# Patient Record
Sex: FEMALE | Race: BLACK OR AFRICAN AMERICAN | Employment: PART TIME
[De-identification: names, ages, dates, MRNs, and addresses within clinical notes are randomized per-mention and may not be internally consistent; named-entity substitution may affect disease eponyms.]

---

## 2017-01-05 ENCOUNTER — OFFICE VISIT (OUTPATIENT)
Dept: INTERNAL MEDICINE | Facility: CLINIC | Age: 47
End: 2017-01-05

## 2017-01-05 VITALS
BODY MASS INDEX: 37.11 KG/M2 | TEMPERATURE: 98.2 F | OXYGEN SATURATION: 99 % | RESPIRATION RATE: 14 BRPM | HEART RATE: 50 BPM | SYSTOLIC BLOOD PRESSURE: 124 MMHG | DIASTOLIC BLOOD PRESSURE: 79 MMHG | WEIGHT: 190 LBS

## 2017-01-05 DIAGNOSIS — I10 ESSENTIAL HYPERTENSION: ICD-10-CM

## 2017-01-05 DIAGNOSIS — J20.8 ACUTE BRONCHITIS DUE TO OTHER SPECIFIED ORGANISMS: Primary | ICD-10-CM

## 2017-01-05 DIAGNOSIS — H10.13 ALLERGIC CONJUNCTIVITIS, BILATERAL: ICD-10-CM

## 2017-01-05 PROCEDURE — 99213 OFFICE O/P EST LOW 20 MIN: CPT | Performed by: INTERNAL MEDICINE

## 2017-01-05 RX ORDER — OLOPATADINE HYDROCHLORIDE 1 MG/ML
1 SOLUTION/ DROPS OPHTHALMIC 2 TIMES DAILY
Qty: 1 BOTTLE | Refills: 0 | Status: SHIPPED | OUTPATIENT
Start: 2017-01-05 | End: 2017-01-06

## 2017-01-05 RX ORDER — AZITHROMYCIN 250 MG/1
TABLET, FILM COATED ORAL
Qty: 1 PACKET | Refills: 0 | Status: SHIPPED | OUTPATIENT
Start: 2017-01-05 | End: 2017-01-15

## 2017-01-05 RX ORDER — BENZONATATE 100 MG/1
100 CAPSULE ORAL 3 TIMES DAILY PRN
Qty: 30 CAPSULE | Refills: 0 | Status: SHIPPED | OUTPATIENT
Start: 2017-01-05 | End: 2017-01-12

## 2017-01-05 ASSESSMENT — PATIENT HEALTH QUESTIONNAIRE - PHQ9
6. FEELING BAD ABOUT YOURSELF - OR THAT YOU ARE A FAILURE OR HAVE LET YOURSELF OR YOUR FAMILY DOWN: 1
SUM OF ALL RESPONSES TO PHQ QUESTIONS 1-9: 18
SUM OF ALL RESPONSES TO PHQ9 QUESTIONS 1 & 2: 3
2. FEELING DOWN, DEPRESSED OR HOPELESS: 1
9. THOUGHTS THAT YOU WOULD BE BETTER OFF DEAD, OR OF HURTING YOURSELF: 0
3. TROUBLE FALLING OR STAYING ASLEEP: 3
4. FEELING TIRED OR HAVING LITTLE ENERGY: 3
8. MOVING OR SPEAKING SO SLOWLY THAT OTHER PEOPLE COULD HAVE NOTICED. OR THE OPPOSITE, BEING SO FIGETY OR RESTLESS THAT YOU HAVE BEEN MOVING AROUND A LOT MORE THAN USUAL: 2
7. TROUBLE CONCENTRATING ON THINGS, SUCH AS READING THE NEWSPAPER OR WATCHING TELEVISION: 3
5. POOR APPETITE OR OVEREATING: 3
10. IF YOU CHECKED OFF ANY PROBLEMS, HOW DIFFICULT HAVE THESE PROBLEMS MADE IT FOR YOU TO DO YOUR WORK, TAKE CARE OF THINGS AT HOME, OR GET ALONG WITH OTHER PEOPLE: 1
1. LITTLE INTEREST OR PLEASURE IN DOING THINGS: 2

## 2017-01-05 ASSESSMENT — ENCOUNTER SYMPTOMS
EYE ITCHING: 1
COUGH: 1
GASTROINTESTINAL NEGATIVE: 1
ALLERGIC/IMMUNOLOGIC NEGATIVE: 1

## 2017-01-06 ENCOUNTER — TELEPHONE (OUTPATIENT)
Dept: INTERNAL MEDICINE | Facility: CLINIC | Age: 47
End: 2017-01-06

## 2017-01-06 DIAGNOSIS — F32.89 OTHER DEPRESSION: Primary | ICD-10-CM

## 2017-01-06 RX ORDER — AZELASTINE HYDROCHLORIDE 0.5 MG/ML
1 SOLUTION/ DROPS OPHTHALMIC 2 TIMES DAILY
Qty: 1 BOTTLE | Refills: 0 | Status: SHIPPED | OUTPATIENT
Start: 2017-01-06 | End: 2017-01-30 | Stop reason: SDUPTHER

## 2017-01-30 RX ORDER — AZELASTINE HYDROCHLORIDE 0.5 MG/ML
1 SOLUTION/ DROPS OPHTHALMIC 2 TIMES DAILY
Qty: 1 BOTTLE | Refills: 0 | Status: SHIPPED | OUTPATIENT
Start: 2017-01-30 | End: 2017-05-01 | Stop reason: ALTCHOICE

## 2017-02-22 ENCOUNTER — TELEPHONE (OUTPATIENT)
Dept: INTERNAL MEDICINE | Facility: CLINIC | Age: 47
End: 2017-02-22

## 2017-02-22 ENCOUNTER — APPOINTMENT (OUTPATIENT)
Dept: GENERAL RADIOLOGY | Age: 47
End: 2017-02-22
Payer: MEDICARE

## 2017-02-22 ENCOUNTER — HOSPITAL ENCOUNTER (EMERGENCY)
Age: 47
Discharge: HOME | End: 2017-02-22
Attending: EMERGENCY MEDICINE
Payer: MEDICARE

## 2017-02-22 VITALS
BODY MASS INDEX: 37.89 KG/M2 | HEIGHT: 60 IN | TEMPERATURE: 97.9 F | DIASTOLIC BLOOD PRESSURE: 84 MMHG | SYSTOLIC BLOOD PRESSURE: 136 MMHG | WEIGHT: 193 LBS | RESPIRATION RATE: 15 BRPM | HEART RATE: 60 BPM | OXYGEN SATURATION: 100 %

## 2017-02-22 DIAGNOSIS — R07.9 CHEST PAIN, UNSPECIFIED TYPE: Primary | ICD-10-CM

## 2017-02-22 LAB
ABSOLUTE EOS #: 0.1 K/UL (ref 0–0.4)
ABSOLUTE LYMPH #: 2.2 K/UL (ref 1–4.8)
ABSOLUTE MONO #: 0.5 K/UL (ref 0.1–1.2)
ANION GAP SERPL CALCULATED.3IONS-SCNC: 13 MMOL/L (ref 9–17)
BASOPHILS # BLD: 1 % (ref 0–2)
BASOPHILS ABSOLUTE: 0 K/UL (ref 0–0.2)
BUN BLDV-MCNC: 11 MG/DL (ref 6–20)
BUN/CREAT BLD: ABNORMAL (ref 9–20)
CALCIUM SERPL-MCNC: 9.8 MG/DL (ref 8.6–10.4)
CHLORIDE BLD-SCNC: 102 MMOL/L (ref 98–107)
CO2: 26 MMOL/L (ref 20–31)
CREAT SERPL-MCNC: 0.81 MG/DL (ref 0.5–0.9)
D-DIMER QUANTITATIVE: 0.18 MG/L FEU
DIFFERENTIAL TYPE: ABNORMAL
EOSINOPHILS RELATIVE PERCENT: 2 % (ref 1–4)
GFR AFRICAN AMERICAN: >60 ML/MIN
GFR NON-AFRICAN AMERICAN: >60 ML/MIN
GFR SERPL CREATININE-BSD FRML MDRD: ABNORMAL ML/MIN/{1.73_M2}
GFR SERPL CREATININE-BSD FRML MDRD: ABNORMAL ML/MIN/{1.73_M2}
GLUCOSE BLD-MCNC: 105 MG/DL (ref 70–99)
HCT VFR BLD CALC: 35.4 % (ref 36–46)
HEMOGLOBIN: 11.5 G/DL (ref 12–16)
LYMPHOCYTES # BLD: 37 % (ref 24–44)
MCH RBC QN AUTO: 26.8 PG (ref 26–34)
MCHC RBC AUTO-ENTMCNC: 32.6 G/DL (ref 31–37)
MCV RBC AUTO: 82.3 FL (ref 80–100)
MONOCYTES # BLD: 8 % (ref 2–11)
PDW BLD-RTO: 15.1 % (ref 12.5–15.4)
PLATELET # BLD: 312 K/UL (ref 140–450)
PLATELET ESTIMATE: ABNORMAL
PMV BLD AUTO: 9.1 FL (ref 6–12)
POC TROPONIN I: 0 NG/ML (ref 0–0.1)
POC TROPONIN I: 0 NG/ML (ref 0–0.1)
POC TROPONIN INTERP: NORMAL
POC TROPONIN INTERP: NORMAL
POTASSIUM SERPL-SCNC: 4.5 MMOL/L (ref 3.7–5.3)
RBC # BLD: 4.3 M/UL (ref 4–5.2)
RBC # BLD: ABNORMAL 10*6/UL
SEG NEUTROPHILS: 52 % (ref 36–66)
SEGMENTED NEUTROPHILS ABSOLUTE COUNT: 3.2 K/UL (ref 1.8–7.7)
SODIUM BLD-SCNC: 141 MMOL/L (ref 135–144)
WBC # BLD: 6 K/UL (ref 3.5–11)
WBC # BLD: ABNORMAL 10*3/UL

## 2017-02-22 PROCEDURE — 6360000002 HC RX W HCPCS: Performed by: EMERGENCY MEDICINE

## 2017-02-22 PROCEDURE — 96376 TX/PRO/DX INJ SAME DRUG ADON: CPT

## 2017-02-22 PROCEDURE — 84484 ASSAY OF TROPONIN QUANT: CPT

## 2017-02-22 PROCEDURE — 6370000000 HC RX 637 (ALT 250 FOR IP): Performed by: EMERGENCY MEDICINE

## 2017-02-22 PROCEDURE — 80048 BASIC METABOLIC PNL TOTAL CA: CPT

## 2017-02-22 PROCEDURE — 85025 COMPLETE CBC W/AUTO DIFF WBC: CPT

## 2017-02-22 PROCEDURE — 85379 FIBRIN DEGRADATION QUANT: CPT

## 2017-02-22 PROCEDURE — 71020 XR CHEST STANDARD TWO VW: CPT

## 2017-02-22 PROCEDURE — 96374 THER/PROPH/DIAG INJ IV PUSH: CPT

## 2017-02-22 PROCEDURE — 99285 EMERGENCY DEPT VISIT HI MDM: CPT

## 2017-02-22 PROCEDURE — 93005 ELECTROCARDIOGRAM TRACING: CPT

## 2017-02-22 PROCEDURE — 71020 XR CHEST STANDARD TWO VW: CPT | Performed by: RADIOLOGY

## 2017-02-22 RX ORDER — MORPHINE SULFATE 4 MG/ML
4 INJECTION, SOLUTION INTRAMUSCULAR; INTRAVENOUS ONCE
Status: COMPLETED | OUTPATIENT
Start: 2017-02-22 | End: 2017-02-22

## 2017-02-22 RX ORDER — MORPHINE SULFATE 2 MG/ML
2 INJECTION, SOLUTION INTRAMUSCULAR; INTRAVENOUS ONCE
Status: COMPLETED | OUTPATIENT
Start: 2017-02-22 | End: 2017-02-22

## 2017-02-22 RX ORDER — ALPRAZOLAM 0.25 MG/1
0.5 TABLET ORAL ONCE
Status: COMPLETED | OUTPATIENT
Start: 2017-02-22 | End: 2017-02-22

## 2017-02-22 RX ORDER — ASPIRIN 81 MG/1
243 TABLET, CHEWABLE ORAL ONCE
Status: COMPLETED | OUTPATIENT
Start: 2017-02-22 | End: 2017-02-22

## 2017-02-22 RX ORDER — IBUPROFEN 600 MG/1
600 TABLET ORAL EVERY 6 HOURS PRN
Qty: 15 TABLET | Refills: 0 | Status: SHIPPED | OUTPATIENT
Start: 2017-02-22 | End: 2017-03-02 | Stop reason: ALTCHOICE

## 2017-02-22 RX ADMIN — ASPIRIN 81 MG 243 MG: 81 TABLET ORAL at 17:22

## 2017-02-22 RX ADMIN — MORPHINE SULFATE 4 MG: 4 INJECTION, SOLUTION INTRAMUSCULAR; INTRAVENOUS at 17:22

## 2017-02-22 RX ADMIN — ALPRAZOLAM 0.5 MG: 0.25 TABLET ORAL at 17:22

## 2017-02-22 RX ADMIN — MORPHINE SULFATE 2 MG: 2 INJECTION, SOLUTION INTRAMUSCULAR; INTRAVENOUS at 19:40

## 2017-02-22 ASSESSMENT — PAIN SCALES - GENERAL
PAINLEVEL_OUTOF10: 8
PAINLEVEL_OUTOF10: 10
PAINLEVEL_OUTOF10: 8

## 2017-02-22 ASSESSMENT — PAIN DESCRIPTION - DESCRIPTORS: DESCRIPTORS: PRESSURE

## 2017-02-22 ASSESSMENT — PAIN DESCRIPTION - FREQUENCY: FREQUENCY: CONTINUOUS

## 2017-02-22 ASSESSMENT — PAIN DESCRIPTION - ORIENTATION: ORIENTATION: MID

## 2017-02-22 ASSESSMENT — PAIN DESCRIPTION - LOCATION: LOCATION: CHEST

## 2017-02-22 ASSESSMENT — PAIN DESCRIPTION - PAIN TYPE: TYPE: ACUTE PAIN

## 2017-02-24 LAB
EKG ATRIAL RATE: 61 BPM
EKG P AXIS: 64 DEGREES
EKG P-R INTERVAL: 132 MS
EKG Q-T INTERVAL: 382 MS
EKG QRS DURATION: 80 MS
EKG QTC CALCULATION (BAZETT): 384 MS
EKG R AXIS: 11 DEGREES
EKG T AXIS: 18 DEGREES
EKG VENTRICULAR RATE: 61 BPM

## 2017-03-02 ENCOUNTER — OFFICE VISIT (OUTPATIENT)
Dept: INTERNAL MEDICINE | Facility: CLINIC | Age: 47
End: 2017-03-02

## 2017-03-02 VITALS
TEMPERATURE: 97.8 F | SYSTOLIC BLOOD PRESSURE: 147 MMHG | BODY MASS INDEX: 38.48 KG/M2 | OXYGEN SATURATION: 100 % | DIASTOLIC BLOOD PRESSURE: 86 MMHG | HEIGHT: 60 IN | WEIGHT: 196 LBS | HEART RATE: 56 BPM

## 2017-03-02 DIAGNOSIS — R20.2 NUMBNESS AND TINGLING: ICD-10-CM

## 2017-03-02 DIAGNOSIS — R13.12 DYSPHAGIA, OROPHARYNGEAL: ICD-10-CM

## 2017-03-02 DIAGNOSIS — Z98.890 S/P CARDIAC CATH: ICD-10-CM

## 2017-03-02 DIAGNOSIS — I10 ESSENTIAL HYPERTENSION: ICD-10-CM

## 2017-03-02 DIAGNOSIS — K21.00 CHRONIC REFLUX ESOPHAGITIS: ICD-10-CM

## 2017-03-02 DIAGNOSIS — E78.2 MIXED HYPERLIPIDEMIA: ICD-10-CM

## 2017-03-02 DIAGNOSIS — K21.9 GASTROESOPHAGEAL REFLUX DISEASE WITHOUT ESOPHAGITIS: ICD-10-CM

## 2017-03-02 DIAGNOSIS — R20.0 NUMBNESS AND TINGLING: ICD-10-CM

## 2017-03-02 PROCEDURE — 99213 OFFICE O/P EST LOW 20 MIN: CPT | Performed by: INTERNAL MEDICINE

## 2017-03-02 RX ORDER — CARVEDILOL 6.25 MG/1
6.25 TABLET ORAL 2 TIMES DAILY
Qty: 60 TABLET | Refills: 3 | Status: SHIPPED | OUTPATIENT
Start: 2017-03-02 | End: 2017-08-23 | Stop reason: SDUPTHER

## 2017-03-02 RX ORDER — ATORVASTATIN CALCIUM 20 MG/1
20 TABLET, FILM COATED ORAL DAILY
Qty: 30 TABLET | Refills: 3 | Status: SHIPPED | OUTPATIENT
Start: 2017-03-02 | End: 2017-08-23 | Stop reason: SDUPTHER

## 2017-03-02 RX ORDER — GABAPENTIN 100 MG/1
100 CAPSULE ORAL 3 TIMES DAILY
Qty: 90 CAPSULE | Refills: 3 | Status: SHIPPED | OUTPATIENT
Start: 2017-03-02 | End: 2019-04-12

## 2017-03-02 RX ORDER — VALSARTAN AND HYDROCHLOROTHIAZIDE 80; 12.5 MG/1; MG/1
1 TABLET, FILM COATED ORAL DAILY
Qty: 30 TABLET | Refills: 3 | Status: SHIPPED | OUTPATIENT
Start: 2017-03-02 | End: 2017-08-23 | Stop reason: SDUPTHER

## 2017-03-02 RX ORDER — OMEPRAZOLE 40 MG/1
40 CAPSULE, DELAYED RELEASE ORAL 2 TIMES DAILY
Qty: 60 CAPSULE | Refills: 3 | Status: SHIPPED | OUTPATIENT
Start: 2017-03-02 | End: 2017-10-05 | Stop reason: SDUPTHER

## 2017-03-02 ASSESSMENT — ENCOUNTER SYMPTOMS
EYES NEGATIVE: 1
GASTROINTESTINAL NEGATIVE: 1
ALLERGIC/IMMUNOLOGIC NEGATIVE: 1
RESPIRATORY NEGATIVE: 1

## 2017-03-28 ENCOUNTER — TELEPHONE (OUTPATIENT)
Dept: GASTROENTEROLOGY | Age: 47
End: 2017-03-28

## 2017-04-03 ENCOUNTER — TELEPHONE (OUTPATIENT)
Dept: GASTROENTEROLOGY | Age: 47
End: 2017-04-03

## 2017-04-10 ENCOUNTER — HOSPITAL ENCOUNTER (OUTPATIENT)
Age: 47
Discharge: HOME | End: 2017-04-10
Payer: MEDICARE

## 2017-04-10 ENCOUNTER — OFFICE VISIT (OUTPATIENT)
Dept: INTERNAL MEDICINE | Age: 47
End: 2017-04-10
Payer: MEDICARE

## 2017-04-10 ENCOUNTER — HOSPITAL ENCOUNTER (OUTPATIENT)
Dept: GENERAL RADIOLOGY | Age: 47
Discharge: HOME | End: 2017-04-10
Payer: MEDICARE

## 2017-04-10 VITALS
HEIGHT: 60 IN | HEART RATE: 58 BPM | SYSTOLIC BLOOD PRESSURE: 142 MMHG | WEIGHT: 198 LBS | DIASTOLIC BLOOD PRESSURE: 87 MMHG | BODY MASS INDEX: 38.87 KG/M2

## 2017-04-10 DIAGNOSIS — H04.123 DRY EYES: ICD-10-CM

## 2017-04-10 DIAGNOSIS — S83.207A ACUTE MENISCAL TEAR OF KNEE, LEFT, INITIAL ENCOUNTER: ICD-10-CM

## 2017-04-10 DIAGNOSIS — S83.207A ACUTE MENISCAL TEAR OF KNEE, LEFT, INITIAL ENCOUNTER: Primary | ICD-10-CM

## 2017-04-10 PROCEDURE — 99213 OFFICE O/P EST LOW 20 MIN: CPT | Performed by: INTERNAL MEDICINE

## 2017-04-10 PROCEDURE — 73562 X-RAY EXAM OF KNEE 3: CPT

## 2017-04-18 ENCOUNTER — TELEPHONE (OUTPATIENT)
Dept: INTERNAL MEDICINE | Age: 47
End: 2017-04-18

## 2017-05-01 ENCOUNTER — HOSPITAL ENCOUNTER (OUTPATIENT)
Age: 47
Setting detail: SPECIMEN
Discharge: HOME | End: 2017-05-01
Payer: MEDICARE

## 2017-05-01 ENCOUNTER — OFFICE VISIT (OUTPATIENT)
Dept: PULMONOLOGY | Age: 47
End: 2017-05-01
Payer: MEDICARE

## 2017-05-01 VITALS
WEIGHT: 196 LBS | BODY MASS INDEX: 38.48 KG/M2 | HEIGHT: 60 IN | RESPIRATION RATE: 12 BRPM | SYSTOLIC BLOOD PRESSURE: 169 MMHG | TEMPERATURE: 97.1 F | HEART RATE: 64 BPM | DIASTOLIC BLOOD PRESSURE: 85 MMHG | OXYGEN SATURATION: 97 %

## 2017-05-01 DIAGNOSIS — R06.83 SNORING: ICD-10-CM

## 2017-05-01 DIAGNOSIS — J30.89 PERENNIAL ALLERGIC RHINITIS, UNSPECIFIED ALLERGIC RHINITIS TRIGGER: ICD-10-CM

## 2017-05-01 DIAGNOSIS — J01.90 ACUTE RHINOSINUSITIS: ICD-10-CM

## 2017-05-01 DIAGNOSIS — E66.09 NON MORBID OBESITY DUE TO EXCESS CALORIES: ICD-10-CM

## 2017-05-01 DIAGNOSIS — J34.3 NASAL TURBINATE HYPERTROPHY: ICD-10-CM

## 2017-05-01 DIAGNOSIS — K21.9 GASTROESOPHAGEAL REFLUX DISEASE WITHOUT ESOPHAGITIS: ICD-10-CM

## 2017-05-01 DIAGNOSIS — J45.50 UNCOMPLICATED SEVERE PERSISTENT ASTHMA: Primary | ICD-10-CM

## 2017-05-01 LAB
ALBUMIN SERPL-MCNC: 4.4 G/DL (ref 3.5–5.2)
ALBUMIN/GLOBULIN RATIO: 1.4 (ref 1–2.5)
ALP BLD-CCNC: 50 U/L (ref 35–104)
ALT SERPL-CCNC: 18 U/L (ref 5–33)
AST SERPL-CCNC: 25 U/L
BILIRUB SERPL-MCNC: 0.31 MG/DL (ref 0.3–1.2)
BILIRUBIN DIRECT: <0.08 MG/DL
BILIRUBIN, INDIRECT: NORMAL MG/DL (ref 0–1)
GLOBULIN: NORMAL G/DL (ref 1.5–3.8)
TOTAL PROTEIN: 7.6 G/DL (ref 6.4–8.3)

## 2017-05-01 PROCEDURE — 36415 COLL VENOUS BLD VENIPUNCTURE: CPT

## 2017-05-01 PROCEDURE — 80076 HEPATIC FUNCTION PANEL: CPT

## 2017-05-01 PROCEDURE — 99214 OFFICE O/P EST MOD 30 MIN: CPT | Performed by: INTERNAL MEDICINE

## 2017-05-01 RX ORDER — ALBUTEROL SULFATE 90 UG/1
2 AEROSOL, METERED RESPIRATORY (INHALATION) EVERY 6 HOURS PRN
Qty: 1 INHALER | Refills: 11 | Status: SHIPPED | OUTPATIENT
Start: 2017-05-01 | End: 2018-06-26 | Stop reason: SDUPTHER

## 2017-05-01 RX ORDER — FLUTICASONE PROPIONATE 50 MCG
1 SPRAY, SUSPENSION (ML) NASAL DAILY
Qty: 1 BOTTLE | Refills: 6 | Status: SHIPPED | OUTPATIENT
Start: 2017-05-01 | End: 2018-02-19 | Stop reason: SDUPTHER

## 2017-05-02 ENCOUNTER — HOSPITAL ENCOUNTER (EMERGENCY)
Age: 47
Discharge: HOME | End: 2017-05-02
Attending: EMERGENCY MEDICINE
Payer: MEDICARE

## 2017-05-02 ENCOUNTER — APPOINTMENT (OUTPATIENT)
Dept: GENERAL RADIOLOGY | Age: 47
End: 2017-05-02
Payer: MEDICARE

## 2017-05-02 VITALS
SYSTOLIC BLOOD PRESSURE: 133 MMHG | WEIGHT: 196 LBS | BODY MASS INDEX: 38.28 KG/M2 | OXYGEN SATURATION: 100 % | DIASTOLIC BLOOD PRESSURE: 65 MMHG | HEART RATE: 57 BPM | TEMPERATURE: 97.3 F | RESPIRATION RATE: 13 BRPM

## 2017-05-02 DIAGNOSIS — R10.13 ABDOMINAL PAIN, EPIGASTRIC: Primary | ICD-10-CM

## 2017-05-02 LAB
ABSOLUTE EOS #: 0.1 K/UL (ref 0–0.4)
ABSOLUTE LYMPH #: 3.3 K/UL (ref 1–4.8)
ABSOLUTE MONO #: 0.7 K/UL (ref 0.1–1.2)
ALBUMIN SERPL-MCNC: 4.3 G/DL (ref 3.5–5.2)
ALBUMIN/GLOBULIN RATIO: 1.4 (ref 1–2.5)
ALP BLD-CCNC: 49 U/L (ref 35–104)
ALT SERPL-CCNC: 16 U/L (ref 5–33)
ANION GAP SERPL CALCULATED.3IONS-SCNC: 13 MMOL/L (ref 9–17)
AST SERPL-CCNC: 17 U/L
BASOPHILS # BLD: 0 %
BASOPHILS ABSOLUTE: 0 K/UL (ref 0–0.2)
BILIRUB SERPL-MCNC: 0.55 MG/DL (ref 0.3–1.2)
BILIRUBIN DIRECT: 0.12 MG/DL
BILIRUBIN, INDIRECT: 0.43 MG/DL (ref 0–1)
BUN BLDV-MCNC: 8 MG/DL (ref 6–20)
BUN/CREAT BLD: ABNORMAL (ref 9–20)
CALCIUM SERPL-MCNC: 10 MG/DL (ref 8.6–10.4)
CHLORIDE BLD-SCNC: 102 MMOL/L (ref 98–107)
CO2: 25 MMOL/L (ref 20–31)
CREAT SERPL-MCNC: 0.75 MG/DL (ref 0.5–0.9)
DIFFERENTIAL TYPE: ABNORMAL
EOSINOPHILS RELATIVE PERCENT: 1 %
GFR AFRICAN AMERICAN: >60 ML/MIN
GFR NON-AFRICAN AMERICAN: >60 ML/MIN
GFR SERPL CREATININE-BSD FRML MDRD: ABNORMAL ML/MIN/{1.73_M2}
GFR SERPL CREATININE-BSD FRML MDRD: ABNORMAL ML/MIN/{1.73_M2}
GLOBULIN: NORMAL G/DL (ref 1.5–3.8)
GLUCOSE BLD-MCNC: 110 MG/DL (ref 70–99)
HCG QUALITATIVE: NEGATIVE
HCT VFR BLD CALC: 35.4 % (ref 36–46)
HEMOGLOBIN: 11.5 G/DL (ref 12–16)
LIPASE: 21 U/L (ref 13–60)
LYMPHOCYTES # BLD: 38 %
MCH RBC QN AUTO: 26.4 PG (ref 26–34)
MCHC RBC AUTO-ENTMCNC: 32.5 G/DL (ref 31–37)
MCV RBC AUTO: 81.1 FL (ref 80–100)
MONOCYTES # BLD: 8 %
PDW BLD-RTO: 17.3 % (ref 12.5–15.4)
PLATELET # BLD: 284 K/UL (ref 140–450)
PLATELET ESTIMATE: ABNORMAL
PMV BLD AUTO: 9.2 FL (ref 6–12)
POC TROPONIN I: 0 NG/ML (ref 0–0.1)
POC TROPONIN I: 0 NG/ML (ref 0–0.1)
POC TROPONIN INTERP: NORMAL
POC TROPONIN INTERP: NORMAL
POTASSIUM SERPL-SCNC: 4.7 MMOL/L (ref 3.7–5.3)
RBC # BLD: 4.36 M/UL (ref 4–5.2)
RBC # BLD: ABNORMAL 10*6/UL
SEG NEUTROPHILS: 53 %
SEGMENTED NEUTROPHILS ABSOLUTE COUNT: 4.4 K/UL (ref 1.8–7.7)
SODIUM BLD-SCNC: 140 MMOL/L (ref 135–144)
TOTAL PROTEIN: 7.4 G/DL (ref 6.4–8.3)
WBC # BLD: 8.5 K/UL (ref 3.5–11)
WBC # BLD: ABNORMAL 10*3/UL

## 2017-05-02 PROCEDURE — 96375 TX/PRO/DX INJ NEW DRUG ADDON: CPT

## 2017-05-02 PROCEDURE — 80048 BASIC METABOLIC PNL TOTAL CA: CPT

## 2017-05-02 PROCEDURE — 6360000002 HC RX W HCPCS: Performed by: EMERGENCY MEDICINE

## 2017-05-02 PROCEDURE — 99284 EMERGENCY DEPT VISIT MOD MDM: CPT

## 2017-05-02 PROCEDURE — 6370000000 HC RX 637 (ALT 250 FOR IP): Performed by: EMERGENCY MEDICINE

## 2017-05-02 PROCEDURE — 85025 COMPLETE CBC W/AUTO DIFF WBC: CPT

## 2017-05-02 PROCEDURE — 80076 HEPATIC FUNCTION PANEL: CPT

## 2017-05-02 PROCEDURE — 96374 THER/PROPH/DIAG INJ IV PUSH: CPT

## 2017-05-02 PROCEDURE — 84484 ASSAY OF TROPONIN QUANT: CPT

## 2017-05-02 PROCEDURE — 71020 XR CHEST STANDARD TWO VW: CPT

## 2017-05-02 PROCEDURE — 83690 ASSAY OF LIPASE: CPT

## 2017-05-02 PROCEDURE — 93005 ELECTROCARDIOGRAM TRACING: CPT

## 2017-05-02 PROCEDURE — 84703 CHORIONIC GONADOTROPIN ASSAY: CPT

## 2017-05-02 RX ORDER — ONDANSETRON 2 MG/ML
4 INJECTION INTRAMUSCULAR; INTRAVENOUS ONCE
Status: COMPLETED | OUTPATIENT
Start: 2017-05-02 | End: 2017-05-02

## 2017-05-02 RX ORDER — MAGNESIUM HYDROXIDE/ALUMINUM HYDROXICE/SIMETHICONE 120; 1200; 1200 MG/30ML; MG/30ML; MG/30ML
30 SUSPENSION ORAL ONCE
Status: COMPLETED | OUTPATIENT
Start: 2017-05-02 | End: 2017-05-02

## 2017-05-02 RX ORDER — ONDANSETRON 2 MG/ML
4 INJECTION INTRAMUSCULAR; INTRAVENOUS EVERY 12 HOURS SCHEDULED
Qty: 8 ML | Refills: 0 | Status: SHIPPED | OUTPATIENT
Start: 2017-05-02 | End: 2017-05-04

## 2017-05-02 RX ORDER — FENTANYL CITRATE 50 UG/ML
50 INJECTION, SOLUTION INTRAMUSCULAR; INTRAVENOUS ONCE
Status: COMPLETED | OUTPATIENT
Start: 2017-05-02 | End: 2017-05-02

## 2017-05-02 RX ORDER — KETOROLAC TROMETHAMINE 30 MG/ML
30 INJECTION, SOLUTION INTRAMUSCULAR; INTRAVENOUS ONCE
Status: COMPLETED | OUTPATIENT
Start: 2017-05-02 | End: 2017-05-02

## 2017-05-02 RX ADMIN — KETOROLAC TROMETHAMINE 30 MG: 30 INJECTION, SOLUTION INTRAMUSCULAR at 18:59

## 2017-05-02 RX ADMIN — ONDANSETRON 4 MG: 2 INJECTION, SOLUTION INTRAMUSCULAR; INTRAVENOUS at 16:31

## 2017-05-02 RX ADMIN — ALUMINUM HYDROXIDE, MAGNESIUM HYDROXIDE, AND SIMETHICONE 30 ML: 200; 200; 20 SUSPENSION ORAL at 16:31

## 2017-05-02 RX ADMIN — FENTANYL CITRATE 50 MCG: 50 INJECTION INTRAMUSCULAR; INTRAVENOUS at 16:42

## 2017-05-02 ASSESSMENT — PAIN SCALES - GENERAL
PAINLEVEL_OUTOF10: 7
PAINLEVEL_OUTOF10: 8
PAINLEVEL_OUTOF10: 8

## 2017-05-02 ASSESSMENT — PAIN DESCRIPTION - DESCRIPTORS: DESCRIPTORS: SHARP

## 2017-05-02 ASSESSMENT — ENCOUNTER SYMPTOMS
NAUSEA: 1
DIARRHEA: 0
EYE DISCHARGE: 0
COUGH: 0
SORE THROAT: 0
ABDOMINAL PAIN: 1
SHORTNESS OF BREATH: 1
VOMITING: 1
EYE PAIN: 0

## 2017-05-02 ASSESSMENT — PAIN DESCRIPTION - ORIENTATION: ORIENTATION: LEFT

## 2017-05-02 ASSESSMENT — PAIN DESCRIPTION - LOCATION: LOCATION: CHEST

## 2017-05-02 ASSESSMENT — HEART SCORE: ECG: 0

## 2017-05-02 ASSESSMENT — PAIN DESCRIPTION - PAIN TYPE: TYPE: ACUTE PAIN

## 2017-05-04 LAB
EKG ATRIAL RATE: 61 BPM
EKG P AXIS: 87 DEGREES
EKG P-R INTERVAL: 176 MS
EKG Q-T INTERVAL: 392 MS
EKG QRS DURATION: 74 MS
EKG QTC CALCULATION (BAZETT): 394 MS
EKG R AXIS: 51 DEGREES
EKG T AXIS: 51 DEGREES
EKG VENTRICULAR RATE: 61 BPM

## 2017-05-26 ENCOUNTER — TELEPHONE (OUTPATIENT)
Dept: INTERNAL MEDICINE | Age: 47
End: 2017-05-26

## 2017-05-30 DIAGNOSIS — Z98.890 S/P CARDIAC CATH: ICD-10-CM

## 2017-05-30 RX ORDER — ASPIRIN 81 MG/1
TABLET ORAL
Qty: 30 TABLET | Refills: 3 | Status: SHIPPED | OUTPATIENT
Start: 2017-05-30 | End: 2017-10-05 | Stop reason: SDUPTHER

## 2017-06-02 DIAGNOSIS — M25.561 RIGHT KNEE PAIN, UNSPECIFIED CHRONICITY: Primary | ICD-10-CM

## 2017-06-12 ENCOUNTER — TELEPHONE (OUTPATIENT)
Dept: INTERNAL MEDICINE | Age: 47
End: 2017-06-12

## 2017-06-13 ENCOUNTER — TELEPHONE (OUTPATIENT)
Dept: INTERNAL MEDICINE | Age: 47
End: 2017-06-13

## 2017-06-30 DIAGNOSIS — M25.552 LEFT HIP PAIN: ICD-10-CM

## 2017-06-30 RX ORDER — IBUPROFEN 800 MG/1
TABLET ORAL
Qty: 30 TABLET | Refills: 3 | Status: SHIPPED | OUTPATIENT
Start: 2017-06-30 | End: 2017-08-20 | Stop reason: SDUPTHER

## 2017-07-17 ENCOUNTER — HOSPITAL ENCOUNTER (OUTPATIENT)
Age: 47
Setting detail: OBSERVATION
Discharge: HOME | End: 2017-07-18
Attending: EMERGENCY MEDICINE | Admitting: EMERGENCY MEDICINE
Payer: MEDICARE

## 2017-07-17 ENCOUNTER — APPOINTMENT (OUTPATIENT)
Dept: GENERAL RADIOLOGY | Age: 47
End: 2017-07-17
Payer: MEDICARE

## 2017-07-17 DIAGNOSIS — R13.14 PHARYNGOESOPHAGEAL DYSPHAGIA: Primary | ICD-10-CM

## 2017-07-17 DIAGNOSIS — R51.9 HEADACHE, UNSPECIFIED HEADACHE TYPE: ICD-10-CM

## 2017-07-17 LAB
ANION GAP SERPL CALCULATED.3IONS-SCNC: 12 MMOL/L (ref 9–17)
BUN BLDV-MCNC: 9 MG/DL (ref 6–20)
BUN/CREAT BLD: ABNORMAL (ref 9–20)
CALCIUM SERPL-MCNC: 9 MG/DL (ref 8.6–10.4)
CHLORIDE BLD-SCNC: 101 MMOL/L (ref 98–107)
CO2: 24 MMOL/L (ref 20–31)
CREAT SERPL-MCNC: 0.76 MG/DL (ref 0.5–0.9)
GFR AFRICAN AMERICAN: >60 ML/MIN
GFR NON-AFRICAN AMERICAN: >60 ML/MIN
GFR SERPL CREATININE-BSD FRML MDRD: ABNORMAL ML/MIN/{1.73_M2}
GFR SERPL CREATININE-BSD FRML MDRD: ABNORMAL ML/MIN/{1.73_M2}
GLUCOSE BLD-MCNC: 109 MG/DL (ref 70–99)
HCT VFR BLD CALC: 33.6 % (ref 36–46)
HEMOGLOBIN: 10.9 G/DL (ref 12–16)
INR BLD: 1
MCH RBC QN AUTO: 26.3 PG (ref 26–34)
MCHC RBC AUTO-ENTMCNC: 32.5 G/DL (ref 31–37)
MCV RBC AUTO: 80.9 FL (ref 80–100)
PDW BLD-RTO: 17.1 % (ref 12.5–15.4)
PLATELET # BLD: 301 K/UL (ref 140–450)
PMV BLD AUTO: 8.9 FL (ref 6–12)
POTASSIUM SERPL-SCNC: 3.8 MMOL/L (ref 3.7–5.3)
PROTHROMBIN TIME: 11.3 SEC (ref 9.4–12.6)
RBC # BLD: 4.15 M/UL (ref 4–5.2)
SODIUM BLD-SCNC: 137 MMOL/L (ref 135–144)
WBC # BLD: 7 K/UL (ref 3.5–11)

## 2017-07-17 PROCEDURE — 71020 XR CHEST STANDARD TWO VW: CPT

## 2017-07-17 PROCEDURE — 85027 COMPLETE CBC AUTOMATED: CPT

## 2017-07-17 PROCEDURE — G0378 HOSPITAL OBSERVATION PER HR: HCPCS

## 2017-07-17 PROCEDURE — 6370000000 HC RX 637 (ALT 250 FOR IP): Performed by: EMERGENCY MEDICINE

## 2017-07-17 PROCEDURE — 80048 BASIC METABOLIC PNL TOTAL CA: CPT

## 2017-07-17 PROCEDURE — 85610 PROTHROMBIN TIME: CPT

## 2017-07-17 PROCEDURE — 6360000002 HC RX W HCPCS: Performed by: STUDENT IN AN ORGANIZED HEALTH CARE EDUCATION/TRAINING PROGRAM

## 2017-07-17 PROCEDURE — 96376 TX/PRO/DX INJ SAME DRUG ADON: CPT

## 2017-07-17 PROCEDURE — 99285 EMERGENCY DEPT VISIT HI MDM: CPT

## 2017-07-17 PROCEDURE — 2580000003 HC RX 258: Performed by: STUDENT IN AN ORGANIZED HEALTH CARE EDUCATION/TRAINING PROGRAM

## 2017-07-17 PROCEDURE — 96374 THER/PROPH/DIAG INJ IV PUSH: CPT

## 2017-07-17 RX ORDER — 0.9 % SODIUM CHLORIDE 0.9 %
1000 INTRAVENOUS SOLUTION INTRAVENOUS ONCE
Status: COMPLETED | OUTPATIENT
Start: 2017-07-17 | End: 2017-07-17

## 2017-07-17 RX ORDER — SODIUM CHLORIDE 0.9 % (FLUSH) 0.9 %
10 SYRINGE (ML) INJECTION PRN
Status: DISCONTINUED | OUTPATIENT
Start: 2017-07-17 | End: 2017-07-18 | Stop reason: HOSPADM

## 2017-07-17 RX ORDER — ACETAMINOPHEN 325 MG/1
650 TABLET ORAL EVERY 4 HOURS PRN
Status: DISCONTINUED | OUTPATIENT
Start: 2017-07-17 | End: 2017-07-18 | Stop reason: HOSPADM

## 2017-07-17 RX ORDER — VALSARTAN 80 MG/1
80 TABLET ORAL DAILY
Status: DISCONTINUED | OUTPATIENT
Start: 2017-07-17 | End: 2017-07-18 | Stop reason: HOSPADM

## 2017-07-17 RX ORDER — FLUTICASONE PROPIONATE 50 MCG
1 SPRAY, SUSPENSION (ML) NASAL DAILY
Status: DISCONTINUED | OUTPATIENT
Start: 2017-07-17 | End: 2017-07-18 | Stop reason: HOSPADM

## 2017-07-17 RX ORDER — PANTOPRAZOLE SODIUM 40 MG/1
40 TABLET, DELAYED RELEASE ORAL
Status: DISCONTINUED | OUTPATIENT
Start: 2017-07-18 | End: 2017-07-18 | Stop reason: HOSPADM

## 2017-07-17 RX ORDER — ATORVASTATIN CALCIUM 20 MG/1
20 TABLET, FILM COATED ORAL NIGHTLY
Status: DISCONTINUED | OUTPATIENT
Start: 2017-07-17 | End: 2017-07-18 | Stop reason: HOSPADM

## 2017-07-17 RX ORDER — DOCUSATE SODIUM 100 MG/1
100 CAPSULE, LIQUID FILLED ORAL 2 TIMES DAILY
Status: DISCONTINUED | OUTPATIENT
Start: 2017-07-17 | End: 2017-07-18 | Stop reason: HOSPADM

## 2017-07-17 RX ORDER — GABAPENTIN 100 MG/1
100 CAPSULE ORAL 3 TIMES DAILY
Status: DISCONTINUED | OUTPATIENT
Start: 2017-07-17 | End: 2017-07-18 | Stop reason: HOSPADM

## 2017-07-17 RX ORDER — SODIUM CHLORIDE 0.9 % (FLUSH) 0.9 %
10 SYRINGE (ML) INJECTION EVERY 12 HOURS SCHEDULED
Status: DISCONTINUED | OUTPATIENT
Start: 2017-07-17 | End: 2017-07-18 | Stop reason: HOSPADM

## 2017-07-17 RX ORDER — ASPIRIN 81 MG/1
81 TABLET ORAL DAILY
Status: DISCONTINUED | OUTPATIENT
Start: 2017-07-18 | End: 2017-07-18 | Stop reason: HOSPADM

## 2017-07-17 RX ORDER — KETOROLAC TROMETHAMINE 30 MG/ML
30 INJECTION, SOLUTION INTRAMUSCULAR; INTRAVENOUS EVERY 6 HOURS PRN
Status: DISCONTINUED | OUTPATIENT
Start: 2017-07-17 | End: 2017-07-18 | Stop reason: HOSPADM

## 2017-07-17 RX ORDER — KETOROLAC TROMETHAMINE 30 MG/ML
30 INJECTION, SOLUTION INTRAMUSCULAR; INTRAVENOUS ONCE
Status: COMPLETED | OUTPATIENT
Start: 2017-07-17 | End: 2017-07-17

## 2017-07-17 RX ORDER — ALBUTEROL SULFATE 90 UG/1
2 AEROSOL, METERED RESPIRATORY (INHALATION) EVERY 6 HOURS PRN
Status: DISCONTINUED | OUTPATIENT
Start: 2017-07-17 | End: 2017-07-18 | Stop reason: HOSPADM

## 2017-07-17 RX ORDER — CARVEDILOL 6.25 MG/1
6.25 TABLET ORAL 2 TIMES DAILY WITH MEALS
Status: DISCONTINUED | OUTPATIENT
Start: 2017-07-17 | End: 2017-07-18 | Stop reason: HOSPADM

## 2017-07-17 RX ADMIN — Medication 10 ML: at 21:37

## 2017-07-17 RX ADMIN — DOCUSATE SODIUM 100 MG: 100 CAPSULE ORAL at 21:36

## 2017-07-17 RX ADMIN — GABAPENTIN 100 MG: 100 CAPSULE ORAL at 21:36

## 2017-07-17 RX ADMIN — SODIUM CHLORIDE 1000 ML: 9 INJECTION, SOLUTION INTRAVENOUS at 10:20

## 2017-07-17 RX ADMIN — KETOROLAC TROMETHAMINE 30 MG: 30 INJECTION, SOLUTION INTRAMUSCULAR; INTRAVENOUS at 23:43

## 2017-07-17 RX ADMIN — CARVEDILOL 6.25 MG: 6.25 TABLET, FILM COATED ORAL at 21:36

## 2017-07-17 RX ADMIN — KETOROLAC TROMETHAMINE 30 MG: 30 INJECTION, SOLUTION INTRAMUSCULAR at 10:20

## 2017-07-17 RX ADMIN — KETOROLAC TROMETHAMINE 30 MG: 30 INJECTION, SOLUTION INTRAMUSCULAR; INTRAVENOUS at 12:59

## 2017-07-17 RX ADMIN — VALSARTAN 80 MG: 80 TABLET ORAL at 21:36

## 2017-07-17 RX ADMIN — FLUTICASONE PROPIONATE 1 SPRAY: 50 SPRAY, METERED NASAL at 21:36

## 2017-07-17 RX ADMIN — ATORVASTATIN CALCIUM 20 MG: 20 TABLET, FILM COATED ORAL at 21:36

## 2017-07-17 ASSESSMENT — PAIN DESCRIPTION - FREQUENCY
FREQUENCY: CONTINUOUS
FREQUENCY: INTERMITTENT

## 2017-07-17 ASSESSMENT — ENCOUNTER SYMPTOMS
GASTROINTESTINAL NEGATIVE: 1
ALLERGIC/IMMUNOLOGIC NEGATIVE: 1
SORE THROAT: 1
RESPIRATORY NEGATIVE: 1
TROUBLE SWALLOWING: 1
EYES NEGATIVE: 1

## 2017-07-17 ASSESSMENT — PAIN DESCRIPTION - DESCRIPTORS
DESCRIPTORS: THROBBING
DESCRIPTORS: SORE;SHARP

## 2017-07-17 ASSESSMENT — PAIN DESCRIPTION - PAIN TYPE
TYPE: ACUTE PAIN
TYPE: ACUTE PAIN

## 2017-07-17 ASSESSMENT — PAIN DESCRIPTION - LOCATION
LOCATION: THROAT
LOCATION: HEAD;THROAT

## 2017-07-17 ASSESSMENT — PAIN SCALES - GENERAL
PAINLEVEL_OUTOF10: 6
PAINLEVEL_OUTOF10: 6
PAINLEVEL_OUTOF10: 5
PAINLEVEL_OUTOF10: 6
PAINLEVEL_OUTOF10: 5
PAINLEVEL_OUTOF10: 5
PAINLEVEL_OUTOF10: 7
PAINLEVEL_OUTOF10: 8

## 2017-07-17 ASSESSMENT — PAIN DESCRIPTION - PROGRESSION
CLINICAL_PROGRESSION: NOT CHANGED
CLINICAL_PROGRESSION: NOT CHANGED
CLINICAL_PROGRESSION: GRADUALLY WORSENING
CLINICAL_PROGRESSION: NOT CHANGED

## 2017-07-18 ENCOUNTER — ANESTHESIA EVENT (OUTPATIENT)
Dept: ENDOSCOPY | Age: 47
End: 2017-07-18
Payer: MEDICARE

## 2017-07-18 ENCOUNTER — ANESTHESIA (OUTPATIENT)
Dept: ENDOSCOPY | Age: 47
End: 2017-07-18
Payer: MEDICARE

## 2017-07-18 VITALS
HEART RATE: 71 BPM | BODY MASS INDEX: 42.33 KG/M2 | OXYGEN SATURATION: 98 % | TEMPERATURE: 98.7 F | DIASTOLIC BLOOD PRESSURE: 61 MMHG | RESPIRATION RATE: 15 BRPM | WEIGHT: 215.6 LBS | HEIGHT: 60 IN | SYSTOLIC BLOOD PRESSURE: 113 MMHG

## 2017-07-18 VITALS
DIASTOLIC BLOOD PRESSURE: 60 MMHG | SYSTOLIC BLOOD PRESSURE: 110 MMHG | RESPIRATION RATE: 11 BRPM | OXYGEN SATURATION: 99 %

## 2017-07-18 PROCEDURE — G0378 HOSPITAL OBSERVATION PER HR: HCPCS

## 2017-07-18 PROCEDURE — 3609012700 HC EGD DILATION SAVORY: Performed by: INTERNAL MEDICINE

## 2017-07-18 PROCEDURE — 3700000000 HC ANESTHESIA ATTENDED CARE: Performed by: INTERNAL MEDICINE

## 2017-07-18 PROCEDURE — 6360000002 HC RX W HCPCS: Performed by: STUDENT IN AN ORGANIZED HEALTH CARE EDUCATION/TRAINING PROGRAM

## 2017-07-18 PROCEDURE — 96376 TX/PRO/DX INJ SAME DRUG ADON: CPT

## 2017-07-18 PROCEDURE — 6370000000 HC RX 637 (ALT 250 FOR IP): Performed by: EMERGENCY MEDICINE

## 2017-07-18 PROCEDURE — 7100000011 HC PHASE II RECOVERY - ADDTL 15 MIN: Performed by: INTERNAL MEDICINE

## 2017-07-18 PROCEDURE — 2500000003 HC RX 250 WO HCPCS: Performed by: NURSE ANESTHETIST, CERTIFIED REGISTERED

## 2017-07-18 PROCEDURE — 7100000010 HC PHASE II RECOVERY - FIRST 15 MIN: Performed by: INTERNAL MEDICINE

## 2017-07-18 PROCEDURE — 2580000003 HC RX 258: Performed by: STUDENT IN AN ORGANIZED HEALTH CARE EDUCATION/TRAINING PROGRAM

## 2017-07-18 PROCEDURE — 96375 TX/PRO/DX INJ NEW DRUG ADDON: CPT

## 2017-07-18 PROCEDURE — 6370000000 HC RX 637 (ALT 250 FOR IP): Performed by: STUDENT IN AN ORGANIZED HEALTH CARE EDUCATION/TRAINING PROGRAM

## 2017-07-18 PROCEDURE — 3700000001 HC ADD 15 MINUTES (ANESTHESIA): Performed by: INTERNAL MEDICINE

## 2017-07-18 PROCEDURE — 6360000002 HC RX W HCPCS: Performed by: NURSE ANESTHETIST, CERTIFIED REGISTERED

## 2017-07-18 PROCEDURE — C1769 GUIDE WIRE: HCPCS | Performed by: INTERNAL MEDICINE

## 2017-07-18 RX ORDER — PROPOFOL 10 MG/ML
INJECTION, EMULSION INTRAVENOUS PRN
Status: DISCONTINUED | OUTPATIENT
Start: 2017-07-18 | End: 2017-07-18 | Stop reason: SDUPTHER

## 2017-07-18 RX ORDER — FENTANYL CITRATE 50 UG/ML
25 INJECTION, SOLUTION INTRAMUSCULAR; INTRAVENOUS ONCE
Status: COMPLETED | OUTPATIENT
Start: 2017-07-18 | End: 2017-07-18

## 2017-07-18 RX ORDER — LIDOCAINE HYDROCHLORIDE 40 MG/ML
4 SOLUTION TOPICAL ONCE
Status: DISCONTINUED | OUTPATIENT
Start: 2017-07-18 | End: 2017-07-18

## 2017-07-18 RX ORDER — HYDROCODONE BITARTRATE AND ACETAMINOPHEN 5; 325 MG/1; MG/1
1 TABLET ORAL EVERY 6 HOURS PRN
Qty: 10 TABLET | Refills: 0 | Status: SHIPPED | OUTPATIENT
Start: 2017-07-18 | End: 2017-07-25

## 2017-07-18 RX ORDER — LIDOCAINE HYDROCHLORIDE 10 MG/ML
INJECTION, SOLUTION INFILTRATION; PERINEURAL PRN
Status: DISCONTINUED | OUTPATIENT
Start: 2017-07-18 | End: 2017-07-18 | Stop reason: SDUPTHER

## 2017-07-18 RX ADMIN — DOCUSATE SODIUM 100 MG: 100 CAPSULE ORAL at 12:40

## 2017-07-18 RX ADMIN — KETOROLAC TROMETHAMINE 30 MG: 30 INJECTION, SOLUTION INTRAMUSCULAR; INTRAVENOUS at 12:40

## 2017-07-18 RX ADMIN — GABAPENTIN 100 MG: 100 CAPSULE ORAL at 12:40

## 2017-07-18 RX ADMIN — Medication 10 ML: at 08:15

## 2017-07-18 RX ADMIN — LIDOCAINE HYDROCHLORIDE 35 MG: 10 INJECTION, SOLUTION INFILTRATION; PERINEURAL at 10:03

## 2017-07-18 RX ADMIN — MAGNESIUM HYDROXIDE 30 ML: 400 SUSPENSION ORAL at 12:39

## 2017-07-18 RX ADMIN — FENTANYL CITRATE 25 MCG: 50 INJECTION, SOLUTION INTRAMUSCULAR; INTRAVENOUS at 12:40

## 2017-07-18 RX ADMIN — LIDOCAINE HYDROCHLORIDE 5 ML: 20 SOLUTION ORAL; TOPICAL at 16:05

## 2017-07-18 RX ADMIN — PROPOFOL 180 MG: 10 INJECTION, EMULSION INTRAVENOUS at 10:03

## 2017-07-18 RX ADMIN — KETOROLAC TROMETHAMINE 30 MG: 30 INJECTION, SOLUTION INTRAMUSCULAR; INTRAVENOUS at 06:19

## 2017-07-18 RX ADMIN — CARVEDILOL 6.25 MG: 6.25 TABLET, FILM COATED ORAL at 12:41

## 2017-07-18 RX ADMIN — FLUTICASONE PROPIONATE 1 SPRAY: 50 SPRAY, METERED NASAL at 12:40

## 2017-07-18 RX ADMIN — ASPIRIN 81 MG: 81 TABLET, COATED ORAL at 12:41

## 2017-07-18 RX ADMIN — PANTOPRAZOLE SODIUM 40 MG: 40 TABLET, DELAYED RELEASE ORAL at 12:41

## 2017-07-18 RX ADMIN — VALSARTAN 80 MG: 80 TABLET ORAL at 12:40

## 2017-07-18 ASSESSMENT — PAIN SCALES - GENERAL
PAINLEVEL_OUTOF10: 0
PAINLEVEL_OUTOF10: 5
PAINLEVEL_OUTOF10: 0
PAINLEVEL_OUTOF10: 6
PAINLEVEL_OUTOF10: 6
PAINLEVEL_OUTOF10: 4
PAINLEVEL_OUTOF10: 0
PAINLEVEL_OUTOF10: 0
PAINLEVEL_OUTOF10: 6
PAINLEVEL_OUTOF10: 0
PAINLEVEL_OUTOF10: 4
PAINLEVEL_OUTOF10: 0
PAINLEVEL_OUTOF10: 4
PAINLEVEL_OUTOF10: 0

## 2017-07-18 ASSESSMENT — PAIN - FUNCTIONAL ASSESSMENT: PAIN_FUNCTIONAL_ASSESSMENT: 0-10

## 2017-07-18 ASSESSMENT — PAIN DESCRIPTION - PROGRESSION
CLINICAL_PROGRESSION: GRADUALLY IMPROVING
CLINICAL_PROGRESSION: NOT CHANGED

## 2017-07-18 ASSESSMENT — PAIN DESCRIPTION - ORIENTATION: ORIENTATION: MID

## 2017-07-18 ASSESSMENT — PAIN DESCRIPTION - DESCRIPTORS
DESCRIPTORS: ACHING
DESCRIPTORS: THROBBING

## 2017-07-18 ASSESSMENT — PAIN DESCRIPTION - PAIN TYPE: TYPE: ACUTE PAIN

## 2017-07-18 ASSESSMENT — PAIN DESCRIPTION - LOCATION: LOCATION: HEAD

## 2017-07-19 ENCOUNTER — TELEPHONE (OUTPATIENT)
Dept: INTERNAL MEDICINE | Age: 47
End: 2017-07-19

## 2017-08-07 ENCOUNTER — APPOINTMENT (OUTPATIENT)
Dept: GENERAL RADIOLOGY | Age: 47
End: 2017-08-07
Payer: MEDICARE

## 2017-08-07 ENCOUNTER — HOSPITAL ENCOUNTER (EMERGENCY)
Age: 47
Discharge: HOME | End: 2017-08-07
Attending: EMERGENCY MEDICINE
Payer: MEDICARE

## 2017-08-07 VITALS
HEART RATE: 63 BPM | WEIGHT: 199 LBS | SYSTOLIC BLOOD PRESSURE: 128 MMHG | TEMPERATURE: 97.9 F | RESPIRATION RATE: 14 BRPM | OXYGEN SATURATION: 98 % | HEIGHT: 60 IN | BODY MASS INDEX: 39.07 KG/M2 | DIASTOLIC BLOOD PRESSURE: 67 MMHG

## 2017-08-07 DIAGNOSIS — R20.2 PARESTHESIAS: ICD-10-CM

## 2017-08-07 DIAGNOSIS — R07.89 NON-CARDIAC CHEST PAIN: Primary | ICD-10-CM

## 2017-08-07 LAB
ABSOLUTE EOS #: 0.1 K/UL (ref 0–0.4)
ABSOLUTE LYMPH #: 3.1 K/UL (ref 1–4.8)
ABSOLUTE MONO #: 0.7 K/UL (ref 0.1–1.2)
ANION GAP SERPL CALCULATED.3IONS-SCNC: 12 MMOL/L (ref 9–17)
BASOPHILS # BLD: 2 %
BASOPHILS ABSOLUTE: 0.2 K/UL (ref 0–0.2)
BUN BLDV-MCNC: 3 MG/DL (ref 6–20)
BUN/CREAT BLD: ABNORMAL (ref 9–20)
CALCIUM SERPL-MCNC: 9.1 MG/DL (ref 8.6–10.4)
CHLORIDE BLD-SCNC: 103 MMOL/L (ref 98–107)
CO2: 24 MMOL/L (ref 20–31)
CREAT SERPL-MCNC: 0.75 MG/DL (ref 0.5–0.9)
DIFFERENTIAL TYPE: ABNORMAL
EOSINOPHILS RELATIVE PERCENT: 2 %
GFR AFRICAN AMERICAN: >60 ML/MIN
GFR NON-AFRICAN AMERICAN: >60 ML/MIN
GFR SERPL CREATININE-BSD FRML MDRD: ABNORMAL ML/MIN/{1.73_M2}
GFR SERPL CREATININE-BSD FRML MDRD: ABNORMAL ML/MIN/{1.73_M2}
GLUCOSE BLD-MCNC: 103 MG/DL (ref 70–99)
HCG QUALITATIVE: NEGATIVE
HCT VFR BLD CALC: 35.3 % (ref 36–46)
HEMOGLOBIN: 11.5 G/DL (ref 12–16)
LYMPHOCYTES # BLD: 45 %
MCH RBC QN AUTO: 26.5 PG (ref 26–34)
MCHC RBC AUTO-ENTMCNC: 32.7 G/DL (ref 31–37)
MCV RBC AUTO: 81.2 FL (ref 80–100)
MONOCYTES # BLD: 10 %
PDW BLD-RTO: 17.3 % (ref 12.5–15.4)
PLATELET # BLD: 286 K/UL (ref 140–450)
PLATELET ESTIMATE: ABNORMAL
PMV BLD AUTO: 9.1 FL (ref 6–12)
POC TROPONIN I: 0 NG/ML (ref 0–0.1)
POC TROPONIN I: 0 NG/ML (ref 0–0.1)
POC TROPONIN INTERP: NORMAL
POC TROPONIN INTERP: NORMAL
POTASSIUM SERPL-SCNC: 3.9 MMOL/L (ref 3.7–5.3)
RBC # BLD: 4.34 M/UL (ref 4–5.2)
RBC # BLD: ABNORMAL 10*6/UL
SEG NEUTROPHILS: 41 %
SEGMENTED NEUTROPHILS ABSOLUTE COUNT: 2.8 K/UL (ref 1.8–7.7)
SODIUM BLD-SCNC: 139 MMOL/L (ref 135–144)
WBC # BLD: 6.9 K/UL (ref 3.5–11)
WBC # BLD: ABNORMAL 10*3/UL

## 2017-08-07 PROCEDURE — 84484 ASSAY OF TROPONIN QUANT: CPT

## 2017-08-07 PROCEDURE — 93005 ELECTROCARDIOGRAM TRACING: CPT

## 2017-08-07 PROCEDURE — 85025 COMPLETE CBC W/AUTO DIFF WBC: CPT

## 2017-08-07 PROCEDURE — 71020 XR CHEST STANDARD TWO VW: CPT

## 2017-08-07 PROCEDURE — 99285 EMERGENCY DEPT VISIT HI MDM: CPT

## 2017-08-07 PROCEDURE — 84703 CHORIONIC GONADOTROPIN ASSAY: CPT

## 2017-08-07 PROCEDURE — 6370000000 HC RX 637 (ALT 250 FOR IP): Performed by: EMERGENCY MEDICINE

## 2017-08-07 PROCEDURE — 80048 BASIC METABOLIC PNL TOTAL CA: CPT

## 2017-08-07 RX ORDER — NITROGLYCERIN 0.4 MG/1
0.4 TABLET SUBLINGUAL EVERY 5 MIN PRN
Status: DISCONTINUED | OUTPATIENT
Start: 2017-08-07 | End: 2017-08-07 | Stop reason: HOSPADM

## 2017-08-07 RX ORDER — ASPIRIN 81 MG/1
324 TABLET, CHEWABLE ORAL ONCE
Status: COMPLETED | OUTPATIENT
Start: 2017-08-07 | End: 2017-08-07

## 2017-08-07 RX ADMIN — ASPIRIN 81 MG 324 MG: 81 TABLET ORAL at 07:09

## 2017-08-07 ASSESSMENT — ENCOUNTER SYMPTOMS
NAUSEA: 1
COUGH: 0
ABDOMINAL PAIN: 0
VOMITING: 0
EYE PAIN: 0
SHORTNESS OF BREATH: 1
COLOR CHANGE: 0

## 2017-08-09 ENCOUNTER — OFFICE VISIT (OUTPATIENT)
Dept: GASTROENTEROLOGY | Age: 47
End: 2017-08-09
Payer: MEDICARE

## 2017-08-09 VITALS
OXYGEN SATURATION: 98 % | DIASTOLIC BLOOD PRESSURE: 82 MMHG | WEIGHT: 198 LBS | BODY MASS INDEX: 38.87 KG/M2 | TEMPERATURE: 98.1 F | SYSTOLIC BLOOD PRESSURE: 143 MMHG | HEART RATE: 57 BPM | HEIGHT: 60 IN

## 2017-08-09 DIAGNOSIS — Z80.0 FAMILY HISTORY OF COLON CANCER: ICD-10-CM

## 2017-08-09 DIAGNOSIS — R13.10 DYSPHAGIA, UNSPECIFIED TYPE: ICD-10-CM

## 2017-08-09 PROCEDURE — 99213 OFFICE O/P EST LOW 20 MIN: CPT | Performed by: INTERNAL MEDICINE

## 2017-08-09 ASSESSMENT — ENCOUNTER SYMPTOMS
CHOKING: 0
TROUBLE SWALLOWING: 1
SHORTNESS OF BREATH: 1
SORE THROAT: 1
COUGH: 1
ABDOMINAL DISTENTION: 0
VOMITING: 0
RECTAL PAIN: 0
DIARRHEA: 0
BACK PAIN: 0
BLOOD IN STOOL: 0
ANAL BLEEDING: 0
ABDOMINAL PAIN: 0
CONSTIPATION: 0
NAUSEA: 0

## 2017-08-10 RX ORDER — POLYETHYLENE GLYCOL 3350 17 G/17G
POWDER, FOR SOLUTION ORAL
Qty: 255 G | Refills: 0 | Status: SHIPPED | OUTPATIENT
Start: 2017-08-10 | End: 2017-09-08

## 2017-08-14 LAB
EKG ATRIAL RATE: 63 BPM
EKG P AXIS: 66 DEGREES
EKG P-R INTERVAL: 150 MS
EKG Q-T INTERVAL: 404 MS
EKG QRS DURATION: 76 MS
EKG QTC CALCULATION (BAZETT): 413 MS
EKG R AXIS: 14 DEGREES
EKG T AXIS: 13 DEGREES
EKG VENTRICULAR RATE: 63 BPM

## 2017-08-15 ENCOUNTER — HOSPITAL ENCOUNTER (OUTPATIENT)
Dept: GENERAL RADIOLOGY | Age: 47
Discharge: HOME | End: 2017-08-15
Payer: MEDICARE

## 2017-08-15 DIAGNOSIS — Z80.0 FAMILY HISTORY OF COLON CANCER: ICD-10-CM

## 2017-08-15 DIAGNOSIS — R13.10 DYSPHAGIA, UNSPECIFIED TYPE: ICD-10-CM

## 2017-08-15 PROCEDURE — 2500000003 HC RX 250 WO HCPCS: Performed by: INTERNAL MEDICINE

## 2017-08-15 PROCEDURE — 74220 X-RAY XM ESOPHAGUS 1CNTRST: CPT

## 2017-08-15 RX ADMIN — BARIUM SULFATE 101 ML: 980 POWDER, FOR SUSPENSION ORAL at 08:30

## 2017-08-15 RX ADMIN — BARIUM SULFATE 1 TABLET: 700 TABLET ORAL at 08:30

## 2017-08-15 RX ADMIN — BARIUM SULFATE 132 ML: 960 POWDER, FOR SUSPENSION ORAL at 08:30

## 2017-08-20 DIAGNOSIS — M25.552 LEFT HIP PAIN: ICD-10-CM

## 2017-08-21 RX ORDER — IBUPROFEN 800 MG/1
TABLET ORAL
Qty: 30 TABLET | Refills: 3 | Status: SHIPPED | OUTPATIENT
Start: 2017-08-21 | End: 2017-11-04 | Stop reason: SDUPTHER

## 2017-08-23 ENCOUNTER — TELEPHONE (OUTPATIENT)
Dept: GASTROENTEROLOGY | Age: 47
End: 2017-08-23

## 2017-08-23 DIAGNOSIS — Z98.890 S/P CARDIAC CATH: ICD-10-CM

## 2017-08-23 DIAGNOSIS — E78.2 MIXED HYPERLIPIDEMIA: ICD-10-CM

## 2017-08-23 DIAGNOSIS — I10 ESSENTIAL HYPERTENSION: ICD-10-CM

## 2017-08-23 RX ORDER — VALSARTAN AND HYDROCHLOROTHIAZIDE 80; 12.5 MG/1; MG/1
TABLET, FILM COATED ORAL
Qty: 30 TABLET | Refills: 3 | Status: SHIPPED | OUTPATIENT
Start: 2017-08-23 | End: 2017-10-09 | Stop reason: SDUPTHER

## 2017-08-23 RX ORDER — CARVEDILOL 6.25 MG/1
TABLET ORAL
Qty: 60 TABLET | Refills: 3 | Status: SHIPPED | OUTPATIENT
Start: 2017-08-23 | End: 2017-10-09 | Stop reason: SDUPTHER

## 2017-08-23 RX ORDER — ATORVASTATIN CALCIUM 20 MG/1
TABLET, FILM COATED ORAL
Qty: 30 TABLET | Refills: 3 | Status: SHIPPED | OUTPATIENT
Start: 2017-08-23 | End: 2017-10-09 | Stop reason: SDUPTHER

## 2017-08-29 ENCOUNTER — OFFICE VISIT (OUTPATIENT)
Dept: INTERNAL MEDICINE | Age: 47
End: 2017-08-29
Payer: MEDICARE

## 2017-08-29 ENCOUNTER — HOSPITAL ENCOUNTER (OUTPATIENT)
Age: 47
Setting detail: SPECIMEN
Discharge: HOME | End: 2017-08-29
Payer: MEDICARE

## 2017-08-29 VITALS
HEART RATE: 60 BPM | WEIGHT: 197 LBS | SYSTOLIC BLOOD PRESSURE: 139 MMHG | DIASTOLIC BLOOD PRESSURE: 87 MMHG | BODY MASS INDEX: 38.68 KG/M2 | HEIGHT: 60 IN

## 2017-08-29 DIAGNOSIS — J45.50 UNCOMPLICATED SEVERE PERSISTENT ASTHMA: ICD-10-CM

## 2017-08-29 DIAGNOSIS — I10 ESSENTIAL HYPERTENSION: Primary | ICD-10-CM

## 2017-08-29 DIAGNOSIS — I10 ESSENTIAL HYPERTENSION: ICD-10-CM

## 2017-08-29 DIAGNOSIS — R13.19 ESOPHAGEAL DYSPHAGIA: ICD-10-CM

## 2017-08-29 LAB
ABSOLUTE EOS #: 0.1 K/UL (ref 0–0.4)
ABSOLUTE LYMPH #: 3.2 K/UL (ref 1–4.8)
ABSOLUTE MONO #: 0.6 K/UL (ref 0.1–1.2)
BASOPHILS # BLD: 0 %
BASOPHILS ABSOLUTE: 0 K/UL (ref 0–0.2)
DIFFERENTIAL TYPE: ABNORMAL
EOSINOPHILS RELATIVE PERCENT: 2 %
FOLATE: >20 NG/ML
HCT VFR BLD CALC: 37.1 % (ref 36–46)
HEMOGLOBIN: 11.7 G/DL (ref 12–16)
LYMPHOCYTES # BLD: 46 %
MCH RBC QN AUTO: 26.3 PG (ref 26–34)
MCHC RBC AUTO-ENTMCNC: 31.6 G/DL (ref 31–37)
MCV RBC AUTO: 82.9 FL (ref 80–100)
MONOCYTES # BLD: 9 %
PDW BLD-RTO: 17.9 % (ref 12.5–15.4)
PLATELET # BLD: 311 K/UL (ref 140–450)
PLATELET ESTIMATE: ABNORMAL
PMV BLD AUTO: 8.9 FL (ref 6–12)
RBC # BLD: 4.47 M/UL (ref 4–5.2)
RBC # BLD: ABNORMAL 10*6/UL
SEDIMENTATION RATE, ERYTHROCYTE: 6 MM (ref 0–20)
SEG NEUTROPHILS: 43 %
SEGMENTED NEUTROPHILS ABSOLUTE COUNT: 3 K/UL (ref 1.8–7.7)
TSH SERPL DL<=0.05 MIU/L-ACNC: 2.31 MIU/L (ref 0.3–5)
VITAMIN B-12: 429 PG/ML (ref 211–946)
WBC # BLD: 7 K/UL (ref 3.5–11)
WBC # BLD: ABNORMAL 10*3/UL

## 2017-08-29 PROCEDURE — 99214 OFFICE O/P EST MOD 30 MIN: CPT | Performed by: INTERNAL MEDICINE

## 2017-08-29 PROCEDURE — 82607 VITAMIN B-12: CPT

## 2017-08-29 PROCEDURE — 85651 RBC SED RATE NONAUTOMATED: CPT

## 2017-08-29 PROCEDURE — 82746 ASSAY OF FOLIC ACID SERUM: CPT

## 2017-08-29 PROCEDURE — 85025 COMPLETE CBC W/AUTO DIFF WBC: CPT

## 2017-08-29 PROCEDURE — 36415 COLL VENOUS BLD VENIPUNCTURE: CPT

## 2017-08-29 PROCEDURE — 84443 ASSAY THYROID STIM HORMONE: CPT

## 2017-08-29 ASSESSMENT — ENCOUNTER SYMPTOMS
SHORTNESS OF BREATH: 1
EYES NEGATIVE: 1
ALLERGIC/IMMUNOLOGIC NEGATIVE: 1

## 2017-09-06 ENCOUNTER — TELEPHONE (OUTPATIENT)
Dept: INTERNAL MEDICINE | Age: 47
End: 2017-09-06

## 2017-09-08 ENCOUNTER — TELEPHONE (OUTPATIENT)
Dept: INTERNAL MEDICINE | Age: 47
End: 2017-09-08

## 2017-09-19 ENCOUNTER — TELEPHONE (OUTPATIENT)
Dept: GASTROENTEROLOGY | Age: 47
End: 2017-09-19

## 2017-09-19 NOTE — TELEPHONE ENCOUNTER
PT called and CX her procedure for tomorrow, pt stated that she was just released from [facility] for her Asthma and it still is not under control

## 2017-09-25 NOTE — TELEPHONE ENCOUNTER
Called patient to see how she was doing. Had to leave VM. Asked patient to call back if she was ready to r/s colonoscopy. Stressed the importance of colonoscopy d/t family hx of colon cancer.

## 2017-10-05 DIAGNOSIS — Z98.890 S/P CARDIAC CATH: ICD-10-CM

## 2017-10-05 DIAGNOSIS — K21.9 GASTROESOPHAGEAL REFLUX DISEASE WITHOUT ESOPHAGITIS: ICD-10-CM

## 2017-10-05 DIAGNOSIS — R13.12 DYSPHAGIA, OROPHARYNGEAL: ICD-10-CM

## 2017-10-05 DIAGNOSIS — K21.00 CHRONIC REFLUX ESOPHAGITIS: ICD-10-CM

## 2017-10-05 RX ORDER — OMEPRAZOLE 40 MG/1
CAPSULE, DELAYED RELEASE ORAL
Qty: 60 CAPSULE | Refills: 3 | Status: SHIPPED | OUTPATIENT
Start: 2017-10-05 | End: 2018-01-24 | Stop reason: SDUPTHER

## 2017-10-05 RX ORDER — ASPIRIN 81 MG/1
TABLET ORAL
Qty: 30 TABLET | Refills: 3 | Status: SHIPPED | OUTPATIENT
Start: 2017-10-05 | End: 2018-01-24 | Stop reason: SDUPTHER

## 2017-10-05 NOTE — TELEPHONE ENCOUNTER
Escribe request for aspirin and omeprazole .   Please escribe if appropriate      Next Visit Date:  10/9/17     Health Maintenance   Topic Date Due    Flu vaccine (1) 09/01/2017    HIV screen  10/11/2017 (Originally 12/4/1984)    Cervical cancer screen  12/08/2017    Diabetes screen  10/11/2019    Lipid screen  03/24/2021    DTaP/Tdap/Td vaccine (2 - Td) 02/05/2026    Pneumococcal med risk  Completed       Hemoglobin A1C (%)   Date Value   10/11/2016 5.5   03/26/2015 5.4             ( goal A1C is < 7)   No results found for: LABMICR  LDL Cholesterol (mg/dL)   Date Value   03/24/2016 153 (H)       (goal LDL is <100)   AST (U/L)   Date Value   05/02/2017 17     ALT (U/L)   Date Value   05/02/2017 16     BUN (mg/dL)   Date Value   08/07/2017 3 (L)     BP Readings from Last 3 Encounters:   08/29/17 139/87   08/09/17 (!) 143/82   08/07/17 128/67          (goal 120/80)          Patient Active Problem List:     Hypertension     Asthma     GERD (gastroesophageal reflux disease)     Hypokalemia     S/P cardiac cath-Minimal disease 11/23/15 Dr. Therese Fairchild     Irritable bowel syndrome with constipation     Obesity     Allergic rhinitis     Hiatal hernia     Paresthesias     Chest pain     Left sided numbness     Dysphagia     Family history of colon cancer

## 2017-10-09 ENCOUNTER — OFFICE VISIT (OUTPATIENT)
Dept: INTERNAL MEDICINE | Age: 47
End: 2017-10-09
Payer: MEDICARE

## 2017-10-09 VITALS
TEMPERATURE: 98.1 F | HEART RATE: 66 BPM | HEIGHT: 60 IN | BODY MASS INDEX: 39.27 KG/M2 | SYSTOLIC BLOOD PRESSURE: 119 MMHG | DIASTOLIC BLOOD PRESSURE: 67 MMHG | WEIGHT: 200 LBS | OXYGEN SATURATION: 100 %

## 2017-10-09 DIAGNOSIS — E78.2 MIXED HYPERLIPIDEMIA: ICD-10-CM

## 2017-10-09 DIAGNOSIS — D64.9 ANEMIA, UNSPECIFIED TYPE: ICD-10-CM

## 2017-10-09 DIAGNOSIS — Z12.39 BREAST CANCER SCREENING: ICD-10-CM

## 2017-10-09 DIAGNOSIS — Z23 NEED FOR IMMUNIZATION AGAINST INFLUENZA: ICD-10-CM

## 2017-10-09 DIAGNOSIS — Z98.890 S/P CARDIAC CATH: ICD-10-CM

## 2017-10-09 DIAGNOSIS — E66.9 OBESITY (BMI 30-39.9): ICD-10-CM

## 2017-10-09 DIAGNOSIS — I10 ESSENTIAL HYPERTENSION: Primary | ICD-10-CM

## 2017-10-09 DIAGNOSIS — R73.9 HYPERGLYCEMIA: ICD-10-CM

## 2017-10-09 DIAGNOSIS — J45.40 MODERATE PERSISTENT ASTHMA WITHOUT COMPLICATION: ICD-10-CM

## 2017-10-09 PROCEDURE — 99214 OFFICE O/P EST MOD 30 MIN: CPT | Performed by: INTERNAL MEDICINE

## 2017-10-09 PROCEDURE — 90471 IMMUNIZATION ADMIN: CPT | Performed by: INTERNAL MEDICINE

## 2017-10-09 PROCEDURE — 90688 IIV4 VACCINE SPLT 0.5 ML IM: CPT | Performed by: INTERNAL MEDICINE

## 2017-10-09 RX ORDER — ATORVASTATIN CALCIUM 20 MG/1
TABLET, FILM COATED ORAL
Qty: 30 TABLET | Refills: 5 | Status: SHIPPED | OUTPATIENT
Start: 2017-10-09 | End: 2018-05-03 | Stop reason: SDUPTHER

## 2017-10-09 RX ORDER — CARVEDILOL 6.25 MG/1
TABLET ORAL
Qty: 60 TABLET | Refills: 5 | Status: SHIPPED | OUTPATIENT
Start: 2017-10-09 | End: 2018-05-03 | Stop reason: SDUPTHER

## 2017-10-09 RX ORDER — POLYETHYLENE GLYCOL 3350 17 G/17G
17 POWDER, FOR SOLUTION ORAL DAILY
Qty: 510 G | Refills: 0 | Status: SHIPPED | OUTPATIENT
Start: 2017-10-09 | End: 2017-11-08

## 2017-10-09 RX ORDER — VALSARTAN AND HYDROCHLOROTHIAZIDE 80; 12.5 MG/1; MG/1
TABLET, FILM COATED ORAL
Qty: 30 TABLET | Refills: 5 | Status: SHIPPED | OUTPATIENT
Start: 2017-10-09 | End: 2018-05-03 | Stop reason: SDUPTHER

## 2017-10-09 RX ORDER — SENNA PLUS 8.6 MG/1
1 TABLET ORAL DAILY
Qty: 30 TABLET | Refills: 2 | Status: SHIPPED | OUTPATIENT
Start: 2017-10-09 | End: 2017-12-19 | Stop reason: SDUPTHER

## 2017-10-09 ASSESSMENT — ENCOUNTER SYMPTOMS
SHORTNESS OF BREATH: 0
HEARTBURN: 0
BLURRED VISION: 0
EYE REDNESS: 0
CONSTIPATION: 1
DIARRHEA: 0
NAUSEA: 0
ABDOMINAL PAIN: 0
BLOOD IN STOOL: 0
COUGH: 0

## 2017-10-09 NOTE — PROGRESS NOTES
[facility]    Progress Note    Date of patient's visit: 10/9/2017    Patient's Name:  Katty Waller    YOB: 1970            Patient Care Team:  Melanie Ruelas MD as PCP - General (Internal Medicine)  Zenaida Wells MD as Consulting Physician (Cardiology)  Thelma Olvera MD as Referring Physician (Internal Medicine)  Tye Menjivar MD as Consulting Physician (Gastroenterology)    REASON FOR VISIT: Routine outpatient follow     Chief Complaint   Patient presents with    Dysphagia     patient states that she is doing better but still having issues now seeing GI   [facility]     patient would like a chadd and pap scheduled     Follow-Up from Hospital     patient states that she was seen at [facility] er about 1 month ago for cold she states that she feels better    Discuss Medications     patient would like something other than colace         HISTORY OF PRESENT ILLNESS:    History was obtained from the patient. Katty Waller is a 51 y.o. is here for Follow-up and med refills. She is transferring care from Dr. Dolphus Boas as he is retiring. She has chronic history of dysphagia. She has had EGD and barium swallow recently which have all been normal.  She has a history of hypertension, moderate persistent asthma, hyperlipidemia and obesity. She had a cardiac cath in 2015 which showed minimal disease. She denies chest pain or shortness of breath. No leg edema. She needs labs done. She is on statin. She agrees for a flu vaccination. She has not had a Pap in several years. She was scheduled for a colonoscopy but canceled it. She has a family history of colon cancer in her father. She plans to reschedule the appointment. EGD 2017  ATTENDING PHYSICIAN: Tye Menjivar MD      HISTORY: Ms. Katty Waller is a 51 y.o. female who presents to the [place] [address] Endoscopy unit for upper endoscopy.  The patient's clinical history is remarkable for dysphagia. She is currently medically stable and appropriate for the planned procedure.         PREOPERATIVE DIAGNOSIS: dysphagia.      PROCEDURES:   1) Transoral Upper Endoscopy, Savary dilation.      POSTOPERATIVE DIAGNOSIS:   Tortuous esophagus     Echo 2016  CONCLUSIONS    Summary  Technically somewhat difficult study. Global left ventricular systolic function is normal.  Estimated ejection fraction is >55 %. Normal valvular structure and function. FL esophagram 2017  COMPARISON:   Chest x-ray, August 7, 2017       HISTORY:   ORDERING SYSTEM PROVIDED HISTORY: Dysphagia, unspecified type       FINDINGS:   The esophagus is of normal caliber and demonstrates normal motility.  There   are no mucosal abnormalities seen.       The gastroesophageal junction is normal.  There is no evidence for reflux   seen.           Impression   Unremarkable esophagram.  No stricture is identified.         NM stress test 2016     FINDINGS:   There is normal distribution of radiotracer throughout the myocardium without evidence for a significant reversible or fixed perfusion defect.  The gated images show no wall motion abnormalities and myocardial thickening is normal.   SSS0. SRS0 SDS 0. Left ventricular ejection fraction is 57 percent. Transient ischemic dilatation (TID) index is 1.07  .           Impression   Normal study.            Past Medical History:   Diagnosis Date    Allergic rhinitis     Asthma     Clinical trial participant 11/23/2015 11/23/20015    COPD (chronic obstructive pulmonary disease) (HCC)     Dysphagia     Dysphagia     has needed EGD with dilatation in the past    GERD (gastroesophageal reflux disease)     Hiatal hernia     Hyperlipidemia     Hypertension     Irritable bowel syndrome with constipation 1/21/2016    Obesity     Snores     states has tested negative for sleep apnea    Wears glasses        Past Surgical History:   Procedure Laterality

## 2017-10-10 ENCOUNTER — HOSPITAL ENCOUNTER (OUTPATIENT)
Age: 47
Discharge: HOME | End: 2017-10-10
Payer: MEDICARE

## 2017-10-10 DIAGNOSIS — Z98.890 S/P CARDIAC CATH: ICD-10-CM

## 2017-10-10 DIAGNOSIS — E78.2 MIXED HYPERLIPIDEMIA: ICD-10-CM

## 2017-10-10 DIAGNOSIS — D64.9 ANEMIA, UNSPECIFIED TYPE: ICD-10-CM

## 2017-10-10 DIAGNOSIS — I10 ESSENTIAL HYPERTENSION: ICD-10-CM

## 2017-10-10 DIAGNOSIS — R73.9 HYPERGLYCEMIA: ICD-10-CM

## 2017-10-10 LAB
ANION GAP SERPL CALCULATED.3IONS-SCNC: 9 MMOL/L (ref 9–17)
BUN BLDV-MCNC: 7 MG/DL (ref 6–20)
BUN/CREAT BLD: ABNORMAL (ref 9–20)
CALCIUM SERPL-MCNC: 9.3 MG/DL (ref 8.6–10.4)
CHLORIDE BLD-SCNC: 103 MMOL/L (ref 98–107)
CHOLESTEROL/HDL RATIO: 3
CHOLESTEROL: 140 MG/DL
CO2: 28 MMOL/L (ref 20–31)
CREAT SERPL-MCNC: 0.67 MG/DL (ref 0.5–0.9)
ESTIMATED AVERAGE GLUCOSE: 123 MG/DL
FERRITIN: 9 UG/L (ref 13–150)
GFR AFRICAN AMERICAN: >60 ML/MIN
GFR NON-AFRICAN AMERICAN: >60 ML/MIN
GFR SERPL CREATININE-BSD FRML MDRD: ABNORMAL ML/MIN/{1.73_M2}
GFR SERPL CREATININE-BSD FRML MDRD: ABNORMAL ML/MIN/{1.73_M2}
GLUCOSE BLD-MCNC: 112 MG/DL (ref 70–99)
HBA1C MFR BLD: 5.9 % (ref 4–6)
HDLC SERPL-MCNC: 47 MG/DL
IRON SATURATION: 9 % (ref 20–55)
IRON: 33 UG/DL (ref 37–145)
LDL CHOLESTEROL: 67 MG/DL (ref 0–130)
POTASSIUM SERPL-SCNC: 4.3 MMOL/L (ref 3.7–5.3)
SODIUM BLD-SCNC: 140 MMOL/L (ref 135–144)
TOTAL IRON BINDING CAPACITY: 377 UG/DL (ref 250–450)
TRIGL SERPL-MCNC: 129 MG/DL
UNSATURATED IRON BINDING CAPACITY: 344 UG/DL (ref 112–347)
VLDLC SERPL CALC-MCNC: NORMAL MG/DL (ref 1–30)

## 2017-10-10 PROCEDURE — 36415 COLL VENOUS BLD VENIPUNCTURE: CPT

## 2017-10-10 PROCEDURE — 80048 BASIC METABOLIC PNL TOTAL CA: CPT

## 2017-10-10 PROCEDURE — 83550 IRON BINDING TEST: CPT

## 2017-10-10 PROCEDURE — 80061 LIPID PANEL: CPT

## 2017-10-10 PROCEDURE — 83540 ASSAY OF IRON: CPT

## 2017-10-10 PROCEDURE — 82728 ASSAY OF FERRITIN: CPT

## 2017-10-10 PROCEDURE — 83036 HEMOGLOBIN GLYCOSYLATED A1C: CPT

## 2017-11-04 DIAGNOSIS — M25.552 LEFT HIP PAIN: ICD-10-CM

## 2017-11-06 RX ORDER — IBUPROFEN 800 MG/1
TABLET ORAL
Qty: 30 TABLET | Refills: 3 | Status: SHIPPED | OUTPATIENT
Start: 2017-11-06 | End: 2018-01-19 | Stop reason: SDUPTHER

## 2017-11-06 NOTE — TELEPHONE ENCOUNTER
joseph request for ibuprofen, future appt scheduled      Health Maintenance   Topic Date Due    HIV screen  12/04/1984    Cervical cancer screen  12/08/2017    Diabetes screen  10/10/2020    Lipid screen  10/10/2022    DTaP/Tdap/Td vaccine (2 - Td) 02/05/2026    Flu vaccine  Completed    Pneumococcal med risk  Completed             (applicable per patient's age: Cancer Screenings, Depression Screening, Fall Risk Screening, Immunizations)    Hemoglobin A1C (%)   Date Value   10/10/2017 5.9   10/11/2016 5.5   03/26/2015 5.4     LDL Cholesterol (mg/dL)   Date Value   10/10/2017 67     AST (U/L)   Date Value   05/02/2017 17     ALT (U/L)   Date Value   05/02/2017 16     BUN (mg/dL)   Date Value   10/10/2017 7      (goal A1C is < 7)   (goal LDL is <100) need 30-50% reduction from baseline     BP Readings from Last 3 Encounters:   10/09/17 119/67   08/29/17 139/87   08/09/17 (!) 143/82    (goal /80)      All Future Testing planned in [facility]:  Lab Frequency Next Occurrence   TRAVIS Screen Routine Once 01/24/2018       Next Visit Date:  Future Appointments  Date Time Provider Fortino Husseinisti   12/18/2017 11:00 AM Armando Padilla MD Resp Spec Mayra Levo   3/1/2018 9:30 AM Scott Lamb MD [id number]            Patient Active Problem List:     Hypertension     Asthma     GERD (gastroesophageal reflux disease)     Hypokalemia     S/P cardiac cath-Minimal disease 11/23/15 Dr. Glover Flaming     Irritable bowel syndrome with constipation     Obesity     Allergic rhinitis     Hiatal hernia     Paresthesias     Left sided numbness     Dysphagia     Family history of colon cancer

## 2017-12-19 ENCOUNTER — OFFICE VISIT (OUTPATIENT)
Dept: INTERNAL MEDICINE | Age: 47
End: 2017-12-19
Payer: MEDICARE

## 2017-12-19 ENCOUNTER — TELEPHONE (OUTPATIENT)
Dept: INTERNAL MEDICINE | Age: 47
End: 2017-12-19

## 2017-12-19 VITALS
HEART RATE: 60 BPM | DIASTOLIC BLOOD PRESSURE: 78 MMHG | BODY MASS INDEX: 39.45 KG/M2 | OXYGEN SATURATION: 98 % | WEIGHT: 202 LBS | SYSTOLIC BLOOD PRESSURE: 128 MMHG

## 2017-12-19 DIAGNOSIS — K59.00 CONSTIPATION, UNSPECIFIED CONSTIPATION TYPE: Primary | ICD-10-CM

## 2017-12-19 DIAGNOSIS — J45.50 SEVERE PERSISTENT ASTHMA WITHOUT COMPLICATION: ICD-10-CM

## 2017-12-19 PROCEDURE — 99213 OFFICE O/P EST LOW 20 MIN: CPT | Performed by: STUDENT IN AN ORGANIZED HEALTH CARE EDUCATION/TRAINING PROGRAM

## 2017-12-19 PROCEDURE — G8417 CALC BMI ABV UP PARAM F/U: HCPCS | Performed by: STUDENT IN AN ORGANIZED HEALTH CARE EDUCATION/TRAINING PROGRAM

## 2017-12-19 PROCEDURE — G8427 DOCREV CUR MEDS BY ELIG CLIN: HCPCS | Performed by: STUDENT IN AN ORGANIZED HEALTH CARE EDUCATION/TRAINING PROGRAM

## 2017-12-19 PROCEDURE — 1036F TOBACCO NON-USER: CPT | Performed by: STUDENT IN AN ORGANIZED HEALTH CARE EDUCATION/TRAINING PROGRAM

## 2017-12-19 PROCEDURE — G8484 FLU IMMUNIZE NO ADMIN: HCPCS | Performed by: STUDENT IN AN ORGANIZED HEALTH CARE EDUCATION/TRAINING PROGRAM

## 2017-12-19 RX ORDER — SENNA PLUS 8.6 MG/1
1 TABLET ORAL DAILY
Qty: 30 TABLET | Refills: 2 | Status: SHIPPED | OUTPATIENT
Start: 2017-12-19 | End: 2018-05-03 | Stop reason: SDUPTHER

## 2017-12-19 RX ORDER — MONTELUKAST SODIUM 10 MG/1
10 TABLET ORAL DAILY
Qty: 30 TABLET | Refills: 3 | Status: SHIPPED | OUTPATIENT
Start: 2017-12-19 | End: 2019-02-07 | Stop reason: ALTCHOICE

## 2017-12-19 ASSESSMENT — ENCOUNTER SYMPTOMS
COUGH: 0
SHORTNESS OF BREATH: 1
EYES NEGATIVE: 1
CONSTIPATION: 1

## 2017-12-19 NOTE — PROGRESS NOTES
[facility]  [facility] [address]  [address] [facility] [address] [address]  Dept: [phone]  Dept Fax: [phone]    Office Progress/Follow Up Note  Date of patient's visit: 12/19/2017  Patient's Name:  Aneudy Vazquez YOB: 1970            Patient Care Team:  Bebo Colvin MD as PCP - General (Internal Medicine)  Ghazala Young MD as Consulting Physician (Cardiology)  Denia Boothe MD as Referring Physician (Internal Medicine)  Shalom Vazquez MD as Consulting Physician (Gastroenterology)  ================================================================    REASON FOR VISIT/CHIEF COMPLAINT:  Forms (fmla); Asthma; and Pharyngitis    HISTORY OF PRESENTING ILLNESS:  History was obtained from: patient. Aneudy Vazquez is a 49 y.o. with past medical history of HTN,COPD ,Asthma is here for a follow up visit and for FMLA paper work. She complain of dyspnea which started 3 days ago,intermittent, last for few minutes and subside on rest,associated with chest pain. She described it as chest tightness,in the center of chest,allveiated and aggravated by nothing, can occur at rest or activity,not associated with N/V,diaphoresis,palpitations. She also complain of constipation and told that she takes senna for it which helped it . Her blood pressure is well controlled. She needs to have colonoscopy,mammogram and PAP smear done. She had chronic dysphagia and EGD and FL esophagogram done which showed no stricture but tortuous esophagus. She had cath done in 2015 which showed minimal disease and trop in august which was negative      Patient Active Problem List   Diagnosis    Hypertension    Asthma    GERD (gastroesophageal reflux disease)    Hypokalemia    S/P cardiac cath-Minimal disease 11/23/15 Dr. Dmitry Fox Irritable bowel syndrome with constipation    Obesity    Allergic rhinitis    Hiatal hernia    Paresthesias    Left sided numbness    Dysphagia    Family history of colon cancer       Health Maintenance Due   Topic Date Due    HIV screen  12/04/1984    Cervical cancer screen  12/08/2017       No Known Allergies      Current Outpatient Prescriptions   Medication Sig Dispense Refill    ibuprofen (ADVIL;MOTRIN) 800 MG tablet take 1 tablet by mouth every 8 hours if needed for pain 30 tablet 3    carvedilol (COREG) 6.25 MG tablet take 1 tablet by mouth twice a day 60 tablet 5    atorvastatin (LIPITOR) 20 MG tablet take 1 tablet by mouth once daily 30 tablet 5    valsartan-hydrochlorothiazide (DIOVAN-HCT) 80-12.5 MG per tablet take 1 tablet by mouth once daily 30 tablet 5    senna (SENOKOT) 8.6 MG tablet Take 1 tablet by mouth daily 30 tablet 2    aspirin 81 MG EC tablet take 1 tablet by mouth once daily 30 tablet 3    omeprazole (PRILOSEC) 40 MG delayed release capsule take 1 capsule by mouth twice a day 60 capsule 3    albuterol sulfate HFA (PROAIR HFA) 108 (90 BASE) MCG/ACT inhaler Inhale 2 puffs into the lungs every 6 hours as needed for Wheezing 1 Inhaler 11    fluticasone (FLONASE) 50 MCG/ACT nasal spray 1 spray by Nasal route daily 1 Bottle 6    fluticasone-salmeterol (ADVAIR) 500-50 MCG/DOSE diskus inhaler Inhale 1 puff into the lungs every 12 hours 1 each 11    tears naturale II (CELLUGEL) SOLN ophthalmic solution Place 1 drop into both eyes as needed for Dry Eyes  0    gabapentin (NEURONTIN) 100 MG capsule Take 1 capsule by mouth 3 times daily 90 capsule 3    albuterol (PROVENTIL) (2.5 MG/3ML) 0.083% nebulizer solution Take 3 mLs by nebulization every 6 hours as needed for Wheezing 120 each 3    Multiple Vitamins-Minerals (WOMENS MULTI VITAMIN & MINERAL PO) Take 1 tablet by mouth daily      docusate sodium (COLACE) 100 MG capsule Take 1 capsule by mouth 2 times daily as needed for Constipation (Patient taking differently: Take 100 mg by mouth 2 times daily as needed for Constipation Indications: only using once daily ) 30 sounds and intact distal pulses. Exam reveals no gallop and no friction rub. No murmur heard. Pulmonary/Chest: Breath sounds normal. No respiratory distress. She has no wheezes. She has no rales. She exhibits no tenderness. Abdominal: Soft. Bowel sounds are normal. She exhibits no distension and no mass. There is no tenderness. Musculoskeletal: Normal range of motion. She exhibits no edema, tenderness or deformity. Neurological: She is alert and oriented to person, place, and time. No cranial nerve deficit. Coordination normal.         DIAGNOSTIC FINDINGS:  CBC:  Lab Results   Component Value Date    WBC 7.0 08/29/2017    HGB 11.7 08/29/2017     08/29/2017       BMP:    Lab Results   Component Value Date     10/10/2017    K 4.3 10/10/2017     10/10/2017    CO2 28 10/10/2017    BUN 7 10/10/2017    CREATININE 0.67 10/10/2017    GLUCOSE 112 10/10/2017       HEMOGLOBIN A1C:   Lab Results   Component Value Date    LABA1C 5.9 10/10/2017       FASTING LIPID PANEL:  Lab Results   Component Value Date    CHOL 140 10/10/2017    HDL 47 10/10/2017    TRIG 129 10/10/2017       ASSESSMENT AND PLAN:  Gabriel Bansal was seen today for forms, asthma and pharyngitis. Diagnoses and all orders for this visit:    Constipation, unspecified constipation type    Other orders  -     senna (SENOKOT) 8.6 MG tablet; Take 1 tablet by mouth daily    . Essential hypertension  carvedilol (COREG) 6.25 MG tablet, valsartan-hydrochlorothiazide (DIOVAN-HCT) 80-12.5 MG     Moderate persistent asthma without complication  Albuterol  Advair  Starting on Singulair    Mixed hyperlipidemia  atorvastatin (LIPITOR) 20 MG tablet    Breast and cervical cancer screening  [facility],PAP smear. Obesity (BMI 30-39. 9)       FOLLOW UP AND INSTRUCTIONS:    · Gabriel Bansal received counseling on the following healthy behaviors: nutrition, exercise and tobacco cessation    · Discussed use, benefit, and side effects of prescribed medications.   Barriers to

## 2017-12-19 NOTE — PATIENT INSTRUCTIONS
Medications e-scribe to pharmacy of pt's choice. Follow-up appointment scheduled for     03/01/18, AVS given to patient.         TV

## 2017-12-19 NOTE — TELEPHONE ENCOUNTER
Pt calling about needing a letter for work. She plans on taking off the rest of the week. Letter can be faxed to  [facility] Yesy Montanez. Pt will return to work on the 12/25.

## 2017-12-19 NOTE — PROGRESS NOTES
Attending Physician Statement GC  I have discussed the care of Kevan Night, including pertinent history and exam findings with the resident. I have reviewed the key elements of all parts of the encounter with the resident. I have seen and examined the patient with the resident and the key elements of all parts of the encounter have been performed by me. 1. Constipation, unspecified constipation type -refill senakot   2. H/o asthma- on advair, albuterol- restart Singulair-AIDS FMLA paperwork done-form filled  Please keep appointment with pulmonary as scheduled  C/o intermittent chest pain- cath in 2015-minimal CAD  3.  UTD on      Follow-up as scheduled in March with Dr. Bonifacio Royal MD

## 2017-12-20 NOTE — TELEPHONE ENCOUNTER
pc asking again for  to reconsider her letter , she is not feeling well and would like a letter please advise

## 2018-01-02 ENCOUNTER — TELEPHONE (OUTPATIENT)
Dept: GASTROENTEROLOGY | Age: 48
End: 2018-01-02

## 2018-01-02 NOTE — TELEPHONE ENCOUNTER
Pt called to schedule her colon with Dr Viktoriya Ruvalcaba. Writer noticed pt was a NS on 8/23/17 and CX on 9/20/17. Please advise if pt should be re scheduled again.

## 2018-01-08 RX ORDER — POLYETHYLENE GLYCOL 3350 17 G/17G
POWDER, FOR SOLUTION ORAL
Qty: 255 G | Refills: 0 | Status: SHIPPED | OUTPATIENT
Start: 2018-01-08 | End: 2018-02-01

## 2018-01-13 ENCOUNTER — HOSPITAL ENCOUNTER (INPATIENT)
Age: 48
LOS: 1 days | Discharge: HOME | DRG: 192 | End: 2018-01-16
Attending: EMERGENCY MEDICINE | Admitting: EMERGENCY MEDICINE
Payer: MEDICARE

## 2018-01-13 ENCOUNTER — APPOINTMENT (OUTPATIENT)
Dept: GENERAL RADIOLOGY | Age: 48
DRG: 192 | End: 2018-01-13
Payer: MEDICARE

## 2018-01-13 DIAGNOSIS — R07.9 CHEST PAIN, UNSPECIFIED TYPE: Primary | ICD-10-CM

## 2018-01-13 LAB
ABSOLUTE EOS #: 0.09 K/UL (ref 0–0.44)
ABSOLUTE IMMATURE GRANULOCYTE: 0.03 K/UL (ref 0–0.3)
ABSOLUTE LYMPH #: 4.59 K/UL (ref 1.1–3.7)
ABSOLUTE MONO #: 0.57 K/UL (ref 0.1–1.2)
ANION GAP SERPL CALCULATED.3IONS-SCNC: 16 MMOL/L (ref 9–17)
BASOPHILS # BLD: 0 % (ref 0–2)
BASOPHILS ABSOLUTE: <0.03 K/UL (ref 0–0.2)
BUN BLDV-MCNC: 11 MG/DL (ref 6–20)
BUN/CREAT BLD: NORMAL (ref 9–20)
CALCIUM SERPL-MCNC: 9.1 MG/DL (ref 8.6–10.4)
CHLORIDE BLD-SCNC: 100 MMOL/L (ref 98–107)
CO2: 22 MMOL/L (ref 20–31)
CREAT SERPL-MCNC: 0.76 MG/DL (ref 0.5–0.9)
DIFFERENTIAL TYPE: ABNORMAL
EOSINOPHILS RELATIVE PERCENT: 1 % (ref 1–4)
GFR AFRICAN AMERICAN: >60 ML/MIN
GFR NON-AFRICAN AMERICAN: >60 ML/MIN
GFR SERPL CREATININE-BSD FRML MDRD: NORMAL ML/MIN/{1.73_M2}
GFR SERPL CREATININE-BSD FRML MDRD: NORMAL ML/MIN/{1.73_M2}
GLUCOSE BLD-MCNC: 95 MG/DL (ref 70–99)
HCT VFR BLD CALC: 32.6 % (ref 36.3–47.1)
HEMOGLOBIN: 10.2 G/DL (ref 11.9–15.1)
IMMATURE GRANULOCYTES: 0 %
LV EF: 55 %
LVEF MODALITY: NORMAL
LYMPHOCYTES # BLD: 56 % (ref 24–43)
MCH RBC QN AUTO: 25.4 PG (ref 25.2–33.5)
MCHC RBC AUTO-ENTMCNC: 31.3 G/DL (ref 28.4–34.8)
MCV RBC AUTO: 81.1 FL (ref 82.6–102.9)
MONOCYTES # BLD: 7 % (ref 3–12)
PDW BLD-RTO: 17.1 % (ref 11.8–14.4)
PLATELET # BLD: ABNORMAL K/UL (ref 138–453)
PLATELET ESTIMATE: ABNORMAL
PLATELET, FLUORESCENCE: NORMAL K/UL (ref 138–453)
PLATELET, IMMATURE FRACTION: NORMAL % (ref 1.1–10.3)
PMV BLD AUTO: ABNORMAL FL (ref 8.1–13.5)
POTASSIUM SERPL-SCNC: 4.6 MMOL/L (ref 3.7–5.3)
RBC # BLD: 4.02 M/UL (ref 3.95–5.11)
RBC # BLD: ABNORMAL 10*6/UL
SEG NEUTROPHILS: 36 % (ref 36–65)
SEGMENTED NEUTROPHILS ABSOLUTE COUNT: 2.93 K/UL (ref 1.5–8.1)
SODIUM BLD-SCNC: 138 MMOL/L (ref 135–144)
TROPONIN INTERP: NORMAL
TROPONIN T: <0.03 NG/ML
WBC # BLD: 8.2 K/UL (ref 3.5–11.3)
WBC # BLD: ABNORMAL 10*3/UL

## 2018-01-13 PROCEDURE — 85025 COMPLETE CBC W/AUTO DIFF WBC: CPT

## 2018-01-13 PROCEDURE — 36415 COLL VENOUS BLD VENIPUNCTURE: CPT

## 2018-01-13 PROCEDURE — 2580000003 HC RX 258: Performed by: EMERGENCY MEDICINE

## 2018-01-13 PROCEDURE — 94664 DEMO&/EVAL PT USE INHALER: CPT

## 2018-01-13 PROCEDURE — G0378 HOSPITAL OBSERVATION PER HR: HCPCS

## 2018-01-13 PROCEDURE — 6370000000 HC RX 637 (ALT 250 FOR IP): Performed by: EMERGENCY MEDICINE

## 2018-01-13 PROCEDURE — 84484 ASSAY OF TROPONIN QUANT: CPT

## 2018-01-13 PROCEDURE — 71046 X-RAY EXAM CHEST 2 VIEWS: CPT

## 2018-01-13 PROCEDURE — 96374 THER/PROPH/DIAG INJ IV PUSH: CPT

## 2018-01-13 PROCEDURE — 6360000002 HC RX W HCPCS: Performed by: EMERGENCY MEDICINE

## 2018-01-13 PROCEDURE — 80048 BASIC METABOLIC PNL TOTAL CA: CPT

## 2018-01-13 PROCEDURE — 94640 AIRWAY INHALATION TREATMENT: CPT

## 2018-01-13 PROCEDURE — 93306 TTE W/DOPPLER COMPLETE: CPT

## 2018-01-13 PROCEDURE — 99285 EMERGENCY DEPT VISIT HI MDM: CPT

## 2018-01-13 PROCEDURE — 93005 ELECTROCARDIOGRAM TRACING: CPT

## 2018-01-13 RX ORDER — ALBUTEROL SULFATE 90 UG/1
2 AEROSOL, METERED RESPIRATORY (INHALATION) EVERY 6 HOURS PRN
Status: DISCONTINUED | OUTPATIENT
Start: 2018-01-13 | End: 2018-01-16 | Stop reason: HOSPADM

## 2018-01-13 RX ORDER — MONTELUKAST SODIUM 10 MG/1
10 TABLET ORAL DAILY
Status: DISCONTINUED | OUTPATIENT
Start: 2018-01-13 | End: 2018-01-13

## 2018-01-13 RX ORDER — CYCLOBENZAPRINE HCL 5 MG
5 TABLET ORAL 3 TIMES DAILY PRN
Qty: 15 TABLET | Refills: 0 | Status: CANCELLED | OUTPATIENT
Start: 2018-01-13 | End: 2018-01-23

## 2018-01-13 RX ORDER — DOCUSATE SODIUM 100 MG/1
100 CAPSULE, LIQUID FILLED ORAL 2 TIMES DAILY PRN
Status: DISCONTINUED | OUTPATIENT
Start: 2018-01-13 | End: 2018-01-16 | Stop reason: HOSPADM

## 2018-01-13 RX ORDER — PANTOPRAZOLE SODIUM 40 MG/1
40 TABLET, DELAYED RELEASE ORAL EVERY MORNING
Status: DISCONTINUED | OUTPATIENT
Start: 2018-01-13 | End: 2018-01-16 | Stop reason: HOSPADM

## 2018-01-13 RX ORDER — SENNA PLUS 8.6 MG/1
1 TABLET ORAL DAILY
Status: DISCONTINUED | OUTPATIENT
Start: 2018-01-13 | End: 2018-01-16 | Stop reason: HOSPADM

## 2018-01-13 RX ORDER — CARVEDILOL 3.12 MG/1
3.12 TABLET ORAL 2 TIMES DAILY WITH MEALS
Status: DISCONTINUED | OUTPATIENT
Start: 2018-01-13 | End: 2018-01-16 | Stop reason: HOSPADM

## 2018-01-13 RX ORDER — ALBUTEROL SULFATE 2.5 MG/3ML
2.5 SOLUTION RESPIRATORY (INHALATION) EVERY 6 HOURS PRN
Status: DISCONTINUED | OUTPATIENT
Start: 2018-01-13 | End: 2018-01-16 | Stop reason: HOSPADM

## 2018-01-13 RX ORDER — MORPHINE SULFATE 4 MG/ML
4 INJECTION, SOLUTION INTRAMUSCULAR; INTRAVENOUS
Status: DISPENSED | OUTPATIENT
Start: 2018-01-13 | End: 2018-01-15

## 2018-01-13 RX ORDER — NITROGLYCERIN 0.4 MG/1
0.4 TABLET SUBLINGUAL EVERY 5 MIN PRN
Status: DISCONTINUED | OUTPATIENT
Start: 2018-01-13 | End: 2018-01-16 | Stop reason: HOSPADM

## 2018-01-13 RX ORDER — MORPHINE SULFATE 2 MG/ML
2 INJECTION, SOLUTION INTRAMUSCULAR; INTRAVENOUS
Status: ACTIVE | OUTPATIENT
Start: 2018-01-13 | End: 2018-01-15

## 2018-01-13 RX ORDER — SODIUM CHLORIDE 0.9 % (FLUSH) 0.9 %
10 SYRINGE (ML) INJECTION EVERY 12 HOURS SCHEDULED
Status: DISCONTINUED | OUTPATIENT
Start: 2018-01-13 | End: 2018-01-16 | Stop reason: HOSPADM

## 2018-01-13 RX ORDER — KETOROLAC TROMETHAMINE 15 MG/ML
15 INJECTION, SOLUTION INTRAMUSCULAR; INTRAVENOUS
Status: DISPENSED | OUTPATIENT
Start: 2018-01-13 | End: 2018-01-13

## 2018-01-13 RX ORDER — OMEPRAZOLE 20 MG/1
20 CAPSULE, DELAYED RELEASE ORAL EVERY MORNING
Status: DISCONTINUED | OUTPATIENT
Start: 2018-01-13 | End: 2018-01-13

## 2018-01-13 RX ORDER — SODIUM CHLORIDE 0.9 % (FLUSH) 0.9 %
10 SYRINGE (ML) INJECTION PRN
Status: DISCONTINUED | OUTPATIENT
Start: 2018-01-13 | End: 2018-01-16 | Stop reason: HOSPADM

## 2018-01-13 RX ORDER — SODIUM CHLORIDE 9 MG/ML
INJECTION, SOLUTION INTRAVENOUS CONTINUOUS
Status: DISCONTINUED | OUTPATIENT
Start: 2018-01-13 | End: 2018-01-14

## 2018-01-13 RX ORDER — ACETAMINOPHEN 325 MG/1
650 TABLET ORAL EVERY 6 HOURS PRN
Qty: 60 TABLET | Refills: 0 | Status: CANCELLED | OUTPATIENT
Start: 2018-01-13

## 2018-01-13 RX ORDER — IBUPROFEN 600 MG/1
600 TABLET ORAL EVERY 6 HOURS PRN
Qty: 30 TABLET | Refills: 0 | Status: CANCELLED | OUTPATIENT
Start: 2018-01-13

## 2018-01-13 RX ORDER — KETOROLAC TROMETHAMINE 30 MG/ML
30 INJECTION, SOLUTION INTRAMUSCULAR; INTRAVENOUS ONCE
Status: COMPLETED | OUTPATIENT
Start: 2018-01-13 | End: 2018-01-13

## 2018-01-13 RX ORDER — ATORVASTATIN CALCIUM 10 MG/1
10 TABLET, FILM COATED ORAL DAILY
Status: DISCONTINUED | OUTPATIENT
Start: 2018-01-13 | End: 2018-01-16 | Stop reason: HOSPADM

## 2018-01-13 RX ORDER — MORPHINE SULFATE 4 MG/ML
4 INJECTION, SOLUTION INTRAMUSCULAR; INTRAVENOUS ONCE
Status: COMPLETED | OUTPATIENT
Start: 2018-01-13 | End: 2018-01-13

## 2018-01-13 RX ORDER — GABAPENTIN 100 MG/1
100 CAPSULE ORAL 3 TIMES DAILY
Status: DISCONTINUED | OUTPATIENT
Start: 2018-01-13 | End: 2018-01-16 | Stop reason: HOSPADM

## 2018-01-13 RX ORDER — ASPIRIN 81 MG/1
324 TABLET, CHEWABLE ORAL ONCE
Status: COMPLETED | OUTPATIENT
Start: 2018-01-13 | End: 2018-01-13

## 2018-01-13 RX ORDER — KETOROLAC TROMETHAMINE 15 MG/ML
15 INJECTION, SOLUTION INTRAMUSCULAR; INTRAVENOUS EVERY 6 HOURS PRN
Status: DISCONTINUED | OUTPATIENT
Start: 2018-01-13 | End: 2018-01-13

## 2018-01-13 RX ORDER — FLUTICASONE PROPIONATE 50 MCG
1 SPRAY, SUSPENSION (ML) NASAL DAILY
Status: DISCONTINUED | OUTPATIENT
Start: 2018-01-13 | End: 2018-01-16 | Stop reason: HOSPADM

## 2018-01-13 RX ORDER — ACETAMINOPHEN 325 MG/1
650 TABLET ORAL EVERY 4 HOURS PRN
Status: DISCONTINUED | OUTPATIENT
Start: 2018-01-13 | End: 2018-01-16 | Stop reason: HOSPADM

## 2018-01-13 RX ORDER — MONTELUKAST SODIUM 10 MG/1
10 TABLET ORAL DAILY
Status: DISCONTINUED | OUTPATIENT
Start: 2018-01-13 | End: 2018-01-16 | Stop reason: HOSPADM

## 2018-01-13 RX ADMIN — ATORVASTATIN CALCIUM 10 MG: 10 TABLET, FILM COATED ORAL at 12:54

## 2018-01-13 RX ADMIN — GABAPENTIN 100 MG: 100 CAPSULE ORAL at 20:16

## 2018-01-13 RX ADMIN — MORPHINE SULFATE 4 MG: 4 INJECTION, SOLUTION INTRAMUSCULAR; INTRAVENOUS at 09:33

## 2018-01-13 RX ADMIN — KETOROLAC TROMETHAMINE 30 MG: 30 INJECTION, SOLUTION INTRAMUSCULAR at 12:46

## 2018-01-13 RX ADMIN — SENNA 8.6 MG: 8.6 TABLET, COATED ORAL at 12:54

## 2018-01-13 RX ADMIN — MORPHINE SULFATE 4 MG: 4 INJECTION INTRAVENOUS at 20:22

## 2018-01-13 RX ADMIN — FLUTICASONE PROPIONATE 1 SPRAY: 50 SPRAY, METERED NASAL at 12:54

## 2018-01-13 RX ADMIN — CARVEDILOL 3.12 MG: 3.12 TABLET, FILM COATED ORAL at 12:54

## 2018-01-13 RX ADMIN — NITROGLYCERIN 0.4 MG: 0.4 TABLET SUBLINGUAL at 09:33

## 2018-01-13 RX ADMIN — Medication 2 PUFF: at 19:46

## 2018-01-13 RX ADMIN — MONTELUKAST SODIUM 10 MG: 10 TABLET, FILM COATED ORAL at 12:54

## 2018-01-13 RX ADMIN — CARVEDILOL 3.12 MG: 3.12 TABLET, FILM COATED ORAL at 20:16

## 2018-01-13 RX ADMIN — SODIUM CHLORIDE: 9 INJECTION, SOLUTION INTRAVENOUS at 12:55

## 2018-01-13 RX ADMIN — PANTOPRAZOLE SODIUM 40 MG: 40 TABLET, DELAYED RELEASE ORAL at 12:54

## 2018-01-13 RX ADMIN — ASPIRIN 81 MG 324 MG: 81 TABLET ORAL at 12:54

## 2018-01-13 ASSESSMENT — ENCOUNTER SYMPTOMS
EYES NEGATIVE: 1
ALLERGIC/IMMUNOLOGIC NEGATIVE: 1
NAUSEA: 1
RESPIRATORY NEGATIVE: 1

## 2018-01-13 ASSESSMENT — PAIN DESCRIPTION - PROGRESSION: CLINICAL_PROGRESSION: GRADUALLY WORSENING

## 2018-01-13 ASSESSMENT — PAIN DESCRIPTION - PAIN TYPE: TYPE: ACUTE PAIN

## 2018-01-13 ASSESSMENT — PAIN DESCRIPTION - LOCATION: LOCATION: CHEST

## 2018-01-13 ASSESSMENT — PAIN SCALES - GENERAL
PAINLEVEL_OUTOF10: 7
PAINLEVEL_OUTOF10: 8
PAINLEVEL_OUTOF10: 8

## 2018-01-13 ASSESSMENT — PAIN DESCRIPTION - ORIENTATION: ORIENTATION: MID

## 2018-01-13 ASSESSMENT — PAIN DESCRIPTION - ONSET: ONSET: ON-GOING

## 2018-01-13 ASSESSMENT — PAIN DESCRIPTION - DESCRIPTORS: DESCRIPTORS: PRESSURE

## 2018-01-13 ASSESSMENT — PAIN DESCRIPTION - FREQUENCY: FREQUENCY: CONTINUOUS

## 2018-01-13 ASSESSMENT — HEART SCORE: ECG: 0

## 2018-01-13 NOTE — ED PROVIDER NOTES
Negative. Psychiatric/Behavioral: Negative. PHYSICAL EXAM   (up to 7 for level 4, 8 or more for level 5)      INITIAL VITALS:   BP (!) 152/86   Pulse 66   Temp 96.6 °F (35.9 °C) (Oral)   Resp 16   Ht 5' (1.524 m)   Wt 217 lb 6 oz (98.6 kg)   SpO2 98%   BMI 42.45 kg/m²     Physical Exam   Constitutional: She is oriented to person, place, and time. She appears well-developed and well-nourished. HENT:   Head: Normocephalic and atraumatic. Eyes: Conjunctivae are normal. Pupils are equal, round, and reactive to light. Neck: Normal range of motion. No thyromegaly present. Cardiovascular: Normal rate, regular rhythm and normal heart sounds. Pulmonary/Chest: Effort normal and breath sounds normal. No respiratory distress. She has no wheezes. She has no rales. Abdominal: Soft. She exhibits no distension. There is no tenderness. Musculoskeletal: Normal range of motion. Neurological: She is alert and oriented to person, place, and time. Skin: Skin is warm. Nursing note and vitals reviewed.       DIFFERENTIAL  DIAGNOSIS     PLAN (LABS / IMAGING / EKG):  Orders Placed This Encounter   Procedures    XR CHEST STANDARD (2 VW)    CBC WITH AUTO DIFFERENTIAL    BASIC METABOLIC PANEL    Troponin    Immature Platelet Fraction    Troponin    Troponin    DIET CARB CONTROL;    Vital signs per unit routine    Notify physician    Notify physician    Up as tolerated    Telemetry Monitoring    Telemetry monitoring    Full Code    Inpatient consult to Cardiology    EKG 12 Lead    EKG 12 Lead    EKG 12 Lead    ECHO Complete 2D W Doppler W Color    PATIENT STATUS (FROM ED OR OR/PROCEDURAL) Observation       MEDICATIONS ORDERED:  Orders Placed This Encounter   Medications    nitroGLYCERIN (NITROSTAT) SL tablet 0.4 mg    morphine injection 4 mg    docusate sodium (COLACE) capsule 100 mg    albuterol (PROVENTIL) nebulizer solution 2.5 mg    DISCONTD: montelukast (SINGULAIR) tablet 10 mg 36 36 - 65 %    Lymphocytes 56 (H) 24 - 43 %    Monocytes 7 3 - 12 %    Eosinophils % 1 1 - 4 %    Basophils 0 0 - 2 %    Immature Granulocytes 0 0 %    Segs Absolute 2.93 1.50 - 8.10 k/uL    Absolute Lymph # 4.59 (H) 1.10 - 3.70 k/uL    Absolute Mono # 0.57 0.10 - 1.20 k/uL    Absolute Eos # 0.09 0.00 - 0.44 k/uL    Basophils # <0.03 0.00 - 0.20 k/uL    Absolute Immature Granulocyte 0.03 0.00 - 0.30 k/uL   BASIC METABOLIC PANEL   Result Value Ref Range    Glucose 95 70 - 99 mg/dL    BUN 11 6 - 20 mg/dL    CREATININE 0.76 0.50 - 0.90 mg/dL    Bun/Cre Ratio NOT REPORTED 9 - 20    Calcium 9.1 8.6 - 10.4 mg/dL    Sodium 138 135 - 144 mmol/L    Potassium 4.6 3.7 - 5.3 mmol/L    Chloride 100 98 - 107 mmol/L    CO2 22 20 - 31 mmol/L    Anion Gap 16 9 - 17 mmol/L    GFR Non-African American >60 >60 mL/min    GFR African American >60 >60 mL/min    GFR Comment          GFR Staging NOT REPORTED    Troponin   Result Value Ref Range    Troponin T <0.03 <0.03 ng/mL    Troponin Interp         Immature Platelet Fraction   Result Value Ref Range    Platelet, Immature Fraction NOT REPORTED 1.1 - 10.3 %    Platelet, Fluorescence Platelet clumps present, count appears decreased.  138 - 453 k/uL   Troponin   Result Value Ref Range    Troponin T <0.03 <0.03 ng/mL    Troponin Interp         Troponin   Result Value Ref Range    Troponin T <0.03 <0.03 ng/mL    Troponin Interp         EKG 12 Lead   Result Value Ref Range    Ventricular Rate 54 BPM    Atrial Rate 54 BPM    P-R Interval 160 ms    QRS Duration 72 ms    Q-T Interval 386 ms    QTc Calculation (Bazett) 366 ms    P Axis 67 degrees    R Axis 25 degrees    T Axis 27 degrees   EKG 12 Lead   Result Value Ref Range    Ventricular Rate 49 BPM    Atrial Rate 49 BPM    P-R Interval 166 ms    QRS Duration 80 ms    Q-T Interval 436 ms    QTc Calculation (Bazett) 393 ms    P Axis 112 degrees    R Axis 174 degrees    T Axis 168 degrees   EKG 12 Lead   Result Value Ref Range    Ventricular Rate 67 BPM    Atrial Rate 67 BPM    P-R Interval 170 ms    QRS Duration 74 ms    Q-T Interval 424 ms    QTc Calculation (Bazett) 448 ms    P Axis 72 degrees    R Axis 19 degrees    T Axis 5 degrees       IMPRESSION: Patients present with complaints of pressure like chest pain with associated left arm numbness and tingling, nausea. On presentation, blood pressure is 133/77, heart rate is 57, respiratory rate is 14, patient looks slightly uncomfortable. Physical exam is unremarkable, Normal heart sounds, normal lung sounds, no JVD, no lower extremity edema. EKG is nonspecific and initial troponin is negative. Patient's presentation is slightly wandering given that she has a history of hypertension, hyperlipidemia and hasn't had a stress test or been evaluated by cardiologist in a while . We have a obtained an EKG, we will proceed with a chest xray, CBC, BMP, a second troponin, treat with sublingual nitroglycerin, morphine and then re- assess. RADIOLOGY:  Xr Chest Standard (2 Vw)    Result Date: 1/13/2018  EXAMINATION: TWO VIEWS OF THE CHEST 1/13/2018 8:57 am COMPARISON: 08/07/2017 HISTORY: ORDERING SYSTEM PROVIDED HISTORY: chest pain TECHNOLOGIST PROVIDED HISTORY: Reason for exam:->chest pain Acuity: Acute Type of Exam: Initial 54-year-old female who complains of acute chest pain FINDINGS: Cardiac monitor leads overlie the chest. Cardiac and mediastinal contours unchanged and within normal limits. No pneumothorax. No free air. No acute focal airspace consolidation or pleural effusions. Minimal degenerative changes throughout the spine. No acute focal airspace consolidation. EKG  EKG Interpretation    Rhythm: normal sinus   Rate: 54  Axis: normal  Conduction: normal  ST Segments: normal  T Waves: flipped in III  Q Waves: normal    Clinical Impression: Sinus bradycardia, normal axis, normal intervals, nonspecific T-wave changes.     Joe Roldan MD      All EKG's are interpreted by the Emergency Department Physician who either signs or Co-signs this chart in the absence of a cardiologist.    Heart Score    Heart Score for chest pain patients  History: Moderately Suspicious  ECG: Normal  Patient Age: > 39 and < 65 years  *Risk factors for Atherosclerotic disease: Hypercholesterolemia, Hypertension, Obesity  Risk Factors: 1 or 2 risk factors  Troponin: < 1X normal limit  Heart Score Total: 3    Score 0  3 = 2.5%  MACE over next 6 wks = Discharge home  Score 4  6 = 20.3%  MACE over next 6 wks = Obs admit  Score 7 - 10 = 72.7%  MACE over next 6 wks = Early invasive Rx       EMERGENCY DEPARTMENT COURSE:  Patient remained uncomfortable on the ED, vitals remained stable, troponin was negative, repeat EKG was still nonspecific for any acute ST segment changes but there was some new T-wave changes in the lateral leads and aVL. Patient reported moderate relief with morphine, nitroglycerin. Patient will be admitted for serial troponins, cardiology evaluation. CONSULTS:  IP CONSULT TO CARDIOLOGY    CRITICAL CARE:  None    FINAL IMPRESSION      1.  Chest pain, unspecified type          DISPOSITION / PLAN     DISPOSITION Admitted 01/13/2018 10:33:59 AM      PATIENT REFERRED TO:  Ting Walton MD  [facility] [address]  [phone]            DISCHARGE MEDICATIONS:  Current Discharge Medication List          Bj Calvert MD  Emergency Medicine Resident    (Please note that portions of this note were completed with a voice recognition program.  Efforts were made to edit the dictations but occasionally words are mis-transcribed.)       Bj Calvert MD  Resident  01/13/18 [id number]

## 2018-01-13 NOTE — H&P
[facility]  CDU / OBSERVATION eNCOUnter  Resident Note     Pt Name: Johan Watts  MRN: [id number]  [place] 1970  Date of evaluation: 1/13/18  Patient's PCP is :  Alis Yen MD    CHIEF COMPLAINT       Chief Complaint   Patient presents with    Chest Pain         HISTORY OF PRESENT ILLNESS    Johan Watts is a 49 y.o. female who presents With acute midsternal chest pain most likely etiology musculoskeletal chest wall pain versus ACS. Patient states that she's been having midsternal chest pain described as a pressure/something sitting on her chest with radiation of the left arm that started gradually 4 days ago. 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 States that the pain is sharp when moving/pushing on her chest and she has a dull pressure on her chest when she is exerting herself. States that her symptoms have worsened since it first started 4 days ago. Denies abdominal pain, back pain, cough, shortness of breath dyspnea diaphoresis. Patient has a history of COPD, hyperlipidemia, hypertension, positive family history of heart disease. Workup in the emergency Department largely unremarkable. EKG nondiagnostic troponins negative chest x-ray unremarkable. Patient admitted to the observation unit for echocardiogram.    Location/Symptom: Acute midsternal chest pain  Timing/Onset: 4 days  Provocation: None  Quality: Sharp/pressure  Radiation: Left arm  Timing/Duration: Intermittent  Modifying Factors:  Worse with movement and exertion    REVIEW OF SYSTEMS       General ROS - No fevers, No malaise   Ophthalmic ROS - No discharge, No changes in vision  ENT ROS -  No sore throat, No rhinorrhea,   Respiratory ROS - no shortness of breath, no cough, no  wheezing  Cardiovascular ROS - + chest pain, no dyspnea on exertion  Gastrointestinal ROS - No abdominal pain, no nausea (TYLENOL) tablet 650 mg Q4H PRN   0.9 % sodium chloride infusion Continuous   morphine injection 2 mg Q2H PRN   Or    morphine (PF) injection 4 mg Q2H PRN   pantoprazole (PROTONIX) tablet 40 mg QAM   mometasone-formoterol (DULERA) 100-5 MCG/ACT inhaler 2 puff BID     0.9 % sodium chloride infusion Continuous       All medication charted and reviewed. ALLERGIES     has No Known Allergies. FAMILY HISTORY     indicated that her mother is alive. She indicated that her father is alive. family history includes Asthma in her mother; Colon Cancer in her father; High Cholesterol in her mother; Hypertension in her mother; Osteoarthritis in her mother; Other in her mother. The patient denies any pertinent family history. I have reviewed and agree with the family history entered. I have reviewed the Family History and it is not significant to the case    SOCIAL HISTORY      reports that she has never smoked. She has never used smokeless tobacco. She reports that she drinks alcohol. She reports that she uses drugs, including Marijuana, about 1 time per week. I have reviewed and agree with all Social.  There are no concerns for substance abuse/use. PHYSICAL EXAM     INITIAL VITALS:  height is 5' (1.524 m) and weight is 217 lb 6 oz (98.6 kg). Her oral temperature is 96.6 °F (35.9 °C). Her blood pressure is 152/86 (abnormal) and her pulse is 66. Her respiration is 16 and oxygen saturation is 98%.       CONSTITUTIONAL: AOx4, no apparent distress, appears stated age    HEAD: normocephalic, atraumatic   EYES: PERRLA, EOMI    ENT: moist mucous membranes, uvula midline   NECK: supple, symmetric, No JVD    BACK: symmetric   LUNGS: clear to auscultation bilaterally No rhonchi rales or wheezes    CARDIOVASCULAR: regular rate and rhythm, no murmurs, rubs or gallops, Peripheral pulses 2+ throughout and symmetrical    ABDOMEN: soft, non-tender, non-distended with normal active bowel sounds No pulsatile midline mass

## 2018-01-13 NOTE — CARE COORDINATION
Case Management Initial Discharge Plan  Shannanevita Barber,         Readmission Risk              Readmission Risk:        2.5       Age 72 or Greater:  0    Admitted from SNF or Requires Paid or Family Care:  0    Currently has CHF,COPD,ARF,CRI,or is on dialysis:  0    Takes more than 5 Prescription Medications:  0    Takes Digoxin,Insulin,Anticoagulants,Narcotics or ASA/Plavix:  [address] in Past 12 Months:  0    On Disability:  0    Patient Considers own Health:  2.5            Met with:patient to discuss discharge plans. Information verified: address, contacts, phone number, , insurance Yes  PCP: Neftali Kruger MD  Date of last visit: 2 weeks ago    Insurance Provider: [employer]    Discharge Planning  Current Residence:  Private Residence  Living Arrangements:  Spouse/Significant Other   Home has 1 stories/ none stairs to climb  Support Systems:  Spouse/Significant Other  Current Services PTA:  None Supplier: none  Patient able to perform ADL's:Independent  DME used to aid ambulation prior to admission: none /during admission none    Potential Assistance Needed:  N/A    Pharmacy: [facility]   Potential Assistance Purchasing Medications:  No  Does patient want to participate in local refill/ meds to beds program?  No    Patient agreeable to home care: No  [place] of choice provided:  n/a      Type of Home Care Services:  None  Patient expects to be discharged to:  home    Prior SNF/Rehab Placement and Facility: no  Agreeable to SNF/Rehab: No  [place] of choice provided: n/a   Evaluation: n/a    Expected Discharge date:      Follow Up Appointment: Best Day/ Time:      Transportation provider: spouse  Transportation arrangements needed for discharge: No    Discharge Plan: home        Electronically signed by Yogi Juárez RN on 18 at 10:48 AM

## 2018-01-14 LAB
TROPONIN INTERP: NORMAL
TROPONIN T: <0.03 NG/ML

## 2018-01-14 PROCEDURE — 94640 AIRWAY INHALATION TREATMENT: CPT

## 2018-01-14 PROCEDURE — G0378 HOSPITAL OBSERVATION PER HR: HCPCS

## 2018-01-14 PROCEDURE — 84484 ASSAY OF TROPONIN QUANT: CPT

## 2018-01-14 PROCEDURE — 2580000003 HC RX 258: Performed by: EMERGENCY MEDICINE

## 2018-01-14 PROCEDURE — 6370000000 HC RX 637 (ALT 250 FOR IP): Performed by: EMERGENCY MEDICINE

## 2018-01-14 PROCEDURE — 93005 ELECTROCARDIOGRAM TRACING: CPT

## 2018-01-14 PROCEDURE — 36415 COLL VENOUS BLD VENIPUNCTURE: CPT

## 2018-01-14 RX ADMIN — GABAPENTIN 100 MG: 100 CAPSULE ORAL at 14:33

## 2018-01-14 RX ADMIN — MONTELUKAST SODIUM 10 MG: 10 TABLET, FILM COATED ORAL at 09:46

## 2018-01-14 RX ADMIN — ATORVASTATIN CALCIUM 10 MG: 10 TABLET, FILM COATED ORAL at 09:46

## 2018-01-14 RX ADMIN — Medication 2 PUFF: at 20:06

## 2018-01-14 RX ADMIN — Medication 10 ML: at 22:35

## 2018-01-14 RX ADMIN — PANTOPRAZOLE SODIUM 40 MG: 40 TABLET, DELAYED RELEASE ORAL at 09:46

## 2018-01-14 RX ADMIN — CARVEDILOL 3.12 MG: 3.12 TABLET, FILM COATED ORAL at 09:46

## 2018-01-14 RX ADMIN — GABAPENTIN 100 MG: 100 CAPSULE ORAL at 09:46

## 2018-01-14 RX ADMIN — FLUTICASONE PROPIONATE 1 SPRAY: 50 SPRAY, METERED NASAL at 09:47

## 2018-01-14 RX ADMIN — GABAPENTIN 100 MG: 100 CAPSULE ORAL at 20:11

## 2018-01-14 RX ADMIN — SENNA 8.6 MG: 8.6 TABLET, COATED ORAL at 09:47

## 2018-01-14 RX ADMIN — CARVEDILOL 3.12 MG: 3.12 TABLET, FILM COATED ORAL at 17:17

## 2018-01-14 NOTE — PROGRESS NOTES
injection 10 mL 2 times per day   sodium chloride flush 0.9 % injection 10 mL PRN   acetaminophen (TYLENOL) tablet 650 mg Q4H PRN   morphine injection 2 mg Q2H PRN   Or    morphine (PF) injection 4 mg Q2H PRN   pantoprazole (PROTONIX) tablet 40 mg QAM   mometasone-formoterol (DULERA) 100-5 MCG/ACT inhaler 2 puff BID     0.9 % sodium chloride infusion Continuous       All medication charted and reviewed. CONSULTS      IP CONSULT TO CARDIOLOGY    ASSESSMENT/PLAN       Lukas Ward is a 49 y.o. female who presents with acute midsternal chest pain most likely etiology musculoskeletal chest wall pain versus ACS. Patient did have an echocardiogram performed yesterday however I do not see results within her chart. Cardiology recommending stress test for tomorrow. · Acute midsternal chest pain: Echocardiogram pending, stress test pending, cardiology consult, Toradol, morphine, Protonix, Tylenol, nitroglycerin  · Continue home medications and pain control  · Monitor vitals, labs, and imaging  · DISPO: pending consults and clinical improvement    --  [facility]  Emergency Medicine Resident Physician     This dictation was generated by voice recognition computer software. Although all attempts are made to edit the dictation for accuracy, there may be errors in the transcription that are not intended.

## 2018-01-14 NOTE — CONSULTS
[facility]  In PatientCardiology Consult             Cardiology   Consult Note          History Obtained From:  patient    HISTORY OF PRESENT ILLNESS:      The patient is a 49 y.o. female seen for chest pain. Patient  presents with midsternal chest pain described as a pressure, pain is constant started after moving furniture. No prior cardiac history. Past Medical History:    Past Medical History:   Diagnosis Date    Allergic rhinitis     Asthma     Clinical trial participant 11/23/2015 11/23/20015    COPD (chronic obstructive pulmonary disease) (HCC)     Dysphagia     Dysphagia     has needed EGD with dilatation in the past    GERD (gastroesophageal reflux disease)     Hiatal hernia     Hyperlipidemia     Hypertension     Irritable bowel syndrome with constipation 1/21/2016    Obesity     Snores     states has tested negative for sleep apnea    Wears glasses      Past Surgical History:    Past Surgical History:   Procedure Laterality Date    COLONOSCOPY  2015    ENDOSCOPY, COLON, DIAGNOSTIC  2015    with dilatation    TUBAL LIGATION      UPPER GASTROINTESTINAL ENDOSCOPY      The endoscopic exam showed mildly tortuous esophagus. Savary dilation performed x 42 and 45 F.     UPPER GASTROINTESTINAL ENDOSCOPY N/A 7/18/2017    EGD DILATION SAVORY performed by Carole Garcia MD at [address] [place] [address] Medications:  Prior to Admission medications    Medication Sig Start Date End Date Taking? Authorizing Provider   polyethylene glycol (GLYCOLAX) powder Please dispense with 4 dulcolax tabs with this prescription. Use as directed by following your patient instructions given by office.  1/8/18 2/1/18 Yes Carole Garcia MD   senna (SENOKOT) 8.6 MG tablet Take 1 tablet by mouth daily 12/19/17 12/19/18 Yes Quita Yadav MD   montelukast (SINGULAIR) 10 MG tablet Take 1 tablet by mouth daily 12/19/17  Yes Quita Yadav MD   ibuprofen (ADVIL;MOTRIN) 800 MG tablet take 1

## 2018-01-15 ENCOUNTER — APPOINTMENT (OUTPATIENT)
Dept: NUCLEAR MEDICINE | Age: 48
DRG: 192 | End: 2018-01-15
Payer: MEDICARE

## 2018-01-15 LAB
EKG ATRIAL RATE: 49 BPM
EKG ATRIAL RATE: 54 BPM
EKG ATRIAL RATE: 64 BPM
EKG ATRIAL RATE: 67 BPM
EKG P AXIS: 112 DEGREES
EKG P AXIS: 67 DEGREES
EKG P AXIS: 67 DEGREES
EKG P AXIS: 72 DEGREES
EKG P-R INTERVAL: 144 MS
EKG P-R INTERVAL: 160 MS
EKG P-R INTERVAL: 166 MS
EKG P-R INTERVAL: 170 MS
EKG Q-T INTERVAL: 386 MS
EKG Q-T INTERVAL: 398 MS
EKG Q-T INTERVAL: 424 MS
EKG Q-T INTERVAL: 436 MS
EKG QRS DURATION: 64 MS
EKG QRS DURATION: 72 MS
EKG QRS DURATION: 74 MS
EKG QRS DURATION: 80 MS
EKG QTC CALCULATION (BAZETT): 366 MS
EKG QTC CALCULATION (BAZETT): 393 MS
EKG QTC CALCULATION (BAZETT): 410 MS
EKG QTC CALCULATION (BAZETT): 448 MS
EKG R AXIS: 174 DEGREES
EKG R AXIS: 19 DEGREES
EKG R AXIS: 25 DEGREES
EKG R AXIS: 28 DEGREES
EKG T AXIS: 168 DEGREES
EKG T AXIS: 26 DEGREES
EKG T AXIS: 27 DEGREES
EKG T AXIS: 5 DEGREES
EKG VENTRICULAR RATE: 49 BPM
EKG VENTRICULAR RATE: 54 BPM
EKG VENTRICULAR RATE: 64 BPM
EKG VENTRICULAR RATE: 67 BPM
LV EF: 59 %
LVEF MODALITY: NORMAL

## 2018-01-15 PROCEDURE — 6370000000 HC RX 637 (ALT 250 FOR IP): Performed by: STUDENT IN AN ORGANIZED HEALTH CARE EDUCATION/TRAINING PROGRAM

## 2018-01-15 PROCEDURE — 6370000000 HC RX 637 (ALT 250 FOR IP): Performed by: EMERGENCY MEDICINE

## 2018-01-15 PROCEDURE — 93017 CV STRESS TEST TRACING ONLY: CPT | Performed by: NURSE PRACTITIONER

## 2018-01-15 PROCEDURE — 6360000002 HC RX W HCPCS: Performed by: STUDENT IN AN ORGANIZED HEALTH CARE EDUCATION/TRAINING PROGRAM

## 2018-01-15 PROCEDURE — 2580000003 HC RX 258: Performed by: EMERGENCY MEDICINE

## 2018-01-15 PROCEDURE — 94640 AIRWAY INHALATION TREATMENT: CPT

## 2018-01-15 PROCEDURE — 78452 HT MUSCLE IMAGE SPECT MULT: CPT

## 2018-01-15 PROCEDURE — 3430000000 HC RX DIAGNOSTIC RADIOPHARMACEUTICAL: Performed by: EMERGENCY MEDICINE

## 2018-01-15 PROCEDURE — 1200000000 HC SEMI PRIVATE

## 2018-01-15 PROCEDURE — 6360000002 HC RX W HCPCS: Performed by: INTERNAL MEDICINE

## 2018-01-15 PROCEDURE — A9500 TC99M SESTAMIBI: HCPCS | Performed by: EMERGENCY MEDICINE

## 2018-01-15 RX ORDER — SODIUM CHLORIDE 0.9 % (FLUSH) 0.9 %
10 SYRINGE (ML) INJECTION 2 TIMES DAILY
Status: DISCONTINUED | OUTPATIENT
Start: 2018-01-15 | End: 2018-01-16 | Stop reason: HOSPADM

## 2018-01-15 RX ORDER — AMINOPHYLLINE DIHYDRATE 25 MG/ML
100 INJECTION, SOLUTION INTRAVENOUS
Status: DISCONTINUED | OUTPATIENT
Start: 2018-01-15 | End: 2018-01-15

## 2018-01-15 RX ORDER — KETOROLAC TROMETHAMINE 30 MG/ML
30 INJECTION, SOLUTION INTRAMUSCULAR; INTRAVENOUS
Status: COMPLETED | OUTPATIENT
Start: 2018-01-15 | End: 2018-01-15

## 2018-01-15 RX ORDER — HYDROCODONE BITARTRATE AND ACETAMINOPHEN 5; 325 MG/1; MG/1
2 TABLET ORAL EVERY 4 HOURS PRN
Status: DISCONTINUED | OUTPATIENT
Start: 2018-01-15 | End: 2018-01-16 | Stop reason: HOSPADM

## 2018-01-15 RX ORDER — SODIUM CHLORIDE 0.9 % (FLUSH) 0.9 %
10 SYRINGE (ML) INJECTION PRN
Status: DISCONTINUED | OUTPATIENT
Start: 2018-01-15 | End: 2018-01-15

## 2018-01-15 RX ORDER — NITROGLYCERIN 0.4 MG/1
0.4 TABLET SUBLINGUAL EVERY 5 MIN PRN
Status: DISCONTINUED | OUTPATIENT
Start: 2018-01-15 | End: 2018-01-15

## 2018-01-15 RX ORDER — METOPROLOL TARTRATE 5 MG/5ML
2.5 INJECTION INTRAVENOUS PRN
Status: DISCONTINUED | OUTPATIENT
Start: 2018-01-15 | End: 2018-01-15

## 2018-01-15 RX ORDER — SODIUM CHLORIDE 9 MG/ML
INJECTION, SOLUTION INTRAVENOUS ONCE
Status: COMPLETED | OUTPATIENT
Start: 2018-01-15 | End: 2018-01-15

## 2018-01-15 RX ADMIN — CARVEDILOL 3.12 MG: 3.12 TABLET, FILM COATED ORAL at 12:50

## 2018-01-15 RX ADMIN — ATORVASTATIN CALCIUM 10 MG: 10 TABLET, FILM COATED ORAL at 12:50

## 2018-01-15 RX ADMIN — Medication 10 ML: at 20:18

## 2018-01-15 RX ADMIN — GABAPENTIN 100 MG: 100 CAPSULE ORAL at 18:11

## 2018-01-15 RX ADMIN — GABAPENTIN 100 MG: 100 CAPSULE ORAL at 12:50

## 2018-01-15 RX ADMIN — CARVEDILOL 3.12 MG: 3.12 TABLET, FILM COATED ORAL at 20:17

## 2018-01-15 RX ADMIN — HYDROCODONE BITARTRATE AND ACETAMINOPHEN 2 TABLET: 5; 325 TABLET ORAL at 22:04

## 2018-01-15 RX ADMIN — Medication 10 ML: at 08:00

## 2018-01-15 RX ADMIN — TETRAKIS(2-METHOXYISOBUTYLISOCYANIDE)COPPER(I) TETRAFLUOROBORATE 40.1 MILLICURIE: 1 INJECTION, POWDER, LYOPHILIZED, FOR SOLUTION INTRAVENOUS at 10:03

## 2018-01-15 RX ADMIN — SODIUM CHLORIDE, PRESERVATIVE FREE 10 ML: 5 INJECTION INTRAVENOUS at 10:03

## 2018-01-15 RX ADMIN — SENNA 8.6 MG: 8.6 TABLET, COATED ORAL at 12:50

## 2018-01-15 RX ADMIN — Medication 10 ML: at 12:51

## 2018-01-15 RX ADMIN — Medication 2 PUFF: at 20:18

## 2018-01-15 RX ADMIN — KETOROLAC TROMETHAMINE 30 MG: 30 INJECTION, SOLUTION INTRAMUSCULAR at 00:36

## 2018-01-15 RX ADMIN — Medication 30 ML: at 13:48

## 2018-01-15 RX ADMIN — PANTOPRAZOLE SODIUM 40 MG: 40 TABLET, DELAYED RELEASE ORAL at 12:49

## 2018-01-15 RX ADMIN — REGADENOSON 0.4 MG: 0.08 INJECTION, SOLUTION INTRAVENOUS at 10:03

## 2018-01-15 RX ADMIN — SODIUM CHLORIDE: 9 INJECTION, SOLUTION INTRAVENOUS at 09:34

## 2018-01-15 RX ADMIN — Medication 10 ML: at 09:33

## 2018-01-15 RX ADMIN — FLUTICASONE PROPIONATE 1 SPRAY: 50 SPRAY, METERED NASAL at 12:50

## 2018-01-15 RX ADMIN — Medication 10 ML: at 20:19

## 2018-01-15 RX ADMIN — TETRAKIS(2-METHOXYISOBUTYLISOCYANIDE)COPPER(I) TETRAFLUOROBORATE 15.7 MILLICURIE: 1 INJECTION, POWDER, LYOPHILIZED, FOR SOLUTION INTRAVENOUS at 08:01

## 2018-01-15 ASSESSMENT — PAIN SCALES - GENERAL
PAINLEVEL_OUTOF10: 7
PAINLEVEL_OUTOF10: 8

## 2018-01-15 NOTE — PROGRESS NOTES
[address] [facility]  CDU / OBSERVATION eNCOUnter  Attending NOte       I performed a history and physical examination of the patient and discussed management with the resident. I reviewed the residents note and agree with the documented findings and plan of care. Any areas of disagreement are noted on the chart. I was personally present for the key portions of any procedures. I have documented in the chart those procedures where I was not present during the key portions. I have reviewed the nurses notes. I agree with the chief complaint, past medical history, past surgical history, allergies, medications, social and family history as documented unless otherwise noted below. The Family history, social history, and ROS are effectively unchanged since admission unless noted elsewhere in the chart. Patient for stress test today. Patient more comfortable today. Patient with component of angina in addition to component of pleuritic type pain. [facility] consult cardiology if positive. Patient more comfortable then she has been. Anticipate discharge if stress negative.     Lindsey Conrad MD  Attending Emergency  Physician

## 2018-01-15 NOTE — PROCEDURES
[address] [facility] [address] Bang [address]                                CARDIAC STRESS TEST    PATIENT NAME: Reji Mcclelland              :        1970  MED REC NO:   [id number]                             ROOM:       [id number]  ACCOUNT NO:   [de-identified]                           ADMIT DATE: 2018  PROVIDER:     Flory Barahona    DATE OF STUDY:  01/15/2018    LEXISCAN STRESS STUDY: PROCEDURE EXPLAINED AND CONSENT SIGNED. MEDICATIONS: 0.4 MG LEXISCAN  RESTING HEART RATE: 66  PEAK HEART RATE: 97  RESTING BLOOD PRESSURE: 115/66  PEAK BLOOD PRESSURE: 136/78  RESTING ECG: NORMAL  STRESS HEART RESPONSE: NORMAL  STRESS BLOOD PRESSURE RESPONSE: APPROPRIATE  STRESS ECGS: NORMAL  CHEST PAIN: NONE  ISCHEMIC ECG CHANGES: NONE    IMPRESSION: ELECTROCARDIOGRAPHICALLY NEGATIVE STRESS STUDY. RADIO-ISOTOPE  STUDY TO FOLLOW FROM THE NUCLEAR MEDICINE DEPARTMENT.       Keshia Bailey    D: 01/15/2018 14:31:03       T: 01/15/2018 14:32:00     RUYD

## 2018-01-16 ENCOUNTER — APPOINTMENT (OUTPATIENT)
Dept: CARDIAC CATH/INVASIVE PROCEDURES | Age: 48
DRG: 192 | End: 2018-01-16
Payer: MEDICARE

## 2018-01-16 VITALS
TEMPERATURE: 97.9 F | WEIGHT: 217.37 LBS | SYSTOLIC BLOOD PRESSURE: 104 MMHG | HEIGHT: 60 IN | DIASTOLIC BLOOD PRESSURE: 59 MMHG | RESPIRATION RATE: 16 BRPM | BODY MASS INDEX: 42.68 KG/M2 | OXYGEN SATURATION: 100 % | HEART RATE: 59 BPM

## 2018-01-16 LAB
LV EF: 65 %
LVEF MODALITY: NORMAL

## 2018-01-16 PROCEDURE — C1760 CLOSURE DEV, VASC: HCPCS

## 2018-01-16 PROCEDURE — C1894 INTRO/SHEATH, NON-LASER: HCPCS

## 2018-01-16 PROCEDURE — 2580000003 HC RX 258: Performed by: INTERNAL MEDICINE

## 2018-01-16 PROCEDURE — 6370000000 HC RX 637 (ALT 250 FOR IP): Performed by: EMERGENCY MEDICINE

## 2018-01-16 PROCEDURE — 2580000003 HC RX 258: Performed by: EMERGENCY MEDICINE

## 2018-01-16 PROCEDURE — 4A023N7 MEASUREMENT OF CARDIAC SAMPLING AND PRESSURE, LEFT HEART, PERCUTANEOUS APPROACH: ICD-10-PCS | Performed by: INTERNAL MEDICINE

## 2018-01-16 PROCEDURE — B2111ZZ FLUOROSCOPY OF MULTIPLE CORONARY ARTERIES USING LOW OSMOLAR CONTRAST: ICD-10-PCS | Performed by: INTERNAL MEDICINE

## 2018-01-16 PROCEDURE — C1725 CATH, TRANSLUMIN NON-LASER: HCPCS

## 2018-01-16 PROCEDURE — 93458 L HRT ARTERY/VENTRICLE ANGIO: CPT | Performed by: INTERNAL MEDICINE

## 2018-01-16 PROCEDURE — B2151ZZ FLUOROSCOPY OF LEFT HEART USING LOW OSMOLAR CONTRAST: ICD-10-PCS | Performed by: INTERNAL MEDICINE

## 2018-01-16 PROCEDURE — C1769 GUIDE WIRE: HCPCS

## 2018-01-16 PROCEDURE — 6360000002 HC RX W HCPCS

## 2018-01-16 RX ORDER — SODIUM CHLORIDE 0.9 % (FLUSH) 0.9 %
10 SYRINGE (ML) INJECTION EVERY 12 HOURS SCHEDULED
Status: DISCONTINUED | OUTPATIENT
Start: 2018-01-16 | End: 2018-01-16 | Stop reason: HOSPADM

## 2018-01-16 RX ORDER — SODIUM CHLORIDE 9 MG/ML
INJECTION, SOLUTION INTRAVENOUS CONTINUOUS
Status: DISCONTINUED | OUTPATIENT
Start: 2018-01-16 | End: 2018-01-16 | Stop reason: HOSPADM

## 2018-01-16 RX ORDER — ONDANSETRON 2 MG/ML
4 INJECTION INTRAMUSCULAR; INTRAVENOUS EVERY 6 HOURS PRN
Status: DISCONTINUED | OUTPATIENT
Start: 2018-01-16 | End: 2018-01-16 | Stop reason: HOSPADM

## 2018-01-16 RX ORDER — SODIUM CHLORIDE 0.9 % (FLUSH) 0.9 %
10 SYRINGE (ML) INJECTION PRN
Status: DISCONTINUED | OUTPATIENT
Start: 2018-01-16 | End: 2018-01-16 | Stop reason: HOSPADM

## 2018-01-16 RX ORDER — DOCUSATE SODIUM 100 MG/1
100 CAPSULE, LIQUID FILLED ORAL 2 TIMES DAILY
Status: DISCONTINUED | OUTPATIENT
Start: 2018-01-16 | End: 2018-01-16 | Stop reason: HOSPADM

## 2018-01-16 RX ORDER — ACETAMINOPHEN 325 MG/1
650 TABLET ORAL EVERY 4 HOURS PRN
Status: DISCONTINUED | OUTPATIENT
Start: 2018-01-16 | End: 2018-01-16 | Stop reason: HOSPADM

## 2018-01-16 RX ADMIN — CARVEDILOL 3.12 MG: 3.12 TABLET, FILM COATED ORAL at 18:05

## 2018-01-16 RX ADMIN — FLUTICASONE PROPIONATE 1 SPRAY: 50 SPRAY, METERED NASAL at 08:25

## 2018-01-16 RX ADMIN — GABAPENTIN 100 MG: 100 CAPSULE ORAL at 00:42

## 2018-01-16 RX ADMIN — GABAPENTIN 100 MG: 100 CAPSULE ORAL at 16:16

## 2018-01-16 RX ADMIN — ACETAMINOPHEN 650 MG: 325 TABLET ORAL at 16:13

## 2018-01-16 RX ADMIN — PANTOPRAZOLE SODIUM 40 MG: 40 TABLET, DELAYED RELEASE ORAL at 08:25

## 2018-01-16 RX ADMIN — SENNA 8.6 MG: 8.6 TABLET, COATED ORAL at 08:25

## 2018-01-16 RX ADMIN — Medication 10 ML: at 08:26

## 2018-01-16 RX ADMIN — ATORVASTATIN CALCIUM 10 MG: 10 TABLET, FILM COATED ORAL at 08:26

## 2018-01-16 RX ADMIN — GABAPENTIN 100 MG: 100 CAPSULE ORAL at 08:25

## 2018-01-16 RX ADMIN — CARVEDILOL 3.12 MG: 3.12 TABLET, FILM COATED ORAL at 08:25

## 2018-01-16 RX ADMIN — DOCUSATE SODIUM 100 MG: 100 CAPSULE ORAL at 10:19

## 2018-01-16 RX ADMIN — SODIUM CHLORIDE: 9 INJECTION, SOLUTION INTRAVENOUS at 13:32

## 2018-01-16 ASSESSMENT — PAIN SCALES - GENERAL
PAINLEVEL_OUTOF10: 7
PAINLEVEL_OUTOF10: 7

## 2018-01-16 ASSESSMENT — PAIN DESCRIPTION - PAIN TYPE: TYPE: ACUTE PAIN

## 2018-01-16 ASSESSMENT — PAIN DESCRIPTION - FREQUENCY: FREQUENCY: CONTINUOUS

## 2018-01-16 ASSESSMENT — PAIN DESCRIPTION - DESCRIPTORS: DESCRIPTORS: PRESSURE

## 2018-01-16 ASSESSMENT — PAIN DESCRIPTION - ONSET: ONSET: ON-GOING

## 2018-01-16 ASSESSMENT — PAIN DESCRIPTION - LOCATION: LOCATION: CHEST

## 2018-01-16 NOTE — DISCHARGE INSTR - DIET
the base of your diet. · Choose heart-healthy fats such as canola, olive, and flaxseed oil, and foods high in heart-healthy fats, such as nuts, seeds, soybeans, tofu, and fish. · Eat fish at least twice per week; the fish highest in omega-3 fatty acids and lowest in mercury include salmon, herring, mackerel, sardines, and canned chunk light tuna. If you eat fish less than twice per week or have high triglycerides, talk to your doctor about taking fish oil supplements. · Read food labels. ¨ For products low in fat and cholesterol, look for fat free, low-fat, cholesterol free, saturated fat free, and trans fat freeAlso scan the Nutrition Facts Label, which lists saturated fat, trans fat, and cholesterol amounts. ¨ For products low in sodium, look for sodium free, very low sodium, low sodium, no added salt, and unsalted   · Skip the salt when cooking or at the table; if food needs more flavor, get creative and try out different herbs and spices. Garlic and onion also add substantial flavor to foods. · Trim any visible fat off meat and poultry before cooking, and drain the fat off after [place]. · Use cooking methods that require little or no added fat, such as grilling, boiling, baking, poaching, broiling, roasting, steaming, stir-frying, and sauting. · Avoid fast food and convenience food. They tend to be high in saturated and trans fat and have a lot of added salt. · Talk to a registered dietitian for individualized diet advice.       Last Reviewed: March 2011 Ashkan Peter MS, MPH, RD   Updated: 3/29/2011

## 2018-01-16 NOTE — PROGRESS NOTES
Pt complaining of constipation. Spoke with Dr. Joey Watson about pts complaint and received a verbal order for colace 100mg bid.

## 2018-01-16 NOTE — PROGRESS NOTES
OBS/CDU   RESIDENT NOTE      Patients PCP is:  Moni Chery MD        SUBJECTIVE        No acute events overnight. Patient stating her chest pain is much improved with Norco, no current pain at rest. Has been able to tolerate a full diet without nausea or vomiting. The patient is urinating on her own and is passing flatus. Denies fever, chills, nausea, vomiting, shortness of breath, abdominal pain, focal weakness, numbness, tingling, urinary/bowel symptoms, vision changes, visual hallucinations, or headache. PHYSICAL EXAM      General: NAD, AO X 3  Heent: EMOI, PERRL  Neck: SUPPLE, NO JVD  Cardiovascular: RRR, S1S2  Pulmonary: CTAB, NO SOB  Abdomen: SOFT, NTTP, ND, +BS  Extremities: +2/4 PULSES DISTAL, NO SWELLING  Neuro:  NO NUMBNESS OR TINGLING  Psych:  MENTATION AT BASELINE, NO SUICIDAL IDEATION. PERTINENT TEST /EXAMS      I have reviewed all available laboratory results.     MEDICATIONS CURRENT       docusate sodium (COLACE) capsule 100 mg BID   sodium chloride flush 0.9 % injection 10 mL BID   sodium chloride flush 0.9 % injection 10 mL BID   bismuth subsalicylate (PEPTO BISMOL) 262 MG/15ML suspension 30 mL Q6H PRN   HYDROcodone-acetaminophen (NORCO) 5-325 MG per tablet 2 tablet Q4H PRN   nitroGLYCERIN (NITROSTAT) SL tablet 0.4 mg Q5 Min PRN   docusate sodium (COLACE) capsule 100 mg BID PRN   albuterol (PROVENTIL) nebulizer solution 2.5 mg Q6H PRN   gabapentin (NEURONTIN) capsule 100 mg TID   carboxymethylcellulose PF 1 % ophthalmic solution 1 drop PRN   albuterol sulfate  (90 Base) MCG/ACT inhaler 2 puff Q6H PRN   fluticasone (FLONASE) 50 MCG/ACT nasal spray 1 spray Daily   carvedilol (COREG) tablet 3.125 mg BID WC   atorvastatin (LIPITOR) tablet 10 mg Daily   senna (SENOKOT) tablet 8.6 mg Daily   montelukast (SINGULAIR) tablet 10 mg Daily   sodium chloride flush 0.9 % injection 10 mL 2 times per day   sodium chloride flush 0.9 % injection 10 mL PRN   acetaminophen (TYLENOL) tablet 650 mg Q4H PRN   pantoprazole (PROTONIX) tablet 40 mg QAM   mometasone-formoterol (DULERA) 100-5 MCG/ACT inhaler 2 puff BID     0.9 % sodium chloride infusion Continuous       All medication charted and reviewed. CONSULTS      IP CONSULT TO CARDIOLOGY    ASSESSMENT      Kennedy Harris is a 49 y.o. female who presents with acute midsternal chest pain most likely etiology musculoskeletal chest wall pain versus ACS      PLAN       Patient did have an echocardiogram performed which was normal.  Cardiology recommending stress which was high risk. · Acute midsternal chest pain: cardiology consult, Toradol, morphine, Protonix, Tylenol, nitroglycerin  · Discharge pending cardiology clearance  IP CONSULT TO CARDIOLOGY  · Further workup and evaluation   · Follow up recommendations     · Continue home meds, pain control   · Monitor vitals, labs, imaging     DISPO: pending consults and clinical improvement       --  Sidney Pradhan  Emergency Medicine Resident Physician     This dictation was generated by voice recognition computer software. Although all attempts are made to edit the dictation for accuracy, there may be errors in the transcription that are not intended.

## 2018-01-19 DIAGNOSIS — M25.552 LEFT HIP PAIN: ICD-10-CM

## 2018-01-21 RX ORDER — IBUPROFEN 800 MG/1
TABLET ORAL
Qty: 30 TABLET | Refills: 3 | Status: SHIPPED | OUTPATIENT
Start: 2018-01-21 | End: 2019-03-18

## 2018-01-24 DIAGNOSIS — Z98.890 S/P CARDIAC CATH: ICD-10-CM

## 2018-01-24 DIAGNOSIS — R13.12 DYSPHAGIA, OROPHARYNGEAL: ICD-10-CM

## 2018-01-24 DIAGNOSIS — K21.00 CHRONIC REFLUX ESOPHAGITIS: ICD-10-CM

## 2018-01-24 DIAGNOSIS — K21.9 GASTROESOPHAGEAL REFLUX DISEASE WITHOUT ESOPHAGITIS: ICD-10-CM

## 2018-01-24 NOTE — TELEPHONE ENCOUNTER
Fax request for aspirin medication pended.        Health Maintenance   Topic Date Due    HIV screen  12/04/1984    Cervical cancer screen  12/08/2017    A1C test (Diabetic or Prediabetic)  10/10/2018    Potassium monitoring  01/13/2019    Creatinine monitoring  01/13/2019    Lipid screen  10/10/2022    DTaP/Tdap/Td vaccine (2 - Td) 02/05/2026    Flu vaccine  Completed    Pneumococcal med risk  Completed             (applicable per patient's age: Cancer Screenings, Depression Screening, Fall Risk Screening, Immunizations)    Hemoglobin A1C (%)   Date Value   10/10/2017 5.9   10/11/2016 5.5   03/26/2015 5.4     LDL Cholesterol (mg/dL)   Date Value   10/10/2017 67     AST (U/L)   Date Value   05/02/2017 17     ALT (U/L)   Date Value   05/02/2017 16     BUN (mg/dL)   Date Value   01/13/2018 11      (goal A1C is < 7)   (goal LDL is <100) need 30-50% reduction from baseline     BP Readings from Last 3 Encounters:   01/16/18 (!) 104/59   12/19/17 128/78   10/09/17 119/67    (goal /80)      All Future Testing planned in [facility]:  Lab Frequency Next Occurrence   TRAVIS Screen Routine Once 01/24/2018       Next Visit Date:  Future Appointments  Date Time Provider Fortino Jean Baptiste   1/29/2018 10:30 AM STV MAMMO RM 1 [facility] MAMMO STV Radiolog   1/30/2018 10:15 AM Lauren Lopez MD [facility]   2/19/2018 9:00 AM Lisa Zuñiga MD Resp Spec [facility]   3/1/2018 9:30 AM Lauren Lopez MD [facility]            Patient Active Problem List:     Hypertension     Asthma     GERD (gastroesophageal reflux disease)     Hypokalemia     S/P cardiac cath-Minimal disease 11/23/15 Dr. Yuli Ruelas     Irritable bowel syndrome with constipation     Obesity     Allergic rhinitis     Hiatal hernia     Paresthesias     Left sided numbness     Dysphagia     Family history of colon cancer     Chest pain

## 2018-01-26 RX ORDER — ASPIRIN 81 MG/1
TABLET ORAL
Qty: 30 TABLET | Refills: 3 | Status: SHIPPED | OUTPATIENT
Start: 2018-01-26 | End: 2018-05-30 | Stop reason: SDUPTHER

## 2018-01-26 RX ORDER — OMEPRAZOLE 40 MG/1
CAPSULE, DELAYED RELEASE ORAL
Qty: 60 CAPSULE | Refills: 3 | Status: SHIPPED | OUTPATIENT
Start: 2018-01-26 | End: 2018-05-03 | Stop reason: SDUPTHER

## 2018-01-29 ENCOUNTER — HOSPITAL ENCOUNTER (OUTPATIENT)
Dept: MAMMOGRAPHY | Age: 48
Discharge: HOME | End: 2018-01-29
Payer: MEDICARE

## 2018-01-29 DIAGNOSIS — Z12.39 SCREENING BREAST EXAMINATION: ICD-10-CM

## 2018-01-29 PROCEDURE — 77067 SCR MAMMO BI INCL CAD: CPT

## 2018-02-07 ENCOUNTER — ANESTHESIA EVENT (OUTPATIENT)
Dept: OPERATING ROOM | Age: 48
End: 2018-02-07
Payer: MEDICARE

## 2018-02-08 ENCOUNTER — HOSPITAL ENCOUNTER (OUTPATIENT)
Age: 48
Setting detail: OUTPATIENT SURGERY
Discharge: HOME | End: 2018-02-08
Attending: INTERNAL MEDICINE | Admitting: INTERNAL MEDICINE
Payer: MEDICARE

## 2018-02-08 ENCOUNTER — ANESTHESIA (OUTPATIENT)
Dept: OPERATING ROOM | Age: 48
End: 2018-02-08
Payer: MEDICARE

## 2018-02-08 VITALS — SYSTOLIC BLOOD PRESSURE: 153 MMHG | DIASTOLIC BLOOD PRESSURE: 94 MMHG | OXYGEN SATURATION: 99 %

## 2018-02-08 VITALS
HEIGHT: 60 IN | OXYGEN SATURATION: 100 % | SYSTOLIC BLOOD PRESSURE: 125 MMHG | WEIGHT: 198.2 LBS | DIASTOLIC BLOOD PRESSURE: 79 MMHG | TEMPERATURE: 97.7 F | HEART RATE: 63 BPM | RESPIRATION RATE: 14 BRPM | BODY MASS INDEX: 38.91 KG/M2

## 2018-02-08 LAB — HCG, PREGNANCY URINE (POC): NEGATIVE

## 2018-02-08 PROCEDURE — 84703 CHORIONIC GONADOTROPIN ASSAY: CPT

## 2018-02-08 PROCEDURE — 3609027000 HC COLONOSCOPY: Performed by: INTERNAL MEDICINE

## 2018-02-08 PROCEDURE — 3700000001 HC ADD 15 MINUTES (ANESTHESIA): Performed by: INTERNAL MEDICINE

## 2018-02-08 PROCEDURE — 7100000011 HC PHASE II RECOVERY - ADDTL 15 MIN: Performed by: INTERNAL MEDICINE

## 2018-02-08 PROCEDURE — 45378 DIAGNOSTIC COLONOSCOPY: CPT | Performed by: INTERNAL MEDICINE

## 2018-02-08 PROCEDURE — 7100000010 HC PHASE II RECOVERY - FIRST 15 MIN: Performed by: INTERNAL MEDICINE

## 2018-02-08 PROCEDURE — 3700000000 HC ANESTHESIA ATTENDED CARE: Performed by: INTERNAL MEDICINE

## 2018-02-08 PROCEDURE — 6360000002 HC RX W HCPCS: Performed by: NURSE ANESTHETIST, CERTIFIED REGISTERED

## 2018-02-08 PROCEDURE — 2580000003 HC RX 258: Performed by: ANESTHESIOLOGY

## 2018-02-08 PROCEDURE — 2500000003 HC RX 250 WO HCPCS: Performed by: NURSE ANESTHETIST, CERTIFIED REGISTERED

## 2018-02-08 RX ORDER — LIDOCAINE HYDROCHLORIDE 20 MG/ML
INJECTION, SOLUTION INFILTRATION; PERINEURAL PRN
Status: DISCONTINUED | OUTPATIENT
Start: 2018-02-08 | End: 2018-02-08 | Stop reason: SDUPTHER

## 2018-02-08 RX ORDER — SODIUM CHLORIDE, SODIUM LACTATE, POTASSIUM CHLORIDE, CALCIUM CHLORIDE 600; 310; 30; 20 MG/100ML; MG/100ML; MG/100ML; MG/100ML
INJECTION, SOLUTION INTRAVENOUS CONTINUOUS
Status: DISCONTINUED | OUTPATIENT
Start: 2018-02-08 | End: 2018-02-08 | Stop reason: HOSPADM

## 2018-02-08 RX ORDER — LIDOCAINE HYDROCHLORIDE 10 MG/ML
1 INJECTION, SOLUTION EPIDURAL; INFILTRATION; INTRACAUDAL; PERINEURAL
Status: DISCONTINUED | OUTPATIENT
Start: 2018-02-08 | End: 2018-02-08 | Stop reason: HOSPADM

## 2018-02-08 RX ORDER — SODIUM CHLORIDE 9 MG/ML
INJECTION, SOLUTION INTRAVENOUS CONTINUOUS
Status: DISCONTINUED | OUTPATIENT
Start: 2018-02-08 | End: 2018-02-08

## 2018-02-08 RX ORDER — FENTANYL CITRATE 50 UG/ML
INJECTION, SOLUTION INTRAMUSCULAR; INTRAVENOUS PRN
Status: DISCONTINUED | OUTPATIENT
Start: 2018-02-08 | End: 2018-02-08 | Stop reason: SDUPTHER

## 2018-02-08 RX ORDER — SODIUM CHLORIDE 0.9 % (FLUSH) 0.9 %
10 SYRINGE (ML) INJECTION PRN
Status: DISCONTINUED | OUTPATIENT
Start: 2018-02-08 | End: 2018-02-08 | Stop reason: HOSPADM

## 2018-02-08 RX ORDER — SODIUM CHLORIDE 0.9 % (FLUSH) 0.9 %
10 SYRINGE (ML) INJECTION EVERY 12 HOURS SCHEDULED
Status: DISCONTINUED | OUTPATIENT
Start: 2018-02-08 | End: 2018-02-08 | Stop reason: HOSPADM

## 2018-02-08 RX ORDER — PROPOFOL 10 MG/ML
INJECTION, EMULSION INTRAVENOUS PRN
Status: DISCONTINUED | OUTPATIENT
Start: 2018-02-08 | End: 2018-02-08 | Stop reason: SDUPTHER

## 2018-02-08 RX ORDER — MIDAZOLAM HYDROCHLORIDE 1 MG/ML
INJECTION INTRAMUSCULAR; INTRAVENOUS PRN
Status: DISCONTINUED | OUTPATIENT
Start: 2018-02-08 | End: 2018-02-08 | Stop reason: SDUPTHER

## 2018-02-08 RX ADMIN — PROPOFOL 20 MG: 10 INJECTION, EMULSION INTRAVENOUS at 10:17

## 2018-02-08 RX ADMIN — SODIUM CHLORIDE, POTASSIUM CHLORIDE, SODIUM LACTATE AND CALCIUM CHLORIDE: 600; 310; 30; 20 INJECTION, SOLUTION INTRAVENOUS at 09:16

## 2018-02-08 RX ADMIN — PROPOFOL 100 MG: 10 INJECTION, EMULSION INTRAVENOUS at 10:12

## 2018-02-08 RX ADMIN — PROPOFOL 20 MG: 10 INJECTION, EMULSION INTRAVENOUS at 10:25

## 2018-02-08 RX ADMIN — FENTANYL CITRATE 50 MCG: 50 INJECTION, SOLUTION INTRAMUSCULAR; INTRAVENOUS at 10:12

## 2018-02-08 RX ADMIN — LIDOCAINE HYDROCHLORIDE 100 MG: 20 INJECTION, SOLUTION INFILTRATION; PERINEURAL at 10:12

## 2018-02-08 RX ADMIN — PROPOFOL 20 MG: 10 INJECTION, EMULSION INTRAVENOUS at 10:22

## 2018-02-08 RX ADMIN — FENTANYL CITRATE 50 MCG: 50 INJECTION, SOLUTION INTRAMUSCULAR; INTRAVENOUS at 10:17

## 2018-02-08 RX ADMIN — MIDAZOLAM HYDROCHLORIDE 2 MG: 1 INJECTION, SOLUTION INTRAMUSCULAR; INTRAVENOUS at 10:10

## 2018-02-08 ASSESSMENT — PULMONARY FUNCTION TESTS
PIF_VALUE: 1

## 2018-02-08 ASSESSMENT — PAIN DESCRIPTION - DESCRIPTORS: DESCRIPTORS: SHARP

## 2018-02-08 ASSESSMENT — PAIN - FUNCTIONAL ASSESSMENT: PAIN_FUNCTIONAL_ASSESSMENT: 0-10

## 2018-02-08 NOTE — OP NOTE
[facility]     REFERRING PHYSICIAN: No ref. provider found     PRIMARY CARE PROVIDER: Erica Greco MD    ATTENDING PHYSICIAN: Adrian Toro MD     HISTORY: Ms. Dmitriy Simmons is a 49 y.o. female who presents to the [place] [address] Endoscopy unit for colonoscopy. The patient's clinical history is remarkable for 1st screening. Father had colon cancer at 61. She is currently medically stable and appropriate for the planned procedure. PREOPERATIVE DIAGNOSIS: family history of colon cancer. PROCEDURES:   Transanal Colonoscopy --screening. POSTPROCEDURE DIAGNOSIS:  Redundant colon    MEDICATIONS:     MAC per anesthesia    INSTRUMENT: Olympus PCF-H180AL flexible Colonoscope. PREPARATION: The nature and character of the procedure as well as risks, benefits, and alternatives were discussed with the patient and informed consent was obtained. Complications were said to include, but were not limited to: medication allergy, medication reaction, cardiovascular and respiratory problems, bleeding, perforation, infection, and/or missed diagnosis. Following arrival in the endoscopy room, the patient was placed in the left lateral decubitus position and final time-out accomplished in the presence of the nursing staff. Baseline vital signs were obtained and reviewed, and IV sedation was subsequently initiated. FINDINGS: Rectal examination demonstrated no significant visible external abnormality and digital palpation was unremarkable. Following adequate conscious sedation the colonoscope was introduced and advanced under direct visualization to the cecum, which was identified by the ileocecal valve and appendiceal orifice. The bowel preparation was felt to be adequate. This included small amounts of thick, thin and watery stool that was able to be adequately irrigated and aspirated. Cecal intubation time was 7 minutes.      Once maximally inserted, the endoscope was withdrawn and the mucosa was carefully inspected. The mucosal exam was normal. Redundant colon. Retroflexion was performed in the rectum and showed no pathology. Withdrawal time was 6 minutes. RECOMMENDATIONS:   1) Follow up with referring provider, as previously scheduled.    2) Continue interval colon cancer screening (i.e repeat colonoscopy in 5 years)        Erika Fonseca MD [facility]

## 2018-02-08 NOTE — H&P
50 MCG/ACT nasal spray 1 spray by Nasal route daily 5/1/17  Yes Jeannine Frost MD   fluticasone-salmeterol (ADVAIR) 500-50 MCG/DOSE diskus inhaler Inhale 1 puff into the lungs every 12 hours 5/1/17  Yes Jeannine Frost MD   tears naturale II (CELLUGEL) SOLN ophthalmic solution Place 1 drop into both eyes as needed for Dry Eyes 4/10/17  Yes Nile Ascencio MD   gabapentin (NEURONTIN) 100 MG capsule Take 1 capsule by mouth 3 times daily 3/2/17  Yes Braden Hood MD   montelukast (SINGULAIR) 10 MG tablet Take 1 tablet by mouth daily 10/10/16 12/19/18 Yes Jeannine Frost MD   Multiple Vitamins-Minerals (WOMENS MULTI VITAMIN & MINERAL PO) Take 1 tablet by mouth daily   Yes Kelin Black MD   docusate sodium (COLACE) 100 MG capsule Take 1 capsule by mouth 2 times daily as needed for Constipation  Patient taking differently: Take 100 mg by mouth 2 times daily as needed for Constipation Indications: only using once daily  1/21/16  Yes Eugenia Chau MD   aspirin 81 MG EC tablet take 1 tablet by mouth once daily 1/26/18   Moni Chery MD   ibuprofen (ADVIL;MOTRIN) 800 MG tablet take 1 tablet by mouth every 8 hours if needed for pain 1/21/18   Moni Chery MD   albuterol (PROVENTIL) (2.5 MG/3ML) 0.083% nebulizer solution Take 3 mLs by nebulization every 6 hours as needed for Wheezing 5/31/16   Harlen Canavan, MD       This is a 49 y.o. female who is pleasant, cooperative, alert and oriented x3, in no acute distress. Heart: Heart sounds are normal.  HR regular rate and rhythm without murmur, gallop or rub. Lungs: Normal respiratory effort with good air exchange and clear to auscultation without wheezes or rales bilaterally   Abdomen: soft, nontender, nondistended with bowel sounds .        Labs:  Recent Labs      02/08/18   0859  01/13/18   0913   HGB   --   10.2*   HCT   --   32.6*   WBC   --   8.2   MCV   --   81.1*   PLT   --   See Reflexed IPF Result   NA   --   138   K   --   4.6   CL   -- 100   CO2   --   22   BUN   --   11   CREATININE   --   0.76   GLUCOSE   --   95   HCG  NEGATIVE   --        NEIL Boothe-BC  Electronically signed 2/8/2018 at [facility],  Resident Signed Internal Medicine  H&P Date of Service: 1/13/2018  1:50 PM   Related encounter: ED to Hosp-Admission (Discharged) from 1/13/2018 in [facility] [address]       Expand All Collapse All    []Hide copied text  []Hover for attribution information  [address]  CDU / [facility] NOTE      Pt Name: Lukas Ward  MRN: [id number]  [place] 1970  Date of evaluation: 1/13/18  Patient's PCP is :  Mathew Gillespie MD     CHIEF COMPLAINT            Chief Complaint   Patient presents with    Chest Pain            HISTORY OF PRESENT ILLNESS    Lukas Ward is a 49 y.o. female who presents With acute midsternal chest pain most likely etiology musculoskeletal chest wall pain versus ACS.    Patient states that she's been having midsternal chest pain described as a pressure/something sitting on her chest with radiation of the left arm that started gradually 4 days ago. 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 States that the pain is sharp when moving/pushing on her chest and she has a dull pressure on her chest when she is exerting herself. States that her symptoms have worsened since it first started 4 days ago. Denies abdominal pain, back pain, cough, shortness of breath dyspnea diaphoresis. Patient has a history of COPD, hyperlipidemia, hypertension, positive family history of heart disease.     Workup in the emergency Department largely unremarkable. EKG nondiagnostic troponins negative chest x-ray unremarkable.   Patient admitted to the observation unit for echocardiogram.     Location/Symptom: Acute midsternal chest pain  Timing/Onset: 4 days  Provocation: None  Quality: Sharp/pressure  Radiation: Left arm  Timing/Duration: Intermittent  Modifying Factors: Worse with movement and exertion     REVIEW OF SYSTEMS        General ROS - No fevers, No malaise   Ophthalmic ROS - No discharge, No changes in vision  ENT ROS -  No sore throat, No rhinorrhea,   Respiratory ROS - no shortness of breath, no cough, no  wheezing  Cardiovascular ROS - + chest pain, no dyspnea on exertion  Gastrointestinal ROS - No abdominal pain, no nausea or vomiting, no change in bowel habits, no black or bloody stools  Genito-Urinary ROS - No dysuria, trouble voiding, or hematuria  Musculoskeletal ROS - + myalgias, No arthalgias  Neurological ROS - No headache, no dizziness/lightheadedness, No focal weakness, no loss of sensation  Dermatological ROS - No lesions, No rash      (PQRS) Advance directives on face sheet per hospital policy. No change unless specifically mentioned in chart     PAST MEDICAL HISTORY    has a past medical history of Allergic rhinitis; Asthma; Clinical trial participant; COPD (chronic obstructive pulmonary disease) ([facility] [address] Dysphagia; Dysphagia; GERD (gastroesophageal reflux disease); Hiatal hernia; Hyperlipidemia; Hypertension; Irritable bowel syndrome with constipation; Obesity; Snores; and Wears glasses.     I have reviewed the past medical history with the patient and it is pertinent to this complaint.        SURGICAL HISTORY      has a past surgical history that includes Tubal ligation; Endoscopy, colon, diagnostic (2015); Colonoscopy (2015); Upper gastrointestinal endoscopy; and Upper gastrointestinal endoscopy (N/A, 7/18/2017).   I have reviewed and agree with Surgical History entered and it is pertinent to this complaint.      CURRENT MEDICATIONS       Current Meds Link used for Sign Out Report      nitroGLYCERIN (NITROSTAT) SL tablet 0.4 mg Q5 Min PRN   docusate sodium (COLACE) capsule 100 mg BID PRN   albuterol (PROVENTIL) nebulizer solution 2.5 mg Q6H PRN 4-7 pts indicates moderate risk for MACE      20.3%  (OBS)  8-10 pts indicates high risk for MACE            72.7%  (EARLY INVASIVE TX)     CDU IMPRESSION / PLAN       Edenilson Galloway is a 49 y.o. female who presents with acute midsternal chest pain most likely etiology musculoskeletal chest wall pain versus ACS.    Patient admitted to the observation unit for cardiology consult and echocardiogram which is pending at this time     · Acute midsternal chest pain: Cardiology consult echocardiogram, Toradol, morphine, Protonix, Tylenol, nitroglycerin  · Continue home medications and pain control  · Monitor vitals, labs, and imaging  · DISPO: pending consults and clinical improvement     CONSULTS:     IP CONSULT TO CARDIOLOGY        PATIENT REFERRED TO:     Hoyle Barthel, MD [facility] [address]  [phone]                 --  Domitila Denny DO   Emergency Medicine Resident      This dictation was generated by voice recognition computer software.   Although all attempts are made to edit the dictation for accuracy, there may be errors in the transcription that are not intended.                  Cosigned by: Ariana Valle MD at 1/17/2018  2:47 PM   Revision History                        Routing History

## 2018-02-19 ENCOUNTER — TELEPHONE (OUTPATIENT)
Dept: PULMONOLOGY | Age: 48
End: 2018-02-19

## 2018-02-19 ENCOUNTER — OFFICE VISIT (OUTPATIENT)
Dept: PULMONOLOGY | Age: 48
End: 2018-02-19
Payer: MEDICARE

## 2018-02-19 VITALS
HEIGHT: 60 IN | OXYGEN SATURATION: 99 % | RESPIRATION RATE: 16 BRPM | TEMPERATURE: 97.1 F | BODY MASS INDEX: 39.03 KG/M2 | DIASTOLIC BLOOD PRESSURE: 83 MMHG | WEIGHT: 198.8 LBS | SYSTOLIC BLOOD PRESSURE: 141 MMHG | HEART RATE: 73 BPM

## 2018-02-19 DIAGNOSIS — R06.83 SNORING: ICD-10-CM

## 2018-02-19 DIAGNOSIS — J45.50 UNCOMPLICATED SEVERE PERSISTENT ASTHMA: Primary | ICD-10-CM

## 2018-02-19 DIAGNOSIS — K44.9 HIATAL HERNIA: ICD-10-CM

## 2018-02-19 DIAGNOSIS — I10 ESSENTIAL HYPERTENSION: ICD-10-CM

## 2018-02-19 DIAGNOSIS — J34.3 NASAL TURBINATE HYPERTROPHY: ICD-10-CM

## 2018-02-19 DIAGNOSIS — J30.89 CHRONIC NON-SEASONAL ALLERGIC RHINITIS, UNSPECIFIED TRIGGER: Primary | ICD-10-CM

## 2018-02-19 DIAGNOSIS — K21.9 GASTROESOPHAGEAL REFLUX DISEASE WITHOUT ESOPHAGITIS: ICD-10-CM

## 2018-02-19 PROCEDURE — G8417 CALC BMI ABV UP PARAM F/U: HCPCS | Performed by: INTERNAL MEDICINE

## 2018-02-19 PROCEDURE — G8427 DOCREV CUR MEDS BY ELIG CLIN: HCPCS | Performed by: INTERNAL MEDICINE

## 2018-02-19 PROCEDURE — G8484 FLU IMMUNIZE NO ADMIN: HCPCS | Performed by: INTERNAL MEDICINE

## 2018-02-19 PROCEDURE — 99214 OFFICE O/P EST MOD 30 MIN: CPT | Performed by: INTERNAL MEDICINE

## 2018-02-19 PROCEDURE — 1036F TOBACCO NON-USER: CPT | Performed by: INTERNAL MEDICINE

## 2018-02-19 RX ORDER — FLUTICASONE PROPIONATE 50 MCG
1 SPRAY, SUSPENSION (ML) NASAL DAILY
Qty: 1 BOTTLE | Refills: 3 | Status: SHIPPED | OUTPATIENT
Start: 2018-02-19 | End: 2018-05-03 | Stop reason: SDUPTHER

## 2018-02-20 NOTE — PROGRESS NOTES
MD Mikal   gabapentin (NEURONTIN) 100 MG capsule Take 1 capsule by mouth 3 times daily 3/2/17  Yes Jocelynn Herzog MD   montelukast (SINGULAIR) 10 MG tablet Take 1 tablet by mouth daily 10/10/16 12/19/18 Yes Brianda Salazar MD   albuterol (PROVENTIL) (2.5 MG/3ML) 0.083% nebulizer solution Take 3 mLs by nebulization every 6 hours as needed for Wheezing 5/31/16  Yes Odilon Hernandez MD   Multiple Vitamins-Minerals (WOMENS MULTI VITAMIN & MINERAL PO) Take 1 tablet by mouth daily   Yes Historical Provider, MD   docusate sodium (COLACE) 100 MG capsule Take 1 capsule by mouth 2 times daily as needed for Constipation  Patient taking differently: Take 100 mg by mouth 2 times daily as needed for Constipation Indications: only using once daily  1/21/16  Yes Bevin Bosworth, MD     Exam obese  General Appearance:    Alert, cooperative, no distress, appears stated age   Head:    Normocephalic, without obvious abnormality, atraumatic                  :    Neck:   Supple, symmetrical, trachea midline, no adenopathy;     thyroid:  no enlargement/tenderness/nodules; no carotid    bruit no JVP , no HJR   Back:     Symmetric, no curvature, ROM normal, no CVA tenderness   Lungs:       Clear to auscultation bilaterally, respirations unlabored no dullness no bb , no wheezing no rales , vent all lobes , diaphram motion normal    Chest Wall:    No tenderness or deformity      Heart:    Regular rate and rhythm, S1 and S2 normal, no murmur, rub        or gallop no rvh                           Abdomen:                                                 Pulses:                                            Lymph nodes:                    Neurologic:                  Soft, non-tender, bowel sounds active all four quadrants,     no masses, no organomegaly         2+ and symmetric all extremities            Cervical, supraclavicular not enlarged or matted or tender      CNII-XII intact, normal strength 5/5 .   Sensation grossly normal  and

## 2018-03-20 NOTE — TELEPHONE ENCOUNTER
E-scribe request from [employer] for Doc-Q-Lace 100 mg.      Health Maintenance   Topic Date Due    HIV screen  12/04/1984    Cervical cancer screen  12/08/2017    A1C test (Diabetic or Prediabetic)  10/10/2018    Potassium monitoring  01/13/2019    Creatinine monitoring  01/13/2019    Lipid screen  10/10/2022    Colon cancer screen colonoscopy  02/08/2023    DTaP/Tdap/Td vaccine (2 - Td) 02/05/2026    Flu vaccine  Completed    Pneumococcal med risk  Completed             (applicable per patient's age: Cancer Screenings, Depression Screening, Fall Risk Screening, Immunizations)    Hemoglobin A1C (%)   Date Value   10/10/2017 5.9   10/11/2016 5.5   03/26/2015 5.4     LDL Cholesterol (mg/dL)   Date Value   10/10/2017 67     AST (U/L)   Date Value   05/02/2017 17     ALT (U/L)   Date Value   05/02/2017 16     BUN (mg/dL)   Date Value   01/13/2018 11      (goal A1C is < 7)   (goal LDL is <100) need 30-50% reduction from baseline     BP Readings from Last 3 Encounters:   02/19/18 (!) 141/83   02/08/18 (!) 153/94   02/08/18 125/79    (goal /80)      All Future Testing planned in [facility]:  Lab Frequency Next Occurrence   TRAVIS Screen Routine Once 01/24/2018       Next Visit Date:  Future Appointments  Date Time Provider Fortino Ita   4/12/2018 10:45 AM Varun Dhillon MD [facility] IM [address]   8/20/2018 9:30 AM Jaja Sun MD Resp Spec [id number]            Patient Active Problem List:     Hypertension     Asthma     GERD (gastroesophageal reflux disease)     Hypokalemia     S/P cardiac cath-Minimal disease 11/23/15 Dr. Bettie Coronado     Irritable bowel syndrome with constipation     Obesity     Allergic rhinitis     Hiatal hernia     Paresthesias     Left sided numbness     Dysphagia     Family history of colon cancer     Chest pain

## 2018-03-21 RX ORDER — DOCUSATE SODIUM 100 MG/1
CAPSULE ORAL
Qty: 30 CAPSULE | Refills: 2 | Status: SHIPPED | OUTPATIENT
Start: 2018-03-21 | End: 2019-04-08

## 2018-04-01 DIAGNOSIS — J45.50 UNCOMPLICATED SEVERE PERSISTENT ASTHMA: ICD-10-CM

## 2018-05-03 ENCOUNTER — OFFICE VISIT (OUTPATIENT)
Dept: INTERNAL MEDICINE | Age: 48
End: 2018-05-03
Payer: MEDICARE

## 2018-05-03 VITALS
SYSTOLIC BLOOD PRESSURE: 124 MMHG | HEIGHT: 60 IN | DIASTOLIC BLOOD PRESSURE: 84 MMHG | WEIGHT: 202 LBS | HEART RATE: 71 BPM | OXYGEN SATURATION: 99 % | BODY MASS INDEX: 39.66 KG/M2

## 2018-05-03 DIAGNOSIS — J45.50 SEVERE PERSISTENT ASTHMA WITHOUT COMPLICATION: Primary | ICD-10-CM

## 2018-05-03 DIAGNOSIS — J30.89 CHRONIC NONSEASONAL ALLERGIC RHINITIS DUE TO OTHER ALLERGEN: ICD-10-CM

## 2018-05-03 DIAGNOSIS — R13.12 DYSPHAGIA, OROPHARYNGEAL: ICD-10-CM

## 2018-05-03 DIAGNOSIS — K21.00 CHRONIC REFLUX ESOPHAGITIS: ICD-10-CM

## 2018-05-03 DIAGNOSIS — E66.09 CLASS 2 OBESITY DUE TO EXCESS CALORIES WITHOUT SERIOUS COMORBIDITY WITH BODY MASS INDEX (BMI) OF 39.0 TO 39.9 IN ADULT: ICD-10-CM

## 2018-05-03 DIAGNOSIS — I10 ESSENTIAL HYPERTENSION: ICD-10-CM

## 2018-05-03 DIAGNOSIS — D50.0 IRON DEFICIENCY ANEMIA DUE TO CHRONIC BLOOD LOSS: ICD-10-CM

## 2018-05-03 DIAGNOSIS — E78.2 MIXED HYPERLIPIDEMIA: ICD-10-CM

## 2018-05-03 DIAGNOSIS — Z98.890 S/P CARDIAC CATH: ICD-10-CM

## 2018-05-03 DIAGNOSIS — J34.3 NASAL TURBINATE HYPERTROPHY: ICD-10-CM

## 2018-05-03 DIAGNOSIS — R73.02 IGT (IMPAIRED GLUCOSE TOLERANCE): ICD-10-CM

## 2018-05-03 LAB — HBA1C MFR BLD: 6 %

## 2018-05-03 PROCEDURE — 83036 HEMOGLOBIN GLYCOSYLATED A1C: CPT | Performed by: INTERNAL MEDICINE

## 2018-05-03 PROCEDURE — 99214 OFFICE O/P EST MOD 30 MIN: CPT | Performed by: INTERNAL MEDICINE

## 2018-05-03 PROCEDURE — G8427 DOCREV CUR MEDS BY ELIG CLIN: HCPCS | Performed by: INTERNAL MEDICINE

## 2018-05-03 PROCEDURE — G8417 CALC BMI ABV UP PARAM F/U: HCPCS | Performed by: INTERNAL MEDICINE

## 2018-05-03 PROCEDURE — 1036F TOBACCO NON-USER: CPT | Performed by: INTERNAL MEDICINE

## 2018-05-03 RX ORDER — VALSARTAN AND HYDROCHLOROTHIAZIDE 80; 12.5 MG/1; MG/1
TABLET, FILM COATED ORAL
Qty: 30 TABLET | Refills: 5 | Status: SHIPPED | OUTPATIENT
Start: 2018-05-03 | End: 2018-07-30

## 2018-05-03 RX ORDER — CARVEDILOL 6.25 MG/1
TABLET ORAL
Qty: 60 TABLET | Refills: 5 | Status: SHIPPED | OUTPATIENT
Start: 2018-05-03 | End: 2019-04-08 | Stop reason: SDUPTHER

## 2018-05-03 RX ORDER — LANOLIN ALCOHOL/MO/W.PET/CERES
325 CREAM (GRAM) TOPICAL 2 TIMES DAILY
Qty: 90 TABLET | Refills: 3 | Status: SHIPPED | OUTPATIENT
Start: 2018-05-03 | End: 2020-08-11 | Stop reason: SDUPTHER

## 2018-05-03 RX ORDER — ATORVASTATIN CALCIUM 20 MG/1
TABLET, FILM COATED ORAL
Qty: 30 TABLET | Refills: 5 | Status: SHIPPED | OUTPATIENT
Start: 2018-05-03 | End: 2019-04-08 | Stop reason: SDUPTHER

## 2018-05-03 RX ORDER — ALBUTEROL SULFATE 2.5 MG/3ML
2.5 SOLUTION RESPIRATORY (INHALATION) EVERY 6 HOURS PRN
Qty: 120 EACH | Refills: 3 | Status: SHIPPED | OUTPATIENT
Start: 2018-05-03 | End: 2019-04-08 | Stop reason: SDUPTHER

## 2018-05-03 RX ORDER — FLUTICASONE PROPIONATE 50 MCG
1 SPRAY, SUSPENSION (ML) NASAL DAILY
Qty: 1 BOTTLE | Refills: 3 | Status: SHIPPED | OUTPATIENT
Start: 2018-05-03 | End: 2021-11-11

## 2018-05-03 RX ORDER — OMEPRAZOLE 40 MG/1
CAPSULE, DELAYED RELEASE ORAL
Qty: 60 CAPSULE | Refills: 3 | Status: SHIPPED | OUTPATIENT
Start: 2018-05-03 | End: 2018-11-19 | Stop reason: SDUPTHER

## 2018-05-03 ASSESSMENT — PATIENT HEALTH QUESTIONNAIRE - PHQ9
SUM OF ALL RESPONSES TO PHQ QUESTIONS 1-9: 1
SUM OF ALL RESPONSES TO PHQ9 QUESTIONS 1 & 2: 1
1. LITTLE INTEREST OR PLEASURE IN DOING THINGS: 0
2. FEELING DOWN, DEPRESSED OR HOPELESS: 1

## 2018-05-07 ASSESSMENT — ENCOUNTER SYMPTOMS
SINUS PAIN: 0
EYE REDNESS: 0
SPUTUM PRODUCTION: 0
WHEEZING: 0
HEARTBURN: 1
CONSTIPATION: 0
COUGH: 1
BLURRED VISION: 0
BLOOD IN STOOL: 0
DIARRHEA: 0
SORE THROAT: 0
NAUSEA: 0
ABDOMINAL PAIN: 0
SHORTNESS OF BREATH: 0

## 2018-05-30 DIAGNOSIS — Z98.890 S/P CARDIAC CATH: ICD-10-CM

## 2018-05-30 RX ORDER — NAPROXEN SODIUM 220 MG
TABLET ORAL
Qty: 30 TABLET | Refills: 2 | Status: SHIPPED | OUTPATIENT
Start: 2018-05-30 | End: 2018-11-15 | Stop reason: SDUPTHER

## 2018-05-31 RX ORDER — ASPIRIN 81 MG
TABLET, DELAYED RELEASE (ENTERIC COATED) ORAL
Qty: 30 TABLET | Refills: 3 | Status: SHIPPED | OUTPATIENT
Start: 2018-05-31 | End: 2018-10-23 | Stop reason: SDUPTHER

## 2018-06-26 DIAGNOSIS — J45.50 UNCOMPLICATED SEVERE PERSISTENT ASTHMA: ICD-10-CM

## 2018-07-30 ENCOUNTER — TELEPHONE (OUTPATIENT)
Dept: INTERNAL MEDICINE | Age: 48
End: 2018-07-30

## 2018-07-30 DIAGNOSIS — I10 ESSENTIAL HYPERTENSION: Primary | ICD-10-CM

## 2018-07-30 RX ORDER — LOSARTAN POTASSIUM AND HYDROCHLOROTHIAZIDE 12.5; 5 MG/1; MG/1
1 TABLET ORAL DAILY
Qty: 30 TABLET | Refills: 3 | Status: SHIPPED | OUTPATIENT
Start: 2018-07-30 | End: 2019-04-08

## 2018-07-30 NOTE — TELEPHONE ENCOUNTER
Pharmacy called said valsartan not available right now due to recall , pharmacy is asking for possible change to losartan please advise new script can be escribed       Next Visit Date:  Future Appointments  Date Time Provider Fortino Jean Baptiste   8/10/2018 10:00 AM Nic Meadows DO [address] OB/Gyn [facility]   8/20/2018 9:30 AM Bula Goodell, MD Resp Spec [facility]   9/4/2018 1:15 PM Chica Baer MD [address] IM [address] Maintenance   Topic Date Due    Cervical cancer screen  12/08/2017    HIV screen  08/03/2018 (Originally 12/4/1984)    Flu vaccine (1) 09/01/2018    Potassium monitoring  01/13/2019    Creatinine monitoring  01/13/2019    A1C test (Diabetic or Prediabetic)  05/03/2019    Lipid screen  10/10/2022    Colon cancer screen colonoscopy  02/08/2023    DTaP/Tdap/Td vaccine (2 - Td) 02/05/2026    Pneumococcal med risk  Completed       Hemoglobin A1C (%)   Date Value   05/03/2018 6.0   10/10/2017 5.9   10/11/2016 5.5             ( goal A1C is < 7)   No results found for: LABMICR  LDL Cholesterol (mg/dL)   Date Value   10/10/2017 67   03/24/2016 153 (H)       (goal LDL is <100)   AST (U/L)   Date Value   05/02/2017 17     ALT (U/L)   Date Value   05/02/2017 16     BUN (mg/dL)   Date Value   01/13/2018 11     BP Readings from Last 3 Encounters:   05/03/18 124/84   02/19/18 (!) 141/83   02/08/18 (!) 153/94          (goal 120/80)    All Future Testing planned in [facility]  Lab Frequency Next Occurrence   TRAVIS Screen Routine Once 01/24/2018               Patient Active Problem List:     Hypertension     Asthma     GERD (gastroesophageal reflux disease)     Hypokalemia     S/P cardiac cath-Minimal disease 11/23/15 Dr. Alta Millan     Irritable bowel syndrome with constipation     Obesity     Allergic rhinitis     Hiatal hernia     Paresthesias     Left sided numbness     Dysphagia     Family history of colon cancer     Chest pain

## 2018-08-10 ENCOUNTER — HOSPITAL ENCOUNTER (OUTPATIENT)
Age: 48
Setting detail: SPECIMEN
Discharge: HOME | End: 2018-08-10
Payer: MEDICARE

## 2018-08-10 ENCOUNTER — OFFICE VISIT (OUTPATIENT)
Dept: OBGYN | Age: 48
End: 2018-08-10
Payer: MEDICARE

## 2018-08-10 VITALS
SYSTOLIC BLOOD PRESSURE: 114 MMHG | HEIGHT: 60 IN | DIASTOLIC BLOOD PRESSURE: 70 MMHG | TEMPERATURE: 98.2 F | WEIGHT: 197 LBS | HEART RATE: 62 BPM | BODY MASS INDEX: 38.68 KG/M2

## 2018-08-10 DIAGNOSIS — Z01.419 WELL WOMAN EXAM WITH ROUTINE GYNECOLOGICAL EXAM: Primary | ICD-10-CM

## 2018-08-10 LAB
DIRECT EXAM: NORMAL
Lab: NORMAL
SPECIMEN DESCRIPTION: NORMAL
STATUS: NORMAL

## 2018-08-10 PROCEDURE — 99386 PREV VISIT NEW AGE 40-64: CPT | Performed by: STUDENT IN AN ORGANIZED HEALTH CARE EDUCATION/TRAINING PROGRAM

## 2018-08-10 PROCEDURE — 99213 OFFICE O/P EST LOW 20 MIN: CPT | Performed by: STUDENT IN AN ORGANIZED HEALTH CARE EDUCATION/TRAINING PROGRAM

## 2018-08-10 NOTE — PROGRESS NOTES
CATHETERIZATION  01/16/2018    CARDIAC CATHETERIZATION  11/23/2015     Minimal non obstructive CAD    COLONOSCOPY  2015    COLONOSCOPY  02/08/2018    Redundant colon    ENDOSCOPY, COLON, DIAGNOSTIC  2015    with dilatation    ENDOSCOPY, COLON, DIAGNOSTIC  02/08/2018    WNL     AK COLON CA SCRN NOT HI RSK IND N/A 2/8/2018    COLONOSCOPY performed by Katie Ritter MD at [address] [place] [address]    UPPER GASTROINTESTINAL ENDOSCOPY      The endoscopic exam showed mildly tortuous esophagus. Savary dilation performed x 42 and 45 F.     UPPER GASTROINTESTINAL ENDOSCOPY N/A 7/18/2017    EGD DILATION SAVORY performed by Katie Ritter MD at [facility]     Family History   Problem Relation Age of Onset    Hypertension Mother     High Cholesterol Mother     Asthma Mother     Osteoarthritis Mother     Other Mother         overweight    Colon Cancer Father      Social History     Social History    Marital status:      Spouse name: N/A    Number of children: N/A    Years of education: N/A     Occupational History    Not on file.      Social History Main Topics    Smoking status: Never Smoker    Smokeless tobacco: Never Used    Alcohol use 0.0 oz/week      Comment: occasionally    Drug use: No    Sexual activity: Yes     Partners: Male      Comment: tubal ligation      Other Topics Concern    Not on file     Social History Narrative    No narrative on file       MEDICATIONS:  Current Outpatient Prescriptions   Medication Sig Dispense Refill    losartan-hydrochlorothiazide (HYZAAR) 50-12.5 MG per tablet Take 1 tablet by mouth daily 30 tablet 3    PROAIR  (90 Base) MCG/ACT inhaler inhale 2 puffs by mouth every 6 hours if needed for wheezing 8.5 g 10    ASPIRIN LOW DOSE 81 MG EC tablet take 1 tablet by mouth once daily 30 tablet 3    RA SENNA 8.6 MG tablet take 1 tablet by mouth once daily 30 tablet 2    fluticasone (FLONASE) 50 MCG/ACT nasal spray 1 spray by Nasal route daily 1 Bottle 3    omeprazole (PRILOSEC) 40 MG delayed release capsule take 1 capsule by mouth twice a day 60 capsule 3    carvedilol (COREG) 6.25 MG tablet take 1 tablet by mouth twice a day 60 tablet 5    atorvastatin (LIPITOR) 20 MG tablet take 1 tablet by mouth once daily 30 tablet 5    ferrous sulfate (FE TABS) 325 (65 Fe) MG EC tablet Take 1 tablet by mouth 2 times daily 90 tablet 3    albuterol (PROVENTIL) (2.5 MG/3ML) 0.083% nebulizer solution Take 3 mLs by nebulization every 6 hours as needed for Wheezing 120 each 3    ADVAIR DISKUS 500-50 MCG/DOSE diskus inhaler inhale 1 dose by mouth every 12 hours 1 Inhaler 4    DOCQLACE 100 MG capsule take 1 capsule by mouth twice a day if needed for constipation 30 capsule 2    ibuprofen (ADVIL;MOTRIN) 800 MG tablet take 1 tablet by mouth every 8 hours if needed for pain 30 tablet 3    montelukast (SINGULAIR) 10 MG tablet Take 1 tablet by mouth daily 30 tablet 3    gabapentin (NEURONTIN) 100 MG capsule Take 1 capsule by mouth 3 times daily 90 capsule 3    albuterol (PROVENTIL) (2.5 MG/3ML) 0.083% nebulizer solution Take 3 mLs by nebulization every 6 hours as needed for Wheezing 120 each 3    Multiple Vitamins-Minerals (WOMENS MULTI VITAMIN & MINERAL PO) Take 1 tablet by mouth daily       No current facility-administered medications for this visit. Facility-Administered Medications Ordered in Other Visits   Medication Dose Route Frequency Provider Last Rate Last Dose    0.9 % sodium chloride infusion   Intravenous Continuous Mary Coffey MD 30 mL/hr at 05/26/15 1008           ALLERGIES:  Allergies as of 08/10/2018    (No Known Allergies)       Symptoms of decreased mood absent  Symptoms of anhedonia absent    **If either question is answered in a  positive fashion then complete the PHQ9 Scoring Evaluation and make the appropriate referral**    Immunization status: up to date and documented.       Gynecologic History:  Menarche: 14 yo  Menopause: N/A Patient's last menstrual period was 07/25/2018. Sexually Active: Yes    STD History: Yes remote history of gonorrhea and trichomonas     Permanent Sterilization: Yes tubal ligation in 2003   Reversible Birth Control: No        Hormone Replacement Exposure: No      Genetic Qualified Family History of Breast, Ovarian , Colon or Uterine Cancer: Yes, father has colon cancer   If YES see scanned worksheet. Preventative Health Testing:    Health Maintenance Due:  Health Maintenance Due   Topic Date Due    HIV screen  12/04/1984    Cervical cancer screen  12/08/2017     Date of Last Pap Smear: 12/08/14  Abnormal Pap Smear History: Denies  Colposcopy History: Denies  Date of Last Mammogram: 1/29/18, normal   Date of Last Colonoscopy: 2/8/18, normal   Date of Last Bone Density: N/A      ________________________________________________________________________  REVIEW OF SYSTEMS:     A minimum of an eleven point review of systems was completed.     Review Of Systems (11 point):  CONSTITUTIONAL:  negative for  fevers, chills and weight loss  EYES:  negative for  double vision, blurred vision, visual disturbance and redness  HEENT:  negative for  hearing loss, tinnitus, earaches, nasal congestion, epistaxis, sore throat and voice change  RESPIRATORY:  negative for  dry cough, cough with sputum, dyspnea and wheezing  CARDIOVASCULAR:  negative for  chest pain, dyspnea, palpitations  GASTROINTESTINAL:  negative for nausea, vomiting, change in bowel habits, diarrhea, constipation, abdominal pain, hematemesis and hemtochezia  GENITOURINARY:  negative for frequency, dysuria, hematuria and genital lesions, vaginal discharge, vaginal pain, vaginal itching, sexual problems and dyspareunia; positive hot flashes   INTEGUMENT/BREAST:  negative for rash, skin lesion(s), changes in hair, changes in nails, breast lump, nipple discharge, breast tenderness and change in skin over the breast  HEMATOLOGIC/LYMPHATIC:  negative for easy constipation 1/21/2016    Obesity     Snores     states has tested negative for sleep apnea    Wears glasses          Patient Active Problem List    Diagnosis Date Noted    Chest pain 01/13/2018    Dysphagia 08/09/2017    Family history of colon cancer 08/09/2017     Father      Paresthesias 11/03/2016    Left sided numbness     Obesity     Allergic rhinitis     Hiatal hernia     Irritable bowel syndrome with constipation 01/21/2016    Hypokalemia 11/23/2015    S/P cardiac cath-Minimal disease 11/23/15 Dr. Ana Ruiz 11/23/2015    GERD (gastroesophageal reflux disease) 12/08/2014    Hypertension 09/05/2014    Asthma 09/05/2014            Counseling Completed:  Hereditary Breast, Ovarian, Colon and Uterine Cancer screening Done. Tobacco & Secondary smoke risks reviewed; instructed on cessation and avoidance        PLAN:  Return in about 1 year (around 8/10/2019) for Annual Exam.  Pap smear collected today  GC/C and vaginitis probe ordered today   Mammogram and colonoscopy uptodate  Repeat Annual every 1 year  Cervical Cytology Evaluation begins at 24years old. If Negative Cytology, Follow-up screening per current guidelines. Mammograms every 1 year. If 37 yo and last mammogram was negative. Calcium and Vitamin D dosing reviewed. Colonoscopy screening reviewed as well as onset for bone density testing. Birth control and barrier recommendations discussed. STD counseling and prevention reviewed. Gardisil counseling completed for all patients 7-33 yo. Routine health maintenance per patients PCP. Orders Placed This Encounter   Procedures    VAGINITIS DNA PROBE    C. Trachomatis / N. Gonorrhoeae, DNA    PAP SMEAR     Order Specific Question:   Collection Type     Answer: Thin Prep     Order Specific Question:   Prior Abnormal Pap Test     Answer:   No     Order Specific Question:   Screening or Diagnostic     Answer:   Screening     Order Specific Question:   HPV Requested?      Answer:

## 2018-08-13 LAB
C TRACH DNA GENITAL QL NAA+PROBE: NEGATIVE
N. GONORRHOEAE DNA: NEGATIVE

## 2018-08-29 LAB — CYTOLOGY REPORT: NORMAL

## 2018-09-14 ENCOUNTER — APPOINTMENT (OUTPATIENT)
Dept: GENERAL RADIOLOGY | Age: 48
End: 2018-09-14
Payer: MEDICARE

## 2018-09-14 ENCOUNTER — HOSPITAL ENCOUNTER (EMERGENCY)
Age: 48
Discharge: HOME | End: 2018-09-15
Attending: EMERGENCY MEDICINE
Payer: MEDICARE

## 2018-09-14 DIAGNOSIS — R07.9 CHEST PAIN, UNSPECIFIED TYPE: Primary | ICD-10-CM

## 2018-09-14 LAB
EKG ATRIAL RATE: 60 BPM
EKG P AXIS: 65 DEGREES
EKG P-R INTERVAL: 156 MS
EKG Q-T INTERVAL: 422 MS
EKG QRS DURATION: 76 MS
EKG QTC CALCULATION (BAZETT): 422 MS
EKG R AXIS: 14 DEGREES
EKG T AXIS: 19 DEGREES
EKG VENTRICULAR RATE: 60 BPM
POC TROPONIN I: 0.01 NG/ML (ref 0–0.1)
POC TROPONIN INTERP: NORMAL

## 2018-09-14 PROCEDURE — 99285 EMERGENCY DEPT VISIT HI MDM: CPT

## 2018-09-14 PROCEDURE — 71046 X-RAY EXAM CHEST 2 VIEWS: CPT

## 2018-09-14 PROCEDURE — 85025 COMPLETE CBC W/AUTO DIFF WBC: CPT

## 2018-09-14 PROCEDURE — 80048 BASIC METABOLIC PNL TOTAL CA: CPT

## 2018-09-14 PROCEDURE — 96374 THER/PROPH/DIAG INJ IV PUSH: CPT

## 2018-09-14 PROCEDURE — 93005 ELECTROCARDIOGRAM TRACING: CPT

## 2018-09-14 PROCEDURE — 83880 ASSAY OF NATRIURETIC PEPTIDE: CPT

## 2018-09-14 PROCEDURE — 84484 ASSAY OF TROPONIN QUANT: CPT

## 2018-09-14 RX ORDER — METHYLPREDNISOLONE SODIUM SUCCINATE 125 MG/2ML
125 INJECTION, POWDER, LYOPHILIZED, FOR SOLUTION INTRAMUSCULAR; INTRAVENOUS ONCE
Status: DISCONTINUED | OUTPATIENT
Start: 2018-09-14 | End: 2018-09-15 | Stop reason: HOSPADM

## 2018-09-14 RX ORDER — MAGNESIUM HYDROXIDE/ALUMINUM HYDROXICE/SIMETHICONE 120; 1200; 1200 MG/30ML; MG/30ML; MG/30ML
30 SUSPENSION ORAL ONCE
Status: DISCONTINUED | OUTPATIENT
Start: 2018-09-14 | End: 2018-09-15 | Stop reason: HOSPADM

## 2018-09-14 ASSESSMENT — PAIN DESCRIPTION - PROGRESSION: CLINICAL_PROGRESSION: GRADUALLY WORSENING

## 2018-09-14 ASSESSMENT — PAIN DESCRIPTION - LOCATION: LOCATION: CHEST

## 2018-09-14 ASSESSMENT — PAIN DESCRIPTION - PAIN TYPE: TYPE: ACUTE PAIN

## 2018-09-14 ASSESSMENT — PAIN DESCRIPTION - DESCRIPTORS: DESCRIPTORS: PRESSURE;TIGHTNESS

## 2018-09-14 ASSESSMENT — PAIN DESCRIPTION - ORIENTATION: ORIENTATION: LEFT

## 2018-09-14 ASSESSMENT — PAIN DESCRIPTION - FREQUENCY: FREQUENCY: INTERMITTENT

## 2018-09-14 ASSESSMENT — PAIN DESCRIPTION - ONSET: ONSET: GRADUAL

## 2018-09-14 ASSESSMENT — PAIN SCALES - GENERAL: PAINLEVEL_OUTOF10: 8

## 2018-09-15 VITALS
HEART RATE: 59 BPM | WEIGHT: 200 LBS | DIASTOLIC BLOOD PRESSURE: 79 MMHG | HEIGHT: 60 IN | TEMPERATURE: 97.9 F | RESPIRATION RATE: 16 BRPM | SYSTOLIC BLOOD PRESSURE: 134 MMHG | BODY MASS INDEX: 39.27 KG/M2 | OXYGEN SATURATION: 100 %

## 2018-09-15 LAB
ABSOLUTE EOS #: <0.03 K/UL (ref 0–0.44)
ABSOLUTE IMMATURE GRANULOCYTE: <0.03 K/UL (ref 0–0.3)
ABSOLUTE LYMPH #: 1.34 K/UL (ref 1.1–3.7)
ABSOLUTE MONO #: 0.09 K/UL (ref 0.1–1.2)
ANION GAP SERPL CALCULATED.3IONS-SCNC: 13 MMOL/L (ref 9–17)
BASOPHILS # BLD: 0 % (ref 0–2)
BASOPHILS ABSOLUTE: <0.03 K/UL (ref 0–0.2)
BNP INTERPRETATION: NORMAL
BUN BLDV-MCNC: 9 MG/DL (ref 6–20)
BUN/CREAT BLD: ABNORMAL (ref 9–20)
CALCIUM SERPL-MCNC: 9.5 MG/DL (ref 8.6–10.4)
CHLORIDE BLD-SCNC: 100 MMOL/L (ref 98–107)
CO2: 24 MMOL/L (ref 20–31)
CREAT SERPL-MCNC: 0.72 MG/DL (ref 0.5–0.9)
DIFFERENTIAL TYPE: ABNORMAL
EOSINOPHILS RELATIVE PERCENT: 0 % (ref 1–4)
GFR AFRICAN AMERICAN: >60 ML/MIN
GFR NON-AFRICAN AMERICAN: >60 ML/MIN
GFR SERPL CREATININE-BSD FRML MDRD: ABNORMAL ML/MIN/{1.73_M2}
GFR SERPL CREATININE-BSD FRML MDRD: ABNORMAL ML/MIN/{1.73_M2}
GLUCOSE BLD-MCNC: 140 MG/DL (ref 70–99)
HCT VFR BLD CALC: 37.3 % (ref 36.3–47.1)
HEMOGLOBIN: 11.5 G/DL (ref 11.9–15.1)
IMMATURE GRANULOCYTES: 0 %
LYMPHOCYTES # BLD: 19 % (ref 24–43)
MCH RBC QN AUTO: 25.7 PG (ref 25.2–33.5)
MCHC RBC AUTO-ENTMCNC: 30.8 G/DL (ref 28.4–34.8)
MCV RBC AUTO: 83.4 FL (ref 82.6–102.9)
MONOCYTES # BLD: 1 % (ref 3–12)
NRBC AUTOMATED: 0 PER 100 WBC
PDW BLD-RTO: 16.4 % (ref 11.8–14.4)
PLATELET # BLD: 341 K/UL (ref 138–453)
PLATELET ESTIMATE: ABNORMAL
PMV BLD AUTO: 11.8 FL (ref 8.1–13.5)
POC TROPONIN I: 0 NG/ML (ref 0–0.1)
POC TROPONIN INTERP: NORMAL
POTASSIUM SERPL-SCNC: 4.3 MMOL/L (ref 3.7–5.3)
PRO-BNP: 50 PG/ML
RBC # BLD: 4.47 M/UL (ref 3.95–5.11)
RBC # BLD: ABNORMAL 10*6/UL
SEG NEUTROPHILS: 80 % (ref 36–65)
SEGMENTED NEUTROPHILS ABSOLUTE COUNT: 5.69 K/UL (ref 1.5–8.1)
SODIUM BLD-SCNC: 137 MMOL/L (ref 135–144)
WBC # BLD: 7.2 K/UL (ref 3.5–11.3)
WBC # BLD: ABNORMAL 10*3/UL

## 2018-09-15 PROCEDURE — 94664 DEMO&/EVAL PT USE INHALER: CPT

## 2018-09-15 PROCEDURE — 6370000000 HC RX 637 (ALT 250 FOR IP): Performed by: EMERGENCY MEDICINE

## 2018-09-15 PROCEDURE — 94640 AIRWAY INHALATION TREATMENT: CPT

## 2018-09-15 PROCEDURE — 84484 ASSAY OF TROPONIN QUANT: CPT

## 2018-09-15 RX ORDER — ALBUTEROL SULFATE 90 UG/1
2 AEROSOL, METERED RESPIRATORY (INHALATION)
Status: DISCONTINUED | OUTPATIENT
Start: 2018-09-15 | End: 2018-09-15 | Stop reason: HOSPADM

## 2018-09-15 RX ORDER — ALBUTEROL SULFATE 2.5 MG/3ML
5 SOLUTION RESPIRATORY (INHALATION)
Status: DISCONTINUED | OUTPATIENT
Start: 2018-09-15 | End: 2018-09-15 | Stop reason: HOSPADM

## 2018-09-15 RX ORDER — IPRATROPIUM BROMIDE AND ALBUTEROL SULFATE 2.5; .5 MG/3ML; MG/3ML
1 SOLUTION RESPIRATORY (INHALATION)
Status: DISCONTINUED | OUTPATIENT
Start: 2018-09-15 | End: 2018-09-15 | Stop reason: HOSPADM

## 2018-09-15 RX ADMIN — IPRATROPIUM BROMIDE AND ALBUTEROL SULFATE 1 AMPULE: .5; 3 SOLUTION RESPIRATORY (INHALATION) at 00:11

## 2018-09-15 ASSESSMENT — PAIN DESCRIPTION - DESCRIPTORS: DESCRIPTORS: PRESSURE;ACHING

## 2018-09-15 ASSESSMENT — ENCOUNTER SYMPTOMS
RHINORRHEA: 0
SHORTNESS OF BREATH: 0
ABDOMINAL PAIN: 0

## 2018-09-15 ASSESSMENT — PAIN - FUNCTIONAL ASSESSMENT: PAIN_FUNCTIONAL_ASSESSMENT: 0-10

## 2018-09-15 NOTE — ED PROVIDER NOTES
The structures studied were the heart and pericardium. FINDINGS:  Pericardial effusion was not identified. The study was technically adequate        LABS:  Labs Reviewed   CBC WITH AUTO DIFFERENTIAL   BASIC METABOLIC PANEL   BRAIN NATRIURETIC PEPTIDE   POCT TROPONIN   POCT TROPONIN   POCT TROPONIN       EMERGENCY DEPARTMENT COURSE:     -------------------------  BP: (!) 156/89, Temp: 98.4 °F (36.9 °C), Pulse: 57, Resp: 16  Physical Exam   Constitutional: She is oriented to person, place, and time. She appears well-developed and well-nourished. HENT:   Head: Normocephalic and atraumatic. Right Ear: External ear normal.   Left Ear: External ear normal.   Eyes: Right eye exhibits no discharge. Left eye exhibits no discharge. No scleral icterus. Neck: Normal range of motion. No tracheal deviation present. Pulmonary/Chest: Effort normal. No stridor. No respiratory distress. Musculoskeletal: Normal range of motion. Neurological: She is alert and oriented to person, place, and time. Coordination normal.   Skin: Skin is warm and dry. She is not diaphoretic. Psychiatric: She has a normal mood and affect. Her behavior is normal.         Comments  The patient presents with complaint of chest pain. Concerns that need urgent evaluation include:  Acute Coronary Syndrome: Aortic Dissection:  Pulmonary embolism:  Pneumothorax  Pneumonia  Esophogeal Rupture:  Pericardial Tamponade     Based on history and physical exam the patient is unlikely to have Tamponade rupture mildly diminshed lungs but no sign of pneumonia or pneumothorax on x ray or US. The pt is PERC negative. And with recent cath that was negative and normal initial trop with normal EKG a likely 2 trop low risk chest pain for out pt follow up.        EKG Interpretation   Interpreted by Steve Guerrero DO    Rhythm: normal sinus   Rate: normal  Axis: normal  Ectopy: none  Conduction: normal  ST Segments: normal  T Waves: normal  Q Waves:

## 2018-09-15 NOTE — ED PROVIDER NOTES
MG/3ML) 0.083% nebulizer solution Take 3 mLs by nebulization every 6 hours as needed for Wheezing 5/3/18   Bird Garner MD   ADVAIR DISKUS 500-50 MCG/DOSE diskus inhaler inhale 1 dose by mouth every 12 hours 4/2/18   Teo Dyson MD   DOCQLACE 100 MG capsule take 1 capsule by mouth twice a day if needed for constipation 3/21/18   Bird Garner MD   ibuprofen (ADVIL;MOTRIN) 800 MG tablet take 1 tablet by mouth every 8 hours if needed for pain 1/21/18   Bird Garner MD   montelukast (SINGULAIR) 10 MG tablet Take 1 tablet by mouth daily 12/19/17   Tacos Freitas MD   gabapentin (NEURONTIN) 100 MG capsule Take 1 capsule by mouth 3 times daily 3/2/17   Sheela Pittman MD   albuterol (PROVENTIL) (2.5 MG/3ML) 0.083% nebulizer solution Take 3 mLs by nebulization every 6 hours as needed for Wheezing 5/31/16   Marcela Cruz MD   Multiple Vitamins-Minerals (WOMENS MULTI VITAMIN & MINERAL PO) Take 1 tablet by mouth daily    Historical Provider, MD       REVIEW OF SYSTEMS    (2-9 systems for level 4, 10 or more for level 5)      Review of Systems    PHYSICAL EXAM   (up to 7 for level 4, 8 or more for level 5)      INITIAL VITALS:   BP (!) 156/89   Pulse 56   Temp 98.4 °F (36.9 °C) (Oral)   Resp 16   Ht 5' (1.524 m)   Wt 200 lb (90.7 kg)   LMP 08/23/2018   SpO2 99%   BMI 39.06 kg/m²     Physical Exam    DIFFERENTIAL  DIAGNOSIS     PLAN (LABS / IMAGING / EKG):  Orders Placed This Encounter   Procedures    XR CHEST STANDARD (2 VW)    CBC Auto Differential    Basic Metabolic Panel    Brain Natriuretic Peptide    Respiratory care evaluation only    POCT troponin    POCT troponin    EKG 12 Lead       MEDICATIONS ORDERED:  Orders Placed This Encounter   Medications    aluminum & magnesium hydroxide-simethicone (MAALOX) 998-343-40 MG/5ML suspension 30 mL    methylPREDNISolone sodium (SOLU-MEDROL) injection 125 mg       DDX: ***    DIAGNOSTIC RESULTS / EMERGENCY DEPARTMENT COURSE / MDM LABS:  Results for orders placed or performed during the hospital encounter of 09/14/18   POCT troponin   Result Value Ref Range    POC Troponin I 0.01 0.00 - 0.10 ng/mL    POC Troponin Interp       The Troponin-I (POC) results cannot be compared to the Troponin-T results. IMPRESSION: ***    RADIOLOGY:  None    EKG  None    All EKG's are interpreted by the Emergency Department Physician who either signs or Co-signs this chart in the absence of a cardiologist.    EMERGENCY DEPARTMENT COURSE:  ***    FINAL IMPRESSION      No diagnosis found. DISPOSITION / PLAN     DISPOSITION  09/14/2018 11:41:39 PM      PATIENT REFERRED TO:  No follow-up provider specified.     DISCHARGE MEDICATIONS:  New Prescriptions    No medications on file       [address] [facility] [address]  Student Physician'

## 2018-09-15 NOTE — ED TRIAGE NOTES
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 Intermittent pain, 8 on pain scale, sharp and pressure, laying down makes it worse, took Motrin yesterday without relief. Sts she can't get a good breath. Has asthma, sts this feels different. Has never had a pain like this.  + Nausea, + diaphoresis, early today. (Sts this might be menopause). Sts last stress test 10/ 2016, it was normal.  Pink, warm, dry. No s/s of distress.

## 2018-09-15 NOTE — ED PROVIDER NOTES
spray by Nasal route daily 5/3/18   Chica Baer MD   omeprazole (PRILOSEC) 40 MG delayed release capsule take 1 capsule by mouth twice a day 5/3/18   Chica Baer MD   carvedilol (COREG) 6.25 MG tablet take 1 tablet by mouth twice a day 5/3/18   Chica Baer MD   atorvastatin (LIPITOR) 20 MG tablet take 1 tablet by mouth once daily 5/3/18   Chica Baer MD   ferrous sulfate (FE TABS) 325 (65 Fe) MG EC tablet Take 1 tablet by mouth 2 times daily 5/3/18   Chica Baer MD   albuterol (PROVENTIL) (2.5 MG/3ML) 0.083% nebulizer solution Take 3 mLs by nebulization every 6 hours as needed for Wheezing 5/3/18   Chica Baer MD   ADVAIR DISKUS 500-50 MCG/DOSE diskus inhaler inhale 1 dose by mouth every 12 hours 4/2/18   Bula Goodell, MD   DOCQLACE 100 MG capsule take 1 capsule by mouth twice a day if needed for constipation 3/21/18   Chica Baer MD   ibuprofen (ADVIL;MOTRIN) 800 MG tablet take 1 tablet by mouth every 8 hours if needed for pain 1/21/18   Chica Baer MD   montelukast (SINGULAIR) 10 MG tablet Take 1 tablet by mouth daily 12/19/17   Wiley Haynes MD   gabapentin (NEURONTIN) 100 MG capsule Take 1 capsule by mouth 3 times daily 3/2/17   Angelica Quesada MD   albuterol (PROVENTIL) (2.5 MG/3ML) 0.083% nebulizer solution Take 3 mLs by nebulization every 6 hours as needed for Wheezing 5/31/16   Mat Jacobs MD   Multiple Vitamins-Minerals (WOMENS MULTI VITAMIN & MINERAL PO) Take 1 tablet by mouth daily    Historical Provider, MD       REVIEW OF SYSTEMS    (2-9 systems for level 4, 10 or more for level 5)      Review of Systems   Constitutional: Negative for chills and fever. HENT: Negative for congestion and rhinorrhea. Eyes: Negative for visual disturbance. Respiratory: Negative for shortness of breath. Cardiovascular: Positive for chest pain. Gastrointestinal: Negative for abdominal pain. Genitourinary: Negative for dysuria and frequency. Musculoskeletal: Negative for arthralgias. Skin: Negative for wound. Neurological: Negative for dizziness and light-headedness. PHYSICAL EXAM   (up to 7 for level 4, 8 or more for level 5)      INITIAL VITALS:   /79   Pulse 59   Temp 97.9 °F (36.6 °C) (Oral)   Resp 16   Ht 5' (1.524 m)   Wt 200 lb (90.7 kg)   LMP 08/23/2018   SpO2 100%   BMI 39.06 kg/m²     Physical Exam   Constitutional: She is oriented to person, place, and time. No distress. HENT:   Head: Normocephalic and atraumatic. Eyes: Right eye exhibits no discharge. Left eye exhibits no discharge. Cardiovascular: Normal rate, regular rhythm and normal heart sounds. Exam reveals no friction rub. No murmur heard. Pulmonary/Chest: Effort normal and breath sounds normal. No stridor. No respiratory distress. She has no wheezes. She has no rales. She exhibits no tenderness. Abdominal: Soft. She exhibits no distension. There is no tenderness. There is no guarding. Neurological: She is alert and oriented to person, place, and time. Skin: Skin is warm and dry. She is not diaphoretic. Nursing note and vitals reviewed.       DIAGNOSTICS     PLAN (LABS / IMAGING / EKG):  Orders Placed This Encounter   Procedures    XR CHEST STANDARD (2 VW)    CBC Auto Differential    Basic Metabolic Panel    Brain Natriuretic Peptide    Respiratory care evaluation only    Initiate ED Aerosol Protocol    Respiratory care evaluation only    HHN Treatment    HHN Treatment    HHN Treatment    MDI Treatment    MDI Treatment    HHN Treatment    POCT troponin    POCT troponin    POCT troponin    EKG 12 Lead       MEDICATIONS ORDERED:  Orders Placed This Encounter   Medications    aluminum & magnesium hydroxide-simethicone (MAALOX) 200-200-20 MG/5ML suspension 30 mL    methylPREDNISolone sodium (SOLU-MEDROL) injection 125 mg    albuterol (PROVENTIL) nebulizer solution 5 mg    ipratropium-albuterol (DUONEB) nebulizer solution 1 ampule    albuterol (PROVENTIL) nebulizer solution 5 mg    albuterol sulfate  (90 Base) MCG/ACT inhaler 2 puff    AND Linked Order Group     albuterol sulfate  (90 Base) MCG/ACT inhaler 2 puff     ipratropium (ATROVENT HFA) 17 MCG/ACT inhaler 2 puff      Order Specific Question:   Please select a reason the therapeutic interchange was not accepted:       Answer:   Sourav Faria for Pharmacy to Substitute    ipratropium (ATROVENT) 0.02 % nebulizer solution 0.5 mg       DIAGNOSTIC RESULTS / EMERGENCY DEPARTMENT COURSE / MDM     LABS:  Results for orders placed or performed during the hospital encounter of 09/14/18   CBC Auto Differential   Result Value Ref Range    WBC 7.2 3.5 - 11.3 k/uL    RBC 4.47 3.95 - 5.11 m/uL    Hemoglobin 11.5 (L) 11.9 - 15.1 g/dL    Hematocrit 37.3 36.3 - 47.1 %    MCV 83.4 82.6 - 102.9 fL    MCH 25.7 25.2 - 33.5 pg    MCHC 30.8 28.4 - 34.8 g/dL    RDW 16.4 (H) 11.8 - 14.4 %    Platelets 344 383 - 213 k/uL    MPV 11.8 8.1 - 13.5 fL    NRBC Automated 0.0 0.0 per 100 WBC    Differential Type NOT REPORTED     Seg Neutrophils 80 (H) 36 - 65 %    Lymphocytes 19 (L) 24 - 43 %    Monocytes 1 (L) 3 - 12 %    Eosinophils % 0 (L) 1 - 4 %    Basophils 0 0 - 2 %    Immature Granulocytes 0 0 %    Segs Absolute 5.69 1.50 - 8.10 k/uL    Absolute Lymph # 1.34 1.10 - 3.70 k/uL    Absolute Mono # 0.09 (L) 0.10 - 1.20 k/uL    Absolute Eos # <0.03 0.00 - 0.44 k/uL    Basophils # <0.03 0.00 - 0.20 k/uL    Absolute Immature Granulocyte <0.03 0.00 - 0.30 k/uL    WBC Morphology NOT REPORTED     RBC Morphology ANISOCYTOSIS PRESENT     Platelet Estimate NOT REPORTED    Basic Metabolic Panel   Result Value Ref Range    Glucose 140 (H) 70 - 99 mg/dL    BUN 9 6 - 20 mg/dL    CREATININE 0.72 0.50 - 0.90 mg/dL    Bun/Cre Ratio NOT REPORTED 9 - 20    Calcium 9.5 8.6 - 10.4 mg/dL    Sodium 137 135 - 144 mmol/L    Potassium 4.3 3.7 - 5.3 mmol/L    Chloride 100 98 - 107 mmol/L    CO2 24 20 - 31 mmol/L    Anion Gap 13 9 - 17 mmol/L    GFR Non-African American >60 >60 mL/min    GFR African American >60 >60 mL/min    GFR Comment          GFR Staging NOT REPORTED    Brain Natriuretic Peptide   Result Value Ref Range    Pro-BNP 50 <300 pg/mL    BNP Interpretation         POCT troponin   Result Value Ref Range    POC Troponin I 0.01 0.00 - 0.10 ng/mL    POC Troponin Interp       The Troponin-I (POC) results cannot be compared to the Troponin-T results. POCT troponin   Result Value Ref Range    POC Troponin I 0.00 0.00 - 0.10 ng/mL    POC Troponin Interp       The Troponin-I (POC) results cannot be compared to the Troponin-T results. RADIOLOGY:  Xr Chest Standard (2 Vw)    Result Date: 9/15/2018  EXAMINATION: TWO VIEWS OF THE CHEST 9/15/2018 12:01 am COMPARISON: 01/13/2018 HISTORY: ORDERING SYSTEM PROVIDED HISTORY: chest pain TECHNOLOGIST PROVIDED HISTORY: chest pain FINDINGS: Heart size and configuration are normal and the lungs are clear. No pneumothorax or pleural effusion. No acute bone finding. Normal chest examination. EKG    Rhythm: normal sinus   Rate: normal  Axis: normal  Ectopy: none  Conduction: normal  ST Segments: nonspecific changes  T Waves: non specific changes  Q Waves: none    Clinical Impression: non-specific EKG    Normal Interval Reference:  P-wave <110 ms  -200 ms  QRS <100 ms  QT <420 ms  QTc 330-470 ms    All EKG's are interpreted by the Emergency Department Physician who either signs or Co-signs this chart in the absence of a cardiologist.    EMERGENCY DEPARTMENT COURSE:    MDM: Patient with a negative cardiac workup in the emergency department and recent cardiac catheterization was 0% stenoses noted to the major coronary arteries. Had improvement in symptoms and emergency Department. Advised return of symptoms significantly worsen. Patient is agreeable plan. Advised also follow up with primary care physician for which she says she'll make an appointment on Monday.   Advised she may return at any time for reevaluation if she becomes concerned. PROCEDURES:  None    CONSULTS:  None    FINAL IMPRESSION      1. Chest pain, unspecified type          DISPOSITION / PLAN     DISPOSITION Decision To Discharge 09/15/2018 03:27:12 AM      PATIENT REFERRED TO:  [facility]  [address] [facility] [address]  [phone]    If symptoms worsen    Adolfo Nevarez MD  [facility] [address] [id number]  [phone]    Schedule an appointment as soon as possible for a visit in 1 day  For after emergency department follow up.       DISCHARGE MEDICATIONS:  Discharge Medication List as of 9/15/2018  3:27 AM          Janny Strauss MD  Emergency Medicine Resident  [address] [facility]    (Please note that portions of this note were completed with a voice recognition program.  Efforts were made to edit the dictations but occasionally words are mis-transcribed.)       Janny Strauss MD  Resident  09/15/18 [id number]

## 2018-09-20 DIAGNOSIS — J45.50 UNCOMPLICATED SEVERE PERSISTENT ASTHMA: ICD-10-CM

## 2018-09-20 RX ORDER — ALBUTEROL SULFATE 90 UG/1
AEROSOL, METERED RESPIRATORY (INHALATION)
Qty: 8.5 G | Refills: 10 | Status: SHIPPED | OUTPATIENT
Start: 2018-09-20 | End: 2020-08-10

## 2018-09-20 NOTE — TELEPHONE ENCOUNTER
Pc from pt stating she has been without inhaler for over a week now pt states she can not take the inhaler they are trying to give her that she needs that proair inhaler and the pharmacy is stating that she needs a prior authorization she states that she has tried other inhalers and they irritate her throat she would like a call back when this issue is resolved at  [phone]. Medication pending.     Health Maintenance   Topic Date Due    HIV screen  12/04/1984    Flu vaccine (1) 09/01/2018    A1C test (Diabetic or Prediabetic)  05/03/2019    Potassium monitoring  09/14/2019    Creatinine monitoring  09/14/2019    Cervical cancer screen  08/10/2021    Lipid screen  10/10/2022    Colon cancer screen colonoscopy  02/08/2023    DTaP/Tdap/Td vaccine (2 - Td) 02/05/2026    Pneumococcal med risk  Completed             (applicable per patient's age: Cancer Screenings, Depression Screening, Fall Risk Screening, Immunizations)    Hemoglobin A1C (%)   Date Value   05/03/2018 6.0   10/10/2017 5.9   10/11/2016 5.5     LDL Cholesterol (mg/dL)   Date Value   10/10/2017 67     AST (U/L)   Date Value   05/02/2017 17     ALT (U/L)   Date Value   05/02/2017 16     BUN (mg/dL)   Date Value   09/14/2018 9      (goal A1C is < 7)   (goal LDL is <100) need 30-50% reduction from baseline     BP Readings from Last 3 Encounters:   09/15/18 134/79   08/10/18 114/70   05/03/18 124/84    (goal /80)      All Future Testing planned in [facility]:  Lab Frequency Next Occurrence   TRAVIS Screen Routine Once 01/24/2018       Next Visit Date:  Future Appointments  Date Time Provider Fortino Jean Baptiste   10/12/2018 3:00 PM Edison Ji MD Resp Spec [email or web address]            Patient Active Problem List:     Hypertension     Asthma     GERD (gastroesophageal reflux disease)     Hypokalemia     S/P cardiac cath-Minimal disease 11/23/15 Dr. Jordan Combs     Irritable bowel syndrome with constipation     Obesity     Allergic rhinitis     Hiatal hernia

## 2018-10-02 DIAGNOSIS — R13.12 DYSPHAGIA, OROPHARYNGEAL: ICD-10-CM

## 2018-10-02 DIAGNOSIS — K21.00 CHRONIC REFLUX ESOPHAGITIS: ICD-10-CM

## 2018-10-02 DIAGNOSIS — J45.50 UNCOMPLICATED SEVERE PERSISTENT ASTHMA: ICD-10-CM

## 2018-10-02 DIAGNOSIS — Z98.890 S/P CARDIAC CATH: ICD-10-CM

## 2018-10-02 RX ORDER — OMEPRAZOLE 40 MG/1
CAPSULE, DELAYED RELEASE ORAL
Qty: 60 CAPSULE | Refills: 3 | OUTPATIENT
Start: 2018-10-02

## 2018-10-02 RX ORDER — ASPIRIN 81 MG/1
TABLET ORAL
Qty: 30 TABLET | Refills: 3 | OUTPATIENT
Start: 2018-10-02

## 2018-10-20 DIAGNOSIS — R13.12 DYSPHAGIA, OROPHARYNGEAL: ICD-10-CM

## 2018-10-20 DIAGNOSIS — K21.00 CHRONIC REFLUX ESOPHAGITIS: ICD-10-CM

## 2018-10-23 DIAGNOSIS — Z98.890 S/P CARDIAC CATH: ICD-10-CM

## 2018-10-23 RX ORDER — OMEPRAZOLE 40 MG/1
CAPSULE, DELAYED RELEASE ORAL
Qty: 60 CAPSULE | Refills: 3 | OUTPATIENT
Start: 2018-10-23

## 2018-10-24 RX ORDER — ASPIRIN 81 MG/1
TABLET ORAL
Qty: 30 TABLET | Refills: 3 | Status: SHIPPED | OUTPATIENT
Start: 2018-10-24 | End: 2019-04-28 | Stop reason: SDUPTHER

## 2018-11-12 ENCOUNTER — TELEPHONE (OUTPATIENT)
Dept: INTERNAL MEDICINE | Age: 48
End: 2018-11-12

## 2018-11-16 RX ORDER — NAPROXEN SODIUM 220 MG
TABLET ORAL
Qty: 30 TABLET | Refills: 2 | Status: SHIPPED | OUTPATIENT
Start: 2018-11-16 | End: 2019-04-08

## 2018-11-19 DIAGNOSIS — J45.50 UNCOMPLICATED SEVERE PERSISTENT ASTHMA: ICD-10-CM

## 2018-11-19 DIAGNOSIS — R13.12 DYSPHAGIA, OROPHARYNGEAL: ICD-10-CM

## 2018-11-19 DIAGNOSIS — K21.00 CHRONIC REFLUX ESOPHAGITIS: ICD-10-CM

## 2018-11-20 RX ORDER — OMEPRAZOLE 40 MG/1
CAPSULE, DELAYED RELEASE ORAL
Qty: 60 CAPSULE | Refills: 3 | Status: SHIPPED | OUTPATIENT
Start: 2018-11-20 | End: 2019-04-08 | Stop reason: SDUPTHER

## 2019-02-07 ENCOUNTER — OFFICE VISIT (OUTPATIENT)
Dept: PRIMARY CARE CLINIC | Age: 49
End: 2019-02-07
Payer: MEDICARE

## 2019-02-07 VITALS
WEIGHT: 198.6 LBS | OXYGEN SATURATION: 98 % | BODY MASS INDEX: 38.79 KG/M2 | DIASTOLIC BLOOD PRESSURE: 84 MMHG | TEMPERATURE: 97.9 F | HEART RATE: 70 BPM | SYSTOLIC BLOOD PRESSURE: 132 MMHG

## 2019-02-07 DIAGNOSIS — J20.9 ACUTE BRONCHITIS, UNSPECIFIED ORGANISM: Primary | ICD-10-CM

## 2019-02-07 DIAGNOSIS — R05.9 COUGH: ICD-10-CM

## 2019-02-07 LAB
INFLUENZA A ANTIBODY: NEGATIVE
INFLUENZA B ANTIBODY: NEGATIVE

## 2019-02-07 PROCEDURE — G8427 DOCREV CUR MEDS BY ELIG CLIN: HCPCS | Performed by: INTERNAL MEDICINE

## 2019-02-07 PROCEDURE — 99213 OFFICE O/P EST LOW 20 MIN: CPT | Performed by: INTERNAL MEDICINE

## 2019-02-07 PROCEDURE — G8484 FLU IMMUNIZE NO ADMIN: HCPCS | Performed by: INTERNAL MEDICINE

## 2019-02-07 PROCEDURE — G8417 CALC BMI ABV UP PARAM F/U: HCPCS | Performed by: INTERNAL MEDICINE

## 2019-02-07 PROCEDURE — 1036F TOBACCO NON-USER: CPT | Performed by: INTERNAL MEDICINE

## 2019-02-07 PROCEDURE — 87804 INFLUENZA ASSAY W/OPTIC: CPT | Performed by: INTERNAL MEDICINE

## 2019-02-07 RX ORDER — AZITHROMYCIN 250 MG/1
TABLET, FILM COATED ORAL
Qty: 1 PACKET | Refills: 0 | Status: SHIPPED | OUTPATIENT
Start: 2019-02-07 | End: 2019-04-08 | Stop reason: ALTCHOICE

## 2019-02-07 RX ORDER — BENZONATATE 100 MG/1
100 CAPSULE ORAL 3 TIMES DAILY PRN
Qty: 30 CAPSULE | Refills: 0 | Status: SHIPPED | OUTPATIENT
Start: 2019-02-07 | End: 2019-02-14

## 2019-02-07 ASSESSMENT — ENCOUNTER SYMPTOMS
WHEEZING: 1
COUGH: 1
NAUSEA: 1
SHORTNESS OF BREATH: 1
ALLERGIC/IMMUNOLOGIC NEGATIVE: 1

## 2019-02-07 ASSESSMENT — PATIENT HEALTH QUESTIONNAIRE - PHQ9
1. LITTLE INTEREST OR PLEASURE IN DOING THINGS: 0
SUM OF ALL RESPONSES TO PHQ QUESTIONS 1-9: 0
2. FEELING DOWN, DEPRESSED OR HOPELESS: 0
SUM OF ALL RESPONSES TO PHQ QUESTIONS 1-9: 0
SUM OF ALL RESPONSES TO PHQ9 QUESTIONS 1 & 2: 0

## 2019-03-01 DIAGNOSIS — J45.50 UNCOMPLICATED SEVERE PERSISTENT ASTHMA: ICD-10-CM

## 2019-03-18 ENCOUNTER — APPOINTMENT (OUTPATIENT)
Dept: GENERAL RADIOLOGY | Age: 49
End: 2019-03-18
Payer: MEDICARE

## 2019-03-18 ENCOUNTER — HOSPITAL ENCOUNTER (EMERGENCY)
Age: 49
Discharge: HOME | End: 2019-03-18
Attending: EMERGENCY MEDICINE
Payer: MEDICARE

## 2019-03-18 VITALS
BODY MASS INDEX: 37.3 KG/M2 | OXYGEN SATURATION: 98 % | WEIGHT: 190 LBS | TEMPERATURE: 98.8 F | HEIGHT: 60 IN | RESPIRATION RATE: 19 BRPM | DIASTOLIC BLOOD PRESSURE: 86 MMHG | SYSTOLIC BLOOD PRESSURE: 145 MMHG | HEART RATE: 85 BPM

## 2019-03-18 DIAGNOSIS — R07.9 CHEST PAIN, UNSPECIFIED TYPE: Primary | ICD-10-CM

## 2019-03-18 LAB
ABSOLUTE EOS #: 0.12 K/UL (ref 0–0.44)
ABSOLUTE IMMATURE GRANULOCYTE: <0.03 K/UL (ref 0–0.3)
ABSOLUTE LYMPH #: 3.55 K/UL (ref 1.1–3.7)
ABSOLUTE MONO #: 0.78 K/UL (ref 0.1–1.2)
ANION GAP SERPL CALCULATED.3IONS-SCNC: 10 MMOL/L (ref 9–17)
BASOPHILS # BLD: 0 % (ref 0–2)
BASOPHILS ABSOLUTE: <0.03 K/UL (ref 0–0.2)
BUN BLDV-MCNC: 12 MG/DL (ref 6–20)
BUN/CREAT BLD: ABNORMAL (ref 9–20)
CALCIUM SERPL-MCNC: 8.9 MG/DL (ref 8.6–10.4)
CHLORIDE BLD-SCNC: 106 MMOL/L (ref 98–107)
CO2: 22 MMOL/L (ref 20–31)
CREAT SERPL-MCNC: 0.7 MG/DL (ref 0.5–0.9)
D-DIMER QUANTITATIVE: 0.25 MG/L FEU
DIFFERENTIAL TYPE: ABNORMAL
EOSINOPHILS RELATIVE PERCENT: 2 % (ref 1–4)
GFR AFRICAN AMERICAN: >60 ML/MIN
GFR NON-AFRICAN AMERICAN: >60 ML/MIN
GFR SERPL CREATININE-BSD FRML MDRD: ABNORMAL ML/MIN/{1.73_M2}
GFR SERPL CREATININE-BSD FRML MDRD: ABNORMAL ML/MIN/{1.73_M2}
GLUCOSE BLD-MCNC: 110 MG/DL (ref 70–99)
HCT VFR BLD CALC: 35.7 % (ref 36.3–47.1)
HEMOGLOBIN: 10.8 G/DL (ref 11.9–15.1)
IMMATURE GRANULOCYTES: 0 %
LYMPHOCYTES # BLD: 44 % (ref 24–43)
MCH RBC QN AUTO: 25.7 PG (ref 25.2–33.5)
MCHC RBC AUTO-ENTMCNC: 30.3 G/DL (ref 28.4–34.8)
MCV RBC AUTO: 85 FL (ref 82.6–102.9)
MONOCYTES # BLD: 10 % (ref 3–12)
NRBC AUTOMATED: 0 PER 100 WBC
PDW BLD-RTO: 16.8 % (ref 11.8–14.4)
PLATELET # BLD: 301 K/UL (ref 138–453)
PLATELET ESTIMATE: ABNORMAL
PMV BLD AUTO: 10.8 FL (ref 8.1–13.5)
POTASSIUM SERPL-SCNC: 4.3 MMOL/L (ref 3.7–5.3)
RBC # BLD: 4.2 M/UL (ref 3.95–5.11)
RBC # BLD: ABNORMAL 10*6/UL
SEG NEUTROPHILS: 44 % (ref 36–65)
SEGMENTED NEUTROPHILS ABSOLUTE COUNT: 3.64 K/UL (ref 1.5–8.1)
SODIUM BLD-SCNC: 138 MMOL/L (ref 135–144)
TROPONIN INTERP: NORMAL
TROPONIN INTERP: NORMAL
TROPONIN T: NORMAL NG/ML
TROPONIN T: NORMAL NG/ML
TROPONIN, HIGH SENSITIVITY: <6 NG/L (ref 0–14)
TROPONIN, HIGH SENSITIVITY: <6 NG/L (ref 0–14)
WBC # BLD: 8.1 K/UL (ref 3.5–11.3)
WBC # BLD: ABNORMAL 10*3/UL

## 2019-03-18 PROCEDURE — 85025 COMPLETE CBC W/AUTO DIFF WBC: CPT

## 2019-03-18 PROCEDURE — 80048 BASIC METABOLIC PNL TOTAL CA: CPT

## 2019-03-18 PROCEDURE — 85379 FIBRIN DEGRADATION QUANT: CPT

## 2019-03-18 PROCEDURE — 99285 EMERGENCY DEPT VISIT HI MDM: CPT

## 2019-03-18 PROCEDURE — 84484 ASSAY OF TROPONIN QUANT: CPT

## 2019-03-18 PROCEDURE — 71046 X-RAY EXAM CHEST 2 VIEWS: CPT

## 2019-03-18 PROCEDURE — 6370000000 HC RX 637 (ALT 250 FOR IP): Performed by: EMERGENCY MEDICINE

## 2019-03-18 PROCEDURE — 93005 ELECTROCARDIOGRAM TRACING: CPT

## 2019-03-18 RX ORDER — IBUPROFEN 200 MG
400 TABLET ORAL EVERY 8 HOURS PRN
Qty: 30 TABLET | Refills: 0 | Status: SHIPPED | OUTPATIENT
Start: 2019-03-18 | End: 2020-01-31

## 2019-03-18 RX ORDER — DIAZEPAM 2 MG/1
2 TABLET ORAL EVERY 8 HOURS PRN
Qty: 6 TABLET | Refills: 0 | Status: SHIPPED | OUTPATIENT
Start: 2019-03-18 | End: 2019-03-28

## 2019-03-18 RX ORDER — HYDROCODONE BITARTRATE AND ACETAMINOPHEN 5; 325 MG/1; MG/1
1 TABLET ORAL ONCE
Status: COMPLETED | OUTPATIENT
Start: 2019-03-18 | End: 2019-03-18

## 2019-03-18 RX ORDER — ONDANSETRON 4 MG/1
4 TABLET, FILM COATED ORAL ONCE
Status: COMPLETED | OUTPATIENT
Start: 2019-03-18 | End: 2019-03-18

## 2019-03-18 RX ADMIN — ONDANSETRON HYDROCHLORIDE 4 MG: 4 TABLET, FILM COATED ORAL at 08:20

## 2019-03-18 RX ADMIN — HYDROCODONE BITARTRATE AND ACETAMINOPHEN 1 TABLET: 5; 325 TABLET ORAL at 08:20

## 2019-03-18 ASSESSMENT — PAIN DESCRIPTION - PAIN TYPE: TYPE: ACUTE PAIN

## 2019-03-18 ASSESSMENT — ENCOUNTER SYMPTOMS
EYE DISCHARGE: 0
VOMITING: 0
SHORTNESS OF BREATH: 0
NAUSEA: 0
ABDOMINAL PAIN: 0
SORE THROAT: 0
COUGH: 0
EYE PAIN: 0
DIARRHEA: 0

## 2019-03-18 ASSESSMENT — PAIN DESCRIPTION - ORIENTATION: ORIENTATION: LEFT

## 2019-03-18 ASSESSMENT — PAIN DESCRIPTION - DESCRIPTORS: DESCRIPTORS: TIGHTNESS

## 2019-03-18 ASSESSMENT — PAIN SCALES - GENERAL
PAINLEVEL_OUTOF10: 9
PAINLEVEL_OUTOF10: 9

## 2019-03-18 ASSESSMENT — PAIN DESCRIPTION - FREQUENCY: FREQUENCY: CONTINUOUS

## 2019-03-18 ASSESSMENT — PAIN DESCRIPTION - LOCATION: LOCATION: CHEST

## 2019-03-19 LAB
EKG ATRIAL RATE: 76 BPM
EKG P AXIS: 74 DEGREES
EKG P-R INTERVAL: 170 MS
EKG Q-T INTERVAL: 382 MS
EKG QRS DURATION: 70 MS
EKG QTC CALCULATION (BAZETT): 429 MS
EKG R AXIS: 11 DEGREES
EKG T AXIS: 15 DEGREES
EKG VENTRICULAR RATE: 76 BPM

## 2019-04-03 DIAGNOSIS — I10 ESSENTIAL HYPERTENSION: ICD-10-CM

## 2019-04-03 DIAGNOSIS — Z98.890 S/P CARDIAC CATH: ICD-10-CM

## 2019-04-04 NOTE — TELEPHONE ENCOUNTER
diovan-hct pending for refill     Health Maintenance   Topic Date Due    HIV screen  12/04/1984    A1C test (Diabetic or Prediabetic)  05/03/2019    Flu vaccine (Season Ended) 09/01/2019    Potassium monitoring  03/18/2020    Creatinine monitoring  03/18/2020    Cervical cancer screen  08/10/2021    Lipid screen  10/10/2022    Colon cancer screen colonoscopy  02/08/2023    DTaP/Tdap/Td vaccine (2 - Td) 02/05/2026    Pneumococcal 0-64 years at Risk Vaccine  Completed             (applicable per patient's age: Cancer Screenings, Depression Screening, Fall Risk Screening, Immunizations)    Hemoglobin A1C (%)   Date Value   05/03/2018 6.0   10/10/2017 5.9   10/11/2016 5.5     LDL Cholesterol (mg/dL)   Date Value   10/10/2017 67     AST (U/L)   Date Value   05/02/2017 17     ALT (U/L)   Date Value   05/02/2017 16     BUN (mg/dL)   Date Value   03/18/2019 12      (goal A1C is < 7)   (goal LDL is <100) need 30-50% reduction from baseline     BP Readings from Last 3 Encounters:   03/18/19 (!) 145/86   02/07/19 132/84   09/15/18 134/79    (goal /80)      All Future Testing planned in [facility]:  Lab Frequency Next Occurrence       Next Visit Date:  Future Appointments   Date Time Provider Fortino Jean Baptiste   4/8/2019  8:30 AM Cristiane Bernstein MD [id number]   4/12/2019  3:15 PM Karen Morgan MD Resp Spec [address] [facility]            Patient Active Problem List:     Hypertension     Asthma     GERD (gastroesophageal reflux disease)     Hypokalemia     S/P cardiac cath-Minimal disease 11/23/15 Dr. Napoleon Ramos     Irritable bowel syndrome with constipation     Obesity     Allergic rhinitis     Hiatal hernia     Paresthesias     Left sided numbness     Dysphagia     Family history of colon cancer     Chest pain

## 2019-04-04 NOTE — TELEPHONE ENCOUNTER
Escriinez request for Singulair (not on patients medication list) .   Please escribe if appropriate      Next Visit Date:  4/8/2019     Health Maintenance   Topic Date Due    HIV screen  12/04/1984    A1C test (Diabetic or Prediabetic)  05/03/2019    Flu vaccine (Season Ended) 09/01/2019    Potassium monitoring  03/18/2020    Creatinine monitoring  03/18/2020    Cervical cancer screen  08/10/2021    Lipid screen  10/10/2022    Colon cancer screen colonoscopy  02/08/2023    DTaP/Tdap/Td vaccine (2 - Td) 02/05/2026    Pneumococcal 0-64 years at Risk Vaccine  Completed       Hemoglobin A1C (%)   Date Value   05/03/2018 6.0   10/10/2017 5.9   10/11/2016 5.5             ( goal A1C is < 7)   No results found for: LABMICR  LDL Cholesterol (mg/dL)   Date Value   10/10/2017 67       (goal LDL is <100)   AST (U/L)   Date Value   05/02/2017 17     ALT (U/L)   Date Value   05/02/2017 16     BUN (mg/dL)   Date Value   03/18/2019 12     BP Readings from Last 3 Encounters:   03/18/19 (!) 145/86   02/07/19 132/84   09/15/18 134/79          (goal 120/80)          Patient Active Problem List:     Hypertension     Asthma     GERD (gastroesophageal reflux disease)     Hypokalemia     S/P cardiac cath-Minimal disease 11/23/15 Dr. Judson Barrett     Irritable bowel syndrome with constipation     Obesity     Allergic rhinitis     Hiatal hernia     Paresthesias     Left sided numbness     Dysphagia     Family history of colon cancer     Chest pain

## 2019-04-06 RX ORDER — VALSARTAN AND HYDROCHLOROTHIAZIDE 80; 12.5 MG/1; MG/1
TABLET, FILM COATED ORAL
Qty: 30 TABLET | Refills: 5 | Status: SHIPPED | OUTPATIENT
Start: 2019-04-06 | End: 2020-02-24

## 2019-04-06 RX ORDER — MONTELUKAST SODIUM 10 MG/1
TABLET ORAL
Qty: 30 TABLET | Refills: 2 | Status: SHIPPED | OUTPATIENT
Start: 2019-04-06 | End: 2019-06-27 | Stop reason: SDUPTHER

## 2019-04-08 ENCOUNTER — OFFICE VISIT (OUTPATIENT)
Dept: INTERNAL MEDICINE | Age: 49
End: 2019-04-08
Payer: MEDICARE

## 2019-04-08 VITALS
SYSTOLIC BLOOD PRESSURE: 132 MMHG | HEART RATE: 73 BPM | HEIGHT: 60 IN | BODY MASS INDEX: 40.21 KG/M2 | DIASTOLIC BLOOD PRESSURE: 80 MMHG | WEIGHT: 204.8 LBS

## 2019-04-08 DIAGNOSIS — Z12.39 BREAST CANCER SCREENING: ICD-10-CM

## 2019-04-08 DIAGNOSIS — E78.2 MIXED HYPERLIPIDEMIA: ICD-10-CM

## 2019-04-08 DIAGNOSIS — E66.09 CLASS 2 OBESITY DUE TO EXCESS CALORIES WITHOUT SERIOUS COMORBIDITY WITH BODY MASS INDEX (BMI) OF 39.0 TO 39.9 IN ADULT: ICD-10-CM

## 2019-04-08 DIAGNOSIS — R73.02 IGT (IMPAIRED GLUCOSE TOLERANCE): ICD-10-CM

## 2019-04-08 DIAGNOSIS — J45.50 SEVERE PERSISTENT ASTHMA WITHOUT COMPLICATION: Primary | ICD-10-CM

## 2019-04-08 DIAGNOSIS — I10 ESSENTIAL HYPERTENSION: ICD-10-CM

## 2019-04-08 DIAGNOSIS — K21.00 CHRONIC REFLUX ESOPHAGITIS: ICD-10-CM

## 2019-04-08 LAB — HBA1C MFR BLD: 5.6 %

## 2019-04-08 PROCEDURE — 99211 OFF/OP EST MAY X REQ PHY/QHP: CPT | Performed by: INTERNAL MEDICINE

## 2019-04-08 PROCEDURE — 83036 HEMOGLOBIN GLYCOSYLATED A1C: CPT | Performed by: INTERNAL MEDICINE

## 2019-04-08 PROCEDURE — 99214 OFFICE O/P EST MOD 30 MIN: CPT | Performed by: INTERNAL MEDICINE

## 2019-04-08 PROCEDURE — 1036F TOBACCO NON-USER: CPT | Performed by: INTERNAL MEDICINE

## 2019-04-08 PROCEDURE — G8417 CALC BMI ABV UP PARAM F/U: HCPCS | Performed by: INTERNAL MEDICINE

## 2019-04-08 PROCEDURE — G8427 DOCREV CUR MEDS BY ELIG CLIN: HCPCS | Performed by: INTERNAL MEDICINE

## 2019-04-08 RX ORDER — ALBUTEROL SULFATE 2.5 MG/3ML
2.5 SOLUTION RESPIRATORY (INHALATION) EVERY 6 HOURS PRN
Qty: 120 EACH | Refills: 3 | Status: SHIPPED | OUTPATIENT
Start: 2019-04-08 | End: 2022-02-01 | Stop reason: SDUPTHER

## 2019-04-08 RX ORDER — ATORVASTATIN CALCIUM 20 MG/1
TABLET, FILM COATED ORAL
Qty: 30 TABLET | Refills: 5 | Status: SHIPPED | OUTPATIENT
Start: 2019-04-08 | End: 2020-03-03 | Stop reason: SDUPTHER

## 2019-04-08 RX ORDER — SENNA PLUS 8.6 MG/1
1 TABLET ORAL 2 TIMES DAILY
Qty: 60 TABLET | Refills: 11 | Status: SHIPPED | OUTPATIENT
Start: 2019-04-08 | End: 2020-02-17

## 2019-04-08 RX ORDER — CARVEDILOL 6.25 MG/1
TABLET ORAL
Qty: 60 TABLET | Refills: 5 | Status: SHIPPED | OUTPATIENT
Start: 2019-04-08 | End: 2020-03-03 | Stop reason: SDUPTHER

## 2019-04-08 RX ORDER — FLUTICASONE PROPIONATE 50 MCG
1 SPRAY, SUSPENSION (ML) NASAL DAILY
Qty: 2 BOTTLE | Refills: 5 | Status: SHIPPED | OUTPATIENT
Start: 2019-04-08 | End: 2019-04-08

## 2019-04-08 RX ORDER — BLOOD PRESSURE TEST KIT-WRIST
KIT MISCELLANEOUS
Qty: 1 DEVICE | Refills: 0 | Status: SHIPPED | OUTPATIENT
Start: 2019-04-08 | End: 2019-04-08 | Stop reason: SDUPTHER

## 2019-04-08 RX ORDER — OMEPRAZOLE 40 MG/1
CAPSULE, DELAYED RELEASE ORAL
Qty: 60 CAPSULE | Refills: 3 | Status: SHIPPED | OUTPATIENT
Start: 2019-04-08 | End: 2019-07-25 | Stop reason: SDUPTHER

## 2019-04-08 RX ORDER — BLOOD PRESSURE TEST KIT-WRIST
KIT MISCELLANEOUS
Qty: 1 DEVICE | Refills: 0 | Status: SHIPPED | OUTPATIENT
Start: 2019-04-08 | End: 2019-04-16

## 2019-04-08 RX ORDER — NAPROXEN SODIUM 220 MG
TABLET ORAL
Qty: 30 TABLET | Refills: 2 | OUTPATIENT
Start: 2019-04-08

## 2019-04-08 ASSESSMENT — ENCOUNTER SYMPTOMS
NAUSEA: 0
BLOOD IN STOOL: 0
DIARRHEA: 0
ABDOMINAL PAIN: 0
CONSTIPATION: 1

## 2019-04-08 NOTE — PROGRESS NOTES
[facility]    Progress Note    Date of patient's visit: 4/8/2019    Patient's Name:  Niall Felder    YOB: 1970            Patient Care Team:  Kayli Phelan MD as PCP - General (Internal Medicine)  Marcio Cosme MD as PCP - [facility] Attributed Provider    REASON FOR VISIT: Routine outpatient follow     Chief Complaint   Patient presents with    Follow-up     f/u from appt w/ walk in clinic ; Cold/cough 02/07    Chest Pain     pt c/o chest pain, states she is still fighting cold, was seen in ED for chest pain as well on 03/18    Orders     pt requesting script for nebulizer and home BP kit          HISTORY OF PRESENT ILLNESS:    History was obtained from the patient. Niall Felder is a 51 y.o. is here for follow-up after a year. She has severe persistent asthma. She says she had problems with her insurance and was unable to get her medications for a few months. Now she is back on all her medications. She states she is using Flonase. She has seen ENT. She has a follow-up appointment with pulmonology soon. She needs a new prescription for nebulizer as she lost her during her move to a new home. She says she uses albuterol  once a day. She states she is compliant with all her medications including her antihypertensives. GERD has been under control since she has been taking Prilosec daily. No dysphagia. She was in the ER a month ago and was told it was a panic attack. She was having chest pain. She has chronic constipation. She takes senna.     Cardiac catherization 2018  Procedure  Procedure Type:     Diagnostic procedure: Lt Heart, Coronary Angio, LVgram      Conclusions      Procedure Summary      NORMAL CORONARIES   NORMAL LVEF 65%.      Recommendations     CXR 2019  FINDINGS:   Frontal and lateral views of the chest are submitted for review.  The cardiac   silhouette is normal in size.  Lung parenchyma is clear without (DIOVAN-HCT) 80-12.5 MG per tablet take 1 tablet by mouth once daily 30 tablet 5    ibuprofen (ADVIL;MOTRIN) 200 MG tablet Take 2 tablets by mouth every 8 hours as needed for Pain 30 tablet 0    ADVAIR DISKUS 500-50 MCG/DOSE diskus inhaler inhale 1 dose by mouth twice a day 1 Inhaler 1    omeprazole (PRILOSEC) 40 MG delayed release capsule take 1 capsule by mouth twice a day 60 capsule 3    RA SENNA 8.6 MG tablet take 1 tablet by mouth once daily 30 tablet 2    ASPIR-LOW 81 MG EC tablet take 1 tablet by mouth once daily 30 tablet 3    albuterol sulfate HFA (PROAIR HFA) 108 (90 Base) MCG/ACT inhaler inhale 2 puffs by mouth every 6 hours if needed for wheezing 8.5 g 10    fluticasone (FLONASE) 50 MCG/ACT nasal spray 1 spray by Nasal route daily 1 Bottle 3    carvedilol (COREG) 6.25 MG tablet take 1 tablet by mouth twice a day 60 tablet 5    atorvastatin (LIPITOR) 20 MG tablet take 1 tablet by mouth once daily 30 tablet 5    ferrous sulfate (FE TABS) 325 (65 Fe) MG EC tablet Take 1 tablet by mouth 2 times daily 90 tablet 3    albuterol (PROVENTIL) (2.5 MG/3ML) 0.083% nebulizer solution Take 3 mLs by nebulization every 6 hours as needed for Wheezing 120 each 3    losartan-hydrochlorothiazide (HYZAAR) 50-12.5 MG per tablet Take 1 tablet by mouth daily 30 tablet 3    gabapentin (NEURONTIN) 100 MG capsule Take 1 capsule by mouth 3 times daily 90 capsule 3    Multiple Vitamins-Minerals (WOMENS MULTI VITAMIN & MINERAL PO) Take 1 tablet by mouth daily       Current Facility-Administered Medications on File Prior to Visit   Medication Dose Route Frequency Provider Last Rate Last Dose    0.9 % sodium chloride infusion   Intravenous Continuous Jayne Benoit MD 30 mL/hr at 05/26/15 1008         SOCIAL HISTORY    Reviewed and no change from previous record. Espiridion Medico  reports that she has never smoked.  She has never used smokeless tobacco.    FAMILY HISTORY:    Reviewed and No change from previous visit    HEALTH MAINTENANCE DUE:      Health Maintenance Due   Topic Date Due    HIV screen  12/04/1984       REVIEW OF SYSTEMS:    12 point review of symptoms completed and found to be normal except noted in the HPI    Review of Systems   Constitutional: Negative for chills, fatigue and fever. HENT: Positive for congestion. Negative for rhinorrhea, sinus pressure, sinus pain and sore throat. Eyes: Negative for discharge, redness and itching. Respiratory: Negative for cough, shortness of breath and wheezing. Cardiovascular: Negative for chest pain, palpitations and leg swelling. Gastrointestinal: Positive for constipation. Negative for abdominal pain, blood in stool, diarrhea and nausea. Endocrine: Negative for polydipsia and polyuria. Genitourinary: Negative for dysuria, flank pain, frequency, hematuria and menstrual problem. Allergic/Immunologic: Positive for environmental allergies. Negative for immunocompromised state. Neurological: Negative for dizziness, weakness and headaches. PHYSICAL EXAM:     Vitals:    04/08/19 0832   BP: 132/80   Site: Left Upper Arm   Position: Sitting   Cuff Size: Large Adult   Pulse: 73   Weight: 204 lb 12.8 oz (92.9 kg)   Height: 5' (1.524 m)     Body mass index is 40 kg/m². BP Readings from Last 3 Encounters:   04/08/19 132/80   03/18/19 (!) 145/86   02/07/19 132/84        Wt Readings from Last 3 Encounters:   04/08/19 204 lb 12.8 oz (92.9 kg)   03/18/19 190 lb (86.2 kg)   02/07/19 198 lb 9.6 oz (90.1 kg)       Physical Exam      HENT:  Normocephalic, Atraumatic, Bilateral TM normal, Oropharynx moist,  Nose hypertrophied turbinates. No sinus tenderness. . Neck- Normal range of motion, No tenderness, Supple, No stridor. Eyes:  PERRL, EOMI, Conjunctiva normal, No discharge. Respiratory:  Normal breath sounds, No respiratory distress, No wheezing, No chest tenderness. Cardiovascular:  Normal heart rate, Normal rhythm, No murmurs, No rubs, No gallops.    GI:  Bowel sounds normal, Soft, No tenderness, No masses  :   No CVA tenderness. Musculoskeletal:  Intact distal pulses, No edema, No tenderness, Back- No tenderness. Integument:  Warm, Dry, No erythema, No rash. Lymphatic:  No lymphadenopathy noted. Neurologic:  Alert & oriented x 3, Normal motor function, Normal sensory function, No focal deficits noted. Psychiatric:  Affect normal      LABORATORY FINDINGS:    CBC:  Lab Results   Component Value Date    WBC 8.1 03/18/2019    HGB 10.8 03/18/2019     03/18/2019     BMP:    Lab Results   Component Value Date     03/18/2019    K 4.3 03/18/2019     03/18/2019    CO2 22 03/18/2019    BUN 12 03/18/2019    CREATININE 0.70 03/18/2019    GLUCOSE 110 03/18/2019     HEMOGLOBIN A1C:   Lab Results   Component Value Date    LABA1C 6.0 05/03/2018     MICROALBUMIN URINE: No results found for: MICROALBUR  FASTING LIPID [email or web address]  Lab Results   Component Value Date    LDLCHOLESTEROL 67 10/10/2017       LIVER PROFILE:  Lab Results   Component Value Date    ALT 16 05/02/2017    AST 17 05/02/2017    PROT 7.4 05/02/2017    BILITOT 0.55 05/02/2017    BILIDIR 0.12 05/02/2017    LABALBU 4.3 05/02/2017      THYROID FUNCTION:   Lab Results   Component Value Date    TSH 2.31 08/29/2017      URINEANALYSIS: No results found for: LABURIN  ASSESSMENT AND PLAN:    1. Severe persistent asthma without complication  Advised compliance with Advair/singulair/ fluticasone nasal spray  Follow up with allergist as recommended      - Respiratory Therapy Supplies (NEBULIZER COMPRESSOR) KIT; 1 kit by Does not apply route once for 1 dose  Dispense: 1 kit; Refill: 0  - albuterol (PROVENTIL) (2.5 MG/3ML) 0.083% nebulizer solution; Take 3 mLs by nebulization every 6 hours as needed for Wheezing  Dispense: 120 each; Refill: 3    2.  Essential hypertension    - carvedilol (COREG) 6.25 MG tablet; take 1 tablet by mouth twice a day  Dispense: 60 tablet;

## 2019-04-08 NOTE — PATIENT INSTRUCTIONS
Printed script for Iesha signed and given to pt along w/ list of DME companies. LABORATORY INSTRUCTIONS    Your doctor has ordered blood or urine testing. You can get this testing done at the Lab located on the first floor of the [facility], or at any other [facility]. Please stop at Main Registration, before going to the lab, as you must be registered first.     Please get this lab done before your next visit. You may NOT eat, drink, smoke, or chew anything before this test for 8-12 hours. You may still have water. MAMMOGRAM INSTRUCTIONS    Your physician has ordered a mammogram for you. Mammography is an X-ray that creates an image of the breast.     To prepare yourself for the test shower, or bathe, as usual, but do not use any deodorants, powders, or perfumes under or around the breast or chest area. You will be called by the Scheduling Department to schedule your testing. If you do not hear from them within a week, please call them at [phone] to schedule the appointment. This will be done at any [address] location of your choice. Report to the Admitting Department 20 minutes before the test to complete the proper paperwork. If you cannot keep this appointment, please call [phone] to cancel and reschedule your appointment. Return To Clinic Visit date not found. After Visit Summary  given and reviewed. --MA    It is very important for your care that you keep your appointment. If for some reason you are unable to keep your appointment it is equally important that you call our office at [phone] to cancel your appointment and reschedule. Failure to do so may result in your termination from our practice.

## 2019-04-09 ASSESSMENT — ENCOUNTER SYMPTOMS
EYE DISCHARGE: 0
SORE THROAT: 0
SINUS PAIN: 0
WHEEZING: 0
EYE REDNESS: 0
SINUS PRESSURE: 0
RHINORRHEA: 0
EYE ITCHING: 0
COUGH: 0
SHORTNESS OF BREATH: 0

## 2019-04-10 ENCOUNTER — HOSPITAL ENCOUNTER (OUTPATIENT)
Facility: CLINIC | Age: 49
Discharge: HOME | End: 2019-04-10
Payer: MEDICARE

## 2019-04-10 DIAGNOSIS — R73.02 IGT (IMPAIRED GLUCOSE TOLERANCE): ICD-10-CM

## 2019-04-10 DIAGNOSIS — I10 ESSENTIAL HYPERTENSION: ICD-10-CM

## 2019-04-10 DIAGNOSIS — E78.2 MIXED HYPERLIPIDEMIA: ICD-10-CM

## 2019-04-10 LAB
ALBUMIN SERPL-MCNC: 4.2 G/DL (ref 3.5–5.2)
ALBUMIN/GLOBULIN RATIO: 1.4 (ref 1–2.5)
ALP BLD-CCNC: 44 U/L (ref 35–104)
ALT SERPL-CCNC: 14 U/L (ref 5–33)
ANION GAP SERPL CALCULATED.3IONS-SCNC: 16 MMOL/L (ref 9–17)
AST SERPL-CCNC: 14 U/L
BILIRUB SERPL-MCNC: 0.24 MG/DL (ref 0.3–1.2)
BUN BLDV-MCNC: 7 MG/DL (ref 6–20)
BUN/CREAT BLD: ABNORMAL (ref 9–20)
CALCIUM SERPL-MCNC: 9.4 MG/DL (ref 8.6–10.4)
CHLORIDE BLD-SCNC: 107 MMOL/L (ref 98–107)
CHOLESTEROL/HDL RATIO: 3.1
CHOLESTEROL: 156 MG/DL
CO2: 22 MMOL/L (ref 20–31)
CREAT SERPL-MCNC: 0.68 MG/DL (ref 0.5–0.9)
GFR AFRICAN AMERICAN: >60 ML/MIN
GFR NON-AFRICAN AMERICAN: >60 ML/MIN
GFR SERPL CREATININE-BSD FRML MDRD: ABNORMAL ML/MIN/{1.73_M2}
GFR SERPL CREATININE-BSD FRML MDRD: ABNORMAL ML/MIN/{1.73_M2}
GLUCOSE BLD-MCNC: 118 MG/DL (ref 70–99)
HDLC SERPL-MCNC: 50 MG/DL
LDL CHOLESTEROL: 74 MG/DL (ref 0–130)
POTASSIUM SERPL-SCNC: 4.4 MMOL/L (ref 3.7–5.3)
SODIUM BLD-SCNC: 145 MMOL/L (ref 135–144)
TOTAL PROTEIN: 7.1 G/DL (ref 6.4–8.3)
TRIGL SERPL-MCNC: 158 MG/DL
VLDLC SERPL CALC-MCNC: ABNORMAL MG/DL (ref 1–30)

## 2019-04-10 PROCEDURE — 36415 COLL VENOUS BLD VENIPUNCTURE: CPT

## 2019-04-10 PROCEDURE — 80061 LIPID PANEL: CPT

## 2019-04-10 PROCEDURE — 80053 COMPREHEN METABOLIC PANEL: CPT

## 2019-04-11 ENCOUNTER — HOSPITAL ENCOUNTER (EMERGENCY)
Age: 49
Discharge: HOME | End: 2019-04-11
Attending: EMERGENCY MEDICINE
Payer: MEDICARE

## 2019-04-11 VITALS
TEMPERATURE: 96.8 F | RESPIRATION RATE: 18 BRPM | HEART RATE: 66 BPM | SYSTOLIC BLOOD PRESSURE: 153 MMHG | DIASTOLIC BLOOD PRESSURE: 77 MMHG | OXYGEN SATURATION: 100 %

## 2019-04-11 DIAGNOSIS — R51.9 ACUTE NONINTRACTABLE HEADACHE, UNSPECIFIED HEADACHE TYPE: Primary | ICD-10-CM

## 2019-04-11 PROCEDURE — 6370000000 HC RX 637 (ALT 250 FOR IP): Performed by: STUDENT IN AN ORGANIZED HEALTH CARE EDUCATION/TRAINING PROGRAM

## 2019-04-11 PROCEDURE — G0382 LEV 3 HOSP TYPE B ED VISIT: HCPCS

## 2019-04-11 RX ORDER — PROCHLORPERAZINE MALEATE 10 MG
10 TABLET ORAL ONCE
Status: COMPLETED | OUTPATIENT
Start: 2019-04-11 | End: 2019-04-11

## 2019-04-11 RX ORDER — DIPHENHYDRAMINE HCL 25 MG
25 TABLET ORAL ONCE
Status: COMPLETED | OUTPATIENT
Start: 2019-04-11 | End: 2019-04-11

## 2019-04-11 RX ORDER — ACETAMINOPHEN, ASPIRIN AND CAFFEINE 250; 250; 65 MG/1; MG/1; MG/1
1 TABLET, FILM COATED ORAL EVERY 8 HOURS PRN
Qty: 20 TABLET | Refills: 0 | Status: SHIPPED | OUTPATIENT
Start: 2019-04-11 | End: 2019-04-16

## 2019-04-11 RX ORDER — ACETAMINOPHEN 500 MG
1000 TABLET ORAL ONCE
Status: COMPLETED | OUTPATIENT
Start: 2019-04-11 | End: 2019-04-11

## 2019-04-11 RX ORDER — ONDANSETRON 4 MG/1
4 TABLET, ORALLY DISINTEGRATING ORAL EVERY 8 HOURS PRN
Qty: 20 TABLET | Refills: 0 | Status: SHIPPED | OUTPATIENT
Start: 2019-04-11 | End: 2020-08-11 | Stop reason: ALTCHOICE

## 2019-04-11 RX ADMIN — DIPHENHYDRAMINE HCL 25 MG: 25 TABLET ORAL at 12:40

## 2019-04-11 RX ADMIN — PROCHLORPERAZINE MALEATE 10 MG: 10 TABLET, FILM COATED ORAL at 12:40

## 2019-04-11 RX ADMIN — ACETAMINOPHEN 1000 MG: 500 TABLET ORAL at 12:18

## 2019-04-11 ASSESSMENT — ENCOUNTER SYMPTOMS
COUGH: 0
ABDOMINAL PAIN: 0
NAUSEA: 1
DIARRHEA: 0
SHORTNESS OF BREATH: 0
SINUS PRESSURE: 0
PHOTOPHOBIA: 0
CONSTIPATION: 0
SINUS PAIN: 0
VOMITING: 0
EYE REDNESS: 0
BACK PAIN: 0

## 2019-04-11 ASSESSMENT — PAIN SCALES - GENERAL: PAINLEVEL_OUTOF10: 8

## 2019-04-11 NOTE — ED PROVIDER NOTES
s/sx but does not typically get HAs    Will give PO HARTMAN cocktail, investigate triggers  If improves, close f/u with PCP  If no improvement or other concerns, CT and neuro f/u      (Please note that portions of this note were completed with a voice recognition program.  Efforts were made to edit the dictations but occasionally words are mis-transcribed.)      Clemens MD  Attending Emergency Physician          Lavon Valentino MD  04/11/19 3720

## 2019-04-11 NOTE — ED PROVIDER NOTES
[facility]  ED  Emergency Department Encounter  Emergency Medicine Resident     Pt Name: Theresa Sanz  MRN: [id number]  [place] 1970  Date of evaluation: 4/11/19  PCP:  Amaury Gill MD    [address] [place] [address]       Chief Complaint   Patient presents with    Headache     pt reports pressure since friday       HISTORY OFPRESENT ILLNESS  (Location/Symptom, Timing/Onset, Context/Setting, Quality, Duration, Modifying Arthor Romeo.)      Theresa Sanz is a 51 y.o. female who presents with  Complaints of a headache since Friday. Patient states that she describes it as a pressure, on the top of her head, denies radiation, states it was gradual in onset. She does admit to some occasional associated nausea but denies any vomiting, fevers, neck pain, neck stiffness, vision changes, photophobia, phonophobia, history of migraines, numbness, weakness, tingling. She denies any history of aneurysms or intracranial pathology. She is not on a blood thinner. Patient states she has been taking Motrin and Aleve as needed with improvement of symptoms. PAST MEDICAL / SURGICAL / SOCIAL / FAMILY HISTORY      has a past medical history of Allergic rhinitis, Asthma, Clinical trial participant, COPD (chronic obstructive pulmonary disease) ([facility] [address] Dysphagia, Dysphagia, GERD (gastroesophageal reflux disease), H/O cardiovascular stress test, Hiatal hernia, Hyperlipidemia, Hypertension, Irritable bowel syndrome with constipation, Obesity, Snores, and Wears glasses. has a past surgical history that includes Tubal ligation (2003); Colonoscopy (2015); Upper gastrointestinal endoscopy; Upper gastrointestinal endoscopy (N/A, 7/18/2017); Cardiac catheterization (01/16/2018); Cardiac catheterization (11/23/2015); Endoscopy, colon, diagnostic (2015); Endoscopy, colon, diagnostic (02/08/2018); pr colon ca scrn not hi rsk ind (N/A, 2/8/2018); and Colonoscopy (02/08/2018).      Social History Socioeconomic History    Marital status:      Spouse name: Not on file    Number of children: Not on file    Years of education: Not on file    Highest education level: Not on file   Occupational History    Not on file   Social Needs    Financial resource strain: Not on file    Food insecurity:     Worry: Not on file     Inability: Not on file    Transportation needs:     Medical: Not on file     Non-medical: Not on file   Tobacco Use    Smoking status: Never Smoker    Smokeless tobacco: Never Used   Substance and Sexual Activity    Alcohol use: Yes     Alcohol/week: 0.0 oz     Comment: occasionally    Drug use: No    Sexual activity: Yes     Partners: Male     Comment: tubal ligation    Lifestyle    Physical activity:     Days per week: Not on file     Minutes per session: Not on file    Stress: Not on file   Relationships    Social connections:     Talks on phone: Not on file     Gets together: Not on file     Attends [employer] service: Not on file     Active member of club or organization: Not on file     Attends meetings of clubs or organizations: Not on file     Relationship status: Not on file    Intimate partner violence:     Fear of current or ex partner: Not on file     Emotionally abused: Not on file     Physically abused: Not on file     Forced sexual activity: Not on file   Other Topics Concern    Not on file   Social History Narrative    Not on file       Family History   Problem Relation Age of Onset    Hypertension Mother     High Cholesterol Mother     Asthma Mother     Osteoarthritis Mother     Other Mother         overweight    Colon Cancer Father         Allergies:  Patient has no known allergies. Home Medications:  Prior to Admission medications    Medication Sig Start Date End Date Taking?  Authorizing Provider   aspirin-acetaminophen-caffeine (EXCEDRIN EXTRA STRENGTH) [phone] MG per tablet Take 1 tablet by mouth every 8 hours as needed for Headaches 4/11/19  Yes Mike Matthews DO   ondansetron (ZOFRAN ODT) 4 MG disintegrating tablet Take 1 tablet by mouth every 8 hours as needed for Nausea 4/11/19  Yes Mike Matthews DO   Respiratory Therapy Supplies (NEBULIZER COMPRESSOR) KIT 1 kit by Does not apply route once for 1 dose 4/8/19 4/8/19  Cristiane Bernstein MD   omeprazole (PRILOSEC) 40 MG delayed release capsule take 1 capsule by mouth twice a day 4/8/19   Cristiane Bernstein MD   carvedilol (COREG) 6.25 MG tablet take 1 tablet by mouth twice a day 4/8/19   Cristiane Bernstein MD   atorvastatin (LIPITOR) 20 MG tablet take 1 tablet by mouth once daily 4/8/19   Cristiane Bernstein MD   albuterol (PROVENTIL) (2.5 MG/3ML) 0.083% nebulizer solution Take 3 mLs by nebulization every 6 hours as needed for Wheezing 4/8/19   Cristiane Bernstein MD   senna (SENOKOT) 8.6 MG tablet Take 1 tablet by mouth 2 times daily 4/8/19 4/7/20  Cristiane Bernstein MD   Blood Pressure Monitoring (3 SERIES BP MONITOR/WRIST) JOHNNY Check as needed 4/8/19   Cristiane Bernstein MD   montelukast (SINGULAIR) 10 MG tablet take 1 tablet by mouth once daily 4/6/19   Bhanu Goddard MD   valsartan-hydrochlorothiazide (DIOVAN-HCT) 80-12.5 MG per tablet take 1 tablet by mouth once daily 4/6/19   Bhanu Goddard MD   ibuprofen (ADVIL;MOTRIN) 200 MG tablet Take 2 tablets by mouth every 8 hours as needed for Pain 3/18/19 4/8/19  Sherri Liao DO   ADVAIR DISKUS 500-50 MCG/DOSE diskus inhaler inhale 1 dose by mouth twice a day 3/3/19   Karen Morgan MD   ASPIR-LOW 81 MG EC tablet take 1 tablet by mouth once daily 10/24/18   Cristiane Bernstein MD   albuterol sulfate HFA (PROAIR HFA) 108 (90 Base) MCG/ACT inhaler inhale 2 puffs by mouth every 6 hours if needed for wheezing 9/20/18   Cristiane Bernstein MD   fluticasone [facility]) 50 MCG/ACT nasal spray 1 spray by Nasal route daily 5/3/18   Cristiane Bernstein MD   ferrous sulfate (FE TABS) 325 (65 Fe) MG EC tablet Take 1 tablet by mouth 2 times daily 5/3/18   Cristiane Bernstein MD heard.  Pulmonary/Chest: Effort normal and breath sounds normal. No stridor. No respiratory distress. She has no wheezes. She has no rales. Abdominal: Soft. Bowel sounds are normal. She exhibits no distension and no mass. There is no tenderness. There is no rebound and no guarding. Musculoskeletal: Normal range of motion. She exhibits no edema, tenderness or deformity. Neurological: She is alert and oriented to person, place, and time. She displays normal reflexes. No cranial nerve deficit or sensory deficit. She exhibits normal muscle tone. Coordination normal.   Skin: Skin is warm and dry. Capillary refill takes less than 2 seconds. No rash noted. She is not diaphoretic. No erythema. No pallor. Psychiatric: She has a normal mood and affect. Her behavior is normal. Thought content normal.   Nursing note and vitals reviewed. DIFFERENTIAL  DIAGNOSIS     PLAN (LABS / IMAGING / EKG):  No orders of the defined types were placed in this encounter. MEDICATIONS ORDERED:  Orders Placed This Encounter   Medications    acetaminophen (TYLENOL) tablet 1,000 mg    prochlorperazine (COMPAZINE) tablet 10 mg    diphenhydrAMINE (BENADRYL) tablet 25 mg    aspirin-acetaminophen-caffeine (EXCEDRIN EXTRA STRENGTH) 250-250-65 MG per tablet     Sig: Take 1 tablet by mouth every 8 hours as needed for Headaches     Dispense:  20 tablet     Refill:  0    ondansetron (ZOFRAN ODT) 4 MG disintegrating tablet     Sig: Take 1 tablet by mouth every 8 hours as needed for Nausea     Dispense:  20 tablet     Refill:  0       DDX: SAH, meningitis, encephalitis, migraine, tension, cluster, sinusitis, abscess, CNS mass, increased ICP, temporal arteritis    Initial MDM/Plan/ED COURSE:    51 y.o. female who presents with complaints of headache. On exam patient is very well-appearing, nontoxic. Patient admitted in the emergency department with a steady gait. Vital signs are within normal limits.   On physical exam abdomen is soft,

## 2019-04-12 ENCOUNTER — OFFICE VISIT (OUTPATIENT)
Dept: PULMONOLOGY | Age: 49
End: 2019-04-12
Payer: MEDICARE

## 2019-04-12 ENCOUNTER — CARE COORDINATION (OUTPATIENT)
Dept: CARE COORDINATION | Age: 49
End: 2019-04-12

## 2019-04-12 VITALS
DIASTOLIC BLOOD PRESSURE: 79 MMHG | SYSTOLIC BLOOD PRESSURE: 129 MMHG | HEIGHT: 60 IN | HEART RATE: 69 BPM | RESPIRATION RATE: 15 BRPM | BODY MASS INDEX: 40.25 KG/M2 | WEIGHT: 205 LBS | TEMPERATURE: 97.7 F | OXYGEN SATURATION: 100 %

## 2019-04-12 DIAGNOSIS — E66.09 NON MORBID OBESITY DUE TO EXCESS CALORIES: ICD-10-CM

## 2019-04-12 DIAGNOSIS — K44.9 HIATAL HERNIA: ICD-10-CM

## 2019-04-12 DIAGNOSIS — K21.9 GASTROESOPHAGEAL REFLUX DISEASE WITHOUT ESOPHAGITIS: ICD-10-CM

## 2019-04-12 DIAGNOSIS — J45.50 UNCOMPLICATED SEVERE PERSISTENT ASTHMA: ICD-10-CM

## 2019-04-12 DIAGNOSIS — I10 ESSENTIAL HYPERTENSION: ICD-10-CM

## 2019-04-12 DIAGNOSIS — J34.3 NASAL TURBINATE HYPERTROPHY: Primary | ICD-10-CM

## 2019-04-12 PROCEDURE — 99214 OFFICE O/P EST MOD 30 MIN: CPT | Performed by: INTERNAL MEDICINE

## 2019-04-12 PROCEDURE — G8417 CALC BMI ABV UP PARAM F/U: HCPCS | Performed by: INTERNAL MEDICINE

## 2019-04-12 PROCEDURE — 1036F TOBACCO NON-USER: CPT | Performed by: INTERNAL MEDICINE

## 2019-04-12 PROCEDURE — G8427 DOCREV CUR MEDS BY ELIG CLIN: HCPCS | Performed by: INTERNAL MEDICINE

## 2019-04-12 NOTE — CARE COORDINATION
Patient was called to follow up with most recent ER visit. There was no answer. A message was left on voicemail to have patient call back regarding ER visit. Office number given [phone].

## 2019-04-14 NOTE — PROGRESS NOTES
[facility] MESERET Sotelo  [birth year]      Jazmín Sotelo  51 y.o. female presented for follow up for asthma   She has been have snorting , snoring at night and wakes up choking . She had previous sleep study which was negative . She did see ENT who continued Flonase but that has not helped . She also has sob - good days and bad days   Uses Advair once or twice a day - mostly once - because she forgets . Also uses albuterol nebulized in am   melendrez snot use albuterol mdi   No asthma exacerbation since last visit   Has been having headaches       Previous visit  Patient had lost her insurance, so could not do the Xolair injections. I'll follow up with ENT. She is doing a little bit better than before using albuterol once every day. No nocturnal use. I have asked her to avoid triggers, which is chemical like Lysol bleach and also smoke. Her  is smoking and she is getting passively exposed. Also encouraged her to quit marijuana. She denies any cough, any hemoptysis since last visit used prednisone once did not have any ER visit for asthma          Previous visit  Since last visit patient is still complaining of symptoms daily. She uses albuterol 3 times a day and at least 3-4 times  night in a week. Dyspneic with exertion, which is grade 2 at work at home has had one course of prednisone again. Since her last visit and see back. She tried Symbicort and Spiriva, but prefer causing burning in the chest and she using Advair now which does not dose had been increased from 250 to 500 / 50 On her last visit. She is also using Singulair  Avoiding allergens and is not smoking  Today, she is willing very fatigued and tired due to her asthma even though there are no rhonchi  Her IgG level is elevated, and based on her weight. She would need 300 mg of Xolair once a month.   I discussed the side effects, the pros and cons of this and she is agreeable to proceed        Review of Systems -  General ROS: negative for - chills, fatigue, fever or weight loss  ENT ROS: rhinitis . Nasal congestion - no pnd , head aches   Cardiovascular ROS: no chest pain , orthopnea or pnd   Gastrointestinal ROS: no abdominal pain, change in bowel habits, or black or bloody stools  Skin - no rash   Neuro - no blurry vision , no loc .  No focal weakness   msk - no jt tenderness or swelling    Vascular - no claudication , rest completed and negative   Lymphatic - complete and negative   Hematology - oncology - complete and negative   Allergy immunology - complete and negative    no burning or hematuria      Immunization History   Administered Date(s) Administered    Influenza Virus Vaccine 12/21/2015    Influenza, Orvis Thomas, 6 mo and older, IM (Flulaval) 10/09/2017    Influenza, Triv, 3 Years and older, IM (Afluria (5 yrs and older) 10/10/2016    Pneumococcal Polysaccharide (Iprhetlou89) 01/21/2016    Tdap (Boostrix, Adacel) 02/05/2016          PAST MEDICAL HISTORY:         Diagnosis Date    Allergic rhinitis     Asthma     Clinical trial participant 11/23/2015 11/23/20015    COPD (chronic obstructive pulmonary disease) ([facility] [address]     Dysphagia     Dysphagia     has needed EGD with dilatation in the past    GERD (gastroesophageal reflux disease)     H/O cardiovascular stress test 01/15/2018    ABNORMAL    Hiatal hernia     Hyperlipidemia     Hypertension     Irritable bowel syndrome with constipation 1/21/2016    Obesity     Snores     states has tested negative for sleep apnea    Wears glasses        Family History:       Problem Relation Age of Onset    Hypertension Mother     High Cholesterol Mother     Asthma Mother     Osteoarthritis Mother     Other Mother         overweight    Colon Cancer Father        SURGICAL HISTORY:   Past Surgical History:   Procedure Laterality Date    CARDIAC CATHETERIZATION  01/16/2018    CARDIAC CATHETERIZATION  11/23/2015     Minimal non obstructive CAD    COLONOSCOPY  2015    Anabell Rutherford MD   Blood Pressure Monitoring (3 SERIES BP MONITOR/WRIST) JOHNNY Check as needed 4/8/19  Yes Elvia Santamaria MD   montelukast (SINGULAIR) 10 MG tablet take 1 tablet by mouth once daily 4/6/19  Yes Bhanu King MD   valsartan-hydrochlorothiazide (DIOVAN-HCT) 80-12.5 MG per tablet take 1 tablet by mouth once daily 4/6/19  Yes Bhanu King MD   ibuprofen (ADVIL;MOTRIN) 200 MG tablet Take 2 tablets by mouth every 8 hours as needed for Pain 3/18/19 4/12/19 Yes Jamie Handler, DO   ADVAIR DISKUS 500-50 MCG/DOSE diskus inhaler inhale 1 dose by mouth twice a day 3/3/19  Yes Blu Mcguire MD   ASPIR-LOW 81 MG EC tablet take 1 tablet by mouth once daily 10/24/18  Yes Elvia Santamaria MD   albuterol sulfate HFA (PROAIR HFA) 108 (90 Base) MCG/ACT inhaler inhale 2 puffs by mouth every 6 hours if needed for wheezing 9/20/18  Yes Elvia Santamaria MD   fluticasone [facility]) 50 MCG/ACT nasal spray 1 spray by Nasal route daily 5/3/18  Yes Elvia Santamaria MD   ferrous sulfate (FE TABS) 325 (65 Fe) MG EC tablet Take 1 tablet by mouth 2 times daily 5/3/18  Yes Elvia Santamaria MD   Multiple Vitamins-Minerals (WOMENS MULTI VITAMIN & MINERAL PO) Take 1 tablet by mouth daily   Yes Historical Provider, MD     Exam obese  Head and neck atraumatic, normocephalic    Nasal - turbinate inferior 4+   No pressure tenderness  in maxillary or frontal sinus   Lymph nodes-no cervical, supraclavicular lymphadenopathy    Neck-no JVP elevation    Lungs - Ventilating all lobes without any rales, rhonchi, wheezes or dullness. No bronchial breath sounds. Chest expansion equal bilaterally    CVS- S1, S2 regular. No S3 no S4, no murmurs    Abdomen-nontender, nondistended. Bowel sounds are present. No organomegaly    Lower extremity-no edema    Upper extremity-no edema    Neurological-grossly normal cranial nerves.   No overt motor deficit           /79 (Site: Right Upper Arm, Position: Sitting, Cuff Size: Medium Adult)   Pulse 69 Temp 97.7 °F (36.5 °C) (Oral)   Resp 15   Ht 5' (1.524 m)   Wt 205 lb (93 kg)   SpO2 100% Comment: room air at rest  BMI 40.04 kg/m²           IgE in April 2016 is 210 international unit units per mL  Assessment   Diagnosis Orders   1. Nasal turbinate hypertrophy  AFL - Clive Mina MD, Otolaryngology, [place]   2. Non morbid obesity due to excess calories  [facility], [place]   3. Essential hypertension     4. Hiatal hernia     5. Gastroesophageal reflux disease without esophagitis, controlled on omeprazole     6. Uncomplicated severe persistent asthma         Plan:  Continue Advair and advise to take twice daily 1 puff . Uses albuterol am and pm   She has had previous sleep study which did not shows IGLESIA but snoring . She was seen by Dr Ella Polk for 4+ inferior nasal hypertrophy and started on Flonase which did not improve her hypertrophy or edema . I have discussed case with Dr Ladonna Caceres he will evaluate pt for turbinectomy and also skin allergy testing . I will see her back after testing in 2 months and re check IgE and consider Xolair if IgE is still elevated and patient continues to have persistent symptoms . Re do sleep study once nasal turbinate  hypertrophy treated   She would like referral to weight loss center . Requested Prescriptions     Signed Prescriptions Disp Refills    Handicap Placard [facility] 1 each 0     Sig: by Does not apply route Expires 4/12/2024       Medications Discontinued During This Encounter   Medication Reason    gabapentin (NEURONTIN) 100 MG capsule LIST CLEANUP       Yordy Garciafelix received counseling on the following healthy behaviors: nutrition, exercise and medication adherence    Patient given educational materials : see patient instruction     Discussed use, benefit, and side effects of prescribed medications. Barriers to medication compliance addressed. All patient questions answered. Pt voiced understanding.    Electronically signed by Laney Contreras MD MARK on 4/14/2019 at 1:17 PM

## 2019-04-15 ENCOUNTER — TELEPHONE (OUTPATIENT)
Dept: PULMONOLOGY | Age: 49
End: 2019-04-15

## 2019-04-15 NOTE — TELEPHONE ENCOUNTER
Mariya Maldonado called at 88:59OE requesting that we fax over her referral to Dr. Valiant Lombard office. Mariya Maldonado gave me the fax # ([phone].) She told me to fax it and have it say \"Attention to [place]. \" I did that.

## 2019-04-16 ENCOUNTER — OFFICE VISIT (OUTPATIENT)
Dept: INTERNAL MEDICINE | Age: 49
End: 2019-04-16
Payer: MEDICARE

## 2019-04-16 VITALS
BODY MASS INDEX: 39.27 KG/M2 | HEART RATE: 72 BPM | WEIGHT: 200 LBS | DIASTOLIC BLOOD PRESSURE: 85 MMHG | HEIGHT: 60 IN | SYSTOLIC BLOOD PRESSURE: 138 MMHG

## 2019-04-16 DIAGNOSIS — E66.9 OBESITY (BMI 30-39.9): ICD-10-CM

## 2019-04-16 DIAGNOSIS — R20.2 PARESTHESIA OF LEFT ARM AND LEG: ICD-10-CM

## 2019-04-16 DIAGNOSIS — J45.40 MODERATE PERSISTENT ASTHMA WITHOUT COMPLICATION: Primary | ICD-10-CM

## 2019-04-16 DIAGNOSIS — R51.9 ACUTE INTRACTABLE HEADACHE, UNSPECIFIED HEADACHE TYPE: ICD-10-CM

## 2019-04-16 PROBLEM — R07.9 CHEST PAIN: Status: RESOLVED | Noted: 2018-01-13 | Resolved: 2019-04-16

## 2019-04-16 PROCEDURE — 1036F TOBACCO NON-USER: CPT | Performed by: STUDENT IN AN ORGANIZED HEALTH CARE EDUCATION/TRAINING PROGRAM

## 2019-04-16 PROCEDURE — 99211 OFF/OP EST MAY X REQ PHY/QHP: CPT | Performed by: INTERNAL MEDICINE

## 2019-04-16 PROCEDURE — 99213 OFFICE O/P EST LOW 20 MIN: CPT | Performed by: STUDENT IN AN ORGANIZED HEALTH CARE EDUCATION/TRAINING PROGRAM

## 2019-04-16 PROCEDURE — G8417 CALC BMI ABV UP PARAM F/U: HCPCS | Performed by: STUDENT IN AN ORGANIZED HEALTH CARE EDUCATION/TRAINING PROGRAM

## 2019-04-16 PROCEDURE — G8427 DOCREV CUR MEDS BY ELIG CLIN: HCPCS | Performed by: STUDENT IN AN ORGANIZED HEALTH CARE EDUCATION/TRAINING PROGRAM

## 2019-04-16 RX ORDER — ACETAMINOPHEN 500 MG
500 TABLET ORAL EVERY 6 HOURS PRN
Qty: 120 TABLET | Refills: 3 | Status: SHIPPED | OUTPATIENT
Start: 2019-04-16 | End: 2019-05-07

## 2019-04-16 RX ORDER — MELOXICAM 7.5 MG/1
7.5 TABLET ORAL DAILY
Qty: 30 TABLET | Refills: 3 | Status: SHIPPED | OUTPATIENT
Start: 2019-04-16 | End: 2020-08-11 | Stop reason: SDUPTHER

## 2019-04-16 NOTE — PROGRESS NOTES
Attending Physician Statement  I have discussed the care of Rosalee Lovell, including pertinent history and exam findings with the resident. I have reviewed the key elements of all parts of the encounter with the resident. I agree with the assessment, and status of the problem list as documented. Diagnosis Orders   1. Moderate persistent asthma without complication     2. Obesity (BMI 30-39.9)     3. Acute intractable headache, unspecified headache type  meloxicam (MOBIC) 7.5 MG tablet    MRI Tejal Purcell MD, Neurology, [facility]   4. Paresthesia of left arm and leg  MRI BRAIN WO CONTRAST      The plan and orders should include   Orders Placed This Encounter   Procedures   Znea Lewis MD, Neurology, [facility]    and this was also documented by the resident. I agree with the referral to Neuro. The medication list was reviewed with the resident and is up to date. The return visit should be in 6 months .     Kanwal Meade MD, [address] [place] [address] Internal Medicine Associate & [facility]  Associate  Department of Internal Medicine  Internal Medicine Clerkship - [facility]  4/16/2019, 10:04 AM

## 2019-04-16 NOTE — PATIENT INSTRUCTIONS
Order for      Mri of brain faxed to [address] [place] [address] they will all pt for appt. Please call [phone] in not heard within 2 weeks. Follow-up appointment scheduled for   10/08/19, AVS given to patient. Medications e-scribe to pharmacy of pt's choice.      Referral to  [facility] neurology [phone]    was placed, summary of care printed and faxed to office, phone numbers given to pt, they will contact office for an appt    tv

## 2019-04-16 NOTE — PROGRESS NOTES
HYPERTENSION visit     BP Readings from Last 3 Encounters:   04/12/19 129/79   04/11/19 (!) 153/77   04/08/19 132/80       LDL Cholesterol (mg/dL)   Date Value   04/10/2019 74     HDL (mg/dL)   Date Value   04/10/2019 50     BUN (mg/dL)   Date Value   04/10/2019 7     CREATININE (mg/dL)   Date Value   04/10/2019 0.68     Glucose (mg/dL)   Date Value   04/10/2019 118 (H)              Have you changed or started any medications since your last visit including any over-the-counter medicines, vitamins, or herbal medicines? no   Have you stopped taking any of your medications? Is so, why? -  no  Are you having any side effects from any of your medications? - no  How often do you miss doses of your medication? no      Have you seen any other physician or provider since your last visit? yes - er   Have you had any other diagnostic tests since your last visit?  no   Have you been seen in the emergency room and/or had an admission in a hospital since we last saw you?  yes -    Have you had your routine dental cleaning in the past 6 months?  no     Do you have an active [employer] account? If no, what is the barrier?   Yes    Patient Care Team:  Edward Mohamud MD as PCP - General (Internal Medicine)  Edward Mohamud MD as PCP - [facility] Attributed Provider    Medical History Review  Past Medical, Family, and Social History reviewed and does not contribute to the patient presenting condition    Health Maintenance   Topic Date Due    HIV screen  12/04/1984    Flu vaccine (Season Ended) 09/01/2019    Potassium monitoring  04/10/2020    Creatinine monitoring  04/10/2020    Cervical cancer screen  08/10/2021    Diabetes screen  04/08/2022    Colon cancer screen colonoscopy  02/08/2023    Lipid screen  04/10/2024    DTaP/Tdap/Td vaccine (2 - Td) 02/05/2026    Pneumococcal 0-64 years Vaccine  Completed

## 2019-04-16 NOTE — PROGRESS NOTES
[facility]    Progress Note    Date of patient's visit: 4/16/2019  YOB: 1970  Patient's Name:  Tory Arriaza    Patient Care Team:  Katya Smiley MD as PCP - General (Internal Medicine)  Katya Smiley MD as PCP - [facility] Attributed Provider    REASON FOR VISIT: Routine outpatient follow     HISTORY OF PRESENT ILLNESS:    History was obtained from the patient. Tory Arriaza is a 51 y.o. is here for a  ED follow up. Acute headache: 2 weeks, associated left arm and leg tingling . No hx of migraine, phtophobia, phonophobia. NSAIDS not helping   Turbinate hypertrophy: no nasal stuffiness at this time or nasal congestion.  Going to see ENT   ASTHMA WELL CONTROLLED     Patient Active Problem List   Diagnosis    Hypertension    Asthma    GERD (gastroesophageal reflux disease)    S/P cardiac cath-Minimal disease 11/23/15 Dr. Tatiana Mitchell Irritable bowel syndrome with constipation    Obesity    Allergic rhinitis    Hiatal hernia    Paresthesias    Left sided numbness    Dysphagia    Family history of colon cancer       ALLERGIES    No Known Allergies      MEDICATIONS:      Current Outpatient Medications on File Prior to Visit   Medication Sig Dispense Refill    aspirin-acetaminophen-caffeine (EXCEDRIN EXTRA STRENGTH) 250-250-65 MG per tablet Take 1 tablet by mouth every 8 hours as needed for Headaches 20 tablet 0    ondansetron (ZOFRAN ODT) 4 MG disintegrating tablet Take 1 tablet by mouth every 8 hours as needed for Nausea 20 tablet 0    omeprazole (PRILOSEC) 40 MG delayed release capsule take 1 capsule by mouth twice a day 60 capsule 3    carvedilol (COREG) 6.25 MG tablet take 1 tablet by mouth twice a day 60 tablet 5    atorvastatin (LIPITOR) 20 MG tablet take 1 tablet by mouth once daily 30 tablet 5    albuterol (PROVENTIL) (2.5 MG/3ML) 0.083% nebulizer solution Take 3 mLs by nebulization every 6 hours as needed for Wheezing 120 each 3    senna (SENOKOT) 8.6 MG tablet Take 1 tablet by mouth 2 times daily 60 tablet 11    montelukast (SINGULAIR) 10 MG tablet take 1 tablet by mouth once daily 30 tablet 2    valsartan-hydrochlorothiazide (DIOVAN-HCT) 80-12.5 MG per tablet take 1 tablet by mouth once daily 30 tablet 5    ADVAIR DISKUS 500-50 MCG/DOSE diskus inhaler inhale 1 dose by mouth twice a day 1 Inhaler 1    ASPIR-LOW 81 MG EC tablet take 1 tablet by mouth once daily 30 tablet 3    albuterol sulfate HFA (PROAIR HFA) 108 (90 Base) MCG/ACT inhaler inhale 2 puffs by mouth every 6 hours if needed for wheezing 8.5 g 10    fluticasone (FLONASE) 50 MCG/ACT nasal spray 1 spray by Nasal route daily 1 Bottle 3    ferrous sulfate (FE TABS) 325 (65 Fe) MG EC tablet Take 1 tablet by mouth 2 times daily 90 tablet 3    Multiple Vitamins-Minerals (WOMENS MULTI VITAMIN & MINERAL PO) Take 1 tablet by mouth daily      ibuprofen (ADVIL;MOTRIN) 200 MG tablet Take 2 tablets by mouth every 8 hours as needed for Pain 30 tablet 0     Current Facility-Administered Medications on File Prior to Visit   Medication Dose Route Frequency Provider Last Rate Last Dose    0.9 % sodium chloride infusion   Intravenous Continuous Maine Baires MD 30 mL/hr at 05/26/15 1008       SOCIAL HISTORY    Reviewed and no change from previous record. Cornelius Faria  reports that she has never smoked.  She has never used smokeless tobacco.    FAMILY HISTORY:    Reviewed and No change from previous visit    REVIEW OF SYSTEMS:    ENT: negative  Respiratory: no cough, shortness of breath, or wheezing  Cardiovascular: no chest pain or dyspnea on exertion  Gastrointestinal: no abdominal pain, black or bloody stools  Neurological: no TIA or stroke symptoms  Endocrine: negative  Genito-Urinary: no dysuria, trouble voiding, or hematuria  Musculoskeletal: negative  Dermatological: negative    PHYSICAL EXAM:      Vitals:    04/16/19 0856   BP: 138/85   Pulse: 72     General appearance - alert, well appearing, and in no distress  Chest - clear to auscultation, no wheezes, rales or rhonchi, symmetric air entry  Heart - normal rate, regular rhythm, normal S1, S2, no murmurs, rubs, clicks or gallops  Abdomen - soft, nontender, nondistended, no masses or organomegaly  Neurological - alert, oriented, normal speech, no focal findings or movement disorder noted  Musculoskeletal - no joint tenderness, deformity or swelling    LABORATORY FINDINGS:    CBC:  Lab Results   Component Value Date    WBC 8.1 03/18/2019    HGB 10.8 03/18/2019     03/18/2019     BMP:    Lab Results   Component Value Date     04/10/2019    K 4.4 04/10/2019     04/10/2019    CO2 22 04/10/2019    BUN 7 04/10/2019    CREATININE 0.68 04/10/2019    GLUCOSE 118 04/10/2019     HEMOGLOBIN A1C:   Lab Results   Component Value Date    LABA1C 5.6 04/08/2019     MICROALBUMIN URINE: No results found for: MICROALBUR  FASTING LIPID PANEL:  Lab Results   Component Value Date    CHOL 156 04/10/2019    HDL 50 04/10/2019    TRIG 158 (H) 04/10/2019     LIVER PROFILE:  Lab Results   Component Value Date    ALT 14 04/10/2019    AST 14 04/10/2019    PROT 7.1 04/10/2019    BILITOT 0.24 04/10/2019    BILIDIR 0.12 05/02/2017    LABALBU 4.2 04/10/2019      THYROID FUNCTION:   Lab Results   Component Value Date    TSH 2.31 08/29/2017      URINE ANALYSIS: No results found for: LABURIN  ASSESSMENT AND PLAN:    1. Moderate persistent asthma without complication  FOLLOWS PULM, WELL CONTRIOLLED    2. Obesity (BMI 30-39. 9)   DIET AND EXERCISE     3. Acute intractable headache, unspecified headache type   HAS TURBINATE HYPERTROPHY but no sinusitis. Headache is intractable, new onset and not responding to meds. Also has paresthesia on left  - meloxicam (MOBIC) 7.5 MG tablet; Take 1 tablet by mouth daily  Dispense: 30 tablet; Refill: 3  - MRI BRAIN WO CONTRAST; Future    4. Paresthesia of left arm and leg     - MRI BRAIN WO CONTRAST;  Future      FOLLOW UP:       1. Jacob Duncan received counseling on the following healthy behaviors: exercise   2. Patient given educational materials - see patient instructions  3. Discussed use, benefit, and side effects of prescribed medications. Barriers to medication compliance addressed. All patient questions answered. Pt voiced understanding. 4.  Reviewed prior labs and health maintenance  5. Continue current medications, diet and exercise.          Shane Agustin MD, PGY-3 Internal Medicine Resident  [facility]  4/16/2019  9:55 AM

## 2019-04-17 ENCOUNTER — TELEPHONE (OUTPATIENT)
Dept: PULMONOLOGY | Age: 49
End: 2019-04-17

## 2019-04-17 DIAGNOSIS — J45.50 UNCOMPLICATED SEVERE PERSISTENT ASTHMA: Primary | ICD-10-CM

## 2019-04-17 DIAGNOSIS — J34.3 NASAL TURBINATE HYPERTROPHY: ICD-10-CM

## 2019-04-17 NOTE — TELEPHONE ENCOUNTER
Sarina Gibbs came into the office today needing a new handicap placard due to a name change. I changed her name in the system, so now all we need to do it print a new one and have it signed because the 2025 [facility] would not accept the one she had with her old name on it. ALSO, she was referred to an ENT [place].) She had 2 no call no shows so they discharged her. So she is requesting a new referral to a new ENT or have us call [facility] office and have them keep her as a patient.

## 2019-04-17 NOTE — TELEPHONE ENCOUNTER
There is ENT physician starting at [facility] - please find out when he is starting and I can reffer her there

## 2019-04-19 NOTE — TELEPHONE ENCOUNTER
I put the order in for the handicap placard, please sign. I searched for an ENT in [place] but I dont know who is starting out there and when. They dont know who, but are thinking the summer ENT will start out there.

## 2019-04-22 ENCOUNTER — HOSPITAL ENCOUNTER (OUTPATIENT)
Dept: MAMMOGRAPHY | Age: 49
Discharge: HOME | End: 2019-04-24
Payer: MEDICARE

## 2019-04-22 DIAGNOSIS — Z12.39 BREAST CANCER SCREENING: ICD-10-CM

## 2019-04-22 PROCEDURE — 77067 SCR MAMMO BI INCL CAD: CPT

## 2019-04-22 NOTE — TELEPHONE ENCOUNTER
Patient wants a referral back to [facility] on [facility]. I put the referral in could you please sign. ..

## 2019-04-23 ENCOUNTER — HOSPITAL ENCOUNTER (OUTPATIENT)
Dept: MRI IMAGING | Age: 49
Discharge: HOME | End: 2019-04-25
Payer: MEDICARE

## 2019-04-23 DIAGNOSIS — R20.2 PARESTHESIA OF LEFT ARM AND LEG: ICD-10-CM

## 2019-04-23 DIAGNOSIS — R51.9 ACUTE INTRACTABLE HEADACHE, UNSPECIFIED HEADACHE TYPE: ICD-10-CM

## 2019-04-23 PROCEDURE — 70551 MRI BRAIN STEM W/O DYE: CPT

## 2019-04-25 DIAGNOSIS — J45.50 UNCOMPLICATED SEVERE PERSISTENT ASTHMA: ICD-10-CM

## 2019-04-25 NOTE — TELEPHONE ENCOUNTER
Dr Charlette Martins, patient is current and due in for an appointment on 6/10/19. Per last dictation patient is on Advair. Please sign for refill if ok. Thank you.

## 2019-04-28 DIAGNOSIS — Z98.890 S/P CARDIAC CATH: ICD-10-CM

## 2019-04-29 ENCOUNTER — TELEPHONE (OUTPATIENT)
Dept: INTERNAL MEDICINE | Age: 49
End: 2019-04-29

## 2019-04-29 DIAGNOSIS — R92.8 ABNORMAL MAMMOGRAM OF LEFT BREAST: Primary | ICD-10-CM

## 2019-04-29 RX ORDER — ASPIRIN 81 MG/1
TABLET, DELAYED RELEASE ORAL
Qty: 30 TABLET | Refills: 5 | Status: SHIPPED | OUTPATIENT
Start: 2019-04-29 | End: 2020-03-31

## 2019-05-01 ENCOUNTER — TELEPHONE (OUTPATIENT)
Dept: INTERNAL MEDICINE | Age: 49
End: 2019-05-01

## 2019-05-03 NOTE — TELEPHONE ENCOUNTER
Shared normal MRI test results with patient  She states that she sees her Neurologist on 5/7/19 and is still having head pain. She is now requesting an order for Melatonin to help her sleep because she has no money to buy it over the counter.

## 2019-05-06 ENCOUNTER — HOSPITAL ENCOUNTER (OUTPATIENT)
Dept: MAMMOGRAPHY | Age: 49
Discharge: HOME | End: 2019-05-08
Payer: MEDICARE

## 2019-05-06 ENCOUNTER — TELEPHONE (OUTPATIENT)
Dept: INTERNAL MEDICINE | Age: 49
End: 2019-05-06

## 2019-05-06 ENCOUNTER — HOSPITAL ENCOUNTER (OUTPATIENT)
Dept: ULTRASOUND IMAGING | Age: 49
End: 2019-05-06
Payer: MEDICARE

## 2019-05-06 DIAGNOSIS — R92.8 ABNORMAL MAMMOGRAM OF LEFT BREAST: ICD-10-CM

## 2019-05-06 PROCEDURE — G0279 TOMOSYNTHESIS, MAMMO: HCPCS

## 2019-05-06 NOTE — TELEPHONE ENCOUNTER
She is requesting an order for Melatonin to help her sleep because she has no money to buy it over the counter. E-scribe request for melatonin. Please review and e-scribe if applicable.      Last Visit Date:  4/16/19  Next Visit Date:  10/8/2019    Hemoglobin A1C (%)   Date Value   04/08/2019 5.6   05/03/2018 6.0   10/10/2017 5.9             ( goal A1C is < 7)   No results found for: LABMICR  LDL Cholesterol (mg/dL)   Date Value   04/10/2019 74       (goal LDL is <100)   AST (U/L)   Date Value   04/10/2019 14     ALT (U/L)   Date Value   04/10/2019 14     BUN (mg/dL)   Date Value   04/10/2019 7     BP Readings from Last 3 Encounters:   04/16/19 138/85   04/12/19 129/79   04/11/19 (!) 153/77          (goal 120/80)        Patient Active Problem List:     Hypertension     Asthma     GERD (gastroesophageal reflux disease)     S/P cardiac cath-Minimal disease 11/23/15 Dr. Prosper Barrera     Irritable bowel syndrome with constipation     Obesity     Allergic rhinitis     Hiatal hernia     Paresthesias     Left sided numbness     Dysphagia     Family history of colon cancer      BMD--

## 2019-05-07 ENCOUNTER — OFFICE VISIT (OUTPATIENT)
Dept: NEUROLOGY | Age: 49
End: 2019-05-07
Payer: MEDICARE

## 2019-05-07 ENCOUNTER — HOSPITAL ENCOUNTER (OUTPATIENT)
Age: 49
Discharge: HOME | End: 2019-05-07
Payer: MEDICARE

## 2019-05-07 VITALS
WEIGHT: 207.2 LBS | BODY MASS INDEX: 40.47 KG/M2 | HEART RATE: 84 BPM | SYSTOLIC BLOOD PRESSURE: 143 MMHG | DIASTOLIC BLOOD PRESSURE: 82 MMHG

## 2019-05-07 DIAGNOSIS — G44.229 CHRONIC TENSION-TYPE HEADACHE, NOT INTRACTABLE: ICD-10-CM

## 2019-05-07 DIAGNOSIS — G44.229 CHRONIC TENSION-TYPE HEADACHE, NOT INTRACTABLE: Primary | ICD-10-CM

## 2019-05-07 DIAGNOSIS — K21.9 GASTROESOPHAGEAL REFLUX DISEASE WITHOUT ESOPHAGITIS: ICD-10-CM

## 2019-05-07 DIAGNOSIS — I10 ESSENTIAL HYPERTENSION: ICD-10-CM

## 2019-05-07 DIAGNOSIS — K58.1 IRRITABLE BOWEL SYNDROME WITH CONSTIPATION: ICD-10-CM

## 2019-05-07 DIAGNOSIS — G47.00 INSOMNIA, UNSPECIFIED TYPE: ICD-10-CM

## 2019-05-07 LAB
C-REACTIVE PROTEIN: 1.6 MG/L (ref 0–5)
SEDIMENTATION RATE, ERYTHROCYTE: 7 MM (ref 0–20)
TSH SERPL DL<=0.05 MIU/L-ACNC: 2.18 MIU/L (ref 0.3–5)
VITAMIN D 25-HYDROXY: 9.9 NG/ML (ref 30–100)

## 2019-05-07 PROCEDURE — 86235 NUCLEAR ANTIGEN ANTIBODY: CPT

## 2019-05-07 PROCEDURE — 82306 VITAMIN D 25 HYDROXY: CPT

## 2019-05-07 PROCEDURE — G8427 DOCREV CUR MEDS BY ELIG CLIN: HCPCS | Performed by: STUDENT IN AN ORGANIZED HEALTH CARE EDUCATION/TRAINING PROGRAM

## 2019-05-07 PROCEDURE — 86140 C-REACTIVE PROTEIN: CPT

## 2019-05-07 PROCEDURE — G8417 CALC BMI ABV UP PARAM F/U: HCPCS | Performed by: STUDENT IN AN ORGANIZED HEALTH CARE EDUCATION/TRAINING PROGRAM

## 2019-05-07 PROCEDURE — 99205 OFFICE O/P NEW HI 60 MIN: CPT | Performed by: STUDENT IN AN ORGANIZED HEALTH CARE EDUCATION/TRAINING PROGRAM

## 2019-05-07 PROCEDURE — 1036F TOBACCO NON-USER: CPT | Performed by: STUDENT IN AN ORGANIZED HEALTH CARE EDUCATION/TRAINING PROGRAM

## 2019-05-07 PROCEDURE — 86225 DNA ANTIBODY NATIVE: CPT

## 2019-05-07 PROCEDURE — 86038 ANTINUCLEAR ANTIBODIES: CPT

## 2019-05-07 PROCEDURE — 84443 ASSAY THYROID STIM HORMONE: CPT

## 2019-05-07 PROCEDURE — 36415 COLL VENOUS BLD VENIPUNCTURE: CPT

## 2019-05-07 PROCEDURE — 85651 RBC SED RATE NONAUTOMATED: CPT

## 2019-05-07 RX ORDER — TOPIRAMATE 25 MG/1
TABLET ORAL
Qty: 70 TABLET | Refills: 0 | Status: SHIPPED | OUTPATIENT
Start: 2019-05-07 | End: 2019-06-14 | Stop reason: SDUPTHER

## 2019-05-07 RX ORDER — MEDICAL SUPPLY, MISCELLANEOUS
1 EACH MISCELLANEOUS DAILY
Qty: 1 EACH | Refills: 0 | Status: SHIPPED | OUTPATIENT
Start: 2019-05-07 | End: 2020-03-03

## 2019-05-07 RX ORDER — CHOLECALCIFEROL (VITAMIN D3) 125 MCG
5 CAPSULE ORAL NIGHTLY
Qty: 30 TABLET | Refills: 0 | Status: SHIPPED | OUTPATIENT
Start: 2019-05-07 | End: 2019-06-14 | Stop reason: SDUPTHER

## 2019-05-07 RX ORDER — DICLOFENAC POTASSIUM 50 MG/1
50 TABLET, FILM COATED ORAL 2 TIMES DAILY PRN
Qty: 28 TABLET | Refills: 0 | Status: SHIPPED | OUTPATIENT
Start: 2019-05-07 | End: 2019-05-13 | Stop reason: SDUPTHER

## 2019-05-07 ASSESSMENT — ENCOUNTER SYMPTOMS
CONSTIPATION: 0
EYE DISCHARGE: 0
DIARRHEA: 0
EYE REDNESS: 0
VOMITING: 0
SORE THROAT: 0
COUGH: 0
SINUS PAIN: 0
PHOTOPHOBIA: 0
EYE PAIN: 0
SHORTNESS OF BREATH: 0
ABDOMINAL PAIN: 0
NAUSEA: 0

## 2019-05-07 NOTE — PROGRESS NOTES
[address] [place] [address],  [address], [facility], [address], [address] [place] [address]  P: [phone]  F: [phone]    NEUROLOGY CLINIC NOTE     PATIENT NAME: Gabriele Parra  PATIENT MRN: [id number]  PRIMARY CARE PHYSICIAN: Gini Blandon MD    HPI:      Gabriele Parra is a 51 y.o.  female with PMH significant for hypertension, hyperlipidemia, IBS, GERD, COPD was seen in the clinic for headaches    History obtained from Patient     Headaches  Headaches started almost 2 - 3 months ago. The pain is predominantly located in bifrontal, bitemporal and top of head and does not radiate  Usually the headache is not preceded by an aura  Currently, headaches are occuring daily  Patient describes the quality of pain as pressure like which varies in intensity 5-7/10. Worse headaches are of Intensity of 7/10. The symptoms may start gradually and build up over 5 to 10 minutes and headaches typically last for 1 to 2 hours.      Associated symptoms include  nausea, visual change- spots in front of eyes and stiff neck/neck pain  Denies vomiting, photophobia, phonophobia, paresthesias, weakness, vertigo, jaw claudication, conjunctival injection, lacrimation and ptosis  Headaches are typically triggered by stress, anxiety, fasting, dehydration  Headaches are relieved by OTC NSAIDs    Headache free days per month: none  Cutaneous allodynia: no  History of:  Family history of headaches or migraines: no  Renal stones: no   Motion sickness:  no  Head/Neck Trauma: no  Stressors: no  Sleep: 6  Hours,            Difficulty in initiating or maintaining sleep: yes - both,              Snoring: no  Psych/Mood: Appropriate  Caffeine: 12 Oz coffee per day    Headache Medications  Current abortive meds: Advil, motrin  Current prophylactic meds: none  Previous abortive medications tried: none  Previous prophylactic medications tried: none PATIENT HISTORY:     Past Medical History:   Diagnosis Date    Allergic rhinitis     Asthma     Clinical trial participant 11/23/2015 11/23/20015    COPD (chronic obstructive pulmonary disease) (HCC)     Dysphagia     Dysphagia     has needed EGD with dilatation in the past    GERD (gastroesophageal reflux disease)     H/O cardiovascular stress test 01/15/2018    ABNORMAL    Hiatal hernia     Hyperlipidemia     Hypertension     Irritable bowel syndrome with constipation 1/21/2016    Obesity     Snores     states has tested negative for sleep apnea    Wears glasses         Past Surgical History:   Procedure Laterality Date    CARDIAC CATHETERIZATION  01/16/2018    CARDIAC CATHETERIZATION  11/23/2015     Minimal non obstructive CAD    COLONOSCOPY  2015    COLONOSCOPY  02/08/2018    Redundant colon    ENDOSCOPY, COLON, DIAGNOSTIC  2015    with dilatation    ENDOSCOPY, COLON, DIAGNOSTIC  02/08/2018    WNL     HI COLON CA SCRN NOT HI RSK IND N/A 2/8/2018    COLONOSCOPY performed by Johnathon Crocker MD at [address] [place] [address]    UPPER GASTROINTESTINAL ENDOSCOPY      The endoscopic exam showed mildly tortuous esophagus. Savary dilation performed x 42 and 45 F.     UPPER GASTROINTESTINAL ENDOSCOPY N/A 7/18/2017    EGD DILATION SAVORY performed by Johnathon Crocker MD at [facility]        Social History     Socioeconomic History    Marital status:      Spouse name: Not on file    Number of children: Not on file    Years of education: Not on file    Highest education level: Not on file   Occupational History    Not on file   Social Needs    Financial resource strain: Not on file    Food insecurity:     Worry: Not on file     Inability: Not on file    Transportation needs:     Medical: Not on file     Non-medical: Not on file   Tobacco Use    Smoking status: Never Smoker    Smokeless tobacco: Never Used   Substance and Sexual Activity    Alcohol use:  Yes Alcohol/week: 0.0 oz     Comment: occasionally    Drug use: No    Sexual activity: Yes     Partners: Male     Comment: tubal ligation    Lifestyle    Physical activity:     Days per week: Not on file     Minutes per session: Not on file    Stress: Not on file   Relationships    Social connections:     Talks on phone: Not on file     Gets together: Not on file     Attends [employer] service: Not on file     Active member of club or organization: Not on file     Attends meetings of clubs or organizations: Not on file     Relationship status: Not on file    Intimate partner violence:     Fear of current or ex partner: Not on file     Emotionally abused: Not on file     Physically abused: Not on file     Forced sexual activity: Not on file   Other Topics Concern    Not on file   Social History Narrative    Not on file        Current Outpatient Medications   Medication Sig Dispense Refill    diclofenac (CATAFLAM) 50 MG tablet Take 1 tablet by mouth 2 times daily as needed for Pain 28 tablet 0    melatonin 5 MG TABS tablet Take 1 tablet by mouth nightly 30 tablet 0    Blood Pressure Monitoring (B-D ASSURE BPM/AUTO ARM CUFF) MISC 1 box by Does not apply route daily 1 each 0    topiramate (TOPAMAX) 25 MG tablet Week 1: 25 mg (1 tab) at bedtime  Week 2: 25 mg (1 tab)  in morning and 25 mg (1 tab) at bedtime  Week 3: 25 mg (1 tab)  in morning and 50 mg (2 tab) at bedtime  Week 4: 50 mg (2 tab)  in morning and 50 mg (2 tab) at bedtime.  70 tablet 0    SM ASPIRIN ADULT LOW STRENGTH 81 MG EC tablet take 1 tablet by mouth once daily 30 tablet 5    ADVAIR DISKUS 500-50 MCG/DOSE diskus inhaler inhale 1 dose by mouth twice a day 1 Inhaler 1    Handicap Placard MISC by Does not apply route Expires 4/19/2024 1 each 0    meloxicam (MOBIC) 7.5 MG tablet Take 1 tablet by mouth daily 30 tablet 3    ondansetron (ZOFRAN ODT) 4 MG disintegrating tablet Take 1 tablet by mouth every 8 hours as needed for Nausea 20 tablet 0    omeprazole (PRILOSEC) 40 MG delayed release capsule take 1 capsule by mouth twice a day 60 capsule 3    carvedilol (COREG) 6.25 MG tablet take 1 tablet by mouth twice a day 60 tablet 5    atorvastatin (LIPITOR) 20 MG tablet take 1 tablet by mouth once daily 30 tablet 5    albuterol (PROVENTIL) (2.5 MG/3ML) 0.083% nebulizer solution Take 3 mLs by nebulization every 6 hours as needed for Wheezing 120 each 3    senna (SENOKOT) 8.6 MG tablet Take 1 tablet by mouth 2 times daily 60 tablet 11    montelukast (SINGULAIR) 10 MG tablet take 1 tablet by mouth once daily 30 tablet 2    valsartan-hydrochlorothiazide (DIOVAN-HCT) 80-12.5 MG per tablet take 1 tablet by mouth once daily 30 tablet 5    ibuprofen (ADVIL;MOTRIN) 200 MG tablet Take 2 tablets by mouth every 8 hours as needed for Pain 30 tablet 0    albuterol sulfate HFA (PROAIR HFA) 108 (90 Base) MCG/ACT inhaler inhale 2 puffs by mouth every 6 hours if needed for wheezing 8.5 g 10    fluticasone (FLONASE) 50 MCG/ACT nasal spray 1 spray by Nasal route daily 1 Bottle 3    ferrous sulfate (FE TABS) 325 (65 Fe) MG EC tablet Take 1 tablet by mouth 2 times daily 90 tablet 3    Multiple Vitamins-Minerals (WOMENS MULTI VITAMIN & MINERAL PO) Take 1 tablet by mouth daily       No current facility-administered medications for this visit. Facility-Administered Medications Ordered in Other Visits   Medication Dose Route Frequency Provider Last Rate Last Dose    0.9 % sodium chloride infusion   Intravenous Continuous Cristie Bamberger, MD 30 mL/hr at 05/26/15 1008          ALLERGIES  No Known Allergies     REVIEW OF SYSTEMS:     Review of Systems   Constitutional: Negative for chills, diaphoresis, fever and unexpected weight change. HENT: Negative for congestion, ear discharge, ear pain, hearing loss, sinus pain and sore throat. Eyes: Negative for photophobia, pain, discharge, redness and visual disturbance.    Respiratory: Negative for cough and shortness of breath. Cardiovascular: Negative for chest pain, palpitations and leg swelling. Gastrointestinal: Negative for abdominal pain, constipation, diarrhea, nausea and vomiting. Endocrine: Negative for polydipsia and polyuria. Genitourinary: Negative for difficulty urinating and hematuria. Musculoskeletal: Negative for neck pain. Skin: Negative for pallor and rash. Neurological: Positive for dizziness, numbness and headaches. Negative for tremors, seizures, syncope, facial asymmetry, speech difficulty, weakness and light-headedness. Hematological: Does not bruise/bleed easily. Psychiatric/Behavioral: Positive for sleep disturbance. Negative for agitation, behavioral problems and hallucinations. VITALS  BP (!) 143/82 (Site: Right Upper Arm, Position: Sitting, Cuff Size: Large Adult)   Pulse 84   Wt 207 lb 3.2 oz (94 kg)   LMP 04/25/2019   BMI 40.47 kg/m²      PHYSICAL EXAMINATION:     Constitutional: Well developed, well nourished and in no acute distress. Head:  normocephalic, atraumatic. Neck: supple, no carotid bruits, thyroid not palpable  Respiratory: Clear to auscultation bilaterally with no use of accessory muscles during respiration. Cardiovascular: normal rate, regular rhythm, no murmur, gallop, rub.   Abdomen: Soft, nontender, nondistended, normal bowel sounds, no hepatomegaly or splenomegaly  Extremities:  peripheral pulses palpable, no pedal edema or calf pain with palpation  Psych: normal affect      NEUROLOGICAL EXAMINATION:     Mental status   Alert and oriented; intact memory with no confusion, speech or language problems; no hallucinations or delusions     Cranial nerves   II - visual fields intact to confrontation                                                III, IV, VI - extra-ocular muscles full: no pupillary defect; no DANNY, no nystagmus, no ptosis   V - normal facial sensation                                                               VII - normal facial symmetry VIII - intact hearing                                                                             IX, X - symmetrical palate                                                                  XI - symmetrical shoulder shrug                                                       XII - midline tongue without atrophy or fasciculation     Motor function  Normal muscle bulk and tone  Muscle strength: normal power 5/5  fine motor movements intact      Sensory function Intact to touch in bilateral upper and lower extremities. Reflex function BUE 2+. Negative Babinski  BLE: 1+   Gait                  Normal station and gait           PRIOR TESTS AND IMAGING: Following images and Labs were reviewed by the examiner        Previous Diagnostic Labs, Tests and Imaging:    MRI Brain: 4/23/19  Impression   1. There are fused scattered nonspecific white matter lesions, likely related   to trace chronic microvascular white matter ischemic disease. 2. Abnormal marrow signal which is nonspecific.  This may be related to   underlying anemia given the patient's hemoglobin of 10.8. 3. Probable old left lamina papyracea fracture. Ordered  Vit D :  TSH :   ESR :  DEVIKA :      ASSESSMENT / PLAN:     Yana Roman is a 51 y.o.  female was seen in the clinic for headaches    · Tension type headache  · Insomnia. · Hypertension  · Hyperlipidemia  · Irritable bowel syndrome  · GERD  · COPD    Plan  - STOP these treatments:   Tylenol, motrin and Advil. I informed the pt that the headaches may worsen over the next couple of weeks after stopping the overused medication. The washout period for overused medications could even be as long as 2-3 months.     - For Headache Prevention:  Topamax 25mg for migraine prevention:  Week 1: 25 mg nightly,   Week 2: 25 mg in AM and PM,   Week 3: 25 mg in AM and 50 mg in PM,   Week 4: 50 mg in AM and PM.  Major side effects: Tingling in the hands, Kidney stones, Word-finding difficulty and mental slowness at higher doses. Remember that preventative medications usually take at least 4-6 weeks to be effective in reducing the frequency of your headaches. - For mild to moderate headache   Diclofenac Potassium 50 mg  mg twice a day as needed ( take with Pepcid to avoid worsening of GERD)       - If acute medications fail, including the rescue medication, and you still have a bad headache, please call the clinic for further treatment options, including possible nerve blocks. Patient had Left occipital nerve tenderness. - Tests:    MRI brain with and without contrast reviewed and discussed with patient. LABS: Thyroid function studies, Vitamin D level, ESR, DEVIKA    - Maintain Headache diary to see if there is a pattern to your headaches. - Discussed headache triggers with patients and instructed to avoid them. - I also recommended the proven behavioral therapies for headache, such as relaxation training, biofeedback, and cognitive behavioral therapy, as studies have shown that the combination of behavioral therapies and medications are better than either alone. - Follow up in the clinic in 6-8 weeks  - Instructed patient to call the clinic if symptoms worsen or develop any new symptoms. I have spent 60 minutes face to face with the patient more than 50% of this time was spent counseling : medical compliance, smoking cessation, blood pressure control and coordinating care.       Electronically signed by Sushila Torres MD on 5/7/2019 at 5:29 PM

## 2019-05-08 LAB
ANA REFERENCE RANGE:: ABNORMAL
ANTI DNA DOUBLE STRANDED: 10 IU/ML
ANTI JO-1 IGG: 6 U/ML
ANTI RNP AB: 148 U/ML
ANTI SSA: 72 U/ML
ANTI SSB: 10 U/ML
ANTI-CENTROMERE: 8 U/ML
ANTI-NUCLEAR ANTIBODY (ANA): POSITIVE
ANTI-SCLERODERMA: 29 U/ML
ANTI-SMITH: 11 U/ML
HISTONE ANTIBODY: 8 U/ML

## 2019-05-13 RX ORDER — DICLOFENAC POTASSIUM 50 MG/1
50 TABLET, FILM COATED ORAL 2 TIMES DAILY PRN
Qty: 28 TABLET | Refills: 0 | Status: SHIPPED | OUTPATIENT
Start: 2019-05-13 | End: 2020-03-03 | Stop reason: SDUPTHER

## 2019-05-13 NOTE — TELEPHONE ENCOUNTER
[facility] faxed stating medication Diclofenac Potassium is on back order. Is there an alternative you can prescribe?

## 2019-05-15 NOTE — TELEPHONE ENCOUNTER
Pharmacy calling because diclofenac potassium (CATAFLAM) 50mg tablet is not available from the . They are requesting to change order to diclofenac sodium 50mg. Order pending; please sign if applicable.

## 2019-06-04 DIAGNOSIS — M25.552 LEFT HIP PAIN: ICD-10-CM

## 2019-06-04 NOTE — TELEPHONE ENCOUNTER
Request for Ibuprofen.       Next Visit Date:  Future Appointments   Date Time Provider [place] Baptiste   6/10/2019  9:00 AM Lisa Webb MD Resp Spec [address]   6/13/2019  8:30 AM Marjan Hendrix MD Neuro [facility]   10/8/2019  8:30 AM Yajaira Marcos MD [address] [facility]   Topic Date Due    HIV screen  12/04/1984    Flu vaccine (Season Ended) 09/01/2019    Potassium monitoring  04/10/2020    Creatinine monitoring  04/10/2020    Cervical cancer screen  08/10/2021    Diabetes screen  04/08/2022    Colon cancer screen colonoscopy  02/08/2023    Lipid screen  04/10/2024    DTaP/Tdap/Td vaccine (2 - Td) 02/05/2026    Pneumococcal 0-64 years Vaccine  Completed       Hemoglobin A1C (%)   Date Value   04/08/2019 5.6   05/03/2018 6.0   10/10/2017 5.9             ( goal A1C is < 7)   No results found for: LABMICR  LDL Cholesterol (mg/dL)   Date Value   04/10/2019 74       (goal LDL is <100)   AST (U/L)   Date Value   04/10/2019 14     ALT (U/L)   Date Value   04/10/2019 14     BUN (mg/dL)   Date Value   04/10/2019 7     BP Readings from Last 3 Encounters:   05/07/19 (!) 143/82   04/16/19 138/85   04/12/19 129/79          (goal 120/80)    All Future Testing planned in [facility]  Lab Frequency Next Occurrence   Sedimentation Rate Once 05/31/2019         Patient Active Problem List:     Hypertension     Asthma     GERD (gastroesophageal reflux disease)     S/P cardiac cath-Minimal disease 11/23/15 Dr. Dorothey Fleischer     Irritable bowel syndrome with constipation     Obesity     Allergic rhinitis     Hiatal hernia     Paresthesias     Left sided numbness     Dysphagia     Family history of colon cancer

## 2019-06-05 RX ORDER — IBUPROFEN 800 MG/1
TABLET ORAL
Qty: 30 TABLET | Refills: 3 | Status: SHIPPED | OUTPATIENT
Start: 2019-06-05 | End: 2019-07-22 | Stop reason: SDUPTHER

## 2019-06-18 ENCOUNTER — TELEPHONE (OUTPATIENT)
Dept: PULMONOLOGY | Age: 49
End: 2019-06-18

## 2019-06-27 RX ORDER — MONTELUKAST SODIUM 10 MG/1
TABLET ORAL
Qty: 30 TABLET | Refills: 5 | Status: SHIPPED | OUTPATIENT
Start: 2019-06-27 | End: 2020-06-29 | Stop reason: ALTCHOICE

## 2019-07-22 DIAGNOSIS — M25.552 LEFT HIP PAIN: ICD-10-CM

## 2019-07-22 RX ORDER — IBUPROFEN 800 MG/1
TABLET ORAL
Qty: 30 TABLET | Refills: 0 | Status: SHIPPED | OUTPATIENT
Start: 2019-07-22 | End: 2020-02-10

## 2019-07-25 DIAGNOSIS — K21.00 CHRONIC REFLUX ESOPHAGITIS: ICD-10-CM

## 2019-07-26 RX ORDER — OMEPRAZOLE 40 MG/1
CAPSULE, DELAYED RELEASE ORAL
Qty: 60 CAPSULE | Refills: 3 | Status: SHIPPED | OUTPATIENT
Start: 2019-07-26 | End: 2020-07-23 | Stop reason: SDUPTHER

## 2019-08-27 ENCOUNTER — APPOINTMENT (OUTPATIENT)
Dept: GENERAL RADIOLOGY | Age: 49
End: 2019-08-27
Payer: MEDICARE

## 2019-08-27 ENCOUNTER — HOSPITAL ENCOUNTER (EMERGENCY)
Age: 49
Discharge: HOME | End: 2019-08-27
Attending: EMERGENCY MEDICINE
Payer: MEDICARE

## 2019-08-27 VITALS
SYSTOLIC BLOOD PRESSURE: 132 MMHG | HEART RATE: 66 BPM | HEIGHT: 60 IN | BODY MASS INDEX: 39.27 KG/M2 | TEMPERATURE: 97.9 F | RESPIRATION RATE: 16 BRPM | DIASTOLIC BLOOD PRESSURE: 77 MMHG | WEIGHT: 200 LBS | OXYGEN SATURATION: 99 %

## 2019-08-27 DIAGNOSIS — R07.89 CHEST TIGHTNESS: Primary | ICD-10-CM

## 2019-08-27 LAB
ABSOLUTE EOS #: 0.29 K/UL (ref 0–0.44)
ABSOLUTE IMMATURE GRANULOCYTE: 0 K/UL (ref 0–0.3)
ABSOLUTE LYMPH #: 2.38 K/UL (ref 1.1–3.7)
ABSOLUTE MONO #: 0.52 K/UL (ref 0.1–1.2)
ANION GAP SERPL CALCULATED.3IONS-SCNC: 13 MMOL/L (ref 9–17)
BASOPHILS # BLD: 1 % (ref 0–2)
BASOPHILS ABSOLUTE: 0.06 K/UL (ref 0–0.2)
BUN BLDV-MCNC: 13 MG/DL (ref 6–20)
BUN/CREAT BLD: ABNORMAL (ref 9–20)
CALCIUM SERPL-MCNC: 9.3 MG/DL (ref 8.6–10.4)
CHLORIDE BLD-SCNC: 102 MMOL/L (ref 98–107)
CO2: 20 MMOL/L (ref 20–31)
CREAT SERPL-MCNC: 0.69 MG/DL (ref 0.5–0.9)
DIFFERENTIAL TYPE: ABNORMAL
EOSINOPHILS RELATIVE PERCENT: 5 % (ref 1–4)
GFR AFRICAN AMERICAN: >60 ML/MIN
GFR NON-AFRICAN AMERICAN: >60 ML/MIN
GFR SERPL CREATININE-BSD FRML MDRD: ABNORMAL ML/MIN/{1.73_M2}
GFR SERPL CREATININE-BSD FRML MDRD: ABNORMAL ML/MIN/{1.73_M2}
GLUCOSE BLD-MCNC: 113 MG/DL (ref 70–99)
HCT VFR BLD CALC: 40.2 % (ref 36.3–47.1)
HEMOGLOBIN: 10.9 G/DL (ref 11.9–15.1)
IMMATURE GRANULOCYTES: 0 %
LYMPHOCYTES # BLD: 41 % (ref 24–43)
MCH RBC QN AUTO: 25.4 PG (ref 25.2–33.5)
MCHC RBC AUTO-ENTMCNC: 27.1 G/DL (ref 28.4–34.8)
MCV RBC AUTO: 93.7 FL (ref 82.6–102.9)
MONOCYTES # BLD: 9 % (ref 3–12)
MORPHOLOGY: ABNORMAL
NRBC AUTOMATED: 0.3 PER 100 WBC
PDW BLD-RTO: 17.2 % (ref 11.8–14.4)
PLATELET # BLD: 294 K/UL (ref 138–453)
PLATELET ESTIMATE: ABNORMAL
PMV BLD AUTO: 10.5 FL (ref 8.1–13.5)
POTASSIUM SERPL-SCNC: 4.2 MMOL/L (ref 3.7–5.3)
RBC # BLD: 4.29 M/UL (ref 3.95–5.11)
RBC # BLD: ABNORMAL 10*6/UL
SEG NEUTROPHILS: 44 % (ref 36–65)
SEGMENTED NEUTROPHILS ABSOLUTE COUNT: 2.55 K/UL (ref 1.5–8.1)
SODIUM BLD-SCNC: 135 MMOL/L (ref 135–144)
TROPONIN INTERP: NORMAL
TROPONIN T: NORMAL NG/ML
TROPONIN, HIGH SENSITIVITY: <6 NG/L (ref 0–14)
WBC # BLD: 5.8 K/UL (ref 3.5–11.3)
WBC # BLD: ABNORMAL 10*3/UL

## 2019-08-27 PROCEDURE — 85025 COMPLETE CBC W/AUTO DIFF WBC: CPT

## 2019-08-27 PROCEDURE — 71046 X-RAY EXAM CHEST 2 VIEWS: CPT

## 2019-08-27 PROCEDURE — 80048 BASIC METABOLIC PNL TOTAL CA: CPT

## 2019-08-27 PROCEDURE — 84484 ASSAY OF TROPONIN QUANT: CPT

## 2019-08-27 PROCEDURE — 6360000002 HC RX W HCPCS: Performed by: STUDENT IN AN ORGANIZED HEALTH CARE EDUCATION/TRAINING PROGRAM

## 2019-08-27 PROCEDURE — 99285 EMERGENCY DEPT VISIT HI MDM: CPT

## 2019-08-27 PROCEDURE — 94640 AIRWAY INHALATION TREATMENT: CPT

## 2019-08-27 PROCEDURE — 6370000000 HC RX 637 (ALT 250 FOR IP): Performed by: STUDENT IN AN ORGANIZED HEALTH CARE EDUCATION/TRAINING PROGRAM

## 2019-08-27 PROCEDURE — 93005 ELECTROCARDIOGRAM TRACING: CPT | Performed by: STUDENT IN AN ORGANIZED HEALTH CARE EDUCATION/TRAINING PROGRAM

## 2019-08-27 RX ORDER — PREDNISONE 20 MG/1
60 TABLET ORAL ONCE
Status: COMPLETED | OUTPATIENT
Start: 2019-08-27 | End: 2019-08-27

## 2019-08-27 RX ORDER — IPRATROPIUM BROMIDE AND ALBUTEROL SULFATE 2.5; .5 MG/3ML; MG/3ML
1 SOLUTION RESPIRATORY (INHALATION)
Status: DISCONTINUED | OUTPATIENT
Start: 2019-08-27 | End: 2019-08-27 | Stop reason: HOSPADM

## 2019-08-27 RX ORDER — ALBUTEROL SULFATE 90 UG/1
2 AEROSOL, METERED RESPIRATORY (INHALATION)
Status: DISCONTINUED | OUTPATIENT
Start: 2019-08-27 | End: 2019-08-27 | Stop reason: HOSPADM

## 2019-08-27 RX ORDER — ALBUTEROL SULFATE 2.5 MG/3ML
5 SOLUTION RESPIRATORY (INHALATION)
Status: DISCONTINUED | OUTPATIENT
Start: 2019-08-27 | End: 2019-08-27 | Stop reason: HOSPADM

## 2019-08-27 RX ORDER — PREDNISONE 10 MG/1
TABLET ORAL
Qty: 20 TABLET | Refills: 0 | Status: SHIPPED | OUTPATIENT
Start: 2019-08-27 | End: 2019-09-06

## 2019-08-27 RX ADMIN — ALBUTEROL SULFATE 5 MG: 5 SOLUTION RESPIRATORY (INHALATION) at 07:53

## 2019-08-27 RX ADMIN — PREDNISONE 60 MG: 20 TABLET ORAL at 07:03

## 2019-08-27 ASSESSMENT — ENCOUNTER SYMPTOMS
SORE THROAT: 0
VOMITING: 0
COUGH: 0
ABDOMINAL PAIN: 0
NAUSEA: 0
PHOTOPHOBIA: 0
CHEST TIGHTNESS: 1
RHINORRHEA: 0
BACK PAIN: 0
SHORTNESS OF BREATH: 1
STRIDOR: 0

## 2019-08-27 ASSESSMENT — PAIN SCALES - GENERAL: PAINLEVEL_OUTOF10: 8

## 2019-08-27 ASSESSMENT — PAIN DESCRIPTION - PAIN TYPE: TYPE: ACUTE PAIN

## 2019-08-27 ASSESSMENT — PAIN DESCRIPTION - LOCATION: LOCATION: CHEST

## 2019-08-27 NOTE — ED PROVIDER NOTES
Interpretation    Interpreted by me, sinus bradycardia, heart rate 58, normal axis, no ST elevation QT corrected 400      CRITICAL CARE: There was a high probability of clinically significant/life threatening deterioration in this patient's condition which required my urgent intervention. Total critical care time was 0 minutes. This excludes any time for separately reportable procedures.        [facility], [address] [facility]  08/27/19 3160

## 2019-08-27 NOTE — ED PROVIDER NOTES
[facility] ED  Emergency Department Encounter  EmergencyMedicine Resident     Pt Name:Beatriz Bass  MRN: [id number]  [place] 1970  Date of evaluation: 8/27/19  PCP:  Lauren Quinteros MD    [address] [place] [address]       Chief Complaint   Patient presents with    Asthma     2 days. last time inhaler use, yesterday     Chest Pain     pain gets worse during inhalation        HISTORY OF PRESENT ILLNESS  (Location/Symptom, Timing/Onset, Context/Setting, Quality, Duration, Modifying Factors, Severity.)      Winston Player is a 51 y.o. female who presents with  who presents with shortness of breath, chest tightness. Patient states that symptoms been ongoing since yesterday. Does report history of asthma, COPD, states that she has been using her Advair and albuterol inhalers at home without improvement of symptoms. Denies fevers, productive cough. Patient complaining of anterior chest tightness, chest pain does not radiate. Denies history of coronary disease. Does have multiple cardiac risk factors including hypertension, hyperlipidemia. No history of DVT or PE, no leg asymmetry posterior calf pain immobilization or long travel. PAST MEDICAL / SURGICAL / SOCIAL / FAMILY HISTORY      has a past medical history of Allergic rhinitis, Asthma, Clinical trial participant, COPD (chronic obstructive pulmonary disease) ([facility] [address] Dysphagia, Dysphagia, GERD (gastroesophageal reflux disease), H/O cardiovascular stress test, Hiatal hernia, Hyperlipidemia, Hypertension, Irritable bowel syndrome with constipation, Obesity, Snores, and Wears glasses. has a past surgical history that includes Tubal ligation (2003); Colonoscopy (2015); Upper gastrointestinal endoscopy; Upper gastrointestinal endoscopy (N/A, 7/18/2017); Cardiac catheterization (01/16/2018); Cardiac catheterization (11/23/2015); Endoscopy, colon, diagnostic (2015);  Endoscopy, colon, diagnostic (02/08/2018); pr colon ca scrn not hi rsk ind (N/A, 2/8/2018); and Colonoscopy (02/08/2018). Social History     Socioeconomic History    Marital status:      Spouse name: Not on file    Number of children: Not on file    Years of education: Not on file    Highest education level: Not on file   Occupational History    Not on file   Social Needs    Financial resource strain: Not on file    Food insecurity:     Worry: Not on file     Inability: Not on file    Transportation needs:     Medical: Not on file     Non-medical: Not on file   Tobacco Use    Smoking status: Never Smoker    Smokeless tobacco: Never Used   Substance and Sexual Activity    Alcohol use: Yes     Alcohol/week: 0.0 standard drinks     Comment: occasionally    Drug use: No    Sexual activity: Yes     Partners: Male     Comment: tubal ligation    Lifestyle    Physical activity:     Days per week: Not on file     Minutes per session: Not on file    Stress: Not on file   Relationships    Social connections:     Talks on phone: Not on file     Gets together: Not on file     Attends Faith service: Not on file     Active member of club or organization: Not on file     Attends meetings of clubs or organizations: Not on file     Relationship status: Not on file    Intimate partner violence:     Fear of current or ex partner: Not on file     Emotionally abused: Not on file     Physically abused: Not on file     Forced sexual activity: Not on file   Other Topics Concern    Not on file   Social History Narrative    Not on file       Family History   Problem Relation Age of Onset    Hypertension Mother     High Cholesterol Mother     Asthma Mother     Osteoarthritis Mother     Other Mother         overweight    Colon Cancer Father        Allergies:  Patient has no known allergies. Home Medications:  Prior to Admission medications    Medication Sig Start Date End Date Taking?  Authorizing Provider   predniSONE (DELTASONE) 10 MG tablet Take 4 tablets by mouth once daily for 5 days 8/27/19 9/6/19 Yes Ebony Davis,    omeprazole (PRILOSEC) 40 MG delayed release capsule take 1 capsule by mouth twice a day 7/26/19  Yes Sonia Delgado MD   ibuprofen (ADVIL;MOTRIN) 800 MG tablet take 1 tablet by mouth every 8 hours if needed for pain 7/22/19  Yes Sonia Delgado MD   montelukast (SINGULAIR) 10 MG tablet take 1 tablet by mouth once daily 6/27/19  Yes Nell Macdonald MD   mometasone-formoterol (DULERA) 200-5 MCG/ACT inhaler Inhale 2 puffs into the lungs 2 times daily 6/21/19  Yes Dion Dias MD   RA MELATONIN 5 MG TABS tablet take 1 tablet by mouth nightly 6/19/19  Yes Betsey Ibanez MD   diclofenac (VOLTAREN) 50 MG EC tablet Take 1 tablet by mouth 2 times daily as needed for Pain 5/15/19  Yes Betsey Ibanez MD   diclofenac (CATAFLAM) 50 MG tablet Take 1 tablet by mouth 2 times daily as needed for Pain 5/13/19  Yes Betsey Ibanez MD   Blood Pressure Monitoring (B-D ASSURE BPM/AUTO ARM CUFF) MISC 1 box by Does not apply route daily 5/7/19  Yes Betsey Ibanez MD   SM ASPIRIN ADULT LOW STRENGTH 81 MG EC tablet take 1 tablet by mouth once daily 4/29/19  Yes Sonia Delgado MD   Handicap Placard MISC by Does not apply route Expires 4/19/2024 4/19/19  Yes Dion Dias MD   meloxicam (MOBIC) 7.5 MG tablet Take 1 tablet by mouth daily 4/16/19  Yes Nell Macdonald MD   ondansetron (ZOFRAN ODT) 4 MG disintegrating tablet Take 1 tablet by mouth every 8 hours as needed for Nausea 4/11/19  Yes Joaquim Ospina DO   carvedilol (COREG) 6.25 MG tablet take 1 tablet by mouth twice a day 4/8/19  Yes Sonia Delgado MD   atorvastatin (LIPITOR) 20 MG tablet take 1 tablet by mouth once daily 4/8/19  Yes Sonia Delgado MD   albuterol (PROVENTIL) (2.5 MG/3ML) 0.083% nebulizer solution Take 3 mLs by nebulization every 6 hours as needed for Wheezing 4/8/19  Yes Sonia Delgado MD   senna (SENOKOT) 8.6 MG tablet Take 1 tablet by mouth 2 times daily 4/8/19 4/7/20 Yes Sonia Delgado MD valsartan-hydrochlorothiazide (DIOVAN-HCT) 80-12.5 MG per tablet take 1 tablet by mouth once daily 4/6/19  Yes Bhanu Mckeon MD   albuterol sulfate HFA (PROAIR HFA) 108 (90 Base) MCG/ACT inhaler inhale 2 puffs by mouth every 6 hours if needed for wheezing 9/20/18  Yes Hayes Villafana MD   fluticasone [facility]) 50 MCG/ACT nasal spray 1 spray by Nasal route daily 5/3/18  Yes Hayes Villafana MD   ferrous sulfate (FE TABS) 325 (65 Fe) MG EC tablet Take 1 tablet by mouth 2 times daily 5/3/18  Yes Hayes Villafana MD   Multiple Vitamins-Minerals (WOMENS MULTI VITAMIN & MINERAL PO) Take 1 tablet by mouth daily   Yes Historical Provider, MD   topiramate (TOPAMAX) 50 MG tablet Take 1 tablet by mouth 2 times daily 6/19/19 7/19/19  Fernanda Salazar MD   ibuprofen (ADVIL;MOTRIN) 200 MG tablet Take 2 tablets by mouth every 8 hours as needed for Pain 3/18/19 5/7/19  Javier Ax, DO       REVIEW OF SYSTEMS    (2-9 systems for level 4, 10 or more for level 5)      Review of Systems   Constitutional: Positive for chills. Negative for fever. HENT: Negative for congestion, rhinorrhea and sore throat. Eyes: Negative for photophobia and visual disturbance. Respiratory: Positive for chest tightness and shortness of breath. Negative for cough and stridor. Cardiovascular: Positive for chest pain. Negative for palpitations and leg swelling. Gastrointestinal: Negative for abdominal pain, nausea and vomiting. Genitourinary: Negative for decreased urine volume. Musculoskeletal: Negative for back pain, neck pain and neck stiffness. Skin: Negative for rash. Allergic/Immunologic: Negative for environmental allergies. Hematological: Negative for adenopathy. Psychiatric/Behavioral: Negative for confusion.        PHYSICAL EXAM   (up to 7 for level 4, 8 or more for level 5)      INITIAL VITALS:   /77   Pulse 66   Temp 97.9 °F (36.6 °C) (Oral)   Resp 16   Ht 5' (1.524 m)   Wt 200 lb (90.7 kg)   LMP 08/20/2019 encounter of 08/27/19   CBC WITH AUTO DIFFERENTIAL   Result Value Ref Range    WBC 5.8 3.5 - 11.3 k/uL    RBC 4.29 3.95 - 5.11 m/uL    Hemoglobin 10.9 (L) 11.9 - 15.1 g/dL    Hematocrit 40.2 36.3 - 47.1 %    MCV 93.7 82.6 - 102.9 fL    MCH 25.4 25.2 - 33.5 pg    MCHC 27.1 (L) 28.4 - 34.8 g/dL    RDW 17.2 (H) 11.8 - 14.4 %    Platelets 952 018 - 154 k/uL    MPV 10.5 8.1 - 13.5 fL    NRBC Automated 0.3 (H) 0.0 per 100 WBC    Differential Type NOT REPORTED     WBC Morphology NOT REPORTED     RBC Morphology NOT REPORTED     Platelet Estimate NOT REPORTED     Immature Granulocytes 0 0 %    Seg Neutrophils 44 36 - 65 %    Lymphocytes 41 24 - 43 %    Monocytes 9 3 - 12 %    Eosinophils % 5 (H) 1 - 4 %    Basophils 1 0 - 2 %    Absolute Immature Granulocyte 0.00 0.00 - 0.30 k/uL    Segs Absolute 2.55 1.50 - 8.10 k/uL    Absolute Lymph # 2.38 1.10 - 3.70 k/uL    Absolute Mono # 0.52 0.10 - 1.20 k/uL    Absolute Eos # 0.29 0.00 - 0.44 k/uL    Basophils Absolute 0.06 0.00 - 0.20 k/uL    Morphology ANISOCYTOSIS PRESENT    Basic Metabolic Panel   Result Value Ref Range    Glucose 113 (H) 70 - 99 mg/dL    BUN 13 6 - 20 mg/dL    CREATININE 0.69 0.50 - 0.90 mg/dL    Bun/Cre Ratio NOT REPORTED 9 - 20    Calcium 9.3 8.6 - 10.4 mg/dL    Sodium 135 135 - 144 mmol/L    Potassium 4.2 3.7 - 5.3 mmol/L    Chloride 102 98 - 107 mmol/L    CO2 20 20 - 31 mmol/L    Anion Gap 13 9 - 17 mmol/L    GFR Non-African American >60 >60 mL/min    GFR African American >60 >60 mL/min    GFR Comment          GFR Staging NOT REPORTED    Troponin   Result Value Ref Range    Troponin, High Sensitivity <6 0 - 14 ng/L    Troponin T NOT REPORTED <0.03 ng/mL    Troponin Interp NOT REPORTED          RADIOLOGY:  XR CHEST STANDARD (2 VW)   Final Result   No significant abnormalities detected.                EKG  EKG Interpretation    Interpreted by me    Rhythm: normal sinus   Rate: Sinus bradycardia, 58  Axis: normal  Ectopy: none  Conduction: normal  ST Segments:

## 2019-08-30 LAB
EKG ATRIAL RATE: 58 BPM
EKG P AXIS: 73 DEGREES
EKG P-R INTERVAL: 170 MS
EKG Q-T INTERVAL: 408 MS
EKG QRS DURATION: 72 MS
EKG QTC CALCULATION (BAZETT): 400 MS
EKG R AXIS: 45 DEGREES
EKG T AXIS: 31 DEGREES
EKG VENTRICULAR RATE: 58 BPM

## 2019-08-30 PROCEDURE — 93010 ELECTROCARDIOGRAM REPORT: CPT | Performed by: INTERNAL MEDICINE

## 2019-09-04 RX ORDER — TOPIRAMATE 50 MG/1
TABLET, FILM COATED ORAL
Qty: 60 TABLET | Refills: 1 | Status: SHIPPED | OUTPATIENT
Start: 2019-09-04 | End: 2020-08-11 | Stop reason: ALTCHOICE

## 2019-11-24 DIAGNOSIS — J45.50 UNCOMPLICATED SEVERE PERSISTENT ASTHMA: ICD-10-CM

## 2019-12-07 ENCOUNTER — OFFICE VISIT (OUTPATIENT)
Dept: PRIMARY CARE CLINIC | Age: 49
End: 2019-12-07
Payer: MEDICARE

## 2019-12-07 VITALS
WEIGHT: 203 LBS | DIASTOLIC BLOOD PRESSURE: 87 MMHG | HEART RATE: 72 BPM | SYSTOLIC BLOOD PRESSURE: 136 MMHG | HEIGHT: 60 IN | BODY MASS INDEX: 39.85 KG/M2

## 2019-12-07 DIAGNOSIS — R09.81 CONGESTION OF NASAL SINUS: ICD-10-CM

## 2019-12-07 DIAGNOSIS — J02.9 SORE THROAT: Primary | ICD-10-CM

## 2019-12-07 LAB — S PYO AG THROAT QL: NORMAL

## 2019-12-07 PROCEDURE — G8417 CALC BMI ABV UP PARAM F/U: HCPCS | Performed by: FAMILY MEDICINE

## 2019-12-07 PROCEDURE — 3017F COLORECTAL CA SCREEN DOC REV: CPT | Performed by: FAMILY MEDICINE

## 2019-12-07 PROCEDURE — 87880 STREP A ASSAY W/OPTIC: CPT | Performed by: FAMILY MEDICINE

## 2019-12-07 PROCEDURE — 1036F TOBACCO NON-USER: CPT | Performed by: FAMILY MEDICINE

## 2019-12-07 PROCEDURE — G8484 FLU IMMUNIZE NO ADMIN: HCPCS | Performed by: FAMILY MEDICINE

## 2019-12-07 PROCEDURE — G8427 DOCREV CUR MEDS BY ELIG CLIN: HCPCS | Performed by: FAMILY MEDICINE

## 2019-12-07 PROCEDURE — 99214 OFFICE O/P EST MOD 30 MIN: CPT | Performed by: FAMILY MEDICINE

## 2019-12-07 RX ORDER — METHYLPREDNISOLONE 4 MG/1
TABLET ORAL
Qty: 1 KIT | Refills: 0 | Status: SHIPPED | OUTPATIENT
Start: 2019-12-07 | End: 2020-01-31 | Stop reason: ALTCHOICE

## 2019-12-07 RX ORDER — FLUCONAZOLE 150 MG/1
TABLET ORAL
Qty: 2 TABLET | Refills: 1 | Status: SHIPPED | OUTPATIENT
Start: 2019-12-07 | End: 2020-08-11 | Stop reason: ALTCHOICE

## 2019-12-07 RX ORDER — FLUTICASONE PROPIONATE 50 MCG
1 SPRAY, SUSPENSION (ML) NASAL 2 TIMES DAILY
Qty: 1 BOTTLE | Refills: 2 | Status: SHIPPED | OUTPATIENT
Start: 2019-12-07 | End: 2020-01-31

## 2019-12-07 RX ORDER — BROMPHENIRAMINE MALEATE, PSEUDOEPHEDRINE HYDROCHLORIDE, AND DEXTROMETHORPHAN HYDROBROMIDE 2; 30; 10 MG/5ML; MG/5ML; MG/5ML
5 SYRUP ORAL 4 TIMES DAILY PRN
Qty: 180 ML | Refills: 1 | Status: SHIPPED | OUTPATIENT
Start: 2019-12-07 | End: 2020-05-15

## 2019-12-07 RX ORDER — AZITHROMYCIN 250 MG/1
TABLET, FILM COATED ORAL
Qty: 1 PACKET | Refills: 1 | Status: SHIPPED | OUTPATIENT
Start: 2019-12-07 | End: 2020-01-31 | Stop reason: ALTCHOICE

## 2019-12-07 ASSESSMENT — ENCOUNTER SYMPTOMS
GASTROINTESTINAL NEGATIVE: 1
RHINORRHEA: 1
SINUS PAIN: 1
WHEEZING: 1
EYES NEGATIVE: 1
ALLERGIC/IMMUNOLOGIC NEGATIVE: 1
COUGH: 1
SORE THROAT: 1
SINUS PRESSURE: 1

## 2020-01-31 ENCOUNTER — HOSPITAL ENCOUNTER (OUTPATIENT)
Age: 50
Discharge: HOME | End: 2020-01-31
Payer: MEDICARE

## 2020-01-31 ENCOUNTER — OFFICE VISIT (OUTPATIENT)
Dept: PULMONOLOGY | Age: 50
End: 2020-01-31
Payer: MEDICARE

## 2020-01-31 VITALS
HEIGHT: 60 IN | DIASTOLIC BLOOD PRESSURE: 68 MMHG | OXYGEN SATURATION: 100 % | SYSTOLIC BLOOD PRESSURE: 119 MMHG | RESPIRATION RATE: 12 BRPM | HEART RATE: 72 BPM | WEIGHT: 202.8 LBS | BODY MASS INDEX: 39.82 KG/M2

## 2020-01-31 LAB
EOSINOPHILS ABSOLUTE: 129 /UL (ref 200–400)
EOSINOPHILS RELATIVE PERCENT: ABNORMAL %
WBC # BLD: ABNORMAL K/UL

## 2020-01-31 PROCEDURE — 99214 OFFICE O/P EST MOD 30 MIN: CPT | Performed by: INTERNAL MEDICINE

## 2020-01-31 PROCEDURE — 85048 AUTOMATED LEUKOCYTE COUNT: CPT

## 2020-01-31 PROCEDURE — 3017F COLORECTAL CA SCREEN DOC REV: CPT | Performed by: INTERNAL MEDICINE

## 2020-01-31 PROCEDURE — 82785 ASSAY OF IGE: CPT

## 2020-01-31 PROCEDURE — G8484 FLU IMMUNIZE NO ADMIN: HCPCS | Performed by: INTERNAL MEDICINE

## 2020-01-31 PROCEDURE — G8427 DOCREV CUR MEDS BY ELIG CLIN: HCPCS | Performed by: INTERNAL MEDICINE

## 2020-01-31 PROCEDURE — 36415 COLL VENOUS BLD VENIPUNCTURE: CPT

## 2020-01-31 PROCEDURE — G8417 CALC BMI ABV UP PARAM F/U: HCPCS | Performed by: INTERNAL MEDICINE

## 2020-01-31 PROCEDURE — 86003 ALLG SPEC IGE CRUDE XTRC EA: CPT

## 2020-01-31 PROCEDURE — 1036F TOBACCO NON-USER: CPT | Performed by: INTERNAL MEDICINE

## 2020-01-31 RX ORDER — BUDESONIDE 0.25 MG/2ML
250 INHALANT ORAL 2 TIMES DAILY
Qty: 60 AMPULE | Refills: 3 | Status: SHIPPED | OUTPATIENT
Start: 2020-01-31 | End: 2020-08-10 | Stop reason: SDUPTHER

## 2020-01-31 ASSESSMENT — ENCOUNTER SYMPTOMS
GASTROINTESTINAL NEGATIVE: 1
EYES NEGATIVE: 1
WHEEZING: 1
COUGH: 1
SINUS PAIN: 0
CHEST TIGHTNESS: 0
SHORTNESS OF BREATH: 1
APNEA: 0
CHOKING: 0
RHINORRHEA: 1

## 2020-01-31 NOTE — PROGRESS NOTES
Carmen Villa  [birth year]      Carmen Villa  47 y.o. female presented for follow up for asthma   Patient did not follow-up in clinic after April 2019. She did go see ENT and was prescribed Flonase 2 sprays each nostril once a day but she is using 1 spray each nostril once a day. Her asthma is poorly controlled she is needing rescue medication albuterol nebulized once a day and albuterol inhaler 2 times a day daily. She is doing Dulera 2 puffs inhaled twice a day but she still notices a wheeze. Symptoms are triggered by chemicals dust animal dander and she tries to avoid all these. She also feels short winded with exertion. GERD is under control  She did not have allergy testing at Dr. James Grijalva because she did not follow-up with him. Last visit  She has been have snorting , snoring at night and wakes up choking . She had previous sleep study which was negative . She did see ENT who continued Flonase but that has not helped . She also has sob - good days and bad days   Uses Advair once or twice a day - mostly once - because she forgets . Also uses albuterol nebulized in am   melendrez snot use albuterol mdi   No asthma exacerbation since last visit   Has been having headaches       Previous visit  Patient had lost her insurance, so could not do the Xolair injections. I'll follow up with ENT. She is doing a little bit better than before using albuterol once every day. No nocturnal use. I have asked her to avoid triggers, which is chemical like Lysol bleach and also smoke. Her  is smoking and she is getting passively exposed. Also encouraged her to quit marijuana. She denies any cough, any hemoptysis since last visit used prednisone once did not have any ER visit for asthma          Previous visit  Since last visit patient is still complaining of symptoms daily. She uses albuterol 3 times a day and at least 3-4 times  night in a week.   Dyspneic with exertion, which is grade 2 at work at home has  Hiatal hernia     Hyperlipidemia     Hypertension     Irritable bowel syndrome with constipation 1/21/2016    Obesity     Snores     states has tested negative for sleep apnea    Wears glasses        Family History:       Problem Relation Age of Onset    Hypertension Mother     High Cholesterol Mother     Asthma Mother     Osteoarthritis Mother     Other Mother         overweight    Colon Cancer Father        SURGICAL HISTORY:   Past Surgical History:   Procedure Laterality Date    CARDIAC CATHETERIZATION  01/16/2018    CARDIAC CATHETERIZATION  11/23/2015     Minimal non obstructive CAD    COLONOSCOPY  2015    COLONOSCOPY  02/08/2018    Redundant colon    ENDOSCOPY, COLON, DIAGNOSTIC  2015    with dilatation    ENDOSCOPY, COLON, DIAGNOSTIC  02/08/2018    WNL     OH COLON CA SCRN NOT HI RSK IND N/A 2/8/2018    COLONOSCOPY performed by Marv Hanna MD at [address] [place] [address]    UPPER GASTROINTESTINAL ENDOSCOPY      The endoscopic exam showed mildly tortuous esophagus. Savary dilation performed x 42 and 45 F.     UPPER GASTROINTESTINAL ENDOSCOPY N/A 7/18/2017    EGD DILATION SAVORY performed by Marv Hanna MD at [facility]              Not in a hospital admission. No Known Allergies  Social History     Tobacco Use   Smoking Status Never Smoker   Smokeless Tobacco Never Used     Prior to Admission medications    Medication Sig Start Date End Date Taking? Authorizing Provider   budesonide (PULMICORT) 0.25 MG/2ML nebulizer suspension Take 2 mLs by nebulization 2 times daily 1/31/20  Yes Isabel Martinez MD   brompheniramine-pseudoephedrine-DM 2-30-10 MG/5ML syrup Take 5 mLs by mouth 4 times daily as needed for Congestion 12/7/19  Yes Candice Scott MD   fluconazole (DIFLUCAN) 150 MG tablet Take 1 tablet after antibiotic use. . Then take 1 in 7 days. . 12/7/19  Yes Candice Scott MD   PROAIR  (13 Base) MCG/ACT inhaler inhale 2 puffs by mouth every 6 hours if needed for wheezing 12/2/19  Yes Zuleika Bailey MD   topiramate (TOPAMAX) 50 MG tablet take 1 tablet by mouth twice a day 9/4/19  Yes Jennifer Carballo MD   omeprazole (PRILOSEC) 40 MG delayed release capsule take 1 capsule by mouth twice a day 7/26/19  Yes Zuleika Bailey MD   ibuprofen (ADVIL;MOTRIN) 800 MG tablet take 1 tablet by mouth every 8 hours if needed for pain 7/22/19  Yes Zuleika Bailey MD   montelukast (SINGULAIR) 10 MG tablet take 1 tablet by mouth once daily 6/27/19  Yes Karlos Naik MD   mometasone-formoterol (DULERA) 200-5 MCG/ACT inhaler Inhale 2 puffs into the lungs 2 times daily 6/21/19  Yes Margaret Copeland MD   RA MELATONIN 5 MG TABS tablet take 1 tablet by mouth nightly 6/19/19  Yes Jennifer Carballo MD   diclofenac (VOLTAREN) 50 MG EC tablet Take 1 tablet by mouth 2 times daily as needed for Pain 5/15/19  Yes Jennifer Carballo MD   diclofenac (CATAFLAM) 50 MG tablet Take 1 tablet by mouth 2 times daily as needed for Pain 5/13/19  Yes Jennifer Carballo MD   Blood Pressure Monitoring (B-D ASSURE BPM/AUTO ARM CUFF) MISC 1 box by Does not apply route daily 5/7/19  Yes Jennifer Carballo MD   SM ASPIRIN ADULT LOW STRENGTH 81 MG EC tablet take 1 tablet by mouth once daily 4/29/19  Yes Zuleika Bailey MD   Handicap Placard MISC by Does not apply route Expires 4/19/2024 4/19/19  Yes Margaret Copeland MD   meloxicam (MOBIC) 7.5 MG tablet Take 1 tablet by mouth daily 4/16/19  Yes Karlos Naik MD   ondansetron (ZOFRAN ODT) 4 MG disintegrating tablet Take 1 tablet by mouth every 8 hours as needed for Nausea 4/11/19  Yes Torey Haider DO   carvedilol (COREG) 6.25 MG tablet take 1 tablet by mouth twice a day 4/8/19  Yes Zuleika Bailey MD   atorvastatin (LIPITOR) 20 MG tablet take 1 tablet by mouth once daily 4/8/19  Yes Zuleika Bailey MD   albuterol (PROVENTIL) (2.5 MG/3ML) 0.083% nebulizer solution Take 3 mLs by nebulization every 6 hours as needed for Wheezing 4/8/19  Yes Zuleika Bailey MD   senna (SENOKOT) 8.6 MG tablet Take 1 tablet by mouth 2 times daily 4/8/19 4/7/20 Yes Gemma Osullivan MD   valsartan-hydrochlorothiazide (DIOVAN-HCT) 80-12.5 MG per tablet take 1 tablet by mouth once daily 4/6/19  Yes Bhanu Strauss MD   albuterol sulfate HFA (PROAIR HFA) 108 (90 Base) MCG/ACT inhaler inhale 2 puffs by mouth every 6 hours if needed for wheezing 9/20/18  Yes Gemma Osullivan MD   fluticasone [facility]) 50 MCG/ACT nasal spray 1 spray by Nasal route daily 5/3/18  Yes Gemma Osullivan MD   ferrous sulfate (FE TABS) 325 (65 Fe) MG EC tablet Take 1 tablet by mouth 2 times daily 5/3/18  Yes Gemma Osullivan MD   Multiple Vitamins-Minerals (WOMENS MULTI VITAMIN & MINERAL PO) Take 1 tablet by mouth daily   Yes Historical Provider, MD     Exam obese  Head and neck atraumatic, normocephalic    Lymph nodes-no cervical, supraclavicular lymphadenopathy  Nasal exam shows inferior nasal turbinate 4+ could not visualize middle or superior turbinates. Mucosa shows clear rhinorrhea and pale  Neck-no JVP elevation    Lungs - Ventilating all lobes without any rales, rhonchi, wheezes or dullness. No bronchial breath sounds. Chest expansion equal bilaterally    CVS- S1, S2 regular. No S3 no S4, no murmurs    Abdomen-nontender, nondistended. Bowel sounds are present. No organomegaly    Lower extremity-no edema    Upper extremity-no edema    Neurological-grossly normal cranial nerves. No overt motor deficit          /68 (Site: Left Upper Arm)   Pulse 72   Resp 12   Ht 5' (1.524 m)   Wt 202 lb 12.8 oz (92 kg)   SpO2 100% Comment: Room air at rest  BMI 39.61 kg/m²           IgE in April 2016 is 210 international unit units per mL  Assessment   Diagnosis Orders   1. Uncomplicated severe persistent asthma  Eosinophil count    Allergen, Region 5 Respiratory Panel    budesonide (PULMICORT) 0.25 MG/2ML nebulizer suspension   2. Nasal turbinate hypertrophy     3. Hiatal hernia     4.  Gastroesophageal reflux disease without esophagitis, controlled on omeprazole     5. Chronic allergic rhinitis         Plan:  Patient did not follow-up after her April appointment last year. Patient continues to have severe persistent asthma needing albuterol either in the form of nebulizer or rescue medication at least 2 times a day. Dust animal dander cleaning supplies worsen her asthma control. She is using Dulera 200 mcg 2 puffs twice a day but since her asthma is still severe persistent I will add Pulmicort nebulized twice a day. Check eosinophil count and IgE level previous IgE level was 215  international units per mL. Will need to consider starting Xolair for improved asthma control if addition of Pulmicort does not improve symptoms. She continues to have nasal congestion and turbinate hypertrophy advised her to continue using Flonase 2 sprays once a day instead of 1 spray each nostril once a day and also follow-up with her ENT physician  I will see her back in 1 months          Requested Prescriptions     Signed Prescriptions Disp Refills    budesonide (PULMICORT) 0.25 MG/2ML nebulizer suspension 60 ampule 3     Sig: Take 2 mLs by nebulization 2 times daily       Medications Discontinued During This Encounter   Medication Reason    azithromycin (ZITHROMAX Z-SHANTA) 250 MG tablet Therapy completed    fluticasone (FLONASE) 50 MCG/ACT nasal spray LIST CLEANUP    methylPREDNISolone (MEDROL, SHANTA,) 4 MG tablet Therapy completed    ibuprofen (ADVIL;MOTRIN) 200 MG tablet LIST CLEANUP       Beatriz received counseling on the following healthy behaviors: nutrition, exercise and medication adherence    Patient given educational materials : see patient instruction     Discussed use, benefit, and side effects of prescribed medications. Barriers to medication compliance addressed. All patient questions answered. Pt voiced understanding.    Electronically signed by Gopi Shen MD on 1/31/2020 at 3:10 PM

## 2020-02-03 LAB
2000687N OAK TREE IGE: <0.34 KU/L (ref 0–0.34)
ALLERGEN BERMUDA GRASS IGE: <0.34 KU/L (ref 0–0.34)
ALLERGEN BIRCH IGE: <0.34 KU/L (ref 0–0.34)
ALLERGEN COW MILK IGE: <0.34 KU/L (ref 0–0.34)
ALLERGEN DOG DANDER IGE: <0.34 KU/L (ref 0–0.34)
ALLERGEN GERMAN COCKROACH IGE: <0.34 KU/L (ref 0–0.34)
ALLERGEN HORMODENDRUM IGE: <0.34 KUL/L (ref 0–0.34)
ALLERGEN MOUSE EPITHELIA IGE: <0.34 KU/L (ref 0–0.34)
ALLERGEN PEANUT (F13) IGE: <0.34 KU/L (ref 0–0.34)
ALLERGEN PECAN TREE IGE: <0.34 KU/L (ref 0–0.34)
ALLERGEN PIGWEED ROUGH IGE: <0.34 KU/L (ref 0–0.34)
ALLERGEN SHEEP SORREL (W18) IGE: <0.34 KU/L (ref 0–0.34)
ALLERGEN TREE SYCAMORE: <0.34 KU/L (ref 0–0.34)
ALLERGEN WALNUT TREE IGE: <0.34 KU/L (ref 0–0.34)
ALLERGEN WHITE MULBERRY TREE, IGE: <0.34 KU/L (ref 0–0.34)
ALLERGEN, TREE, WHITE ASH IGE: <0.34 KU/L (ref 0–0.34)
ALTERNARIA ALTERNATA: <0.34 KU/L (ref 0–0.34)
ASPERGILLUS FUMIGATUS: <0.34 KU/L (ref 0–0.34)
CAT DANDER ANTIBODY: 4.11 KU/L (ref 0–0.34)
COTTONWOOD TREE: <0.34 KU/L (ref 0–0.34)
D. FARINAE: <0.34 KU/L (ref 0–0.34)
D. PTERONYSSINUS: <0.34 KU/L (ref 0–0.34)
ELM TREE: <0.34 KU/L (ref 0–0.34)
IGE: 357 IU/ML
MAPLE/BOXELDER TREE: <0.34 KU/L (ref 0–0.34)
MOUNTAIN CEDAR TREE: <0.34 KU/L (ref 0–0.34)
MUCOR RACEMOSUS: <0.34 KU/L (ref 0–0.34)
P. NOTATUM: <0.34 KU/L (ref 0–0.34)
RUSSIAN THISTLE: <0.34 KU/L (ref 0–0.34)
SHORT RAGWD(A ARTEMIS.) IGE: <0.34 KU/L (ref 0–0.34)
TIMOTHY GRASS: <0.34 KU/L (ref 0–0.34)

## 2020-02-10 ENCOUNTER — APPOINTMENT (OUTPATIENT)
Dept: GENERAL RADIOLOGY | Age: 50
End: 2020-02-10
Payer: MEDICARE

## 2020-02-10 ENCOUNTER — HOSPITAL ENCOUNTER (EMERGENCY)
Age: 50
Discharge: HOME | End: 2020-02-10
Attending: EMERGENCY MEDICINE
Payer: MEDICARE

## 2020-02-10 VITALS
HEIGHT: 60 IN | WEIGHT: 200 LBS | BODY MASS INDEX: 39.27 KG/M2 | SYSTOLIC BLOOD PRESSURE: 146 MMHG | HEART RATE: 65 BPM | OXYGEN SATURATION: 100 % | TEMPERATURE: 98.6 F | DIASTOLIC BLOOD PRESSURE: 91 MMHG | RESPIRATION RATE: 16 BRPM

## 2020-02-10 LAB
ABSOLUTE EOS #: 0.11 K/UL (ref 0–0.44)
ABSOLUTE IMMATURE GRANULOCYTE: <0.03 K/UL (ref 0–0.3)
ABSOLUTE LYMPH #: 2.55 K/UL (ref 1.1–3.7)
ABSOLUTE MONO #: 0.55 K/UL (ref 0.1–1.2)
ANION GAP SERPL CALCULATED.3IONS-SCNC: 13 MMOL/L (ref 9–17)
BASOPHILS # BLD: 0 % (ref 0–2)
BASOPHILS ABSOLUTE: <0.03 K/UL (ref 0–0.2)
BUN BLDV-MCNC: 10 MG/DL (ref 6–20)
BUN/CREAT BLD: ABNORMAL (ref 9–20)
CALCIUM SERPL-MCNC: 9.4 MG/DL (ref 8.6–10.4)
CHLORIDE BLD-SCNC: 104 MMOL/L (ref 98–107)
CO2: 21 MMOL/L (ref 20–31)
CREAT SERPL-MCNC: 0.69 MG/DL (ref 0.5–0.9)
DIFFERENTIAL TYPE: ABNORMAL
EOSINOPHILS RELATIVE PERCENT: 2 % (ref 1–4)
GFR AFRICAN AMERICAN: >60 ML/MIN
GFR NON-AFRICAN AMERICAN: >60 ML/MIN
GFR SERPL CREATININE-BSD FRML MDRD: ABNORMAL ML/MIN/{1.73_M2}
GFR SERPL CREATININE-BSD FRML MDRD: ABNORMAL ML/MIN/{1.73_M2}
GLUCOSE BLD-MCNC: 116 MG/DL (ref 70–99)
HCT VFR BLD CALC: 35.2 % (ref 36.3–47.1)
HEMOGLOBIN: 10.8 G/DL (ref 11.9–15.1)
IMMATURE GRANULOCYTES: 0 %
LYMPHOCYTES # BLD: 39 % (ref 24–43)
MCH RBC QN AUTO: 25.5 PG (ref 25.2–33.5)
MCHC RBC AUTO-ENTMCNC: 30.7 G/DL (ref 28.4–34.8)
MCV RBC AUTO: 83.2 FL (ref 82.6–102.9)
MONOCYTES # BLD: 8 % (ref 3–12)
NRBC AUTOMATED: 0 PER 100 WBC
PDW BLD-RTO: 16.5 % (ref 11.8–14.4)
PLATELET # BLD: ABNORMAL K/UL (ref 138–453)
PLATELET ESTIMATE: ABNORMAL
PLATELET, FLUORESCENCE: NORMAL K/UL (ref 138–453)
PMV BLD AUTO: ABNORMAL FL (ref 8.1–13.5)
POTASSIUM SERPL-SCNC: 3.9 MMOL/L (ref 3.7–5.3)
RBC # BLD: 4.23 M/UL (ref 3.95–5.11)
RBC # BLD: ABNORMAL 10*6/UL
SEG NEUTROPHILS: 51 % (ref 36–65)
SEGMENTED NEUTROPHILS ABSOLUTE COUNT: 3.33 K/UL (ref 1.5–8.1)
SODIUM BLD-SCNC: 138 MMOL/L (ref 135–144)
TROPONIN INTERP: NORMAL
TROPONIN INTERP: NORMAL
TROPONIN T: NORMAL NG/ML
TROPONIN T: NORMAL NG/ML
TROPONIN, HIGH SENSITIVITY: <6 NG/L (ref 0–14)
TROPONIN, HIGH SENSITIVITY: <6 NG/L (ref 0–14)
WBC # BLD: 6.6 K/UL (ref 3.5–11.3)
WBC # BLD: ABNORMAL 10*3/UL

## 2020-02-10 PROCEDURE — 84484 ASSAY OF TROPONIN QUANT: CPT

## 2020-02-10 PROCEDURE — 93005 ELECTROCARDIOGRAM TRACING: CPT | Performed by: STUDENT IN AN ORGANIZED HEALTH CARE EDUCATION/TRAINING PROGRAM

## 2020-02-10 PROCEDURE — 71046 X-RAY EXAM CHEST 2 VIEWS: CPT

## 2020-02-10 PROCEDURE — 85025 COMPLETE CBC W/AUTO DIFF WBC: CPT

## 2020-02-10 PROCEDURE — 80048 BASIC METABOLIC PNL TOTAL CA: CPT

## 2020-02-10 PROCEDURE — 85055 RETICULATED PLATELET ASSAY: CPT

## 2020-02-10 PROCEDURE — 99285 EMERGENCY DEPT VISIT HI MDM: CPT

## 2020-02-10 RX ORDER — IBUPROFEN 400 MG/1
400 TABLET ORAL EVERY 6 HOURS PRN
Qty: 30 TABLET | Refills: 0 | Status: SHIPPED | OUTPATIENT
Start: 2020-02-10 | End: 2020-05-15 | Stop reason: ALTCHOICE

## 2020-02-10 RX ORDER — ACETAMINOPHEN 325 MG/1
325 TABLET ORAL EVERY 6 HOURS PRN
Qty: 30 TABLET | Refills: 0 | Status: SHIPPED | OUTPATIENT
Start: 2020-02-10 | End: 2020-05-15 | Stop reason: ALTCHOICE

## 2020-02-10 ASSESSMENT — PAIN SCALES - GENERAL: PAINLEVEL_OUTOF10: 7

## 2020-02-10 ASSESSMENT — ENCOUNTER SYMPTOMS
ABDOMINAL PAIN: 0
DIARRHEA: 0
NAUSEA: 0
CONSTIPATION: 0
CHEST TIGHTNESS: 1
VOMITING: 0
COUGH: 0
SHORTNESS OF BREATH: 0
WHEEZING: 0

## 2020-02-10 ASSESSMENT — PAIN DESCRIPTION - FREQUENCY: FREQUENCY: INTERMITTENT

## 2020-02-10 ASSESSMENT — PAIN DESCRIPTION - LOCATION: LOCATION: CHEST

## 2020-02-10 ASSESSMENT — PAIN DESCRIPTION - ORIENTATION: ORIENTATION: LEFT

## 2020-02-10 ASSESSMENT — PAIN DESCRIPTION - DESCRIPTORS: DESCRIPTORS: SHARP

## 2020-02-10 ASSESSMENT — PAIN DESCRIPTION - PAIN TYPE: TYPE: ACUTE PAIN

## 2020-02-10 NOTE — ED PROVIDER NOTES
[facility]     Emergency Department     Faculty Note/ Attestation      Pt Name: Jovita Loya                                       MRN: [id number]  [place] 1970  Date of evaluation: 2/10/2020  Patients PCP:    Zuleika Bailey MD    Attestation  I performed a history and physical examination of the patient and discussed management with the resident. I reviewed the residents note and agree with the documented findings and plan of care. Any areas of disagreement are noted on the chart. I was personally present for the key portions of any procedures. I have documented in the chart those procedures where I was not present during the key portions. I have reviewed the emergency nurses triage note. I agree with the chief complaint, past medical history, past surgical history, allergies, medications, social and family history as documented unless otherwise noted below. For Physician Assistant/ Nurse Practitioner cases/documentation I have personally evaluated this patient and have completed at least one if not all key elements of the E/M (history, physical exam, and MDM). Additional findings are as noted. Initial Screens:             Vitals:    Vitals:    02/10/20 0922   BP: (!) 146/91   Pulse: 65   Resp: 16   Temp: 98.6 °F (37 °C)   TempSrc: Oral   SpO2: 100%   Weight: 200 lb (90.7 kg)   Height: 5' (1.524 m)       Chief Complaint      Chief Complaint   Patient presents with    Chest Pain     pain under left breast, tingling in left arm, started when lifting and worse when lifting, no SOB, no weakness or dizziness          height is 5' (1.524 m) and weight is 200 lb (90.7 kg). Her oral temperature is 98.6 °F (37 °C). Her blood pressure is 146/91 (abnormal) and her pulse is 65. Her respiration is 16 and oxygen saturation is 100%. DIAGNOSTIC RESULTS       RADIOLOGY:   XR CHEST STANDARD (2 VW)   Final Result   No acute process.                LABS:  Labs Reviewed   CBC WITH AUTO DIFFERENTIAL - Abnormal; Notable for the following components:       Result Value    Hemoglobin 10.8 (*)     Hematocrit 35.2 (*)     RDW 16.5 (*)     All other components within normal limits   BASIC METABOLIC PANEL - Abnormal; Notable for the following components:    Glucose 116 (*)     All other components within normal limits   TROPONIN   IMMATURE PLATELET FRACTION   TROPONIN         EMERGENCY DEPARTMENT COURSE:     -------------------------      BP: (!) 146/91, Temp: 98.6 °F (37 °C), Pulse: 65, Resp: 16    System Problem List     Patient Active Problem List   Diagnosis    Hypertension    Asthma    GERD (gastroesophageal reflux disease)    S/P cardiac cath-Minimal disease 11/23/15 Dr. Marybeth Dandy    Irritable bowel syndrome with constipation    Obesity    Allergic rhinitis    Hiatal hernia    Paresthesias    Left sided numbness    Dysphagia    Family history of colon cancer         Comments  Chronic Prob List noted          Corea MD, F.A.C.E.P.   Attending Emergency Physician         Kirit Valencia MD  02/10/20 0936

## 2020-02-11 LAB
EKG ATRIAL RATE: 62 BPM
EKG P AXIS: 63 DEGREES
EKG P-R INTERVAL: 144 MS
EKG Q-T INTERVAL: 386 MS
EKG QRS DURATION: 70 MS
EKG QTC CALCULATION (BAZETT): 391 MS
EKG R AXIS: 8 DEGREES
EKG T AXIS: 9 DEGREES
EKG VENTRICULAR RATE: 62 BPM

## 2020-02-11 NOTE — ED PROVIDER NOTES
[facility] ED  Emergency Department Encounter  EmergencyMedicine Resident     Pt Name:Beatriz Alvarenga  MRN: [id number]  [place] 1970  Date of evaluation: 2/10/20  PCP:  Corinne Massed, MD    [address] [place] [address]       Chief Complaint   Patient presents with    Chest Pain     pain under left breast, tingling in left arm, started when lifting and worse when lifting, no SOB, no weakness or dizziness       HISTORY OF PRESENT ILLNESS  (Location/Symptom, Timing/Onset, Context/Setting, Quality, Duration, Modifying Factors, Severity.)      Saleem Aguilar is a 47 y.o. female who presents with complaint of midsternal chest pain nonradiating. 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 Patient denies any shortness of breath nausea fevers chills. PAST MEDICAL / SURGICAL / SOCIAL / FAMILY HISTORY      has a past medical history of Allergic rhinitis, Asthma, Clinical trial participant, COPD (chronic obstructive pulmonary disease) ([facility] [address] Dysphagia, Dysphagia, GERD (gastroesophageal reflux disease), H/O cardiovascular stress test, Hiatal hernia, Hyperlipidemia, Hypertension, Irritable bowel syndrome with constipation, Obesity, Snores, and Wears glasses. has a past surgical history that includes Tubal ligation (2003); Colonoscopy (2015); Upper gastrointestinal endoscopy; Upper gastrointestinal endoscopy (N/A, 7/18/2017); Cardiac catheterization (01/16/2018); Cardiac catheterization (11/23/2015); Endoscopy, colon, diagnostic (2015); Endoscopy, colon, diagnostic (02/08/2018); pr colon ca scrn not hi rsk ind (N/A, 2/8/2018); and Colonoscopy (02/08/2018).     Social History     Socioeconomic History    Marital status:      Spouse name: Not on file    Number of children: Not on file    Years of education: Not on file    Highest education level: Not on file   Occupational History    Not on file Social Needs    Financial resource strain: Not on file    Food insecurity:     Worry: Not on file     Inability: Not on file    Transportation needs:     Medical: Not on file     Non-medical: Not on file   Tobacco Use    Smoking status: Never Smoker    Smokeless tobacco: Never Used   Substance and Sexual Activity    Alcohol use: Yes     Alcohol/week: 0.0 standard drinks     Comment: occasionally    Drug use: No    Sexual activity: Yes     Partners: Male     Comment: tubal ligation    Lifestyle    Physical activity:     Days per week: Not on file     Minutes per session: Not on file    Stress: Not on file   Relationships    Social connections:     Talks on phone: Not on file     Gets together: Not on file     Attends Synagogue service: Not on file     Active member of club or organization: Not on file     Attends meetings of clubs or organizations: Not on file     Relationship status: Not on file    Intimate partner violence:     Fear of current or ex partner: Not on file     Emotionally abused: Not on file     Physically abused: Not on file     Forced sexual activity: Not on file   Other Topics Concern    Not on file   Social History Narrative    Not on file       Patient was advised to stop smoking or to avoid tobacco use    Family History   Problem Relation Age of Onset    Hypertension Mother     High Cholesterol Mother     Asthma Mother     Osteoarthritis Mother     Other Mother         overweight    Colon Cancer Father        Allergies:  Patient has no known allergies. Home Medications:  Prior to Admission medications    Medication Sig Start Date End Date Taking?  Authorizing Provider   acetaminophen (TYLENOL) 325 MG tablet Take 1 tablet by mouth every 6 hours as needed for Pain 2/10/20  Yes Eve Padilla,    ibuprofen (IBU) 400 MG tablet Take 1 tablet by mouth every 6 hours as needed for Pain 2/10/20  Yes Jh Cunningham, DO   budesonide (PULMICORT) 0.25 MG/2ML nebulizer suspension MG tablet take 1 tablet by mouth once daily 4/8/19   Rola Angleo MD   albuterol (PROVENTIL) (2.5 MG/3ML) 0.083% nebulizer solution Take 3 mLs by nebulization every 6 hours as needed for Wheezing 4/8/19   Rola Angelo MD   senna (SENOKOT) 8.6 MG tablet Take 1 tablet by mouth 2 times daily 4/8/19 4/7/20  Rola Angelo MD   valsartan-hydrochlorothiazide (DIOVAN-HCT) 80-12.5 MG per tablet take 1 tablet by mouth once daily 4/6/19   Bhanu Ro MD   albuterol sulfate HFA (PROAIR HFA) 108 (90 Base) MCG/ACT inhaler inhale 2 puffs by mouth every 6 hours if needed for wheezing 9/20/18   Rola Angelo MD   fluticasone [facility]) 50 MCG/ACT nasal spray 1 spray by Nasal route daily 5/3/18   Rola Angelo MD   ferrous sulfate (FE TABS) 325 (65 Fe) MG EC tablet Take 1 tablet by mouth 2 times daily 5/3/18   Rola Angelo MD   Multiple Vitamins-Minerals (WOMENS MULTI VITAMIN & MINERAL PO) Take 1 tablet by mouth daily    Historical Provider, MD       REVIEW OF SYSTEMS    (2-9 systems for level 4, 10 or more for level 5)      Review of Systems   Constitutional: Negative for activity change, appetite change and fever. Respiratory: Positive for chest tightness. Negative for cough, shortness of breath and wheezing. Cardiovascular: Positive for chest pain. Gastrointestinal: Negative for abdominal pain, constipation, diarrhea, nausea and vomiting. Genitourinary: Negative for difficulty urinating and dysuria. Musculoskeletal: Negative for gait problem. Skin: Negative for rash. Neurological: Negative for weakness and headaches. PHYSICAL EXAM   (up to 7 for level 4, 8 or more for level 5)      INITIAL VITALS:  BP (!) 146/91   Pulse 65   Temp 98.6 °F (37 °C) (Oral)   Resp 16   Ht 5' (1.524 m)   Wt 200 lb (90.7 kg)   LMP 01/28/2020   SpO2 100%   BMI 39.06 kg/m²     Physical Exam  Vitals signs reviewed. Constitutional:       General: She is not in acute distress.      Appearance: She is well-developed. She is not diaphoretic. HENT:      Head: Normocephalic and atraumatic. Cardiovascular:      Rate and Rhythm: Normal rate and regular rhythm. Heart sounds: Normal heart sounds. Pulmonary:      Effort: Pulmonary effort is normal. No respiratory distress. Breath sounds: Normal breath sounds. No wheezing. Abdominal:      General: Bowel sounds are normal.      Palpations: Abdomen is soft. Tenderness: There is no abdominal tenderness. There is no rebound. Skin:     General: Skin is warm and dry. Neurological:      Mental Status: She is alert and oriented to person, place, and time. Cranial Nerves: No cranial nerve deficit. Psychiatric:         Thought Content:  Thought content normal.         DIFFERENTIAL  DIAGNOSIS     PLAN (LABS / IMAGING / EKG):  Orders Placed This Encounter   Procedures    XR CHEST STANDARD (2 VW)    CBC Auto Differential    Basic Metabolic Panel    Troponin    Immature Platelet Fraction    Troponin    EKG 12 Lead       MEDICATIONS ORDERED:  Orders Placed This Encounter   Medications    acetaminophen (TYLENOL) 325 MG tablet     Sig: Take 1 tablet by mouth every 6 hours as needed for Pain     Dispense:  30 tablet     Refill:  0    ibuprofen (IBU) 400 MG tablet     Sig: Take 1 tablet by mouth every 6 hours as needed for Pain     Dispense:  30 tablet     Refill:  0         DIAGNOSTIC RESULTS / DEPARTMENT COURSE / MDM     LABS:  Results for orders placed or performed during the hospital encounter of 02/10/20   CBC Auto Differential   Result Value Ref Range    WBC 6.6 3.5 - 11.3 k/uL    RBC 4.23 3.95 - 5.11 m/uL    Hemoglobin 10.8 (L) 11.9 - 15.1 g/dL    Hematocrit 35.2 (L) 36.3 - 47.1 %    MCV 83.2 82.6 - 102.9 fL    MCH 25.5 25.2 - 33.5 pg    MCHC 30.7 28.4 - 34.8 g/dL    RDW 16.5 (H) 11.8 - 14.4 %    Platelets See Reflexed IPF Result 138 - 453 k/uL    MPV NOT REPORTED 8.1 - 13.5 fL    NRBC Automated 0.0 0.0 per 100 WBC    Differential Type NOT REPORTED     WBC Morphology NOT REPORTED     RBC Morphology ANISOCYTOSIS PRESENT     Platelet Estimate NOT REPORTED     Seg Neutrophils 51 36 - 65 %    Lymphocytes 39 24 - 43 %    Monocytes 8 3 - 12 %    Eosinophils % 2 1 - 4 %    Basophils 0 0 - 2 %    Immature Granulocytes 0 0 %    Segs Absolute 3.33 1.50 - 8.10 k/uL    Absolute Lymph # 2.55 1.10 - 3.70 k/uL    Absolute Mono # 0.55 0.10 - 1.20 k/uL    Absolute Eos # 0.11 0.00 - 0.44 k/uL    Basophils Absolute <0.03 0.00 - 0.20 k/uL    Absolute Immature Granulocyte <0.03 0.00 - 0.30 k/uL   Basic Metabolic Panel   Result Value Ref Range    Glucose 116 (H) 70 - 99 mg/dL    BUN 10 6 - 20 mg/dL    CREATININE 0.69 0.50 - 0.90 mg/dL    Bun/Cre Ratio NOT REPORTED 9 - 20    Calcium 9.4 8.6 - 10.4 mg/dL    Sodium 138 135 - 144 mmol/L    Potassium 3.9 3.7 - 5.3 mmol/L    Chloride 104 98 - 107 mmol/L    CO2 21 20 - 31 mmol/L    Anion Gap 13 9 - 17 mmol/L    GFR Non-African American >60 >60 mL/min    GFR African American >60 >60 mL/min    GFR Comment          GFR Staging NOT REPORTED    Troponin   Result Value Ref Range    Troponin, High Sensitivity <6 0 - 14 ng/L    Troponin T NOT REPORTED <0.03 ng/mL    Troponin Interp NOT REPORTED    Immature Platelet Fraction   Result Value Ref Range    Platelet, Fluorescence Platelet clumps present, count appears adequate. 138 - 453 k/uL   Troponin   Result Value Ref Range    Troponin, High Sensitivity <6 0 - 14 ng/L    Troponin T NOT REPORTED <0.03 ng/mL    Troponin Interp NOT REPORTED    EKG 12 Lead   Result Value Ref Range    Ventricular Rate 62 BPM    Atrial Rate 62 BPM    P-R Interval 144 ms    QRS Duration 70 ms    Q-T Interval 386 ms    QTc Calculation (Bazett) 391 ms    P Axis 63 degrees    R Axis 8 degrees    T Axis 9 degrees       IMPRESSION: Patient is a 79-year-old female presents with chest tightness after resolution as above. Will obtain CBC BMP EKG troponin chest x-ray and reevaluate patient.     Patient requesting discharge. Patient was given written and verbalinstructions prior to discharge. Patient understood and agreed. The patient had no further questions. Pre-hypertension/Hypertension: Thepatient has been informed that they may have pre-hypertension or Hypertension based on a blood pressure reading in the emergency department. I recommend that the patient call the primary care provider listed on theirdischarge instructions or a physician of their choice this week to arrange follow up for further evaluation of possible pre-hypertension or Hypertension. RADIOLOGY:  Xr Chest Standard (2 Vw)    Result Date: 2/10/2020  EXAMINATION: TWO XRAY VIEWS OF THE CHEST 2/10/2020 9:50 am COMPARISON: August 27, 2019 HISTORY: ORDERING SYSTEM PROVIDED HISTORY: Chest pain, sob TECHNOLOGIST PROVIDED HISTORY: Chest pain, sob FINDINGS: The lungs are without acute focal process. There is no effusion or pneumothorax. The cardiomediastinal silhouette is without acute process. The osseous structures are without acute process. No acute process. EKG   EKG Interpretation    Interpreted by me    Rhythm: normal sinus   Rate: normal  Axis: normal  Ectopy: none  Conduction: normal  ST Segments: no acute change  T Waves: no acute change  Q Waves: none    Clinical Impression: no acute changes and normal EKG    All EKG's are interpreted by the Emergency Department Physician who either signs or Co-signsthis chart in the absence of a cardiologist.    EMERGENCY DEPARTMENT COURSE:  ED Course as of Feb 10 1905   Mon Feb 10, 2020   1033 Troponin, High Sensitivity: <6 [BL]   1033 XR CHEST STANDARD (2 VW) [BL]      ED Course User Index  [BL] Alysia Burr DO   . Patient reevaluated resting comfortably in bed in no acute distress none. Work-up unremarkable as above pain likely secondary to musculoskeletal.  Will discharge home. PROCEDURES:    CONSULTS:  None      FINAL IMPRESSION      1.  Chest wall pain          DISPOSITION / PLAN DISPOSITION Decision To Discharge    PATIENT REFERRED TO:  MD Gabbi Matthew [address] [facility] [address]  [phone]    Call in 1 day      [facility] ED  [address] [facility] [address]  [phone]    As needed      DISCHARGE MEDICATIONS:  Discharge Medication List as of 2/10/2020 11:29 AM      START taking these medications    Details   acetaminophen (TYLENOL) 325 MG tablet Take 1 tablet by mouth every 6 hours as needed for Pain, Disp-30 tablet, R-0Print             Rolly Stein DO  Emergency Medicine Resident    (Please note thatportions of this note were completed with a voice recognition program.  Efforts were made to edit the dictations but occasionally words are mis-transcribed.)       Rolly Stein DO  Resident  02/10/20 [id number]

## 2020-02-17 RX ORDER — NAPROXEN SODIUM 220 MG
TABLET ORAL
Qty: 60 TABLET | Refills: 11 | Status: SHIPPED | OUTPATIENT
Start: 2020-02-17 | End: 2020-03-11

## 2020-02-17 NOTE — TELEPHONE ENCOUNTER
Request for RA senna - medication pended. Please fill if appropriate. Patient has appt 3/3.       Next Visit Date:  Future Appointments   Date Time Provider Fortino Jean Baptiste   2/24/2020  9:00 AM Eboni Post MD Resp Spec [id number]   3/3/2020  8:45 AM Amada Zaragoza MD [facility] IM [address] Maintenance   Topic Date Due    Flu vaccine (1) 12/07/2020 (Originally 9/1/2019)    Shingles Vaccine (1 of 2) 12/07/2020 (Originally 12/4/2019)    HIV screen  12/07/2020 (Originally 12/4/1984)    Lipid screen  04/10/2020    Potassium monitoring  02/10/2021    Creatinine monitoring  02/10/2021    Breast cancer screen  05/06/2021    Cervical cancer screen  08/10/2021    Diabetes screen  04/08/2022    Colon cancer screen colonoscopy  02/08/2023    DTaP/Tdap/Td vaccine (2 - Td) 02/05/2026    Pneumococcal 0-64 years Vaccine  Completed    Hepatitis A vaccine  Aged Out    Hepatitis B vaccine  Aged Out    Hib vaccine  Aged Out    Meningococcal (ACWY) vaccine  Aged Out       Hemoglobin A1C (%)   Date Value   04/08/2019 5.6   05/03/2018 6.0   10/10/2017 5.9             ( goal A1C is < 7)   No results found for: LABMICR  LDL Cholesterol (mg/dL)   Date Value   04/10/2019 74       (goal LDL is <100)   AST (U/L)   Date Value   04/10/2019 14     ALT (U/L)   Date Value   04/10/2019 14     BUN (mg/dL)   Date Value   02/10/2020 10     BP Readings from Last 3 Encounters:   02/10/20 (!) 146/91   01/31/20 119/68   12/07/19 136/87          (goal 120/80)    All Future Testing planned in [facility]  Lab Frequency Next Occurrence   Sedimentation Rate Once 05/07/2020         Patient Active Problem List:     Hypertension     Asthma     GERD (gastroesophageal reflux disease)     S/P cardiac cath-Minimal disease 11/23/15 Dr. Andrés Grijalva     Irritable bowel syndrome with constipation     Obesity     Allergic rhinitis     Hiatal hernia     Paresthesias     Left sided numbness     Dysphagia     Family history of colon cancer

## 2020-02-21 ENCOUNTER — TELEPHONE (OUTPATIENT)
Dept: INTERNAL MEDICINE | Age: 50
End: 2020-02-21

## 2020-02-24 ENCOUNTER — OFFICE VISIT (OUTPATIENT)
Dept: PULMONOLOGY | Age: 50
End: 2020-02-24
Payer: MEDICARE

## 2020-02-24 VITALS
HEIGHT: 60 IN | BODY MASS INDEX: 39.85 KG/M2 | SYSTOLIC BLOOD PRESSURE: 138 MMHG | WEIGHT: 203 LBS | RESPIRATION RATE: 16 BRPM | DIASTOLIC BLOOD PRESSURE: 89 MMHG | HEART RATE: 84 BPM | OXYGEN SATURATION: 98 %

## 2020-02-24 PROCEDURE — G8417 CALC BMI ABV UP PARAM F/U: HCPCS | Performed by: INTERNAL MEDICINE

## 2020-02-24 PROCEDURE — G8427 DOCREV CUR MEDS BY ELIG CLIN: HCPCS | Performed by: INTERNAL MEDICINE

## 2020-02-24 PROCEDURE — 99214 OFFICE O/P EST MOD 30 MIN: CPT | Performed by: INTERNAL MEDICINE

## 2020-02-24 PROCEDURE — 1036F TOBACCO NON-USER: CPT | Performed by: INTERNAL MEDICINE

## 2020-02-24 PROCEDURE — 3017F COLORECTAL CA SCREEN DOC REV: CPT | Performed by: INTERNAL MEDICINE

## 2020-02-24 PROCEDURE — G8484 FLU IMMUNIZE NO ADMIN: HCPCS | Performed by: INTERNAL MEDICINE

## 2020-02-24 ASSESSMENT — ENCOUNTER SYMPTOMS
SINUS PAIN: 0
COUGH: 1
WHEEZING: 1
SHORTNESS OF BREATH: 1
CHOKING: 0
APNEA: 0
GASTROINTESTINAL NEGATIVE: 1
RHINORRHEA: 1
CHEST TIGHTNESS: 0
EYES NEGATIVE: 1

## 2020-02-24 NOTE — PROGRESS NOTES
Rex Gil  [birth year]      Rex Cordero  47 y.o. female presented for follow up for asthma   I reviewed Ms. Kane Beavers  IgE level and eosinophil level . Her IgE level is elevated eosinophil level is low . She continues to have nocturnal wheeze and is on Dulera 2 puffs twice a day with a spacer uses albuterol in the morning and at night before going to bed her GERD is under control  Denies any cough with purulent sputum does have shortness of breath with exertion  She did follow-up with ENT and there is a plan to proceed with turbinectomy. I have advised her to continue to use Flonase for her chronic rhinitis. Last visit   Patient did not follow-up in clinic after April 2019. She did go see ENT and was prescribed Flonase 2 sprays each nostril once a day but she is using 1 spray each nostril once a day. Her asthma is poorly controlled she is needing rescue medication albuterol nebulized once a day and albuterol inhaler 2 times a day daily. She is doing Dulera 2 puffs inhaled twice a day but she still notices a wheeze. Symptoms are triggered by chemicals dust animal dander and she tries to avoid all these. She also feels short winded with exertion. GERD is under control  She did not have allergy testing at Dr. Vicente Valencia because she did not follow-up with him. Last visit  She has been have snorting , snoring at night and wakes up choking . She had previous sleep study which was negative . She did see ENT who continued Flonase but that has not helped . She also has sob - good days and bad days   Uses Advair once or twice a day - mostly once - because she forgets . Also uses albuterol nebulized in am   melendrez snot use albuterol mdi   No asthma exacerbation since last visit   Has been having headaches       Previous visit  Patient had lost her insurance, so could not do the Xolair injections. I'll follow up with ENT. She is doing a little bit better than before using albuterol once every day.   No nocturnal use. I have asked her to avoid triggers, which is chemical like Lysol bleach and also smoke. Her  is smoking and she is getting passively exposed. Also encouraged her to quit marijuana. She denies any cough, any hemoptysis since last visit used prednisone once did not have any ER visit for asthma          Previous visit  Since last visit patient is still complaining of symptoms daily. She uses albuterol 3 times a day and at least 3-4 times  night in a week. Dyspneic with exertion, which is grade 2 at work at home has had one course of prednisone again. Since her last visit and see back. She tried Symbicort and Spiriva, but prefer causing burning in the chest and she using Advair now which does not dose had been increased from 250 to 500 / 50 On her last visit. She is also using Singulair  Avoiding allergens and is not smoking  Today, she is willing very fatigued and tired due to her asthma even though there are no rhonchi  Her IgG level is elevated, and based on her weight. She would need 300 mg of Xolair once a month. I discussed the side effects, the pros and cons of this and she is agreeable to proceed    Review of Systems   Constitutional: Negative. HENT: Positive for congestion, rhinorrhea and sneezing. Negative for postnasal drip and sinus pain. Eyes: Negative. Respiratory: Positive for cough, shortness of breath and wheezing. Negative for apnea, choking and chest tightness. Cardiovascular: Negative. Gastrointestinal: Negative. Genitourinary: Negative. Neurological: Negative.            Immunization History   Administered Date(s) Administered    Influenza Vaccine, unspecified formulation 10/09/2017    Influenza Virus Vaccine 12/21/2015    Influenza Whole 12/21/2015    Influenza, Quadv, 6 mo and older, IM (Fluzone, Flulaval) 10/09/2017    Influenza, Quadv, IM, PF (6 mo and older Fluzone, Flulaval, Fluarix, and 3 yrs and older Afluria) 11/13/2015    Influenza, Triv, 3 Years and older, IM (Afluria (5 yrs and older) 10/10/2016    Pneumococcal Polysaccharide ([id number]) 01/21/2016    Tdap (Boostrix, Adacel) 02/05/2016          PAST MEDICAL HISTORY:         Diagnosis Date    Allergic rhinitis     Asthma     Clinical trial participant 11/23/2015 11/23/20015    COPD (chronic obstructive pulmonary disease) ([facility] [address]     Dysphagia     Dysphagia     has needed EGD with dilatation in the past    GERD (gastroesophageal reflux disease)     H/O cardiovascular stress test 01/15/2018    ABNORMAL    Hiatal hernia     Hyperlipidemia     Hypertension     Irritable bowel syndrome with constipation 1/21/2016    Obesity     Snores     states has tested negative for sleep apnea    Wears glasses        Family History:       Problem Relation Age of Onset    Hypertension Mother     High Cholesterol Mother     Asthma Mother     Osteoarthritis Mother     Other Mother         overweight    Colon Cancer Father        SURGICAL HISTORY:   Past Surgical History:   Procedure Laterality Date    CARDIAC CATHETERIZATION  01/16/2018    CARDIAC CATHETERIZATION  11/23/2015     Minimal non obstructive CAD    COLONOSCOPY  2015    COLONOSCOPY  02/08/2018    Redundant colon    ENDOSCOPY, COLON, DIAGNOSTIC  2015    with dilatation    ENDOSCOPY, COLON, DIAGNOSTIC  02/08/2018    WNL     MI COLON CA SCRN NOT HI RSK IND N/A 2/8/2018    COLONOSCOPY performed by Aida Huff MD at [address] [place] [address] [address]  [address]    UPPER GASTROINTESTINAL ENDOSCOPY      The endoscopic exam showed mildly tortuous esophagus. Savary dilation performed x 42 and 45 F.     UPPER GASTROINTESTINAL ENDOSCOPY N/A 7/18/2017    EGD DILATION SAVORY performed by Aida Huff MD at [facility]              Not in a hospital admission.   No Known Allergies  Social History     Tobacco Use   Smoking Status Never Smoker   Smokeless Tobacco Never Used     Prior to Admission medications    Medication Sig Start Date End Date Taking? Authorizing Provider   RA SENNA 8.6 MG tablet take 1 tablet by mouth twice a day 2/17/20  Yes Milly Fan MD   acetaminophen (TYLENOL) 325 MG tablet Take 1 tablet by mouth every 6 hours as needed for Pain 2/10/20  Yes Leeann Palma DO   ibuprofen (IBU) 400 MG tablet Take 1 tablet by mouth every 6 hours as needed for Pain 2/10/20  Yes Leeann Palma DO   budesonide (PULMICORT) 0.25 MG/2ML nebulizer suspension Take 2 mLs by nebulization 2 times daily 1/31/20  Yes Martha Holly MD   brompheniramine-pseudoephedrine-DM 2-30-10 MG/5ML syrup Take 5 mLs by mouth 4 times daily as needed for Congestion 12/7/19  Yes Sarah Holly MD   fluconazole (DIFLUCAN) 150 MG tablet Take 1 tablet after antibiotic use. . Then take 1 in 7 days. . 12/7/19  Yes Sarah Holly MD   PROAIR  (87 Base) MCG/ACT inhaler inhale 2 puffs by mouth every 6 hours if needed for wheezing 12/2/19  Yes Milly Fan MD   topiramate (TOPAMAX) 50 MG tablet take 1 tablet by mouth twice a day 9/4/19  Yes Margie Caraballo MD   omeprazole (PRILOSEC) 40 MG delayed release capsule take 1 capsule by mouth twice a day 7/26/19  Yes Milly Fan MD   montelukast (SINGULAIR) 10 MG tablet take 1 tablet by mouth once daily 6/27/19  Yes Sukumar Estes MD   mometasone-formoterol (DULERA) 200-5 MCG/ACT inhaler Inhale 2 puffs into the lungs 2 times daily 6/21/19  Yes Martha Holly MD   RA MELATONIN 5 MG TABS tablet take 1 tablet by mouth nightly 6/19/19  Yes Margie Caraballo MD   diclofenac (VOLTAREN) 50 MG EC tablet Take 1 tablet by mouth 2 times daily as needed for Pain 5/15/19  Yes Margie Caraballo MD   diclofenac (CATAFLAM) 50 MG tablet Take 1 tablet by mouth 2 times daily as needed for Pain 5/13/19  Yes Margie Caraballo MD   Blood Pressure Monitoring (B-D ASSURE BPM/AUTO ARM CUFF) MISC 1 box by Does not apply route daily 5/7/19  Yes Margie Caraballo MD    ASPIRIN ADULT LOW STRENGTH 81 MG EC tablet take 1 tablet by mouth once No overt motor deficit     /89   Pulse 84   Resp 16   Ht 5' (1.524 m)   Wt 203 lb (92.1 kg)   LMP 01/28/2020   SpO2 98% Comment: room air  BMI 39.65 kg/m²     Eosinophils Absolute 129Low   200 - 400 /uL       IgE 357High   <101 IU/mL Final 01/31/2020  3:40  [address]   Alternaria Alternata <0.34  0.00 - 0.34 kU/L Final 01/31/2020  3:40  [address]   Maple/[place]elder Tree <0.34  0.00 - 0.34 kU/L Final 01/31/2020  3:40  [address]   Cat Dander Antibody 4. 11High   0.00 - 0.34 kU/L Final 01/31/2020  3:40 PM [address] [place] Tree <0.34  0.00 - 0.34 kU/L Final 01/31/2020  3:40 PM [place] <0.34  0.00 - 0.34 kU/L Final 01/31/2020  3:40  [address]   Milk, Cow's IgE <0.34  0.00 - 0.34 kU/L Final 01/31/2020  3:40  [address]   Peanut IgE <0.34  0.00 - 0.34 kU/L Final 01/31/2020  3:40  [address]   ALLERGEN PIGWEED ROUGH IGE <0.34  0.00 - 0.34 kU/L Final 01/31/2020  3:40 PM [address] <0.34  0.00 - 0.34 kU/L Final 01/31/2020  3:40 PM [place] <0.34  0.00 - 0.34 kU/L Final 01/31/2020  3:40  [address]   Allergen Hormodendrum IgE <0.34  0.00 - 0.34 kUL/L Final 01/31/2020  3:40 PM [address] <0.34  0.00 - 0.34 kU/L Final 01/31/2020  3:40  [address] Tree IgE <0.34  0.00 - 0.34 kU/L Final 01/31/2020  3:40  [address] IgE <0.34  0.00 - 0.34 kU/L Final 01/31/2020  3:40  [address]   Aspergillus Fumigatus <0.34  0.00 - 0.34 kU/L Final 01/31/2020  3:40  [address]   D. pteronyssinus <0.34  0.00 - 0.34 kU/L Final 01/31/2020  3:40  [address]   D.  Farinae <0.34  0.00 - 0.34 kU/L Final 01/31/2020  3:40 PM [address] Grass IgE <0.34  0.00 - 0.34 kU/L Final 01/31/2020  3:40  [address]   Allergen, MANDIE, [place] IgE <0.34  0.00 - 0.34 kU/L Final 01/31/2020  3:40 PM [address] [place] <0.34  0.00 - 0.34 kU/L Final 01/31/2020  3:40  [address]   Short Ragwd(A parviz.) IgE <0.34  0.00 - 0.34 kU/L Final 01/31/2020  3:40  [address]   Cockroach IgE <0.34  0.00 - 0.34 kU/L Final 01/31/2020  3:40  [address]   Allergen Tree [place] <0.34  0.00 - 0.34 kU/L Final 01/31/2020  3:40 PM [address] Tree IgE <0.34  0.00 - 0.34 kU/L Final 01/31/2020  3:40 PM [address] Tree IgE <0.34  0.00 - 0.34 kU/L Final 01/31/2020  3:40  [address]   Mouse Epithelial <0.34  0.00 - 0.34 kU/L Final 01/31/2020  3:40  [address]   Mucor Racemosus <0.34  0.00 - 0.34 kU/L Final 01/31/2020  3:40  [address]   Allergen [place] Tree, IGE <0.34  0.00 - 0.34 kU/L Final 01/31/2020  3:40  [address]   Dog Dander IgE <0.34  0.00 - 0.34 kU/L Final 01/31/2020  3:40  [address]   Sheep [place] IgE <0.34                Assessment   Diagnosis Orders   1. Uncomplicated severe persistent asthma     2. Nasal turbinate hypertrophy     3. Hiatal hernia     4. Gastroesophageal reflux disease without esophagitis, controlled on omeprazole     5. Chronic allergic rhinitis     6. Non morbid obesity due to excess calories     7. Essential hypertension     8. Elevated IgE level         Plan:  Patient's IgE level is elevated she has severe persistent asthma maximized on bronchodilator therapy will recommend start Xolair injection. I reviewed risks and benefits of her Xolair with patient but she does not want to start Xolair at this present time.   She wants to get her nasal turbinectomy surgery done in May and then will reconsider Xolair injection if symptoms do not improve. Continue Dulera 200 mcg 2 puffs twice a day with a spacer continue Singulair and continue to use albuterol as needed. Take 2 puffs of albuterol MDI 30 minutes before exercise  Weight loss is encouraged  Continue flonase   At present patient is not wheezing she is low risk to proceed with nasal turbinectomy surgery. Follow up in  4 months           Requested Prescriptions      No prescriptions requested or ordered in this encounter       There are no discontinued medications. Cuco Stern received counseling on the following healthy behaviors: nutrition, exercise and medication adherence    Patient given educational materials : see patient instruction     Discussed use, benefit, and side effects of prescribed medications. Barriers to medication compliance addressed. All patient questions answered. Pt voiced understanding.    Electronically signed by Oz Huynh MD on 2/24/2020 at 9:51 AM

## 2020-03-03 ENCOUNTER — OFFICE VISIT (OUTPATIENT)
Dept: INTERNAL MEDICINE | Age: 50
End: 2020-03-03
Payer: MEDICARE

## 2020-03-03 VITALS
HEART RATE: 70 BPM | WEIGHT: 208 LBS | DIASTOLIC BLOOD PRESSURE: 75 MMHG | SYSTOLIC BLOOD PRESSURE: 131 MMHG | BODY MASS INDEX: 40.62 KG/M2

## 2020-03-03 LAB — HBA1C MFR BLD: 6 %

## 2020-03-03 PROCEDURE — 99211 OFF/OP EST MAY X REQ PHY/QHP: CPT | Performed by: INTERNAL MEDICINE

## 2020-03-03 PROCEDURE — G8417 CALC BMI ABV UP PARAM F/U: HCPCS | Performed by: INTERNAL MEDICINE

## 2020-03-03 PROCEDURE — 96160 PT-FOCUSED HLTH RISK ASSMT: CPT | Performed by: INTERNAL MEDICINE

## 2020-03-03 PROCEDURE — 3017F COLORECTAL CA SCREEN DOC REV: CPT | Performed by: INTERNAL MEDICINE

## 2020-03-03 PROCEDURE — 99214 OFFICE O/P EST MOD 30 MIN: CPT | Performed by: INTERNAL MEDICINE

## 2020-03-03 PROCEDURE — 83036 HEMOGLOBIN GLYCOSYLATED A1C: CPT | Performed by: INTERNAL MEDICINE

## 2020-03-03 PROCEDURE — 1036F TOBACCO NON-USER: CPT | Performed by: INTERNAL MEDICINE

## 2020-03-03 PROCEDURE — G8484 FLU IMMUNIZE NO ADMIN: HCPCS | Performed by: INTERNAL MEDICINE

## 2020-03-03 PROCEDURE — G8427 DOCREV CUR MEDS BY ELIG CLIN: HCPCS | Performed by: INTERNAL MEDICINE

## 2020-03-03 RX ORDER — CARVEDILOL 6.25 MG/1
TABLET ORAL
Qty: 60 TABLET | Refills: 5 | Status: SHIPPED | OUTPATIENT
Start: 2020-03-03 | End: 2020-08-11 | Stop reason: SDUPTHER

## 2020-03-03 RX ORDER — VALSARTAN AND HYDROCHLOROTHIAZIDE 80; 12.5 MG/1; MG/1
TABLET, FILM COATED ORAL
Qty: 30 TABLET | Refills: 5 | Status: SHIPPED | OUTPATIENT
Start: 2020-03-03 | End: 2020-08-11 | Stop reason: SDUPTHER

## 2020-03-03 RX ORDER — ATORVASTATIN CALCIUM 20 MG/1
TABLET, FILM COATED ORAL
Qty: 30 TABLET | Refills: 5 | Status: SHIPPED | OUTPATIENT
Start: 2020-03-03 | End: 2020-08-11 | Stop reason: SDUPTHER

## 2020-03-03 ASSESSMENT — PATIENT HEALTH QUESTIONNAIRE - PHQ9
9. THOUGHTS THAT YOU WOULD BE BETTER OFF DEAD, OR OF HURTING YOURSELF: 0
10. IF YOU CHECKED OFF ANY PROBLEMS, HOW DIFFICULT HAVE THESE PROBLEMS MADE IT FOR YOU TO DO YOUR WORK, TAKE CARE OF THINGS AT HOME, OR GET ALONG WITH OTHER PEOPLE: 2
8. MOVING OR SPEAKING SO SLOWLY THAT OTHER PEOPLE COULD HAVE NOTICED. OR THE OPPOSITE, BEING SO FIGETY OR RESTLESS THAT YOU HAVE BEEN MOVING AROUND A LOT MORE THAN USUAL: 2
5. POOR APPETITE OR OVEREATING: 0
1. LITTLE INTEREST OR PLEASURE IN DOING THINGS: 2
7. TROUBLE CONCENTRATING ON THINGS, SUCH AS READING THE NEWSPAPER OR WATCHING TELEVISION: 2
SUM OF ALL RESPONSES TO PHQ9 QUESTIONS 1 & 2: 4
2. FEELING DOWN, DEPRESSED OR HOPELESS: 2
6. FEELING BAD ABOUT YOURSELF - OR THAT YOU ARE A FAILURE OR HAVE LET YOURSELF OR YOUR FAMILY DOWN: 0
SUM OF ALL RESPONSES TO PHQ QUESTIONS 1-9: 14
3. TROUBLE FALLING OR STAYING ASLEEP: 3
4. FEELING TIRED OR HAVING LITTLE ENERGY: 3
SUM OF ALL RESPONSES TO PHQ QUESTIONS 1-9: 14

## 2020-03-03 NOTE — PATIENT INSTRUCTIONS
-Pt due for 4 month f/u in June-- pt to call in May to set up an appt--reminder in [facility] to contact patient as well--AVS given to patient    -Form for pre op clearance completed and faxed for patient    B. Jessee Molina

## 2020-03-03 NOTE — PROGRESS NOTES
[facility]    Progress Note    Date of patient's visit: 3/3/2020    Patient's Name:  Saleem Aguilar    YOB: 1970            Patient Care Team:  Corinne Massed, MD as PCP - General (Internal Medicine)  Corinne Massed, MD as PCP - [facility] Provider    REASON FOR VISIT: Routine outpatient follow     Chief Complaint   Patient presents with    Pre-op Exam     pt is here for surgical clearance, She is having septoplasty with bilateral turbinate reduction done         HISTORY OF PRESENT ILLNESS:    History was obtained from the patient. Saleem Aguilar is a 47 y.o. is here for evaluation and follow-up on her chronic medical problems. She has severe persistent asthma but it has been stable. She has high IgE levels and her pulmonologist has recommended Xolair injections. She is here from ENT to obtain surgical clearance prior to bilateral turbinate hypertrophy reduction. She has no current complaints. She has hypertension, hyperlipidemia, impaired glucose tolerance and morbid obesity. She had angiogram done in 2018 which was negative. She denies any exertional dyspnea or chest pain. No other acute concerns at this point. She has a family history of colon cancer and states she has had a colonoscopy for a 5 years ago. We will have her come back in the summer and review all her testing. She is also gained a lot of weight. Will discuss bariatric referral next visit as well. Results for POC orders placed in visit on 03/03/20   POCT glycosylated hemoglobin (Hb A1C)   Result Value Ref Range    Hemoglobin A1C 6.0 %         Cardiac catherization 2018  Procedure  Procedure Type:     Diagnostic procedure: Lt Heart, Coronary Angio, LVgram      Conclusions      Procedure Summary      NORMAL CORONARIES   NORMAL LVEF 65%.        CXR 2020  FINDINGS:   The lungs are without acute focal process.  There is no effusion or   pneumothorax.  The cardiomediastinal silhouette is without acute process. The   osseous structures are without acute process.           Impression   No acute process.           Past Medical History:   Diagnosis Date    Allergic rhinitis     Asthma     Clinical trial participant 11/23/2015 11/23/20015    COPD (chronic obstructive pulmonary disease) ([facility] [address]     Dysphagia     Dysphagia     has needed EGD with dilatation in the past    GERD (gastroesophageal reflux disease)     H/O cardiovascular stress test 01/15/2018    ABNORMAL    Hiatal hernia     Hyperlipidemia     Hypertension     Irritable bowel syndrome with constipation 1/21/2016    Obesity     [facility]    Wears glasses        Past Surgical History:   Procedure Laterality Date    CARDIAC CATHETERIZATION  01/16/2018    CARDIAC CATHETERIZATION  11/23/2015     Minimal non obstructive CAD    COLONOSCOPY  2015    COLONOSCOPY  02/08/2018    Redundant colon    ENDOSCOPY, COLON, DIAGNOSTIC  2015    with dilatation    ENDOSCOPY, COLON, DIAGNOSTIC  02/08/2018    WNL     PA COLON CA SCRN NOT HI RSK IND N/A 2/8/2018    COLONOSCOPY performed by Laura Alejandre MD at [address]  [address]    UPPER GASTROINTESTINAL ENDOSCOPY      The endoscopic exam showed mildly tortuous esophagus.  Savary dilation performed x 42 and 45 F.     UPPER GASTROINTESTINAL ENDOSCOPY N/A 7/18/2017    EGD DILATION SAVORY performed by Laura Alejandre MD at [facility]         ALLERGIES    No Known Allergies    MEDICATIONS:      Current Outpatient Medications on File Prior to Visit   Medication Sig Dispense Refill    valsartan-hydrochlorothiazide (DIOVAN-HCT) 80-12.5 MG per tablet take 1 tablet by mouth once daily 30 tablet 0    RA SENNA 8.6 MG tablet take 1 tablet by mouth twice a day 60 tablet 11    acetaminophen (TYLENOL) 325 MG tablet Take 1 tablet by mouth every 6 hours as needed for Pain 30 tablet 0    ibuprofen (IBU) 400 MG tablet Take 1 tablet by mouth every 6 hours as needed for Pain 30 tablet 0    budesonide (PULMICORT) 0.25 MG/2ML nebulizer suspension Take 2 mLs by nebulization 2 times daily 60 ampule 3    brompheniramine-pseudoephedrine-DM 2-30-10 MG/5ML syrup Take 5 mLs by mouth 4 times daily as needed for Congestion 180 mL 1    fluconazole (DIFLUCAN) 150 MG tablet Take 1 tablet after antibiotic use. . Then take 1 in 7 days. . 2 tablet 1    PROAIR  (90 Base) MCG/ACT inhaler inhale 2 puffs by mouth every 6 hours if needed for wheezing 18 g 10    topiramate (TOPAMAX) 50 MG tablet take 1 tablet by mouth twice a day 60 tablet 1    omeprazole (PRILOSEC) 40 MG delayed release capsule take 1 capsule by mouth twice a day 60 capsule 3    montelukast (SINGULAIR) 10 MG tablet take 1 tablet by mouth once daily 30 tablet 5    mometasone-formoterol (DULERA) 200-5 MCG/ACT inhaler Inhale 2 puffs into the lungs 2 times daily 1 Inhaler 6    RA MELATONIN 5 MG TABS tablet take 1 tablet by mouth nightly 30 tablet 0    diclofenac (VOLTAREN) 50 MG EC tablet Take 1 tablet by mouth 2 times daily as needed for Pain 60 tablet 3    SM ASPIRIN ADULT LOW STRENGTH 81 MG EC tablet take 1 tablet by mouth once daily 30 tablet 5    meloxicam (MOBIC) 7.5 MG tablet Take 1 tablet by mouth daily 30 tablet 3    ondansetron (ZOFRAN ODT) 4 MG disintegrating tablet Take 1 tablet by mouth every 8 hours as needed for Nausea 20 tablet 0    carvedilol (COREG) 6.25 MG tablet take 1 tablet by mouth twice a day 60 tablet 5    atorvastatin (LIPITOR) 20 MG tablet take 1 tablet by mouth once daily 30 tablet 5    albuterol (PROVENTIL) (2.5 MG/3ML) 0.083% nebulizer solution Take 3 mLs by nebulization every 6 hours as needed for Wheezing 120 each 3    albuterol sulfate HFA (PROAIR HFA) 108 (90 Base) MCG/ACT inhaler inhale 2 puffs by mouth every 6 hours if needed for wheezing 8.5 g 10    fluticasone (FLONASE) 50 MCG/ACT nasal spray 1 spray by Nasal route daily 1 Bottle 3    ferrous sulfate (FE TABS) 325 (65 Fe) MG EC tablet Take 1 tablet by mouth 2 times daily 90 tablet 3    Multiple Vitamins-Minerals (WOMENS MULTI VITAMIN & MINERAL PO) Take 1 tablet by mouth daily       Current Facility-Administered Medications on File Prior to Visit   Medication Dose Route Frequency Provider Last Rate Last Dose    0.9 % sodium chloride infusion   Intravenous Continuous Diogo Saenz MD 30 mL/hr at 05/26/15 1008         SOCIAL HISTORY    Reviewed and no change from previous record. Sandi Cancino  reports that she has never smoked. She has never used smokeless tobacco.    FAMILY HISTORY:    Reviewed and No change from previous visit    HEALTH MAINTENANCE DUE:    There are no preventive care reminders to display for this patient. REVIEW OF SYSTEMS:    12 point review of symptoms completed and found to be normal except noted in the HPI    Review of Systems   Constitutional: Negative for chills, fatigue and fever. HENT: Positive for congestion. Negative for rhinorrhea, sinus pressure, sinus pain and sore throat. Eyes: Negative for discharge, redness and itching. Respiratory: Negative for cough, shortness of breath and wheezing. Cardiovascular: Negative for chest pain, palpitations and leg swelling. Gastrointestinal: Positive for constipation. Negative for abdominal pain, blood in stool, diarrhea and nausea. Endocrine: Negative for polydipsia and polyuria. Genitourinary: Negative for dysuria, flank pain, frequency, hematuria and menstrual problem. Allergic/Immunologic: Positive for environmental allergies. Negative for immunocompromised state. Neurological: Negative for dizziness, weakness and headaches.         PHYSICAL EXAM:     Vitals:    03/03/20 0913   BP: 131/75   Site: Left Upper Arm   Position: Sitting   Cuff Size: Large Adult   Pulse: 70   Weight: 208 lb (94.3 kg)     Body mass index is 40.62 kg/m².     BP Readings from Last 3 Encounters:   03/03/20 131/75 URINEANALYSIS: No results found for: LABURIN  ASSESSMENT AND PLAN:    1. Essential hypertension    - valsartan-hydrochlorothiazide (DIOVAN-HCT) 80-12.5 MG per tablet; Once a day  Dispense: 30 tablet; Refill: 5  - carvedilol (COREG) 6.25 MG tablet; take 1 tablet by mouth twice a day  Dispense: 60 tablet; Refill: 5    2. Mixed hyperlipidemia    - atorvastatin (LIPITOR) 20 MG tablet; take 1 tablet by mouth once daily  Dispense: 30 tablet; Refill: 5    3. Uncomplicated severe persistent asthma  Continue current inhalers  Xolair per pulmonology    4. IGT (impaired glucose tolerance)    - POCT glycosylated hemoglobin (Hb A1C)    5. Morbid obesity with BMI of 40.0-44.9, adult (HCC)  Diet changes  Increase exercise    6. Non-seasonal allergic rhinitis, unspecified trigger  Follow up with ENT  Continue Singulair    7. Preop examination  Low risk          FOLLOW UP AND INSTRUCTIONS:   Return in about 4 months (around 7/3/2020). 1. Beatriz received counseling on the following healthy behaviors: nutrition, exercise, medication adherence and tobacco cessation    2. Reviewed prior labs and health maintenance. 3. Discussed use, benefit, and side effects of prescribed medications. Barriers to medication compliance addressed. All patient questions answered. Pt voiced understanding.      4. Patient given educational materials - see patient instructions    Analilia Duran  Attending Physician, [address] [place] [address], Internal Medicine Residency Program  [address] [place] [address]  3/3/2020, 9:39 AM

## 2020-03-09 RX ORDER — FLUTICASONE PROPIONATE 50 MCG
SPRAY, SUSPENSION (ML) NASAL
Qty: 16 G | OUTPATIENT
Start: 2020-03-09

## 2020-03-11 RX ORDER — NAPROXEN SODIUM 220 MG
TABLET ORAL
Qty: 60 TABLET | Refills: 11 | Status: SHIPPED | OUTPATIENT
Start: 2020-03-11 | End: 2020-08-11 | Stop reason: SDUPTHER

## 2020-03-11 NOTE — TELEPHONE ENCOUNTER
Refill request for Senna. If appropriate please send medication(s) to patients pharmacy. Next appt: Patient is currently on wait list for provider.     Last appt: 03/03/2020    Health Maintenance   Topic Date Due    Flu vaccine (1) 12/07/2020 (Originally 9/1/2019)    Shingles Vaccine (1 of 2) 12/07/2020 (Originally 12/4/2019)    HIV screen  12/07/2020 (Originally 12/4/1984)    Lipid screen  04/10/2020    Potassium monitoring  02/10/2021    Creatinine monitoring  02/10/2021    A1C test (Diabetic or Prediabetic)  03/03/2021    Breast cancer screen  05/06/2021    Cervical cancer screen  08/10/2021    Colon cancer screen colonoscopy  02/08/2023    DTaP/Tdap/Td vaccine (2 - Td) 02/05/2026    Pneumococcal 0-64 years Vaccine  Completed    Hepatitis A vaccine  Aged Out    Hepatitis B vaccine  Aged Out    Hib vaccine  Aged Out    Meningococcal (ACWY) vaccine  Aged Out       Hemoglobin A1C (%)   Date Value   03/03/2020 6.0   04/08/2019 5.6   05/03/2018 6.0             ( goal A1C is < 7)   No results found for: LABMICR  LDL Cholesterol (mg/dL)   Date Value   04/10/2019 74       (goal LDL is <100)   AST (U/L)   Date Value   04/10/2019 14     ALT (U/L)   Date Value   04/10/2019 14     BUN (mg/dL)   Date Value   02/10/2020 10     BP Readings from Last 3 Encounters:   03/03/20 131/75   02/24/20 138/89   02/10/20 (!) 146/91          (goal 120/80)          Patient Active Problem List:     Hypertension     Asthma     GERD (gastroesophageal reflux disease)     S/P cardiac cath-Minimal disease 11/23/15 Dr. Jesus Zapien     Irritable bowel syndrome with constipation     Obesity     Allergic rhinitis     Hiatal hernia     Paresthesias     Left sided numbness     Dysphagia     Family history of colon cancer

## 2020-03-13 ENCOUNTER — OFFICE VISIT (OUTPATIENT)
Dept: PRIMARY CARE CLINIC | Age: 50
End: 2020-03-13
Payer: MEDICARE

## 2020-03-13 VITALS
DIASTOLIC BLOOD PRESSURE: 70 MMHG | OXYGEN SATURATION: 99 % | TEMPERATURE: 99 F | BODY MASS INDEX: 40.23 KG/M2 | SYSTOLIC BLOOD PRESSURE: 120 MMHG | WEIGHT: 206 LBS | HEART RATE: 80 BPM

## 2020-03-13 PROCEDURE — G8427 DOCREV CUR MEDS BY ELIG CLIN: HCPCS | Performed by: NURSE PRACTITIONER

## 2020-03-13 PROCEDURE — 3023F SPIROM DOC REV: CPT | Performed by: NURSE PRACTITIONER

## 2020-03-13 PROCEDURE — G8926 SPIRO NO PERF OR DOC: HCPCS | Performed by: NURSE PRACTITIONER

## 2020-03-13 PROCEDURE — 3017F COLORECTAL CA SCREEN DOC REV: CPT | Performed by: NURSE PRACTITIONER

## 2020-03-13 PROCEDURE — 1036F TOBACCO NON-USER: CPT | Performed by: NURSE PRACTITIONER

## 2020-03-13 PROCEDURE — 99202 OFFICE O/P NEW SF 15 MIN: CPT | Performed by: NURSE PRACTITIONER

## 2020-03-13 PROCEDURE — G8484 FLU IMMUNIZE NO ADMIN: HCPCS | Performed by: NURSE PRACTITIONER

## 2020-03-13 PROCEDURE — G8417 CALC BMI ABV UP PARAM F/U: HCPCS | Performed by: NURSE PRACTITIONER

## 2020-03-13 RX ORDER — AZITHROMYCIN 250 MG/1
250 TABLET, FILM COATED ORAL SEE ADMIN INSTRUCTIONS
Qty: 6 TABLET | Refills: 0 | Status: SHIPPED | OUTPATIENT
Start: 2020-03-13 | End: 2020-03-18

## 2020-03-13 RX ORDER — BENZONATATE 200 MG/1
200 CAPSULE ORAL 3 TIMES DAILY PRN
Qty: 20 CAPSULE | Refills: 0 | Status: SHIPPED | OUTPATIENT
Start: 2020-03-13 | End: 2020-03-20

## 2020-03-13 ASSESSMENT — ENCOUNTER SYMPTOMS
RHINORRHEA: 0
SHORTNESS OF BREATH: 1
COUGH: 1
WHEEZING: 1
SORE THROAT: 0

## 2020-03-13 NOTE — LETTER
[address] [facility]  [facility] [address]  Phone: [phone]  Fax: [phone]    CAROLE Robles CNP        March 13, 4170     Patient: Selma Yadav   YOB: 1970   Date of Visit: 3/13/2020       To Whom It May Concern: It is my medical opinion that Adele Fernandez is excused through 3/15/20. If you have any questions or concerns, please don't hesitate to call.     Sincerely,        CAROLE Robles CNP

## 2020-03-13 NOTE — PROGRESS NOTES
Visit Information    Have you changed or started any medications since your last visit including any over-the-counter medicines, vitamins, or herbal medicines? no   Are you having any side effects from any of your medications? -  no  Have you stopped taking any of your medications? Is so, why? -  no    Have you seen any other physician or provider since your last visit? No  Have you had any other diagnostic tests since your last visit? Yes - Records Obtained  Have you been seen in the emergency room and/or had an admission to a hospital since we last saw you? Yes - Records Obtained  Have you had your routine dental cleaning in the past 6 months? no    Have you activated your [employer] account? If not, what are your barriers?  Yes     Patient Care Team:  Morris Melissa MD as PCP - General (Internal Medicine)  Morris Melissa MD as PCP - [facility]    Medical History Review  Past Medical, Family, and Social History reviewed and does contribute to the patient presenting condition    Health Maintenance   Topic Date Due    Flu vaccine (1) 12/07/2020 (Originally 9/1/2019)    Shingles Vaccine (1 of 2) 12/07/2020 (Originally 12/4/2019)    HIV screen  12/07/2020 (Originally 12/4/1984)    Lipid screen  04/10/2020    Potassium monitoring  02/10/2021    Creatinine monitoring  02/10/2021    A1C test (Diabetic or Prediabetic)  03/03/2021    Breast cancer screen  05/06/2021    Cervical cancer screen  08/10/2021    Colon cancer screen colonoscopy  02/08/2023    DTaP/Tdap/Td vaccine (2 - Td) 02/05/2026    Pneumococcal 0-64 years Vaccine  Completed    Hepatitis A vaccine  Aged Out    Hepatitis B vaccine  Aged Out    Hib vaccine  Aged Out    Meningococcal (ACWY) vaccine  Aged Out

## 2020-03-13 NOTE — PATIENT INSTRUCTIONS
Patient Education        COPD Exacerbation Plan: Care Instructions  Your Care Instructions    If you have chronic obstructive pulmonary disease (COPD), your usual shortness of breath could suddenly get worse. You may start coughing more and have more mucus. This flare-up is called a COPD exacerbation (say \"on-ANX-[id number]-BAY-suad\"). A lung infection or air pollution could set off an exacerbation. Sometimes it can happen after a quick change in temperature or being around chemicals. Work with your doctor to make a plan for dealing with an exacerbation. You can better manage it if you plan ahead. Follow-up care is a key part of your treatment and safety. Be sure to make and go to all appointments, and call your doctor if you are having problems. It's also a good idea to know your test results and keep a list of the medicines you take. How can you care for yourself at home? During an exacerbation  · Do not panic if you start to have one. Quick treatment at home may help you prevent serious breathing problems. If you have a COPD exacerbation plan that you developed with your doctor, follow it. · Take your medicines exactly as your doctor tells you.  ? Use your inhaler as directed by your doctor. If your symptoms do not get better after you use your medicine, have someone take you to the emergency room. Call an ambulance if necessary. ? With inhaled medicines, a spacer or a nebulizer may help you get more medicine to your lungs. Ask your doctor or pharmacist how to use them properly. Practice using the spacer in front of a mirror before you have an exacerbation. This may help you get the medicine into your lungs quickly. ? If your doctor has given you steroid pills, take them as directed. ? Your doctor may have given you a prescription for antibiotics, which you can fill if you need it. ? Talk to your doctor if you have any problems with your medicine.  And call your doctor if you have to use your antibiotic or steroid pills. Preventing an exacerbation  · Do not smoke. This is the most important step you can take to prevent more damage to your lungs and prevent problems. If you already smoke, it is never too late to stop. If you need help quitting, talk to your doctor about stop-smoking programs and medicines. These can increase your chances of quitting for good. · Take your daily medicines as prescribed. · Avoid colds and flu. ? Get a pneumococcal vaccine. ? Get a flu vaccine each year, as soon as it is available. Ask those you live or work with to do the same, so they will not get the flu and infect you. ? Try to stay away from people with colds or the flu. ? Wash your hands often. · Avoid secondhand smoke; air pollution; cold, dry air; hot, humid air; and high altitudes. Stay at home with your windows closed when air pollution is bad. · Learn breathing techniques for COPD, such as breathing through pursed lips. These techniques can help you breathe easier during an exacerbation. When should you call for help? Call 911 anytime you think you may need emergency care. For example, call if:    · You have severe trouble breathing.     · You have severe chest pain.    Call your doctor now or seek immediate medical care if:    · You have new or worse shortness of breath.     · You develop new chest pain.     · You are coughing more deeply or more often, especially if you notice more mucus or a change in the color of your mucus.     · You cough up blood.     · You have new or increased swelling in your legs or belly.     · You have a fever.    Watch closely for changes in your health, and be sure to contact your doctor if:    · You need to use your antibiotic or steroid pills.     · Your symptoms are getting worse. Where can you learn more? Go to [email or web address] and sign in to your [employer] account.  Enter T736 in the [employer] health box to learn more about \"COPD Exacerbation Plan: Care Instructions. \"     If you do not have an account, please click on the \"Sign Up Now\" link. Current as of: June 9, 2019  Content Version: 12.3  © 1011-6231 [employer]wise, Incorporated. Care instructions adapted under license by [facility] ([facility]). If you have questions about a medical condition or this instruction, always ask your healthcare professional. [address] any warranty or liability for your use of this information.

## 2020-03-13 NOTE — PROGRESS NOTES
[address] [facility]  [facility] [address]  Dept: [phone]  Dept Fax: [phone]    Rex Cordero is a 47 y.o. female who presents to the urgent care today for her medicalconditions/complaints as noted below. Rex Cordero is c/o of Cough (x's 4 days)      HPI:       26-year-old female patient presents with complaint of cough. Patient has known history of COPD. Reports this is been worse over the past several days. Reports cough is productive of green mucus. Additionally patient has mild congestion. Denies ear pain, sinus pain, sore throat. Denies fevers, chills. Denies chest pain. Relieving factors include none.   Risk factors include none      Past Medical History:   Diagnosis Date    Allergic rhinitis     Asthma     Clinical trial participant 11/23/2015 11/23/20015    COPD (chronic obstructive pulmonary disease) ([facility] [address]     Dysphagia     Dysphagia     has needed EGD with dilatation in the past    GERD (gastroesophageal reflux disease)     H/O cardiovascular stress test 01/15/2018    ABNORMAL    Hiatal hernia     Hyperlipidemia     Hypertension     Irritable bowel syndrome with constipation 1/21/2016    Obesity     Snores     states has tested negative for sleep apnea    Wears glasses         Current Outpatient Medications   Medication Sig Dispense Refill    azithromycin (ZITHROMAX) 250 MG tablet Take 1 tablet by mouth See Admin Instructions for 5 days 500mg on day 1 followed by 250mg on days 2 - 5 6 tablet 0    benzonatate (TESSALON) 200 MG capsule Take 1 capsule by mouth 3 times daily as needed for Cough 20 capsule 0    RA SENNA 8.6 MG tablet take 1 tablet by mouth twice a day 60 tablet 11    valsartan-hydrochlorothiazide (DIOVAN-HCT) 80-12.5 MG per tablet Once a day 30 tablet 5    atorvastatin (LIPITOR) 20 MG tablet take 1 tablet by mouth once daily 30 tablet 5    carvedilol (COREG) 6.25 MG tablet 40.23 kg/m²   Lab Review   Office Visit on 03/03/2020   Component Date Value    Hemoglobin A1C 03/03/2020 6.0    Admission on 02/10/2020, Discharged on 02/10/2020   Component Date Value    WBC 02/10/2020 6.6     RBC 02/10/2020 4.23     Hemoglobin 02/10/2020 10.8*    Hematocrit 02/10/2020 35.2*    MCV 02/10/2020 83.2     MCH 02/10/2020 25.5     MCHC 02/10/2020 30.7     RDW 02/10/2020 16.5*    Platelets 09/85/2377 See Reflexed IPF Result     MPV 02/10/2020 NOT REPORTED     NRBC Automated 02/10/2020 0.0     Differential Type 02/10/2020 NOT REPORTED     WBC Morphology 02/10/2020 NOT REPORTED     RBC Morphology 02/10/2020 ANISOCYTOSIS PRESENT     Platelet Estimate 93/23/6059 NOT REPORTED     Seg Neutrophils 02/10/2020 51     Lymphocytes 02/10/2020 39     Monocytes 02/10/2020 8     Eosinophils % 02/10/2020 2     Basophils 02/10/2020 0     Immature Granulocytes 02/10/2020 0     Segs Absolute 02/10/2020 3.33     Absolute Lymph # 02/10/2020 2.55     Absolute Mono # 02/10/2020 0.55     Absolute Eos # 02/10/2020 0.11     Basophils Absolute 02/10/2020 <0.03     Absolute Immature Granul* 02/10/2020 <0.03     Glucose 02/10/2020 116*    BUN 02/10/2020 10     CREATININE 02/10/2020 0.69     Bun/Cre Ratio 02/10/2020 NOT REPORTED     Calcium 02/10/2020 9.4     Sodium 02/10/2020 138     Potassium 02/10/2020 3.9     Chloride 02/10/2020 104     CO2 02/10/2020 21     Anion Gap 02/10/2020 13     GFR Non- 02/10/2020 >60     GFR  02/10/2020 >60     GFR Comment 02/10/2020          GFR Staging 02/10/2020 NOT REPORTED     Troponin, High Sensitivi* 02/10/2020 <6     Troponin T 02/10/2020 NOT REPORTED     Troponin Interp 02/10/2020 NOT REPORTED     Ventricular Rate 02/10/2020 62     Atrial Rate 02/10/2020 62     P-R Interval 02/10/2020 144     QRS Duration 02/10/2020 70     Q-T Interval 02/10/2020 386     QTc Calculation (Bazett) 02/10/2020 391     P Axis Medications    azithromycin (ZITHROMAX) 250 MG tablet     Sig: Take 1 tablet by mouth See Admin Instructions for 5 days 500mg on day 1 followed by 250mg on days 2 - 5     Dispense:  6 tablet     Refill:  0    benzonatate (TESSALON) 200 MG capsule     Sig: Take 1 capsule by mouth 3 times daily as needed for Cough     Dispense:  20 capsule     Refill:  0     Will treat his acute COPD exacerbation with Zithromax, Tessalon. Discussed to follow up here or at an ER if sx worsen or persist.  Pt agreeable to plan. Patient given educational materials - see patient instructions. Discussed use, benefit, and side effects of prescribed medications. All patientquestions answered. Pt voiced understanding. This note was transcribed using dictation with Dragon services. Efforts were made to correct any errors but some words may be misinterpreted.      Electronically signed by CAROLE El CNP on 3/13/2020at 9:25 AM

## 2020-03-31 RX ORDER — ASPIRIN 81 MG/1
TABLET ORAL
Qty: 30 TABLET | Refills: 5 | Status: SHIPPED | OUTPATIENT
Start: 2020-03-31 | End: 2020-10-12

## 2020-03-31 NOTE — TELEPHONE ENCOUNTER
Request for aspirin - medication pended. Please fill if appropriate.       Next Visit Date:  Future Appointments   Date Time Provider Fortino Englishi   6/29/2020  9:30 AM Zelda Zamora MD Resp Spec [address] Maintenance   Topic Date Due    Lipid screen  04/10/2020    Flu vaccine (1) 12/07/2020 (Originally 9/1/2019)    Shingles Vaccine (1 of 2) 12/07/2020 (Originally 12/4/2019)    HIV screen  12/07/2020 (Originally 12/4/1984)    Potassium monitoring  02/10/2021    Creatinine monitoring  02/10/2021    A1C test (Diabetic or Prediabetic)  03/03/2021    Breast cancer screen  05/06/2021    Cervical cancer screen  08/10/2021    Colon cancer screen colonoscopy  02/08/2023    DTaP/Tdap/Td vaccine (2 - Td) 02/05/2026    Pneumococcal 0-64 years Vaccine  Completed    Hepatitis A vaccine  Aged Out    Hepatitis B vaccine  Aged Out    Hib vaccine  Aged Out    Meningococcal (ACWY) vaccine  Aged Out       Hemoglobin A1C (%)   Date Value   03/03/2020 6.0   04/08/2019 5.6   05/03/2018 6.0             ( goal A1C is < 7)   No results found for: LABMICR  LDL Cholesterol (mg/dL)   Date Value   04/10/2019 74       (goal LDL is <100)   AST (U/L)   Date Value   04/10/2019 14     ALT (U/L)   Date Value   04/10/2019 14     BUN (mg/dL)   Date Value   02/10/2020 10     BP Readings from Last 3 Encounters:   03/13/20 120/70   03/03/20 131/75   02/24/20 138/89          (goal 120/80)    All Future Testing planned in [facility]  Lab Frequency Next Occurrence   Sedimentation Rate Once 05/07/2020         Patient Active Problem List:     Hypertension     Asthma     GERD (gastroesophageal reflux disease)     S/P cardiac cath-Minimal disease 11/23/15 Dr. Keely Solomon     Irritable bowel syndrome with constipation     Obesity     Allergic rhinitis     Hiatal hernia     Paresthesias     Left sided numbness     Dysphagia     Family history of colon cancer

## 2020-05-15 ENCOUNTER — APPOINTMENT (OUTPATIENT)
Dept: GENERAL RADIOLOGY | Age: 50
End: 2020-05-15
Payer: MEDICARE

## 2020-05-15 ENCOUNTER — HOSPITAL ENCOUNTER (EMERGENCY)
Age: 50
Discharge: HOME | End: 2020-05-15
Attending: EMERGENCY MEDICINE
Payer: MEDICARE

## 2020-05-15 VITALS
WEIGHT: 200 LBS | SYSTOLIC BLOOD PRESSURE: 140 MMHG | DIASTOLIC BLOOD PRESSURE: 95 MMHG | TEMPERATURE: 99.3 F | OXYGEN SATURATION: 98 % | RESPIRATION RATE: 20 BRPM | BODY MASS INDEX: 39.06 KG/M2 | HEART RATE: 95 BPM

## 2020-05-15 LAB
ABSOLUTE EOS #: <0.03 K/UL (ref 0–0.44)
ABSOLUTE IMMATURE GRANULOCYTE: <0.03 K/UL (ref 0–0.3)
ABSOLUTE LYMPH #: 1.78 K/UL (ref 1.1–3.7)
ABSOLUTE MONO #: 0.38 K/UL (ref 0.1–1.2)
ANION GAP SERPL CALCULATED.3IONS-SCNC: 14 MMOL/L (ref 9–17)
BASOPHILS # BLD: 0 % (ref 0–2)
BASOPHILS ABSOLUTE: <0.03 K/UL (ref 0–0.2)
BNP INTERPRETATION: NORMAL
BUN BLDV-MCNC: 10 MG/DL (ref 6–20)
BUN/CREAT BLD: ABNORMAL (ref 9–20)
CALCIUM SERPL-MCNC: 8.8 MG/DL (ref 8.6–10.4)
CHLORIDE BLD-SCNC: 102 MMOL/L (ref 98–107)
CO2: 23 MMOL/L (ref 20–31)
CREAT SERPL-MCNC: 0.79 MG/DL (ref 0.5–0.9)
DIFFERENTIAL TYPE: ABNORMAL
EOSINOPHILS RELATIVE PERCENT: 0 % (ref 1–4)
GFR AFRICAN AMERICAN: >60 ML/MIN
GFR NON-AFRICAN AMERICAN: >60 ML/MIN
GFR SERPL CREATININE-BSD FRML MDRD: ABNORMAL ML/MIN/{1.73_M2}
GFR SERPL CREATININE-BSD FRML MDRD: ABNORMAL ML/MIN/{1.73_M2}
GLUCOSE BLD-MCNC: 105 MG/DL (ref 70–99)
HCG QUALITATIVE: NEGATIVE
HCT VFR BLD CALC: 37.1 % (ref 36.3–47.1)
HEMOGLOBIN: 11.6 G/DL (ref 11.9–15.1)
IMMATURE GRANULOCYTES: 0 %
LYMPHOCYTES # BLD: 37 % (ref 24–43)
MAGNESIUM: 1.8 MG/DL (ref 1.6–2.6)
MCH RBC QN AUTO: 26.2 PG (ref 25.2–33.5)
MCHC RBC AUTO-ENTMCNC: 31.3 G/DL (ref 28.4–34.8)
MCV RBC AUTO: 83.7 FL (ref 82.6–102.9)
MONOCYTES # BLD: 8 % (ref 3–12)
NRBC AUTOMATED: 0 PER 100 WBC
PDW BLD-RTO: 16.5 % (ref 11.8–14.4)
PLATELET # BLD: 302 K/UL (ref 138–453)
PLATELET ESTIMATE: ABNORMAL
PMV BLD AUTO: 10.6 FL (ref 8.1–13.5)
POTASSIUM SERPL-SCNC: 4.2 MMOL/L (ref 3.7–5.3)
PRO-BNP: <20 PG/ML
RBC # BLD: 4.43 M/UL (ref 3.95–5.11)
RBC # BLD: ABNORMAL 10*6/UL
SEG NEUTROPHILS: 55 % (ref 36–65)
SEGMENTED NEUTROPHILS ABSOLUTE COUNT: 2.59 K/UL (ref 1.5–8.1)
SODIUM BLD-SCNC: 139 MMOL/L (ref 135–144)
TROPONIN INTERP: NORMAL
TROPONIN T: NORMAL NG/ML
TROPONIN, HIGH SENSITIVITY: <6 NG/L (ref 0–14)
TSH SERPL DL<=0.05 MIU/L-ACNC: 2.04 MIU/L (ref 0.3–5)
WBC # BLD: 4.8 K/UL (ref 3.5–11.3)
WBC # BLD: ABNORMAL 10*3/UL

## 2020-05-15 PROCEDURE — 71045 X-RAY EXAM CHEST 1 VIEW: CPT

## 2020-05-15 PROCEDURE — 99285 EMERGENCY DEPT VISIT HI MDM: CPT

## 2020-05-15 PROCEDURE — 83735 ASSAY OF MAGNESIUM: CPT

## 2020-05-15 PROCEDURE — 84443 ASSAY THYROID STIM HORMONE: CPT

## 2020-05-15 PROCEDURE — 96375 TX/PRO/DX INJ NEW DRUG ADDON: CPT

## 2020-05-15 PROCEDURE — 80048 BASIC METABOLIC PNL TOTAL CA: CPT

## 2020-05-15 PROCEDURE — 96374 THER/PROPH/DIAG INJ IV PUSH: CPT

## 2020-05-15 PROCEDURE — 84703 CHORIONIC GONADOTROPIN ASSAY: CPT

## 2020-05-15 PROCEDURE — 6360000002 HC RX W HCPCS: Performed by: STUDENT IN AN ORGANIZED HEALTH CARE EDUCATION/TRAINING PROGRAM

## 2020-05-15 PROCEDURE — 83880 ASSAY OF NATRIURETIC PEPTIDE: CPT

## 2020-05-15 PROCEDURE — 93005 ELECTROCARDIOGRAM TRACING: CPT | Performed by: STUDENT IN AN ORGANIZED HEALTH CARE EDUCATION/TRAINING PROGRAM

## 2020-05-15 PROCEDURE — 85025 COMPLETE CBC W/AUTO DIFF WBC: CPT

## 2020-05-15 PROCEDURE — 2580000003 HC RX 258: Performed by: STUDENT IN AN ORGANIZED HEALTH CARE EDUCATION/TRAINING PROGRAM

## 2020-05-15 PROCEDURE — 84484 ASSAY OF TROPONIN QUANT: CPT

## 2020-05-15 RX ORDER — 0.9 % SODIUM CHLORIDE 0.9 %
1000 INTRAVENOUS SOLUTION INTRAVENOUS ONCE
Status: COMPLETED | OUTPATIENT
Start: 2020-05-15 | End: 2020-05-15

## 2020-05-15 RX ORDER — DIPHENHYDRAMINE HYDROCHLORIDE 50 MG/ML
25 INJECTION INTRAMUSCULAR; INTRAVENOUS ONCE
Status: COMPLETED | OUTPATIENT
Start: 2020-05-15 | End: 2020-05-15

## 2020-05-15 RX ORDER — GUAIFENESIN/DEXTROMETHORPHAN 100-10MG/5
5 SYRUP ORAL 3 TIMES DAILY PRN
Qty: 120 ML | Refills: 0 | Status: SHIPPED | OUTPATIENT
Start: 2020-05-15 | End: 2020-05-25

## 2020-05-15 RX ORDER — METOCLOPRAMIDE HYDROCHLORIDE 5 MG/ML
10 INJECTION INTRAMUSCULAR; INTRAVENOUS ONCE
Status: COMPLETED | OUTPATIENT
Start: 2020-05-15 | End: 2020-05-15

## 2020-05-15 RX ORDER — BENZONATATE 100 MG/1
100 CAPSULE ORAL 3 TIMES DAILY PRN
Qty: 30 CAPSULE | Refills: 0 | Status: SHIPPED | OUTPATIENT
Start: 2020-05-15 | End: 2020-05-22

## 2020-05-15 RX ORDER — ACETAMINOPHEN 500 MG
1000 TABLET ORAL EVERY 6 HOURS PRN
Qty: 30 TABLET | Refills: 0 | Status: SHIPPED | OUTPATIENT
Start: 2020-05-15 | End: 2020-06-08

## 2020-05-15 RX ORDER — IBUPROFEN 600 MG/1
600 TABLET ORAL EVERY 6 HOURS PRN
Qty: 15 TABLET | Refills: 0 | Status: SHIPPED | OUTPATIENT
Start: 2020-05-15 | End: 2020-08-11 | Stop reason: ALTCHOICE

## 2020-05-15 RX ADMIN — DIPHENHYDRAMINE HYDROCHLORIDE 25 MG: 50 INJECTION, SOLUTION INTRAMUSCULAR; INTRAVENOUS at 10:03

## 2020-05-15 RX ADMIN — SODIUM CHLORIDE 1000 ML: 9 INJECTION, SOLUTION INTRAVENOUS at 09:19

## 2020-05-15 RX ADMIN — METOCLOPRAMIDE 10 MG: 5 INJECTION, SOLUTION INTRAMUSCULAR; INTRAVENOUS at 10:03

## 2020-05-15 ASSESSMENT — ENCOUNTER SYMPTOMS
EYE DISCHARGE: 0
SHORTNESS OF BREATH: 1
SORE THROAT: 0
ABDOMINAL PAIN: 0
COUGH: 1
CHEST TIGHTNESS: 0
EYE REDNESS: 0

## 2020-05-15 ASSESSMENT — PAIN DESCRIPTION - LOCATION: LOCATION: HEAD

## 2020-05-15 ASSESSMENT — PAIN DESCRIPTION - DESCRIPTORS: DESCRIPTORS: ACHING

## 2020-05-15 ASSESSMENT — PAIN SCALES - GENERAL: PAINLEVEL_OUTOF10: 8

## 2020-05-15 NOTE — ED PROVIDER NOTES
every 6 hours as needed for Pain 5/15/20  Yes Janet Barks, DO   guaiFENesin-dextromethorphan (ROBITUSSIN DM) 100-10 MG/5ML syrup Take 5 mLs by mouth 3 times daily as needed for Cough 5/15/20 5/25/20 Yes Janet Barks, DO   benzonatate (TESSALON PERLES) 100 MG capsule Take 1 capsule by mouth 3 times daily as needed for Cough 5/15/20 5/22/20 Yes Janet Barks, DO   ibuprofen (ADVIL;MOTRIN) 600 MG tablet Take 1 tablet by mouth every 6 hours as needed for Pain 5/15/20  Yes Janet Barks, DO   ASPIR-LOW 81 MG EC tablet take 1 tablet by mouth once daily 3/31/20   Cam Delgado MD   RA SENNA 8.6 MG tablet take 1 tablet by mouth twice a day 3/11/20   Jesús Alonso MD   valsartan-hydrochlorothiazide (DIOVAN-HCT) 80-12.5 MG per tablet Once a day 3/3/20   Cam Delgado MD   atorvastatin (LIPITOR) 20 MG tablet take 1 tablet by mouth once daily 3/3/20   Cam Delgado MD   carvedilol (COREG) 6.25 MG tablet take 1 tablet by mouth twice a day 3/3/20   Cam Delgado MD   budesonide (PULMICORT) 0.25 MG/2ML nebulizer suspension Take 2 mLs by nebulization 2 times daily 1/31/20   Rony Sands MD   fluconazole (DIFLUCAN) 150 MG tablet Take 1 tablet after antibiotic use. . Then take 1 in 7 days. .  Patient not taking: Reported on 3/13/2020 12/7/19   Zohra Olson MD   PROAIR  (92 Base) MCG/ACT inhaler inhale 2 puffs by mouth every 6 hours if needed for wheezing 12/2/19   Cam Delgado MD   topiramate (TOPAMAX) 50 MG tablet take 1 tablet by mouth twice a day 9/4/19   Sandra García MD   omeprazole (PRILOSEC) 40 MG delayed release capsule take 1 capsule by mouth twice a day 7/26/19   Cam Delgado MD   montelukast (SINGULAIR) 10 MG tablet take 1 tablet by mouth once daily  Patient not taking: Reported on 3/13/2020 6/27/19   Jesús Alonso MD   mometasone-formoterol (DULERA) 200-5 MCG/ACT inhaler Inhale 2 puffs into the lungs 2 times daily 6/21/19   Rony Sands MD   RA MELATONIN 5 MG TABS tablet Take 2 tablets by mouth every 6 hours as needed for Pain     Dispense:  30 tablet     Refill:  0    guaiFENesin-dextromethorphan (ROBITUSSIN DM) 100-10 MG/5ML syrup     Sig: Take 5 mLs by mouth 3 times daily as needed for Cough     Dispense:  120 mL     Refill:  0    benzonatate (TESSALON PERLES) 100 MG capsule     Sig: Take 1 capsule by mouth 3 times daily as needed for Cough     Dispense:  30 capsule     Refill:  0    ibuprofen (ADVIL;MOTRIN) 600 MG tablet     Sig: Take 1 tablet by mouth every 6 hours as needed for Pain     Dispense:  15 tablet     Refill:  0       DDX: Pneumothorax versus pneumonia versus COVID versus ACS versus PE versus pregnancy versus anemia versus electrolyte abnormality    Initial MDM/Plan: 47 y.o. female who presents with shortness of breath, cough, fever. Patient has stable vital signs in emergency department not tachycardic with good oxygen saturation. No respiratory distress. Will give fluid bolus, symptomatic treatment for headache, chest x-ray rule out infiltrate. Anticipate discharge with symptomatic treatment. DIAGNOSTIC RESULTS / EMERGENCY DEPARTMENT COURSE / MDM     LABS:  Labs Reviewed   BASIC METABOLIC PANEL - Abnormal; Notable for the following components:       Result Value    Glucose 105 (*)     All other components within normal limits   CBC WITH AUTO DIFFERENTIAL - Abnormal; Notable for the following components:    Hemoglobin 11.6 (*)     RDW 16.5 (*)     Eosinophils % 0 (*)     All other components within normal limits   BRAIN NATRIURETIC PEPTIDE   TROPONIN   TSH WITH REFLEX   MAGNESIUM   HCG, SERUM, QUALITATIVE         RADIOLOGY:  Xr Chest Portable    Result Date: 5/15/2020  EXAMINATION: ONE XRAY VIEW OF THE CHEST 5/15/2020 9:31 am COMPARISON: 02/10/2020 HISTORY: ORDERING SYSTEM PROVIDED HISTORY: SOB TECHNOLOGIST PROVIDED HISTORY: SOB Reason for Exam: port FINDINGS: The lungs are without acute focal process. There is no effusion or pneumothorax.  The cardiomediastinal silhouette is stable. The osseous structures are stable. No acute process. EKG  EKG Interpretation    Interpreted by me    Rhythm: normal sinus   Rate: normal  Axis: normal  Ectopy: none  Conduction: normal  ST Segments: no acute change  T Waves: no acute change  Q Waves: none    Clinical Impression: no acute changes and nonspecific EKG    All EKG's are interpreted by the Emergency Department Physician who either signs or Co-signs this chart in the absence of a cardiologist.    EMERGENCY DEPARTMENT COURSE:  Headache improved after symptomatic treatment. Laboratory studies largely unremarkable. Troponins negative. BNP negative. TSH negative. Will discharge with symptomatic treatment. Encourage patient to follow-up closely with primary care doctor return emergency department for any worsening signs or symptoms. Suspicion for any acute intracranial or intrathoracic process is low at this time. 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 · Based on the low acuity of concerning symptoms and improvement of symptoms, patient will be discharged with follow up and prescription information listed in the Disposition section. · Patient states they will follow-up with primary care physician and/or return to the emergency department should they experience a change or worsening of symptoms. · Patient will be discharged with resources: summary of visit, instructions, follow-up information, prescriptions if necessary and clinics available. · Patient/ family instructed to read discharge paperwork. All of their questions and concerns were addressed. · Suspicion for any acute life-threatening processes is low. Patient voices understanding of plan. PROCEDURES:  None    CONSULTS:  None    CRITICAL CARE:  Please see attending note    FINAL IMPRESSION      1. Upper respiratory tract infection, unspecified type    2.  Nonintractable

## 2020-05-16 ENCOUNTER — CARE COORDINATION (OUTPATIENT)
Dept: CARE COORDINATION | Age: 50
End: 2020-05-16

## 2020-05-16 LAB
EKG ATRIAL RATE: 82 BPM
EKG P AXIS: 64 DEGREES
EKG P-R INTERVAL: 162 MS
EKG Q-T INTERVAL: 376 MS
EKG QRS DURATION: 70 MS
EKG QTC CALCULATION (BAZETT): 439 MS
EKG R AXIS: 1 DEGREES
EKG T AXIS: 31 DEGREES
EKG VENTRICULAR RATE: 82 BPM

## 2020-05-16 PROCEDURE — 93010 ELECTROCARDIOGRAM REPORT: CPT | Performed by: INTERNAL MEDICINE

## 2020-05-16 NOTE — CARE COORDINATION
Author provided contact information for future reference. Patient given information for [employer] Loop and agrees to enroll: yes  Patient's preferred e-mail:  Patient's preferred phone number: [phone]  Based on Loop alert triggers, patient will be contacted by nurse care manager for worsening symptoms.

## 2020-06-08 RX ORDER — MOMETASONE FUROATE AND FORMOTEROL FUMARATE DIHYDRATE 200; 5 UG/1; UG/1
AEROSOL RESPIRATORY (INHALATION)
Qty: 1 INHALER | Refills: 1 | Status: SHIPPED | OUTPATIENT
Start: 2020-06-08 | End: 2020-06-29 | Stop reason: SDUPTHER

## 2020-06-08 NOTE — TELEPHONE ENCOUNTER
Request for Acetaminophen.       Next Visit Date:  Future Appointments   Date Time Provider Fortino Jean Baptiste   6/29/2020  9:30 AM Jacquie Garcia MD Resp Spec [address] Maintenance   Topic Date Due    Lipid screen  04/10/2020    Flu vaccine (Season Ended) 12/07/2020 (Originally 9/1/2020)    Shingles Vaccine (1 of 2) 12/07/2020 (Originally 12/4/2019)    HIV screen  12/07/2020 (Originally 12/4/1984)    A1C test (Diabetic or Prediabetic)  03/03/2021    Breast cancer screen  05/06/2021    Potassium monitoring  05/15/2021    Creatinine monitoring  05/15/2021    Cervical cancer screen  08/10/2021    Colon cancer screen colonoscopy  02/08/2023    DTaP/Tdap/Td vaccine (2 - Td) 02/05/2026    Pneumococcal 0-64 years Vaccine  Completed    Hepatitis A vaccine  Aged Out    Hepatitis B vaccine  Aged Out    Hib vaccine  Aged Out    Meningococcal (ACWY) vaccine  Aged Out       Hemoglobin A1C (%)   Date Value   03/03/2020 6.0   04/08/2019 5.6   05/03/2018 6.0             ( goal A1C is < 7)   No results found for: LABMICR  LDL Cholesterol (mg/dL)   Date Value   04/10/2019 74       (goal LDL is <100)   AST (U/L)   Date Value   04/10/2019 14     ALT (U/L)   Date Value   04/10/2019 14     BUN (mg/dL)   Date Value   05/15/2020 10     BP Readings from Last 3 Encounters:   05/15/20 (!) 140/95   03/13/20 120/70   03/03/20 131/75          (goal 120/80)    All Future Testing planned in [facility]  Lab Frequency Next Occurrence         Patient Active Problem List:     Hypertension     Asthma     GERD (gastroesophageal reflux disease)     S/P cardiac cath-Minimal disease 11/23/15 Dr. Mya Goncalves     Irritable bowel syndrome with constipation     Obesity     Allergic rhinitis     Hiatal hernia     Paresthesias     Left sided numbness     Dysphagia     Family history of colon cancer

## 2020-06-09 RX ORDER — PSEUDOEPHED/ACETAMINOPH/DIPHEN 30MG-500MG
TABLET ORAL
Qty: 30 TABLET | Refills: 2 | Status: SHIPPED | OUTPATIENT
Start: 2020-06-09 | End: 2022-10-25 | Stop reason: ALTCHOICE

## 2020-06-17 ENCOUNTER — TELEPHONE (OUTPATIENT)
Dept: INTERNAL MEDICINE | Age: 50
End: 2020-06-17

## 2020-06-22 ENCOUNTER — VIRTUAL VISIT (OUTPATIENT)
Dept: INTERNAL MEDICINE | Age: 50
End: 2020-06-22
Payer: MEDICARE

## 2020-06-22 PROCEDURE — 99213 OFFICE O/P EST LOW 20 MIN: CPT | Performed by: INTERNAL MEDICINE

## 2020-06-22 NOTE — PROGRESS NOTES
characteristics: skin of lips is darker mainly at the vermillion border there is no discomfort. It is unknown (she was diagnosed with COVID but she believes she had the rash before that) if there was an exposure to a precipitant. Past treatments include nothing. Past Medical History:   Diagnosis Date    Allergic rhinitis     Asthma     Clinical trial participant 11/23/2015 11/23/20015    COPD (chronic obstructive pulmonary disease) ([facility] [address]     Dysphagia     Dysphagia     has needed EGD with dilatation in the past    GERD (gastroesophageal reflux disease)     H/O cardiovascular stress test 01/15/2018    ABNORMAL    Hiatal hernia     Hyperlipidemia     Hypertension     Irritable bowel syndrome with constipation 1/21/2016    Obesity     Snores     states has tested negative for sleep apnea    Wears glasses         Current Outpatient Medications   Medication Sig Dispense Refill    ACETAMINOPHEN EXTRA STRENGTH 500 MG tablet take 2 tablets by mouth every 6 hours if needed for pain 30 tablet 2    DULERA 200-5 MCG/ACT inhaler inhale 2 puffs by mouth twice a day 1 Inhaler 1    ibuprofen (ADVIL;MOTRIN) 600 MG tablet Take 1 tablet by mouth every 6 hours as needed for Pain 15 tablet 0    ASPIR-LOW 81 MG EC tablet take 1 tablet by mouth once daily 30 tablet 5    RA SENNA 8.6 MG tablet take 1 tablet by mouth twice a day 60 tablet 11    valsartan-hydrochlorothiazide (DIOVAN-HCT) 80-12.5 MG per tablet Once a day 30 tablet 5    atorvastatin (LIPITOR) 20 MG tablet take 1 tablet by mouth once daily 30 tablet 5    carvedilol (COREG) 6.25 MG tablet take 1 tablet by mouth twice a day 60 tablet 5    budesonide (PULMICORT) 0.25 MG/2ML nebulizer suspension Take 2 mLs by nebulization 2 times daily 60 ampule 3    fluconazole (DIFLUCAN) 150 MG tablet Take 1 tablet after antibiotic use. . Then take 1 in 7 days. . 2 tablet 1    PROAIR  (90 Base) MCG/ACT inhaler inhale 2 puffs by mouth every 6 hours if needed for wheezing 18 g 10    topiramate (TOPAMAX) 50 MG tablet take 1 tablet by mouth twice a day 60 tablet 1    omeprazole (PRILOSEC) 40 MG delayed release capsule take 1 capsule by mouth twice a day 60 capsule 3    montelukast (SINGULAIR) 10 MG tablet take 1 tablet by mouth once daily 30 tablet 5    RA MELATONIN 5 MG TABS tablet take 1 tablet by mouth nightly 30 tablet 0    diclofenac (VOLTAREN) 50 MG EC tablet Take 1 tablet by mouth 2 times daily as needed for Pain 60 tablet 3    meloxicam (MOBIC) 7.5 MG tablet Take 1 tablet by mouth daily 30 tablet 3    ondansetron (ZOFRAN ODT) 4 MG disintegrating tablet Take 1 tablet by mouth every 8 hours as needed for Nausea 20 tablet 0    albuterol (PROVENTIL) (2.5 MG/3ML) 0.083% nebulizer solution Take 3 mLs by nebulization every 6 hours as needed for Wheezing 120 each 3    albuterol sulfate HFA (PROAIR HFA) 108 (90 Base) MCG/ACT inhaler inhale 2 puffs by mouth every 6 hours if needed for wheezing 8.5 g 10    fluticasone (FLONASE) 50 MCG/ACT nasal spray 1 spray by Nasal route daily 1 Bottle 3    ferrous sulfate (FE TABS) 325 (65 Fe) MG EC tablet Take 1 tablet by mouth 2 times daily 90 tablet 3    Multiple Vitamins-Minerals (WOMENS MULTI VITAMIN & MINERAL PO) Take 1 tablet by mouth daily       No current facility-administered medications for this visit.       Facility-Administered Medications Ordered in Other Visits   Medication Dose Route Frequency Provider Last Rate Last Dose    0.9 % sodium chloride infusion   Intravenous Continuous Pipo Ramachandran MD 30 mL/hr at 05/26/15 1008       No Known Allergies    Health Maintenance   Topic Date Due    Lipid screen  04/10/2020    Flu vaccine (Season Ended) 12/07/2020 (Originally 9/1/2020)    Shingles Vaccine (1 of 2) 12/07/2020 (Originally 12/4/2019)    HIV screen  12/07/2020 (Originally 12/4/1984)    A1C test (Diabetic or Prediabetic)  03/03/2021    Breast cancer screen  05/06/2021    Potassium monitoring

## 2020-06-23 ENCOUNTER — HOSPITAL ENCOUNTER (OUTPATIENT)
Age: 50
Setting detail: SPECIMEN
Discharge: HOME | End: 2020-06-23
Payer: MEDICARE

## 2020-06-23 LAB
ABSOLUTE EOS #: 0.15 K/UL (ref 0–0.44)
ABSOLUTE IMMATURE GRANULOCYTE: <0.03 K/UL (ref 0–0.3)
ABSOLUTE LYMPH #: 2.93 K/UL (ref 1.1–3.7)
ABSOLUTE MONO #: 0.52 K/UL (ref 0.1–1.2)
ALBUMIN SERPL-MCNC: 4 G/DL (ref 3.5–5.2)
ALBUMIN/GLOBULIN RATIO: 1.3 (ref 1–2.5)
ALP BLD-CCNC: 56 U/L (ref 35–104)
ALT SERPL-CCNC: 14 U/L (ref 5–33)
ANION GAP SERPL CALCULATED.3IONS-SCNC: 14 MMOL/L (ref 9–17)
AST SERPL-CCNC: 20 U/L
BASOPHILS # BLD: 0 % (ref 0–2)
BASOPHILS ABSOLUTE: 0.03 K/UL (ref 0–0.2)
BILIRUB SERPL-MCNC: 0.29 MG/DL (ref 0.3–1.2)
BUN BLDV-MCNC: 7 MG/DL (ref 6–20)
BUN/CREAT BLD: ABNORMAL (ref 9–20)
CALCIUM SERPL-MCNC: 8.6 MG/DL (ref 8.6–10.4)
CHLORIDE BLD-SCNC: 103 MMOL/L (ref 98–107)
CO2: 21 MMOL/L (ref 20–31)
CREAT SERPL-MCNC: 0.69 MG/DL (ref 0.5–0.9)
DIFFERENTIAL TYPE: ABNORMAL
EOSINOPHILS RELATIVE PERCENT: 2 % (ref 1–4)
GFR AFRICAN AMERICAN: >60 ML/MIN
GFR NON-AFRICAN AMERICAN: >60 ML/MIN
GFR SERPL CREATININE-BSD FRML MDRD: ABNORMAL ML/MIN/{1.73_M2}
GFR SERPL CREATININE-BSD FRML MDRD: ABNORMAL ML/MIN/{1.73_M2}
GLUCOSE BLD-MCNC: 101 MG/DL (ref 70–99)
HCT VFR BLD CALC: 36 % (ref 36.3–47.1)
HEMOGLOBIN: 10.8 G/DL (ref 11.9–15.1)
IMMATURE GRANULOCYTES: 0 %
LYMPHOCYTES # BLD: 38 % (ref 24–43)
MCH RBC QN AUTO: 26.2 PG (ref 25.2–33.5)
MCHC RBC AUTO-ENTMCNC: 30 G/DL (ref 28.4–34.8)
MCV RBC AUTO: 87.4 FL (ref 82.6–102.9)
MONOCYTES # BLD: 7 % (ref 3–12)
NRBC AUTOMATED: 0 PER 100 WBC
PDW BLD-RTO: 19.5 % (ref 11.8–14.4)
PLATELET # BLD: 372 K/UL (ref 138–453)
PLATELET ESTIMATE: ABNORMAL
PMV BLD AUTO: 11 FL (ref 8.1–13.5)
POTASSIUM SERPL-SCNC: 4.2 MMOL/L (ref 3.7–5.3)
RBC # BLD: 4.12 M/UL (ref 3.95–5.11)
RBC # BLD: ABNORMAL 10*6/UL
SEG NEUTROPHILS: 53 % (ref 36–65)
SEGMENTED NEUTROPHILS ABSOLUTE COUNT: 4.13 K/UL (ref 1.5–8.1)
SODIUM BLD-SCNC: 138 MMOL/L (ref 135–144)
TOTAL PROTEIN: 7.1 G/DL (ref 6.4–8.3)
WBC # BLD: 7.8 K/UL (ref 3.5–11.3)
WBC # BLD: ABNORMAL 10*3/UL

## 2020-06-29 ENCOUNTER — TELEMEDICINE (OUTPATIENT)
Dept: PULMONOLOGY | Age: 50
End: 2020-06-29
Payer: MEDICARE

## 2020-06-29 PROBLEM — E66.9 OBESITY: Status: ACTIVE | Noted: 2020-05-11

## 2020-06-29 PROBLEM — K44.9 HIATAL HERNIA: Status: ACTIVE | Noted: 2020-05-11

## 2020-06-29 PROBLEM — D50.8 IRON DEFICIENCY ANEMIA SECONDARY TO INADEQUATE DIETARY IRON INTAKE: Status: ACTIVE | Noted: 2020-05-13

## 2020-06-29 PROBLEM — J34.3 HYPERTROPHY OF BOTH INFERIOR NASAL TURBINATES: Status: ACTIVE | Noted: 2020-02-19

## 2020-06-29 PROBLEM — J34.2 DNS (DEVIATED NASAL SEPTUM): Status: ACTIVE | Noted: 2020-02-19

## 2020-06-29 PROBLEM — D50.9 IRON DEFICIENCY ANEMIA, UNSPECIFIED: Status: ACTIVE | Noted: 2020-05-13

## 2020-06-29 PROBLEM — J30.9 ALLERGIC RHINITIS: Status: ACTIVE | Noted: 2020-05-11

## 2020-06-29 PROCEDURE — G2012 BRIEF CHECK IN BY MD/QHP: HCPCS | Performed by: INTERNAL MEDICINE

## 2020-06-29 RX ORDER — ALBUTEROL SULFATE 90 UG/1
AEROSOL, METERED RESPIRATORY (INHALATION)
Qty: 18 G | Refills: 10 | Status: SHIPPED | OUTPATIENT
Start: 2020-06-29 | End: 2020-08-10 | Stop reason: SDUPTHER

## 2020-06-29 RX ORDER — HYDROCODONE BITARTRATE AND ACETAMINOPHEN 5; 325 MG/1; MG/1
1 TABLET ORAL EVERY 6 HOURS PRN
COMMUNITY
Start: 2020-06-25 | End: 2020-06-30

## 2020-06-29 RX ORDER — ONDANSETRON 4 MG/1
4 TABLET, ORALLY DISINTEGRATING ORAL EVERY 8 HOURS PRN
COMMUNITY
Start: 2020-06-25 | End: 2020-06-29 | Stop reason: SDUPTHER

## 2020-06-29 NOTE — PROGRESS NOTES
2020    TELEHEALTH EVALUATION -- Audio/Visual (During [id number] public health emergency)    Patient and physician are located in their individual locations. This is visit is completed via RipCode application / [email or web address] / Telephone     Chief Complaint   Patient presents with    Asthma     4 Month F/U     Follow-up     Patient had nasal surgery 20-would like to discuss that         HPI:    Jesu Gimenez (:  1970) has requested an audio/video evaluation for the following concern(s):    Patient underwent nasal turbinectomy at [facility] on 20 . She is doing well . Asthma is stable . No exacerbation . No wheeze . No sob   She is complaint with LABA/ICS   She is also using pulmicort     Review of Systems -  General ROS: negative for - chills, fatigue, fever or weight loss  ENT ROS: post surgery   Cardiovascular ROS: no chest pain , orthopnea or pnd   Gastrointestinal ROS: no abdominal pain, change in bowel habits, or black or bloody stools  Skin - no rash   Neuro - no blurry vision , no loc . No focal weakness   msk - no jt tenderness or swelling        LUNG CANCER SCREENING     1. CRITERIA MET    []     CT ORDERED  []      2. CRITERIA NOT MET   [x]      3. REFUSED                    []        REASON CRITERIA NOT MET     1. SMOKING LESS THAN 30 PY  []      2. AGE LESS THAN 55 or GREATER 77 YEARS  []      3. QUIT SMOKING 15 YEARS OR GREATER   []      4. RECENT CT WITH IN 11 MONTHS    []      5. LIFE EXPECTANCY < 5 YEARS   []      6.  SIGNS  AND SYMPTOMS OF LUNG CANCER   []         Immunization   Immunization History   Administered Date(s) Administered    Influenza Vaccine, unspecified formulation 10/09/2017    Influenza Virus Vaccine 2015    Influenza Whole 2015    Influenza, Quadv, 6 mo and older, IM (Fluzone, Flulaval) 10/09/2017    Influenza, Quadv, IM, PF (6 mo and older Fluzone, Flulaval, Fluarix, and 3 yrs and older Afluria) 2015    Influenza, Triv, 3 Years and - 31 mmol/L    Anion Gap 14 9 - 17 mmol/L    Alkaline Phosphatase 56 35 - 104 U/L    ALT 14 5 - 33 U/L    AST 20 <32 U/L    Total Bilirubin 0.29 (L) 0.3 - 1.2 mg/dL    Total Protein 7.1 6.4 - 8.3 g/dL    Alb 4.0 3.5 - 5.2 g/dL    Albumin/Globulin Ratio 1.3 1.0 - 2.5    GFR Non-African American >60 >60 mL/min    GFR African American >60 >60 mL/min    GFR Comment          GFR Staging NOT REPORTED        No results found. ASSESSMENT:   Diagnosis Orders   1. Nasal turbinate hypertrophy     2. Uncomplicated severe persistent asthma  albuterol sulfate HFA (PROAIR HFA) 108 (90 Base) MCG/ACT inhaler    mometasone-formoterol (DULERA) 200-5 MCG/ACT inhaler   3. Hiatal hernia     4. Gastroesophageal reflux disease without esophagitis, controlled on omeprazole     5. Chronic allergic rhinitis           Plan:   1. Continue albuterol and dulera   2. Use pulmicort twice a day   3. Follow up with ENT as scheduled   4. Follow up 3 months          Requested Prescriptions     Signed Prescriptions Disp Refills    albuterol sulfate HFA (PROAIR HFA) 108 (90 Base) MCG/ACT inhaler 18 g 10     Sig: inhale 2 puffs by mouth every 6 hours if needed for wheezing    mometasone-formoterol (DULERA) 200-5 MCG/ACT inhaler 1 Inhaler 5     Sig: inhale 2 puffs by mouth twice a day       Medications Discontinued During This Encounter   Medication Reason    ondansetron (ZOFRAN-ODT) 4 MG disintegrating tablet DUPLICATE    montelukast (SINGULAIR) 10 MG tablet Therapy completed    PROAIR  (90 Base) MCG/ACT inhaler REORDER    DULERA 200-5 MCG/ACT inhaler REORDER       Beatriz received counseling on the following healthy behaviors: nutrition, exercise and medication adherence    Patient given educational materials : see patient instruction       Discussed use, benefit, and side effects of prescribed medications. Barriers to medication compliance addressed. All patient questions answered. Pt voiced understanding.      An  electronic

## 2020-07-13 ENCOUNTER — TELEPHONE (OUTPATIENT)
Dept: INTERNAL MEDICINE | Age: 50
End: 2020-07-13

## 2020-07-21 ENCOUNTER — TELEPHONE (OUTPATIENT)
Dept: PULMONOLOGY | Age: 50
End: 2020-07-21

## 2020-07-21 NOTE — TELEPHONE ENCOUNTER
Patient called wondering if she could get a letter stating that she has copd and asthma. She states that she had covid back in may and has not yet return to work. She states that she had surgery on her nose. She states that she needs a letter because she has not gone to work. Not sure what you would like to do. Please advise thank you.

## 2020-07-23 RX ORDER — OMEPRAZOLE 40 MG/1
CAPSULE, DELAYED RELEASE ORAL
Qty: 60 CAPSULE | Refills: 3 | Status: SHIPPED | OUTPATIENT
Start: 2020-07-23 | End: 2021-03-26

## 2020-07-23 NOTE — TELEPHONE ENCOUNTER
Request for omeprazole - med pended. Please fill if appropriate.   Next Visit Date:  Future Appointments   Date Time Provider Fort[place] Jean Baptiste   8/11/2020  8:30 AM Jonathan Hmephill MD [facility] IM [address] [facility]   10/2/2020  3:30 PM Victorina Marrufo  [address] Maintenance   Topic Date Due    Lipid screen  04/10/2020    Shingles Vaccine (1 of 2) 12/07/2020 (Originally 12/4/2019)    HIV screen  12/07/2020 (Originally 12/4/1984)    Flu vaccine (1) 09/01/2020    A1C test (Diabetic or Prediabetic)  03/03/2021    Breast cancer screen  05/06/2021    Potassium monitoring  06/23/2021    Creatinine monitoring  06/23/2021    Cervical cancer screen  08/10/2021    Colon cancer screen colonoscopy  02/08/2023    DTaP/Tdap/Td vaccine (2 - Td) 02/05/2026    Pneumococcal 0-64 years Vaccine  Completed    Hepatitis A vaccine  Aged Out    Hepatitis B vaccine  Aged Out    Hib vaccine  Aged Out    Meningococcal (ACWY) vaccine  Aged Out       Hemoglobin A1C (%)   Date Value   03/03/2020 6.0   04/08/2019 5.6   05/03/2018 6.0             ( goal A1C is < 7)   No results found for: LABMICR  LDL Cholesterol (mg/dL)   Date Value   04/10/2019 74       (goal LDL is <100)   AST (U/L)   Date Value   06/23/2020 20     ALT (U/L)   Date Value   06/23/2020 14     BUN (mg/dL)   Date Value   06/23/2020 7     BP Readings from Last 3 Encounters:   05/15/20 (!) 140/95   03/13/20 120/70   03/03/20 131/75          (goal 120/80)    All Future Testing planned in [facility]  Lab Frequency Next Occurrence         Patient Active Problem List:     Hypertension     Asthma     GERD (gastroesophageal reflux disease)     S/P cardiac cath-Minimal disease 11/23/15 Dr. Do Bo     Irritable bowel syndrome with constipation     Obesity     Allergic rhinitis     Hiatal hernia     Paresthesias     Left sided numbness     Dysphagia     Family history of colon cancer     DNS (deviated nasal septum)     Hypertrophy of both inferior nasal turbinates     Iron

## 2020-08-10 ENCOUNTER — TELEPHONE (OUTPATIENT)
Dept: PULMONOLOGY | Age: 50
End: 2020-08-10

## 2020-08-10 ENCOUNTER — VIRTUAL VISIT (OUTPATIENT)
Dept: PULMONOLOGY | Age: 50
End: 2020-08-10
Payer: MEDICARE

## 2020-08-10 PROCEDURE — 1036F TOBACCO NON-USER: CPT | Performed by: INTERNAL MEDICINE

## 2020-08-10 PROCEDURE — G8427 DOCREV CUR MEDS BY ELIG CLIN: HCPCS | Performed by: INTERNAL MEDICINE

## 2020-08-10 PROCEDURE — 99212 OFFICE O/P EST SF 10 MIN: CPT | Performed by: INTERNAL MEDICINE

## 2020-08-10 PROCEDURE — G8417 CALC BMI ABV UP PARAM F/U: HCPCS | Performed by: INTERNAL MEDICINE

## 2020-08-10 PROCEDURE — 3017F COLORECTAL CA SCREEN DOC REV: CPT | Performed by: INTERNAL MEDICINE

## 2020-08-10 RX ORDER — BUDESONIDE 0.25 MG/2ML
250 INHALANT ORAL 2 TIMES DAILY
Qty: 60 AMPULE | Refills: 6 | Status: SHIPPED | OUTPATIENT
Start: 2020-08-10 | End: 2020-10-19 | Stop reason: ALTCHOICE

## 2020-08-10 RX ORDER — ALBUTEROL SULFATE 90 UG/1
AEROSOL, METERED RESPIRATORY (INHALATION)
Qty: 1 INHALER | Refills: 6 | Status: SHIPPED | OUTPATIENT
Start: 2020-08-10 | End: 2021-07-12 | Stop reason: SDUPTHER

## 2020-08-10 NOTE — PROGRESS NOTES
8/10/2020    TELEHEALTH EVALUATION -- Audio/Visual (During [id number] public health emergency)    Patient and physician are located in their individual locations. This is visit is completed via evOLED application / [email or web address] / Telephone     Chief Complaint   Patient presents with    Asthma        HPI:    Marilyn Grove (:  1970) has requested an audio/video evaluation for the following concern(s):    Asthma is persistent without exacerbation . Compliant with inhalers . Uses albuterol daily before exertion and prn , use increases with weather change. But addition of pulmicort has helped . No purulent sputum   Recovered from nasal turbinectomy . Uses dulera bid   Previously discussed xolair injections but she wished to wait and note the improvement from nasal turbinectomy . Review of Systems -  General ROS: negative for - chills, fatigue, fever or weight loss  ENT ROS: negative for - headaches, oral lesions or sore throat  Cardiovascular ROS: no chest pain , orthopnea or pnd   Gastrointestinal ROS: no abdominal pain, change in bowel habits, or black or bloody stools        LUNG CANCER SCREENING     1. CRITERIA MET    []     CT ORDERED  []      2. CRITERIA NOT MET   [x]      3. REFUSED                    []        REASON CRITERIA NOT MET     1. SMOKING LESS THAN 30 PY  []      2. AGE LESS THAN 55 or GREATER 77 YEARS  []      3. QUIT SMOKING 15 YEARS OR GREATER   []      4. RECENT CT WITH IN 11 MONTHS    []      5. LIFE EXPECTANCY < 5 YEARS   []      6.  SIGNS  AND SYMPTOMS OF LUNG CANCER   []         Immunization   Immunization History   Administered Date(s) Administered    Influenza Vaccine, unspecified formulation 10/09/2017    Influenza Virus Vaccine 2015    Influenza Whole 2015    Influenza, Quadv, 6 mo and older, IM (Fluzone, Flulaval) 10/09/2017    Influenza, Quadv, IM, PF (6 mo and older Fluzone, Flulaval, Fluarix, and 3 yrs and older Afluria) 2015    Influenza, Triv, 3 education level: Not on file   Occupational History    Not on file   Social Needs    Financial resource strain: Not on file    Food insecurity     Worry: Not on file     Inability: Not on file    Transportation needs     Medical: Not on file     Non-medical: Not on file   Tobacco Use    Smoking status: Never Smoker    Smokeless tobacco: Never Used   Substance and Sexual Activity    Alcohol use: Yes     Alcohol/week: 0.0 standard drinks     Comment: occasionally    Drug use: No    Sexual activity: Yes     Partners: Male     Comment: tubal ligation    Lifestyle    Physical activity     Days per week: Not on file     Minutes per session: Not on file    Stress: Not on file   Relationships    Social connections     Talks on phone: Not on file     Gets together: Not on file     Attends Faith service: Not on file     Active member of club or organization: Not on file     Attends meetings of clubs or organizations: Not on file     Relationship status: Not on file    Intimate partner violence     Fear of current or ex partner: Not on file     Emotionally abused: Not on file     Physically abused: Not on file     Forced sexual activity: Not on file   Other Topics Concern    Not on file   Social History Narrative    Not on file        TOBACCO:   reports that she has never smoked. She has never used smokeless tobacco.  ETOH:   reports current alcohol use. ALLERGIES:      No Known Allergies      Home Meds:   Prior to Admission medications    Medication Sig Start Date End Date Taking?  Authorizing Provider   mometasone-formoterol [facility]) 200-5 MCG/ACT inhaler inhale 2 puffs by mouth twice a day 8/10/20  Yes Yumiko Matias MD   budesonide (PULMICORT) 0.25 MG/2ML nebulizer suspension Take 2 mLs by nebulization 2 times daily 8/10/20  Yes Yumiko Matias MD   albuterol sulfate HFA (PROAIR HFA) 108 (90 Base) MCG/ACT inhaler inhale 2 puffs by mouth every 6 hours if needed for wheezing 8/10/20  Yes Yumiko Matias MD   omeprazole (PRILOSEC) 40 MG delayed release capsule Once daily as needed 7/23/20  Yes David Cordova MD   ACETAMINOPHEN EXTRA STRENGTH 500 MG tablet take 2 tablets by mouth every 6 hours if needed for pain 6/9/20  Yes David Cordova MD   ibuprofen (ADVIL;MOTRIN) 600 MG tablet Take 1 tablet by mouth every 6 hours as needed for Pain 5/15/20  Yes Vero Swann DO   ASPIR-LOW 81 MG EC tablet take 1 tablet by mouth once daily 3/31/20  Yes David Cordova MD RA SENNA 8.6 MG tablet take 1 tablet by mouth twice a day 3/11/20  Yes Clara Jackson MD   valsartan-hydrochlorothiazide (DIOVAN-HCT) 80-12.5 MG per tablet Once a day 3/3/20  Yes David Cordova MD   atorvastatin (LIPITOR) 20 MG tablet take 1 tablet by mouth once daily 3/3/20  Yes David Cordova MD   carvedilol (COREG) 6.25 MG tablet take 1 tablet by mouth twice a day 3/3/20  Yes David Cordova MD   fluconazole (DIFLUCAN) 150 MG tablet Take 1 tablet after antibiotic use. . Then take 1 in 7 days. . 12/7/19  Yes Buck Dubin, MD   RA MELATONIN 5 MG TABS tablet take 1 tablet by mouth nightly 6/19/19  Yes Dennie Redhead, MD   meloxicam (MOBIC) 7.5 MG tablet Take 1 tablet by mouth daily 4/16/19  Yes Clara Jackson MD   ondansetron (ZOFRAN ODT) 4 MG disintegrating tablet Take 1 tablet by mouth every 8 hours as needed for Nausea 4/11/19  Yes Jaky Hendrix DO   albuterol (PROVENTIL) (2.5 MG/3ML) 0.083% nebulizer solution Take 3 mLs by nebulization every 6 hours as needed for Wheezing 4/8/19  Yes David Cordova MD   fluticasone V[facility]der) 50 MCG/ACT nasal spray 1 spray by Nasal route daily 5/3/18  Yes David Cordova MD   ferrous sulfate (FE TABS) 325 (65 Fe) MG EC tablet Take 1 tablet by mouth 2 times daily 5/3/18  Yes David Cordova MD   Multiple Vitamins-Minerals (WOMENS MULTI VITAMIN & MINERAL PO) Take 1 tablet by mouth daily   Yes Historical Provider, MD   topiramate (TOPAMAX) 50 MG tablet take 1 tablet by mouth twice a day  Patient not taking: Reported on 8/10/2020 9/4/19   Lukas Vasquez MD   diclofenac (VOLTAREN) 50 MG EC tablet Take 1 tablet by mouth 2 times daily as needed for Pain  Patient not taking: Reported on 8/10/2020 5/15/19   Lukas Vasquez MD            Wt Readings from Last 3 Encounters:   05/15/20 200 lb (90.7 kg)   03/13/20 206 lb (93.4 kg)   03/03/20 208 lb (94.3 kg)           No results found. ASSESSMENT:   Diagnosis Orders   1. Uncomplicated severe persistent asthma  mometasone-formoterol (DULERA) 200-5 MCG/ACT inhaler    budesonide (PULMICORT) 0.25 MG/2ML nebulizer suspension    albuterol sulfate HFA (PROAIR HFA) 108 (90 Base) MCG/ACT inhaler   2. Hiatal hernia     3. Gastroesophageal reflux disease without esophagitis, controlled on omeprazole     4. Chronic allergic rhinitis     5. Elevated IgE level           Plan:   1.  Continue albuterol , pulmicort , dulera   Follow up 4 months          Requested Prescriptions     Signed Prescriptions Disp Refills    mometasone-formoterol (DULERA) 200-5 MCG/ACT inhaler 1 Inhaler 6     Sig: inhale 2 puffs by mouth twice a day    budesonide (PULMICORT) 0.25 MG/2ML nebulizer suspension 60 ampule 6     Sig: Take 2 mLs by nebulization 2 times daily    albuterol sulfate HFA (PROAIR HFA) 108 (90 Base) MCG/ACT inhaler 1 Inhaler 6     Sig: inhale 2 puffs by mouth every 6 hours if needed for wheezing       Medications Discontinued During This Encounter   Medication Reason    albuterol sulfate HFA (PROAIR HFA) 108 (90 Base) MCG/ACT inhaler LIST CLEANUP    mometasone-formoterol (DULERA) 200-5 MCG/ACT inhaler REORDER    budesonide (PULMICORT) 0.25 MG/2ML nebulizer suspension REORDER    albuterol sulfate HFA (PROAIR HFA) 108 (90 Base) MCG/ACT inhaler REORDER       Beatriz received counseling on the following healthy behaviors: nutrition, exercise and medication adherence    Patient given educational materials : see patient instruction       Discussed use, benefit, and side effects of prescribed

## 2020-08-11 ENCOUNTER — VIRTUAL VISIT (OUTPATIENT)
Dept: INTERNAL MEDICINE | Age: 50
End: 2020-08-11
Payer: MEDICARE

## 2020-08-11 PROCEDURE — 99214 OFFICE O/P EST MOD 30 MIN: CPT | Performed by: INTERNAL MEDICINE

## 2020-08-11 RX ORDER — VALSARTAN AND HYDROCHLOROTHIAZIDE 80; 12.5 MG/1; MG/1
TABLET, FILM COATED ORAL
Qty: 30 TABLET | Refills: 5 | Status: SHIPPED | OUTPATIENT
Start: 2020-08-11 | End: 2021-07-09 | Stop reason: SDUPTHER

## 2020-08-11 RX ORDER — LANOLIN ALCOHOL/MO/W.PET/CERES
325 CREAM (GRAM) TOPICAL 2 TIMES DAILY
Qty: 60 TABLET | Refills: 5 | Status: SHIPPED | OUTPATIENT
Start: 2020-08-11 | End: 2021-07-09

## 2020-08-11 RX ORDER — ATORVASTATIN CALCIUM 20 MG/1
TABLET, FILM COATED ORAL
Qty: 30 TABLET | Refills: 5 | Status: SHIPPED | OUTPATIENT
Start: 2020-08-11 | End: 2021-06-14

## 2020-08-11 RX ORDER — CARVEDILOL 6.25 MG/1
TABLET ORAL
Qty: 60 TABLET | Refills: 5 | Status: SHIPPED | OUTPATIENT
Start: 2020-08-11 | End: 2021-07-09 | Stop reason: SDUPTHER

## 2020-08-11 RX ORDER — MELOXICAM 7.5 MG/1
7.5 TABLET ORAL DAILY
Qty: 30 TABLET | Refills: 5 | Status: SHIPPED | OUTPATIENT
Start: 2020-08-11 | End: 2021-11-17 | Stop reason: SDUPTHER

## 2020-08-11 RX ORDER — SENNA PLUS 8.6 MG/1
TABLET ORAL
Qty: 60 TABLET | Refills: 5 | Status: SHIPPED | OUTPATIENT
Start: 2020-08-11 | End: 2021-04-22

## 2020-08-11 ASSESSMENT — ENCOUNTER SYMPTOMS
SHORTNESS OF BREATH: 0
DIARRHEA: 0
COUGH: 0
WHEEZING: 0
CONSTIPATION: 0
BACK PAIN: 0
ABDOMINAL PAIN: 0
EYE PAIN: 0
EYE REDNESS: 0

## 2020-08-11 NOTE — TELEPHONE ENCOUNTER
Dr. Ke Edgar,  I sent in a reconsideration for pt's Budesonide to her insurance which is [place]. They have come back again with a DENIAL for Budesonide. They are stating the following as to why this is denied. CRITERIA NOT MET BY ANSWERS THAT WERE PROVIDED AND/OR MISSING CLINICAL DOCUMENTATION SUPPORTING THE FOLLOWIN. Is the patient's condition clinically unstable (patient has had and emergency room visit or two hospitalizations for asthma in the past 30 days) AND changing to a preferred medication might cause deterioration of the patient's condition? 2. Has the patient experienced and inadequate treatment response to no less than a 30 day trial of TWO preferred medications (which do not require prior approval)? 3. Is the patient unable to be changed to a preferred medication (which does not require prior approval) for any of the following acceptable reasons: A) Allergy, B) Contraindications or drug interaction, C) History of unacceptable or toxic side effects? The insurance preferred medications are: Asmanex Twisthaler, Flovent Diskus or HFA, Pulmicort Flexhaler. If you do not feel patient can take any of the alternatives you will need to dictate and appeal letter answer the above questions or question that applies. Or write a script of what is covered.   Please advise

## 2020-08-11 NOTE — PROGRESS NOTES
2020    TELEHEALTH EVALUATION -- Audio/Visual (During FSLSJ-87 public health emergency)    HPI:    Marilyn Grove (:  1970) has requested an audio/video evaluation for the following concern(s):  Patient initiated a video visit via doxy. me to obtain refills on her medications and to discuss her chronic health issues. She had a COVID infection diagnosed in May. Since then her asthma has been worse. She has been following up with pulmonologist.  She recently saw ENT and had a nasal turbinate surgery done in . She is recovering for him. Her IgE level is slightly elevated and there has been some talk of giving her Xolair because of her severe persistent asthma but at this point she is waiting. She is compliant with inhalers. She has not returned back to work. She would like to have bariatric surgery done if possible. She is morbidly obese. She has chronic GERD. She had history of dysphagia and needed dilatation done. She is maintained on chronic Prilosec which is definitely helping. She has osteoarthritis of joints and occasional headache and is requesting a refill on Mobic which does help  I have advised her to be careful with Mobic because of her chronic GERD and other concerns. Blood pressures she states has been well controlled at home. She gives some numbers with systolics in the 856K and diastolic in the 13E. She is noted to have iron deficiency anemia. She says she is menstruating heavy still up to 7 to 8 days in a month. She says she required iron infusion before surgery in . She is still having some fatigue and memory issues. Will check her vitamins and iron levels. Will refer her to gynecologist after pelvic ultrasound as well. Review of Systems   Constitutional: Positive for fatigue. Negative for unexpected weight change. Eyes: Negative for pain, redness and visual disturbance. Respiratory: Negative for cough, shortness of breath and wheezing. Cardiovascular: Negative for chest pain, palpitations and leg swelling. Gastrointestinal: Negative for abdominal pain, constipation and diarrhea. Endocrine: Negative for polydipsia and polyuria. Genitourinary: Positive for menstrual problem. Negative for dysuria, frequency and urgency. Musculoskeletal: Negative for arthralgias and back pain. Allergic/Immunologic: Positive for environmental allergies. Negative for immunocompromised state. Neurological: Positive for headaches. Negative for dizziness, syncope and weakness. Psychiatric/Behavioral: Negative for dysphoric mood. The patient is not nervous/anxious. Prior to Visit Medications    Medication Sig Taking?  Authorizing Provider   mometasone-formoterol (DULERA) 200-5 MCG/ACT inhaler inhale 2 puffs by mouth twice a day Yes Hayde Arriaga MD   budesonide (PULMICORT) 0.25 MG/2ML nebulizer suspension Take 2 mLs by nebulization 2 times daily Yes Hayde Arriaga MD   albuterol sulfate HFA (PROAIR HFA) 108 (90 Base) MCG/ACT inhaler inhale 2 puffs by mouth every 6 hours if needed for wheezing Yes Hayde Arriaga MD   omeprazole (PRILOSEC) 40 MG delayed release capsule Once daily as needed Yes Jerri Echavarria MD   ACETAMINOPHEN EXTRA STRENGTH 500 MG tablet take 2 tablets by mouth every 6 hours if needed for pain Yes Jerri Echavarria MD   ibuprofen (ADVIL;MOTRIN) 600 MG tablet Take 1 tablet by mouth every 6 hours as needed for Pain Yes Stephen Li DO   ASPIR-LOW 81 MG EC tablet take 1 tablet by mouth once daily Yes Jerri Echavarria MD RA SENNA 8.6 MG tablet take 1 tablet by mouth twice a day Yes Tarah Cole MD   valsartan-hydrochlorothiazide (DIOVAN-HCT) 80-12.5 MG per tablet Once a day Yes Jerri Echavarria MD   atorvastatin (LIPITOR) 20 MG tablet take 1 tablet by mouth once daily Yes Jerri Echavarria MD   carvedilol (COREG) 6.25 MG tablet take 1 tablet by mouth twice a day Yes Jerri Echavarria MD RA MELATONIN 5 MG TABS tablet take 1 tablet by mouth nightly Yes Rosalva Armendariz MD   meloxicam (MOBIC) 7.5 MG tablet Take 1 tablet by mouth daily Yes Ele Wilson MD   albuterol (PROVENTIL) (2.5 MG/3ML) 0.083% nebulizer solution Take 3 mLs by nebulization every 6 hours as needed for Wheezing Yes Mario Edgar MD   fluticasone (FLONASE) 50 MCG/ACT nasal spray 1 spray by Nasal route daily Yes Mario Edgar MD   ferrous sulfate (FE TABS) 325 (65 Fe) MG EC tablet Take 1 tablet by mouth 2 times daily Yes Mario Edgar MD   Multiple Vitamins-Minerals (WOMENS MULTI VITAMIN & MINERAL PO) Take 1 tablet by mouth daily Yes Historical Provider, MD       Social History     Tobacco Use    Smoking status: Never Smoker    Smokeless tobacco: Never Used   Substance Use Topics    Alcohol use: Yes     Alcohol/week: 0.0 standard drinks     Comment: occasionally    Drug use: No            PHYSICAL EXAMINATION:  [ INSTRUCTIONS:  \"[x]\" Indicates a positive item  \"[]\" Indicates a negative item  -- DELETE ALL ITEMS NOT EXAMINED]  Vital Signs: (As obtained by patient/caregiver or practitioner observation)    Blood pressure-  Heart rate-    Respiratory rate-    Temperature-  Pulse oximetry-     Constitutional: [x] Appears well-developed and well-nourished [] No apparent distress      [] Abnormal-   Mental status  [x] Alert and awake  [x] Oriented to person/place/time []Able to follow commands      Eyes:  EOM    [x]  Normal  [] Abnormal-  Sclera  [x]  Normal  [] Abnormal -         Discharge [x]  None visible  [] Abnormal -    HENT:   [x] Normocephalic, atraumatic.   [] Abnormal   [] Mouth/Throat: Mucous membranes are moist.     External Ears [] Normal  [] Abnormal-     Neck: [] No visualized mass     Pulmonary/Chest: [x] Respiratory effort normal.  [x] No visualized signs of difficulty breathing or respiratory distress        [] Abnormal-      Musculoskeletal:   [x] Normal gait with no signs of ataxia         [] Normal range of motion of neck        [] Abnormal- Neurological:        [x] No Facial Asymmetry (Cranial nerve 7 motor function) (limited exam to video visit)          [x] No gaze palsy        [] Abnormal-         Skin:        [] No significant exanthematous lesions or discoloration noted on facial skin         [] Abnormal-            Psychiatric:       [x] Normal Affect [] No Hallucinations        [] Abnormal-     Other pertinent observable physical exam findings-     ASSESSMENT/PLAN:  1. Essential hypertension    - carvedilol (COREG) 6.25 MG tablet; take 1 tablet by mouth twice a day  Dispense: 60 tablet; Refill: 5  - valsartan-hydrochlorothiazide (DIOVAN-HCT) 80-12.5 MG per tablet; Once a day  Dispense: 30 tablet; Refill: 5    2. Mixed hyperlipidemia    - Lipid Panel; Future  - atorvastatin (LIPITOR) 20 MG tablet; take 1 tablet by mouth once daily  Dispense: 30 tablet; Refill: 5    3. IGT (impaired glucose tolerance)    - Hemoglobin A1C; Future    4. Iron deficiency anemia, unspecified iron deficiency anemia type    - ferrous sulfate (FE TABS) 325 (65 Fe) MG EC tablet; Take 1 tablet by mouth 2 times daily  Dispense: 60 tablet; Refill: 5  - US PELVIS COMPLETE; Future  - Nick Ventura MD, Gynecology, [place]  - CBC With Auto Differential; Future  - Iron and TIBC; Future  - Vitamin B12 & Folate; Future  - Ferritin; Future    5. Chronic reflux esophagitis  PPI      6. Morbid obesity with BMI of 40.0-44.9, adult [facility])    - [facility] , Weight Management and [address] [place] [address]    7. Severe persistent asthma without complication  Stable     8. Menorrhagia with regular cycle    - US PELVIS COMPLETE; Future  - Nick Ventura MD, Gynecology, [place]      Return in about 3 months (around 11/11/2020). Dennise Benedict is a 47 y.o. female being evaluated by a Virtual Visit (video visit) encounter to address concerns as mentioned above. A caregiver was present when appropriate.  Due to this being a TeleHealth encounter (During COVID-19 public health emergency), evaluation of the following organ systems was limited: Vitals/Constitutional/EENT/Resp/CV/GI//MS/Neuro/Skin/Heme-Lymph-Imm. Pursuant to the emergency declaration under the [address] [place] [address], [address] [place] [address] authority and the [employer] Act, this Virtual Visit was conducted with patient's (and/or legal guardian's) consent, to reduce the patient's risk of exposure to COVID-19 and provide necessary medical care. The patient (and/or legal guardian) has also been advised to contact this office for worsening conditions or problems, and seek emergency medical treatment and/or call 911 if deemed necessary. Patient identification was verified at the start of the visit: Yes    Total time spent on this encounter: 25 minutes    Services were provided through a video synchronous discussion virtually to substitute for in-person clinic visit. Patient and provider were located at their individual homes. --Janel Borges MD on 8/11/2020 at 8:47 AM    An electronic signature was used to authenticate this note.

## 2020-08-14 NOTE — TELEPHONE ENCOUNTER
CALLED PT SPOKE WITH HER AND INFORMED HER OF THE DENIAL AND TO STAY ON [place] [address]. ALSO CALLED [facility] SPOKE WITH JAMIR INFORMED HER TO CANCEL SCRIPT FOR BUDESONIDE AND PT IS TO STAY ON [address].

## 2020-10-01 ENCOUNTER — HOSPITAL ENCOUNTER (OUTPATIENT)
Age: 50
Setting detail: SPECIMEN
Discharge: HOME | End: 2020-10-01
Payer: MEDICARE

## 2020-10-01 ENCOUNTER — OFFICE VISIT (OUTPATIENT)
Dept: OBGYN | Age: 50
End: 2020-10-01
Payer: MEDICARE

## 2020-10-01 VITALS
HEART RATE: 65 BPM | BODY MASS INDEX: 40.27 KG/M2 | DIASTOLIC BLOOD PRESSURE: 76 MMHG | WEIGHT: 206.2 LBS | SYSTOLIC BLOOD PRESSURE: 124 MMHG

## 2020-10-01 PROCEDURE — G8484 FLU IMMUNIZE NO ADMIN: HCPCS | Performed by: STUDENT IN AN ORGANIZED HEALTH CARE EDUCATION/TRAINING PROGRAM

## 2020-10-01 PROCEDURE — 99396 PREV VISIT EST AGE 40-64: CPT | Performed by: STUDENT IN AN ORGANIZED HEALTH CARE EDUCATION/TRAINING PROGRAM

## 2020-10-01 NOTE — PROGRESS NOTES
Attending Physician Statement  I have discussed the care of Juan Antonio Lucas, including pertinent history and exam findings,  with the resident. I have reviewed the key elements of all parts of the encounter with the resident. I agree with the assessment, plan and orders as documented by the resident.   (GE Modifier)

## 2020-10-01 NOTE — PROGRESS NOTES
[address] [address] OB/GYN Annual Visit    Polina Modi  [birth year]                       Primary Care Physician: Sherman Mcdonald MD    CC:   Chief Complaint   Patient presents with   [facility]     est care. iron deficiency anemia, Menorrhagia with regular cycle          HPI: Polina Modi is a 47 y.o. female [id number]    The patient was seen and examined. She is here for an annual visit. She is complaining of heavy menses and hx of anemia due to this. Her Patient's last menstrual period was 09/18/2020 (exact date). . She complains of irregular/heavy bleeding and dysmenorrhea. Her periods are regular and last 9 days. She describes them as heavy. She reports she started to have heavy menses in her 45s. Her bowel habits are irregular and takes stool softeners and is following with her PCP. Reports Colonoscopy last year, normal.  She complains of  bloating. She denies dysuria. She denies urinary leaking. She denies vaginal discharge. She is not sexually active. She is s/p a tubal ligation.      Depression Screen: Symptoms of decreased mood absent  Symptoms of anhedonia absent  **If either question is answered in a  positive fashion then complete the PHQ9 Scoring Evaluation and make the appropriate referral**    REVIEW OF SYSTEMS:  Constitutional: negative fever, negative chills  HEENT: negative visual disturbances, negative headaches  Respiratory: negative dyspnea, negative cough  Cardiovascular: negative chest pain,  negative palpitations  Gastrointestinal: negative abdominal pain, negative RUQ pain, negative N/V, negative diarrhea, negative constipation  Genitourinary: negative dysuria, negative vaginal discharge  Dermatological: negative rash  Hematologic: negative bruising  Immunologic/Lymphatic: negative recent illness, negative recent sick contact  Musculoskeletal: negative back pain, negative myalgias, negative arthralgias  Neurological:  negative dizziness, negative weakness  Behavior/Psych: negative depression, negative anxiety      GYNECOLOGICAL HISTORY:  Age of Menarche: 14  Age of Menopause: NA     Sexually Active:  but not sexually active  STD History: reports remote history of gonorrhea and trichomonas, denies any concerns today    Pap History: last pap smear , negative pathology with endometrial cells present but patient with menses present  Colposcopy History: denies     Permanent Sterilization: yes - tubal ligation  Reversible Birth Control: no  Hormone Replacement Exposure: no    OBSTETRICAL HISTORY:  OB History    Para Term  AB Living   3 2 2 0 1 2   SAB TAB Ectopic Molar Multiple Live Births   1 0 0 0 0 2      # Outcome Date GA Lbr Carter/2nd Weight Sex Delivery Anes PTL Lv   3 Term 02 40w0d  7 lb 2 oz (3.232 kg) M Vag-Spont EPI N FRITZ   2 Term 95 40w0d  7 lb 5 oz (3.317 kg) M Vag-Spont EPI  FRITZ      Name: Nidia Luna SAB 1993 12w0d       ND       PAST MEDICAL HISTORY:   has a past medical history of Allergic rhinitis, Asthma, Clinical trial participant, COPD (chronic obstructive pulmonary disease) ([facility] [address] Dysphagia, Dysphagia, GERD (gastroesophageal reflux disease), H/O cardiovascular stress test, Hiatal hernia, Hyperlipidemia, Hypertension, Irritable bowel syndrome with constipation, Obesity, Snores, and Wears glasses. PAST SURGICAL HISTORY:   has a past surgical history that includes Tubal ligation (); Colonoscopy (); Upper gastrointestinal endoscopy; Upper gastrointestinal endoscopy (N/A, 2017); Cardiac catheterization (2018); Cardiac catheterization (2015); Endoscopy, colon, diagnostic (); Endoscopy, colon, diagnostic (2018); pr colon ca scrn not hi rsk ind (N/A, 2018); Colonoscopy (2018); and Nose surgery. ALLERGIES:  has No Known Allergies. MEDICATIONS:  Prior to Admission medications    Medication Sig Start Date End Date Taking?  Authorizing Provider   ferrous sulfate (FE TABS) 325 (65 Fe) MG EC tablet Take 1 tablet by mouth 2 times daily 8/11/20  Yes Rosy Bernard MD   meloxicam NAGIMIRNA DANIEL [facility]) 7.5 MG tablet Take 1 tablet by mouth daily 8/11/20  Yes Rosy Bernard MD   atorvastatin (LIPITOR) 20 MG tablet take 1 tablet by mouth once daily 8/11/20  Yes Rosy Bernard MD   carvedilol (COREG) 6.25 MG tablet take 1 tablet by mouth twice a day 8/11/20  Yes Rosy Bernard MD   valsartan-hydrochlorothiazide (DIOVAN-HCT) 80-12.5 MG per tablet Once a day 8/11/20  Yes Rosy Bernard MD   senna (RA SENNA) 8.6 MG tablet take 1 tablet by mouth twice a day 8/11/20  Yes Rosy Bernard MD   mometasone-formoterol [facility]) 200-5 MCG/ACT inhaler inhale 2 puffs by mouth twice a day 8/10/20  Yes Angus Montanez MD   budesonide (PULMICORT) 0.25 MG/2ML nebulizer suspension Take 2 mLs by nebulization 2 times daily 8/10/20  Yes Angus Montanez MD   albuterol sulfate HFA (PROAIR HFA) 108 (90 Base) MCG/ACT inhaler inhale 2 puffs by mouth every 6 hours if needed for wheezing 8/10/20  Yes Angus Montanez MD   omeprazole (PRILOSEC) 40 MG delayed release capsule Once daily as needed 7/23/20  Yes Rosy Bernard MD   ASPIR-LOW 81 MG EC tablet take 1 tablet by mouth once daily 3/31/20  Yes Rosy Bernard MD   RA MELATONIN 5 MG TABS tablet take 1 tablet by mouth nightly 6/19/19  Yes Ya Sanchez MD   albuterol (PROVENTIL) (2.5 MG/3ML) 0.083% nebulizer solution Take 3 mLs by nebulization every 6 hours as needed for Wheezing 4/8/19  Yes Rosy Bernard MD   fluticasone Joleen Leyland) 50 MCG/ACT nasal spray 1 spray by Nasal route daily 5/3/18  Yes Rosy Bernard MD   Multiple Vitamins-Minerals (WOMENS MULTI VITAMIN & MINERAL PO) Take 1 tablet by mouth daily   Yes Historical Provider, MD   ACETAMINOPHEN EXTRA STRENGTH 500 MG tablet take 2 tablets by mouth every 6 hours if needed for pain  Patient not taking: Reported on 10/1/2020 6/9/20   Rosy Bernard MD       FAMILY HISTORY:  Family History of Breast, Ovarian, Colon or Uterine Cancer: colon cancer father   family history includes Asthma in her mother; Colon Cancer in her father; High Cholesterol in her mother; Hypertension in her mother; Osteoarthritis in her mother; Other in her mother. SOCIAL HISTORY:   reports that she has never smoked. She has never used smokeless tobacco. She reports current alcohol use of about 4.0 standard drinks of alcohol per week. She reports that she does not use drugs. HEALTH MAINTENANCE:  Date of Last Mammogram:  Normal 2019 BIRADs 1  Date of Last Colonoscopy: reports normal in 2018    VITALS:  Vitals:    10/01/20 1357   BP: 124/76   Site: Right Upper Arm   Position: Sitting   Cuff Size: Medium Adult   Pulse: 65   Weight: 206 lb 3.2 oz (93.5 kg)                                                                                                                                                                     PHYSICAL EXAM:   General Appearance: Appears healthy. Alert; in no acute distress. Pleasant. HEENT: normocephalic and atraumatic, Thyroid normal to inspection and palpation  Respiratory: Normal expansion. Clear to auscultation. No rales, rhonchi, or wheezing.   Cardiovascular: normal rate, normal S1 and S2, no gallops, intact distal pulses and no carotid bruits  Breast:  (Chest): normal appearance, no masses or tenderness  Abdomen: soft, non-tender, non-distended, no right upper quadrant tenderness and no CVA tenderness    Pelvic Exam:   External genitalia: General appearance; normal, Hair distribution; normal, Lesions absent  Urinary system: urethral meatus normal  Vaginal: normal mucosa, no discharge  Cervix: normal appearing cervix without discharge or lesions  Adnexa: normal adnexa in size, nontender and no masses  Uterus: normal single, nontender  Rectal Exam: exam declined by patient  Musculoskeletal: no gross abnormalities  Extremities: non-tender BLE and non-edematous  Psych:  oriented to time, place and person     DATA:  No results found for this visit on 10/01/20. ASSESSMENT & PLAN:    Brayan Bullock is a 47 y.o. female [id number] here for her annual exam complaining of heavy menses   - VSS. Afebrile   - Pap smear negative pathology 2018, endometrial cells present but patient with current menses. No co-testing at this time. Therefore patient due in 2021   - Cultures collected today    - Mammogram ordered   - TVUS ordered by PCP and reprinted today. Counseled on importance to guide management of AUB   - Lab work reprinted that PCP ordered. [facility] added on   - Follow up in a few weeks to discuss results and management plan     Patient Active Problem List    Diagnosis Date Noted    Iron deficiency anemia secondary to inadequate dietary iron intake 05/13/2020    Iron deficiency anemia, unspecified 05/13/2020    Obesity 05/11/2020    Allergic rhinitis 05/11/2020    Hiatal hernia 05/11/2020    DNS (deviated nasal septum) 02/19/2020    Hypertrophy of both inferior nasal turbinates 02/19/2020    Dysphagia 08/09/2017    Family history of colon cancer 08/09/2017     Father      Paresthesias 11/03/2016    Left sided numbness     Irritable bowel syndrome with constipation 01/21/2016    S/P cardiac cath-Minimal disease 11/23/15 Dr. Walker [place] 11/23/2015    GERD (gastroesophageal reflux disease) 12/08/2014    Hypertension 09/05/2014    Asthma 09/05/2014       Return in about 4 weeks (around 10/29/2020) for virtual visit to discuss results and plan for AUB. Dr Farris Beam Completed:    discussed need for repeat pap as per American Society for Colposcopy and Cervical Pathology guidelines. discussed need for mammograms every 1 year, If >42 yo and last mammogram was negative. discussed Calcium and Vitamin D dosing. discussed need for colonoscopy screening as well as onset for bone density testing. discussed birth control and barrier recommendations. discussed STD counseling and prevention.   discussed Gardisil counseling for all patients 7-35 yo. Hereditary Breast, Ovarian, Colon and Uterine Cancer screening discussed. Tobacco & Secondary smoke risks discussed; with recommendation for cessation and avoidance. Routine health maintenance per patients PCP discussed. Patient was seen with total face to face time of 15 minutes. More than 50% of this visit was on counseling and education regarding the problems listed below and her options. She was also counseled on her preventative health maintenance recommendations and follow-up. Diagnosis Orders   1. Screening mammogram for high-risk patient  TRAVIS DIGITAL SCREEN W OR WO CAD BILATERAL   2. Abnormal uterine bleeding (AUB)  Estradiol    Follicle Stimulating Hormone   3.  Well woman exam with routine gynecological exam  Chlamydia Trachomatis & Neisseria gonorrhoeae (GC) by amplified detection    Vaginitis DNA Probe        Merlin Ga DO  Ob/Gyn Resident  [facility], [place]  10/1/2020, 4:22 PM

## 2020-10-02 LAB
DIRECT EXAM: NORMAL
Lab: NORMAL
SPECIMEN DESCRIPTION: NORMAL

## 2020-10-06 ENCOUNTER — HOSPITAL ENCOUNTER (OUTPATIENT)
Age: 50
Discharge: HOME | End: 2020-10-06
Payer: MEDICARE

## 2020-10-06 LAB
ABSOLUTE EOS #: 0.15 K/UL (ref 0–0.44)
ABSOLUTE IMMATURE GRANULOCYTE: <0.03 K/UL (ref 0–0.3)
ABSOLUTE LYMPH #: 2.84 K/UL (ref 1.1–3.7)
ABSOLUTE MONO #: 0.59 K/UL (ref 0.1–1.2)
BASOPHILS # BLD: 0 % (ref 0–2)
BASOPHILS ABSOLUTE: 0.03 K/UL (ref 0–0.2)
CHOLESTEROL/HDL RATIO: 3.5
CHOLESTEROL: 171 MG/DL
DIFFERENTIAL TYPE: NORMAL
EOSINOPHILS RELATIVE PERCENT: 2 % (ref 1–4)
ESTIMATED AVERAGE GLUCOSE: 117 MG/DL
ESTRADIOL LEVEL: 92 PG/ML (ref 27–314)
FERRITIN: 89 UG/L (ref 13–150)
FOLATE: 18.9 NG/ML
FOLLICLE STIMULATING HORMONE: 4.5 U/L (ref 1.7–21.5)
HBA1C MFR BLD: 5.7 % (ref 4–6)
HCT VFR BLD CALC: 40.3 % (ref 36.3–47.1)
HDLC SERPL-MCNC: 49 MG/DL
HEMOGLOBIN: 12.7 G/DL (ref 11.9–15.1)
IMMATURE GRANULOCYTES: 0 %
IRON SATURATION: 52 % (ref 20–55)
IRON: 139 UG/DL (ref 37–145)
LDL CHOLESTEROL: 103 MG/DL (ref 0–130)
LYMPHOCYTES # BLD: 40 % (ref 24–43)
MCH RBC QN AUTO: 27.4 PG (ref 25.2–33.5)
MCHC RBC AUTO-ENTMCNC: 31.5 G/DL (ref 28.4–34.8)
MCV RBC AUTO: 87 FL (ref 82.6–102.9)
MONOCYTES # BLD: 8 % (ref 3–12)
NRBC AUTOMATED: 0 PER 100 WBC
PDW BLD-RTO: 14.2 % (ref 11.8–14.4)
PLATELET # BLD: 300 K/UL (ref 138–453)
PLATELET ESTIMATE: NORMAL
PMV BLD AUTO: 10.9 FL (ref 8.1–13.5)
RBC # BLD: 4.63 M/UL (ref 3.95–5.11)
RBC # BLD: NORMAL 10*6/UL
SEG NEUTROPHILS: 50 % (ref 36–65)
SEGMENTED NEUTROPHILS ABSOLUTE COUNT: 3.5 K/UL (ref 1.5–8.1)
TOTAL IRON BINDING CAPACITY: 267 UG/DL (ref 250–450)
TRIGL SERPL-MCNC: 96 MG/DL
UNSATURATED IRON BINDING CAPACITY: 128 UG/DL (ref 112–347)
VITAMIN B-12: 555 PG/ML (ref 232–1245)
VLDLC SERPL CALC-MCNC: NORMAL MG/DL (ref 1–30)
WBC # BLD: 7.1 K/UL (ref 3.5–11.3)
WBC # BLD: NORMAL 10*3/UL

## 2020-10-06 PROCEDURE — 82607 VITAMIN B-12: CPT

## 2020-10-06 PROCEDURE — 80061 LIPID PANEL: CPT

## 2020-10-06 PROCEDURE — 82728 ASSAY OF FERRITIN: CPT

## 2020-10-06 PROCEDURE — 83036 HEMOGLOBIN GLYCOSYLATED A1C: CPT

## 2020-10-06 PROCEDURE — 36415 COLL VENOUS BLD VENIPUNCTURE: CPT

## 2020-10-06 PROCEDURE — 85025 COMPLETE CBC W/AUTO DIFF WBC: CPT

## 2020-10-06 PROCEDURE — 83540 ASSAY OF IRON: CPT

## 2020-10-06 PROCEDURE — 82746 ASSAY OF FOLIC ACID SERUM: CPT

## 2020-10-06 PROCEDURE — 83001 ASSAY OF GONADOTROPIN (FSH): CPT

## 2020-10-06 PROCEDURE — 83550 IRON BINDING TEST: CPT

## 2020-10-06 PROCEDURE — 82670 ASSAY OF TOTAL ESTRADIOL: CPT

## 2020-10-07 LAB
C TRACH DNA GENITAL QL NAA+PROBE: NEGATIVE
N. GONORRHOEAE DNA: NEGATIVE
SPECIMEN DESCRIPTION: NORMAL

## 2020-10-09 NOTE — TELEPHONE ENCOUNTER
Request for aspirin - med pended. Please fill if appropriate.       Next Visit Date:  Future Appointments   Date Time Provider [place] Jean Baptiste   10/15/2020  4:15 PM Griselda Figueroa DO [facility] OB/Gyn [facility]   10/19/2020 11:15 AM Wallace Johnson MD Resp Spec [facility]OLPP   11/20/2020  2:30 PM STA ULTRASOUND RM 3 STAZ US STA Radiolog   11/20/2020  3:00 PM STA SCR MAMMO RM 2 STAZ MAMMO STA Radiolog       Health Maintenance   Topic Date Due    Flu vaccine (1) 09/01/2020    Shingles Vaccine (1 of 2) 12/07/2020 (Originally 12/4/2019)    HIV screen  12/07/2020 (Originally 12/4/1984)    Breast cancer screen  05/06/2021    Potassium monitoring  06/23/2021    Creatinine monitoring  06/23/2021    Cervical cancer screen  08/10/2021    A1C test (Diabetic or Prediabetic)  10/06/2021    Lipid screen  10/06/2021    Colon cancer screen colonoscopy  02/08/2023    DTaP/Tdap/Td vaccine (2 - Td) 02/05/2026    Pneumococcal 0-64 years Vaccine  Completed    Hepatitis A vaccine  Aged Out    Hepatitis B vaccine  Aged Out    Hib vaccine  Aged Out    Meningococcal (ACWY) vaccine  Aged Out       Hemoglobin A1C (%)   Date Value   10/06/2020 5.7   03/03/2020 6.0   04/08/2019 5.6             ( goal A1C is < 7)   No results found for: LABMICR  LDL Cholesterol (mg/dL)   Date Value   10/06/2020 103       (goal LDL is <100)   AST (U/L)   Date Value   06/23/2020 20     ALT (U/L)   Date Value   06/23/2020 14     BUN (mg/dL)   Date Value   06/23/2020 7     BP Readings from Last 3 Encounters:   10/01/20 124/76   05/15/20 (!) 140/95   03/13/20 120/70          (goal 120/80)    All Future Testing planned in [facility]  Lab Frequency Next Occurrence   US PELVIS COMPLETE Once 11/26/2020   TRAVIS DIGITAL SCREEN W OR WO CAD BILATERAL Once 04/01/2021         Patient Active Problem List:     Hypertension     Asthma     GERD (gastroesophageal reflux disease)     S/P cardiac cath-Minimal disease 11/23/15 Dr. Aaron Ayala     Irritable bowel syndrome with constipation     Obesity     Allergic rhinitis     Hiatal hernia     Paresthesias     Left sided numbness     Dysphagia     Family history of colon cancer     DNS (deviated nasal septum)     Hypertrophy of both inferior nasal turbinates     Iron deficiency anemia secondary to inadequate dietary iron intake     Iron deficiency anemia, unspecified

## 2020-10-12 RX ORDER — ACETAMINOPHEN/DIPHENHYDRAMINE 500MG-25MG
TABLET ORAL
Qty: 30 TABLET | Refills: 5 | Status: SHIPPED | OUTPATIENT
Start: 2020-10-12 | End: 2021-07-09 | Stop reason: SDUPTHER

## 2020-10-15 ENCOUNTER — TELEPHONE (OUTPATIENT)
Dept: INTERNAL MEDICINE | Age: 50
End: 2020-10-15

## 2020-10-19 ENCOUNTER — OFFICE VISIT (OUTPATIENT)
Dept: PULMONOLOGY | Age: 50
End: 2020-10-19
Payer: MEDICARE

## 2020-10-19 VITALS
RESPIRATION RATE: 16 BRPM | DIASTOLIC BLOOD PRESSURE: 80 MMHG | OXYGEN SATURATION: 100 % | WEIGHT: 201 LBS | SYSTOLIC BLOOD PRESSURE: 142 MMHG | TEMPERATURE: 96.6 F | BODY MASS INDEX: 39.46 KG/M2 | HEART RATE: 61 BPM | HEIGHT: 60 IN

## 2020-10-19 PROCEDURE — 3017F COLORECTAL CA SCREEN DOC REV: CPT | Performed by: INTERNAL MEDICINE

## 2020-10-19 PROCEDURE — 1036F TOBACCO NON-USER: CPT | Performed by: INTERNAL MEDICINE

## 2020-10-19 PROCEDURE — 90686 IIV4 VACC NO PRSV 0.5 ML IM: CPT | Performed by: INTERNAL MEDICINE

## 2020-10-19 PROCEDURE — G8427 DOCREV CUR MEDS BY ELIG CLIN: HCPCS | Performed by: INTERNAL MEDICINE

## 2020-10-19 PROCEDURE — G8482 FLU IMMUNIZE ORDER/ADMIN: HCPCS | Performed by: INTERNAL MEDICINE

## 2020-10-19 PROCEDURE — 90471 IMMUNIZATION ADMIN: CPT | Performed by: INTERNAL MEDICINE

## 2020-10-19 PROCEDURE — G8417 CALC BMI ABV UP PARAM F/U: HCPCS | Performed by: INTERNAL MEDICINE

## 2020-10-19 PROCEDURE — 99214 OFFICE O/P EST MOD 30 MIN: CPT | Performed by: INTERNAL MEDICINE

## 2020-10-19 RX ORDER — PREDNISONE 10 MG/1
30 TABLET ORAL DAILY
Qty: 12 TABLET | Refills: 1 | Status: SHIPPED | OUTPATIENT
Start: 2020-10-19 | End: 2020-10-23

## 2020-10-19 NOTE — PROGRESS NOTES
Juan Antonio Stair  [birth year]      Juan Antonio Lucas  47 y.o. female presented for follow up for asthma   Patient is complaining of sore throat. She denies any cough or hemoptysis but with the weather change she is requiring more albuterol doing it 3-4 times a day there is no wheezing but she notices chest tightness. She is using Dulera and Pulmicort nebulized along with albuterol. Her  tells her that she still snores with loudly and gasps and chokes for breath throughout the night sometimes she wakes up gasping and choking. She feels tired and fatigued and unrested in the morning. Previous sleep study in 2016 showed snoring however since then patient has put on significant weight  She also has acid reflux ,she is on Prilosec  Advised her to continue with GERD precautions with head of the bed elevation and avoiding laying down within 2 hours of eating and to take Tums at night      Last visit    I reviewed Ms. Galvan Finger  IgE level and eosinophil level . Her IgE level is elevated eosinophil level is low . She continues to have nocturnal wheeze and is on Dulera 2 puffs twice a day with a spacer uses albuterol in the morning and at night before going to bed her GERD is under control  Denies any cough with purulent sputum does have shortness of breath with exertion  She did follow-up with ENT and there is a plan to proceed with turbinectomy. I have advised her to continue to use Flonase for her chronic rhinitis. Last visit   Patient did not follow-up in clinic after April 2019. She did go see ENT and was prescribed Flonase 2 sprays each nostril once a day but she is using 1 spray each nostril once a day. Her asthma is poorly controlled she is needing rescue medication albuterol nebulized once a day and albuterol inhaler 2 times a day daily. She is doing Dulera 2 puffs inhaled twice a day but she still notices a wheeze.   Symptoms are triggered by chemicals dust animal dander and she tries to avoid all these.  She also feels short winded with exertion. GERD is under control  She did not have allergy testing at Dr. Lola Ferguson because she did not follow-up with him. Last visit  She has been have snorting , snoring at night and wakes up choking . She had previous sleep study which was negative . She did see ENT who continued Flonase but that has not helped . She also has sob - good days and bad days   Uses Advair once or twice a day - mostly once - because she forgets . Also uses albuterol nebulized in am   melendrez snot use albuterol mdi   No asthma exacerbation since last visit   Has been having headaches       Previous visit  Patient had lost her insurance, so could not do the Xolair injections. I'll follow up with ENT. She is doing a little bit better than before using albuterol once every day. No nocturnal use. I have asked her to avoid triggers, which is chemical like Lysol bleach and also smoke. Her  is smoking and she is getting passively exposed. Also encouraged her to quit marijuana. She denies any cough, any hemoptysis since last visit used prednisone once did not have any ER visit for asthma          Previous visit  Since last visit patient is still complaining of symptoms daily. She uses albuterol 3 times a day and at least 3-4 times  night in a week. Dyspneic with exertion, which is grade 2 at work at home has had one course of prednisone again. Since her last visit and see back. She tried Symbicort and Spiriva, but prefer causing burning in the chest and she using Advair now which does not dose had been increased from 250 to 500 / 50 On her last visit. She is also using Singulair  Avoiding allergens and is not smoking  Today, she is willing very fatigued and tired due to her asthma even though there are no rhonchi  Her IgG level is elevated, and based on her weight. She would need 300 mg of Xolair once a month.   I discussed the side effects, the pros and cons of this and she is agreeable to proceed    Review of Systems   Constitutional: Negative. HENT: Positive for congestion, rhinorrhea and sneezing. Negative for postnasal drip and sinus pain. Eyes: Negative. Respiratory: Positive for cough, shortness of breath and wheezing. Negative for apnea, choking and chest tightness. Cardiovascular: Negative. Gastrointestinal: Negative. Genitourinary: Negative. Neurological: Negative.            Immunization History   Administered Date(s) Administered    Influenza Vaccine, unspecified formulation 10/09/2017    Influenza Virus Vaccine 12/21/2015    Influenza Whole 12/21/2015    Influenza, Quadv, 6 mo and older, IM (Fluzone, Flulaval) 10/09/2017    Influenza, Quadv, IM, PF (6 mo and older Fluzone, Flulaval, Fluarix, and 3 yrs and older Afluria) 11/13/2015, 10/19/2020    Influenza, Triv, 3 Years and older, IM (Afluria (5 yrs and older) 10/10/2016    Pneumococcal Polysaccharide ([id number]) 01/21/2016    Tdap (Boostrix, Adacel) 02/05/2016          PAST MEDICAL HISTORY:         Diagnosis Date    Allergic rhinitis     Asthma     Clinical trial participant 11/23/2015 11/23/20015    COPD (chronic obstructive pulmonary disease) ([address]     Dysphagia     Dysphagia     has needed EGD with dilatation in the past    GERD (gastroesophageal reflux disease)     H/O cardiovascular stress test 01/15/2018    ABNORMAL    Hiatal hernia     Hyperlipidemia     Hypertension     Irritable bowel syndrome with constipation 1/21/2016    Obesity     Snores     states has tested negative for sleep apnea    Wears glasses        Family History:       Problem Relation Age of Onset    Hypertension Mother     High Cholesterol Mother     Asthma Mother     Osteoarthritis Mother     Other Mother         overweight    Colon Cancer Father        SURGICAL HISTORY:   Past Surgical History:   Procedure Laterality Date    CARDIAC CATHETERIZATION  01/16/2018    CARDIAC CATHETERIZATION 11/23/2015     Minimal non obstructive CAD    COLONOSCOPY  2015    COLONOSCOPY  02/08/2018    Redundant colon    ENDOSCOPY, COLON, DIAGNOSTIC  2015    with dilatation    ENDOSCOPY, COLON, DIAGNOSTIC  02/08/2018    WNL     NOSE SURGERY      turbinate     MA COLON CA SCRN NOT HI RSK IND N/A 2/8/2018    COLONOSCOPY performed by Atilio Eldridge MD at [address]  2003    UPPER GASTROINTESTINAL ENDOSCOPY      The endoscopic exam showed mildly tortuous esophagus. Savary dilation performed x 42 and 45 F.     UPPER GASTROINTESTINAL ENDOSCOPY N/A 7/18/2017    EGD DILATION SAVORY performed by Atilio Eldridge MD at [facility]              Not in a hospital admission. No Known Allergies  Social History     Tobacco Use   Smoking Status Never Smoker   Smokeless Tobacco Never Used     Prior to Admission medications    Medication Sig Start Date End Date Taking?  Authorizing Provider   nystatin (MYCOSTATIN) 311514 UNIT/ML suspension Take 5 mLs by mouth 4 times daily for 7 days Retain in mouth as long as possible 10/19/20 10/26/20 Yes Wallace Johnson MD   predniSONE (DELTASONE) 10 MG tablet Take 3 tablets by mouth daily for 4 days 10/19/20 10/23/20 Yes Wallace Johnson MD   RA ASPIRIN EC 81 MG EC tablet take 1 tablet by mouth once daily 10/12/20  Yes Claudio Dey MD   ferrous sulfate (FE TABS) 325 (65 Fe) MG EC tablet Take 1 tablet by mouth 2 times daily 8/11/20  Yes Claudio Dey MD   meloxicam (MOBIC) 7.5 MG tablet Take 1 tablet by mouth daily 8/11/20  Yes Claudio Dey MD   atorvastatin (LIPITOR) 20 MG tablet take 1 tablet by mouth once daily 8/11/20  Yes Claudio Dey MD   carvedilol (COREG) 6.25 MG tablet take 1 tablet by mouth twice a day 8/11/20  Yes Claudio Dey MD   valsartan-hydrochlorothiazide (DIOVAN-HCT) 80-12.5 MG per tablet Once a day 8/11/20  Yes Claudio Dey MD   senna (RA SENNA) 8.6 MG tablet take 1 tablet by mouth twice a day 8/11/20  Yes Claudio Dey MD mometasone-formoterol (DULERA) 200-5 MCG/ACT inhaler inhale 2 puffs by mouth twice a day 8/10/20  Yes Evi Owusu MD   albuterol sulfate HFA (PROAIR HFA) 108 (90 Base) MCG/ACT inhaler inhale 2 puffs by mouth every 6 hours if needed for wheezing 8/10/20  Yes Evi Owusu MD   omeprazole (PRILOSEC) 40 MG delayed release capsule Once daily as needed 7/23/20  Yes Anai Parry MD   ACETAMINOPHEN EXTRA STRENGTH 500 MG tablet take 2 tablets by mouth every 6 hours if needed for pain 6/9/20  Yes Anai Parry MD   RA MELATONIN 5 MG TABS tablet take 1 tablet by mouth nightly 6/19/19  Yes Gail Yanez MD   albuterol (PROVENTIL) (2.5 MG/3ML) 0.083% nebulizer solution Take 3 mLs by nebulization every 6 hours as needed for Wheezing 4/8/19  Yes Anai Parry MD   fluticasone [facility]) 50 MCG/ACT nasal spray 1 spray by Nasal route daily 5/3/18  Yes Anai Parry MD   Multiple Vitamins-Minerals (WOMENS MULTI VITAMIN & MINERAL PO) Take 1 tablet by mouth daily   Yes Historical Provider, MD     Exam obese  Head and neck atraumatic, normocephalic    Lymph nodes-no cervical, supraclavicular lymphadenopathy    Neck-no JVP elevation  : No thrush in the soft palate hard palate and tongue  Lungs - Ventilating all lobes without any rales, rhonchi, wheezes or dullness. No bronchial breath sounds. Chest expansion equal bilaterally    CVS- S1, S2 regular. No S3 no S4, no murmurs    Abdomen-nontender, nondistended. Bowel sounds are present. No organomegaly    Lower extremity-no edema    Upper extremity-no edema    Neurological-grossly normal cranial nerves.   No overt motor deficit     BP (!) 142/80 (Site: Left Upper Arm, Position: Sitting, Cuff Size: Medium Adult)   Pulse 61   Temp 96.6 °F (35.9 °C) (Temporal)   Resp 16   Ht 5' (1.524 m)   Wt 201 lb (91.2 kg)   SpO2 100%   BMI 39.26 kg/m²     Eosinophils Absolute 129Low   200 - 400 /uL       IgE 357High   <101 IU/mL Final 01/31/2020  3:40 PM [employer] [facility] [address] [facility] parviz[facility] IgE <0.34  0.00 - 0.34 kU/L Final 01/31/2020  3:40  [address]   Cockroach IgE <0.34  0.00 - 0.34 kU/L Final 01/31/2020  3:40  [address]   Allergen [place] <0.34  0.00 - 0.34 kU/L Final 01/31/2020  3:40 PM [address] [place] IgE <0.34  0.00 - 0.34 kU/L Final 01/31/2020  3:40 PM [address] [place] IgE <0.34  0.00 - 0.34 kU/L Final 01/31/2020  3:40  [address]   Mouse Epithelial <0.34  0.00 - 0.34 kU/L Final 01/31/2020  3:40  [address]   Mucor Racemosus <0.34  0.00 - 0.34 kU/L Final 01/31/2020  3:40  [address]   Allergen [place], IGE <0.34  0.00 - 0.34 kU/L Final 01/31/2020  3:40  [address]   Dog Dander IgE <0.34  0.00 - 0.34 kU/L Final 01/31/2020  3:40  [address]   Sheep [place] IgE <0.34                Assessment   Diagnosis Orders   1. Uncomplicated severe persistent asthma  predniSONE (DELTASONE) 10 MG tablet   2. Hiatal hernia     3. Gastroesophageal reflux disease without esophagitis, controlled on omeprazole     4. Chronic allergic rhinitis     5. Elevated IgE level     6. Essential hypertension  Home Sleep Study   7. Sleep-related breathing disorder  Home Sleep Study   8. Oral thrush  nystatin (MYCOSTATIN) 990826 UNIT/ML suspension   9. Need for influenza vaccination  INFLUENZA, QUADV, 3 YRS AND OLDER, IM PF, PREFILL SYR OR SDV, 0.5ML (AFLURIA QUADV, PF)     Patient's IgE level is elevated she has severe persistent asthma maximized on bronchodilator therapy will recommend start Xolair injection. I reviewed risks and benefits of her Xolair with patient but she does not want to start Xolair at this present time. Plan:  Patient is complaining of sore throat I did not see oral thrush however I was not able to visualize the posterior pharynx completely.   This may also be related to chronic acid reflux. I will start her on nystatin swish and swallow for 7 days. Stop Pulmicort but continue Dulera with spacer. She feels that she needs a prednisone burst because of weather change and her increased chest tightness and symptoms of asthma. Her  is telling her that she is snoring. Has noticed that she is choking and gasping for breath throughout the night he does not have apneas but is waking up because of the gasping. He is feeling sleepy tired during the day and fatigued-we will proceed with home sleep study    Flu vaccine was provided    Follow-up in 3 months        Requested Prescriptions     Signed Prescriptions Disp Refills    nystatin (MYCOSTATIN) 130378 UNIT/ML suspension 140 mL 0     Sig: Take 5 mLs by mouth 4 times daily for 7 days Retain in mouth as long as possible    predniSONE (DELTASONE) 10 MG tablet 12 tablet 1     Sig: Take 3 tablets by mouth daily for 4 days       Medications Discontinued During This Encounter   Medication Reason    budesonide (PULMICORT) 0.25 MG/2ML nebulizer suspension Therapy completed       [facility] received counseling on the following healthy behaviors: nutrition, exercise and medication adherence    Patient given educational materials : see patient instruction     Discussed use, benefit, and side effects of prescribed medications. Barriers to medication compliance addressed. All patient questions answered. Pt voiced understanding.    Electronically signed by Yoselin Hamilton MD on 10/19/2020 at 11:54 AM

## 2020-10-26 ENCOUNTER — TELEPHONE (OUTPATIENT)
Dept: INTERNAL MEDICINE | Age: 50
End: 2020-10-26

## 2020-10-26 NOTE — TELEPHONE ENCOUNTER
Pt due for 3 month Virtual Visit  f/u in December. LM letting pt know to contact office to schedule.

## 2020-11-04 ENCOUNTER — HOSPITAL ENCOUNTER (OUTPATIENT)
Dept: SLEEP CENTER | Age: 50
Discharge: HOME | End: 2020-11-06
Payer: MEDICARE

## 2020-11-04 VITALS — WEIGHT: 200 LBS | BODY MASS INDEX: 39.27 KG/M2 | HEIGHT: 60 IN

## 2020-11-04 PROCEDURE — 95810 POLYSOM 6/> YRS 4/> PARAM: CPT

## 2020-11-17 LAB — STATUS: NORMAL

## 2020-12-17 ENCOUNTER — TELEPHONE (OUTPATIENT)
Dept: INTERNAL MEDICINE | Age: 50
End: 2020-12-17

## 2020-12-17 NOTE — LETTER
[address]  [address] [facility] [address]  Phone: [phone]  Fax: [phone]    Golden Boston MD        [birth year] 17[birth year] 7926    [address] [place] [id number]      Dear Hamilton Treadwell: This letter is a reminder that you may have diagnostic testing that has not been completed. It is important to your well-being that these test(s) are performed. Please find the outstanding test(s) attached. If you could please have these completed before your next appointment. You can have the test completed at any [facility] facility or Lab. Please see the order for scheduling instructions. Otherwise can be done at any [facility]. Please call our office at Dept: [phone] for additional information on the outstanding tests or let us know if they have been completed so we may update your chart. If you have any questions or concerns, please don't hesitate to call.     Sincerely,        Golden Boston MD

## 2020-12-21 ENCOUNTER — APPOINTMENT (OUTPATIENT)
Dept: GENERAL RADIOLOGY | Age: 50
End: 2020-12-21
Payer: MEDICARE

## 2020-12-21 ENCOUNTER — HOSPITAL ENCOUNTER (EMERGENCY)
Age: 50
Discharge: HOME | End: 2020-12-21
Attending: EMERGENCY MEDICINE
Payer: MEDICARE

## 2020-12-21 VITALS
RESPIRATION RATE: 21 BRPM | DIASTOLIC BLOOD PRESSURE: 79 MMHG | OXYGEN SATURATION: 98 % | TEMPERATURE: 97.9 F | SYSTOLIC BLOOD PRESSURE: 143 MMHG | HEART RATE: 93 BPM

## 2020-12-21 LAB
ABSOLUTE EOS #: 0.26 K/UL (ref 0–0.44)
ABSOLUTE IMMATURE GRANULOCYTE: <0.03 K/UL (ref 0–0.3)
ABSOLUTE LYMPH #: 3.98 K/UL (ref 1.1–3.7)
ABSOLUTE MONO #: 0.65 K/UL (ref 0.1–1.2)
ANION GAP SERPL CALCULATED.3IONS-SCNC: 9 MMOL/L (ref 9–17)
BASOPHILS # BLD: 0 % (ref 0–2)
BASOPHILS ABSOLUTE: 0.03 K/UL (ref 0–0.2)
BUN BLDV-MCNC: 14 MG/DL (ref 6–20)
BUN/CREAT BLD: ABNORMAL (ref 9–20)
CALCIUM SERPL-MCNC: 9.3 MG/DL (ref 8.6–10.4)
CHLORIDE BLD-SCNC: 104 MMOL/L (ref 98–107)
CO2: 23 MMOL/L (ref 20–31)
CREAT SERPL-MCNC: 0.84 MG/DL (ref 0.5–0.9)
DIFFERENTIAL TYPE: ABNORMAL
EOSINOPHILS RELATIVE PERCENT: 3 % (ref 1–4)
GFR AFRICAN AMERICAN: >60 ML/MIN
GFR NON-AFRICAN AMERICAN: >60 ML/MIN
GFR SERPL CREATININE-BSD FRML MDRD: ABNORMAL ML/MIN/{1.73_M2}
GFR SERPL CREATININE-BSD FRML MDRD: ABNORMAL ML/MIN/{1.73_M2}
GLUCOSE BLD-MCNC: 112 MG/DL (ref 70–99)
HCT VFR BLD CALC: 39 % (ref 36.3–47.1)
HEMOGLOBIN: 12.1 G/DL (ref 11.9–15.1)
IMMATURE GRANULOCYTES: 0 %
LYMPHOCYTES # BLD: 50 % (ref 24–43)
MCH RBC QN AUTO: 27.8 PG (ref 25.2–33.5)
MCHC RBC AUTO-ENTMCNC: 31 G/DL (ref 28.4–34.8)
MCV RBC AUTO: 89.4 FL (ref 82.6–102.9)
MONOCYTES # BLD: 8 % (ref 3–12)
NRBC AUTOMATED: 0 PER 100 WBC
PDW BLD-RTO: 14.5 % (ref 11.8–14.4)
PLATELET # BLD: 300 K/UL (ref 138–453)
PLATELET ESTIMATE: ABNORMAL
PMV BLD AUTO: 10 FL (ref 8.1–13.5)
POTASSIUM SERPL-SCNC: 4.1 MMOL/L (ref 3.7–5.3)
RBC # BLD: 4.36 M/UL (ref 3.95–5.11)
RBC # BLD: ABNORMAL 10*6/UL
SEG NEUTROPHILS: 39 % (ref 36–65)
SEGMENTED NEUTROPHILS ABSOLUTE COUNT: 3.12 K/UL (ref 1.5–8.1)
SODIUM BLD-SCNC: 136 MMOL/L (ref 135–144)
TROPONIN INTERP: NORMAL
TROPONIN INTERP: NORMAL
TROPONIN T: NORMAL NG/ML
TROPONIN T: NORMAL NG/ML
TROPONIN, HIGH SENSITIVITY: 6 NG/L (ref 0–14)
TROPONIN, HIGH SENSITIVITY: <6 NG/L (ref 0–14)
WBC # BLD: 8.1 K/UL (ref 3.5–11.3)
WBC # BLD: ABNORMAL 10*3/UL

## 2020-12-21 PROCEDURE — 6360000002 HC RX W HCPCS: Performed by: STUDENT IN AN ORGANIZED HEALTH CARE EDUCATION/TRAINING PROGRAM

## 2020-12-21 PROCEDURE — 93005 ELECTROCARDIOGRAM TRACING: CPT | Performed by: STUDENT IN AN ORGANIZED HEALTH CARE EDUCATION/TRAINING PROGRAM

## 2020-12-21 PROCEDURE — 99284 EMERGENCY DEPT VISIT MOD MDM: CPT

## 2020-12-21 PROCEDURE — 71046 X-RAY EXAM CHEST 2 VIEWS: CPT

## 2020-12-21 PROCEDURE — 80048 BASIC METABOLIC PNL TOTAL CA: CPT

## 2020-12-21 PROCEDURE — 85025 COMPLETE CBC W/AUTO DIFF WBC: CPT

## 2020-12-21 PROCEDURE — 96374 THER/PROPH/DIAG INJ IV PUSH: CPT

## 2020-12-21 PROCEDURE — 6370000000 HC RX 637 (ALT 250 FOR IP): Performed by: STUDENT IN AN ORGANIZED HEALTH CARE EDUCATION/TRAINING PROGRAM

## 2020-12-21 PROCEDURE — 84484 ASSAY OF TROPONIN QUANT: CPT

## 2020-12-21 RX ORDER — CYCLOBENZAPRINE HCL 10 MG
10 TABLET ORAL NIGHTLY PRN
Qty: 10 TABLET | Refills: 0 | Status: SHIPPED | OUTPATIENT
Start: 2020-12-21 | End: 2020-12-31

## 2020-12-21 RX ORDER — KETOROLAC TROMETHAMINE 15 MG/ML
15 INJECTION, SOLUTION INTRAMUSCULAR; INTRAVENOUS ONCE
Status: COMPLETED | OUTPATIENT
Start: 2020-12-21 | End: 2020-12-21

## 2020-12-21 RX ORDER — LIDOCAINE 4 G/G
1 PATCH TOPICAL DAILY
Qty: 30 PATCH | Refills: 0 | Status: SHIPPED | OUTPATIENT
Start: 2020-12-21 | End: 2021-01-20

## 2020-12-21 RX ORDER — LIDOCAINE 4 G/G
1 PATCH TOPICAL ONCE
Status: DISCONTINUED | OUTPATIENT
Start: 2020-12-21 | End: 2020-12-21 | Stop reason: HOSPADM

## 2020-12-21 RX ADMIN — KETOROLAC TROMETHAMINE 15 MG: 15 INJECTION, SOLUTION INTRAMUSCULAR; INTRAVENOUS at 06:57

## 2020-12-21 ASSESSMENT — ENCOUNTER SYMPTOMS
NAUSEA: 0
BACK PAIN: 0
ABDOMINAL PAIN: 0
VOMITING: 0
WHEEZING: 0
COUGH: 0
SHORTNESS OF BREATH: 0

## 2020-12-21 ASSESSMENT — PAIN DESCRIPTION - ONSET: ONSET: ON-GOING

## 2020-12-21 ASSESSMENT — PAIN DESCRIPTION - LOCATION: LOCATION: CHEST

## 2020-12-21 ASSESSMENT — PAIN DESCRIPTION - ORIENTATION: ORIENTATION: LEFT

## 2020-12-21 ASSESSMENT — PAIN DESCRIPTION - FREQUENCY: FREQUENCY: CONTINUOUS

## 2020-12-21 ASSESSMENT — PAIN DESCRIPTION - PROGRESSION: CLINICAL_PROGRESSION: GRADUALLY WORSENING

## 2020-12-21 ASSESSMENT — PAIN DESCRIPTION - PAIN TYPE: TYPE: ACUTE PAIN

## 2020-12-21 ASSESSMENT — PAIN SCALES - GENERAL: PAINLEVEL_OUTOF10: 8

## 2020-12-21 ASSESSMENT — PAIN DESCRIPTION - DESCRIPTORS: DESCRIPTORS: RADIATING

## 2020-12-21 NOTE — ED PROVIDER NOTES
[facility]  Emergency Department  Faculty Attestation       I performed a history and physical examination of the patient and discussed management with the resident. I reviewed the residents note and agree with the documented findings including all diagnostic interpretations and plan of care. Any areas of disagreement are noted on the chart. I was personally present for the key portions of any procedures. I have documented in the chart those procedures where I was not present during the key portions. I have reviewed the emergency nurses triage note. I agree with the chief complaint, past medical history, past surgical history, allergies, medications, social and family history as documented unless otherwise noted below. Documentation of the HPI, Physical Exam and Medical Decision Making performed by scribes is based on my personal performance of the HPI, PE and MDM. For Physician Assistant/ Nurse Practitioner cases/documentation I have personally evaluated this patient and have completed at least one if not all key elements of the E/M (history, physical exam, and MDM). Additional findings are as noted. Pertinent Comments     Primary Care Physician: Nikolas Villeda MD    ED Triage Vitals   BP Temp Temp Source Pulse Resp SpO2 Height Weight   12/21/20 0643 12/21/20 0634 12/21/20 0634 12/21/20 0643 12/21/20 0643 12/21/20 0643 -- --   (!) 143/79 97.9 °F (36.6 °C) Temporal 93 21 98 %          History/Physical: This is a 45 y.o. female who presents to the Emergency Department with complaint of chest pain. Started Friday after work. 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 She states that she has numbness and tingling down the left arm as well. Had a cardiac cath 2 years ago that was negative.

## 2020-12-21 NOTE — ED PROVIDER NOTES
[facility]  ED  Emergency Department Encounter  Emergency Medicine Resident     Pt Name: Alma Barraza  MRN: [id number]  [place] 1970  Date of evaluation: 12/21/20  PCP:  Edmundo Arora MD    [address] [place] [address]       Chief Complaint   Patient presents with    Chest Pain       [facility]  (Location/Symptom, Timing/Onset, Context/Setting, Quality, Duration, Modifying Factors,Severity.)      Alma Barraza is a 46 yo female who presents with chest pain. Patient states that for the past 3 days she has been having \"gnawing\" chest pain that does radiate into her left shoulder when she lifts heavy things and moves in certain directions. She states it is not exertional and does not produce nausea, vomiting, shortness of breath, diaphoresis. She has a history of hypertension hyperlipidemia but denies any diabetes, smoking, cocaine use, family history of cardiac disease. Denies any history of blood clots, leg swelling, calf cramping, hemoptysis, recent surgery or long travel, estrogen supplementation. Patient did have a completely normal heart catheterization 2 years ago. PAST MEDICAL / SURGICAL / SOCIAL / FAMILY HISTORY      has a past medical history of Allergic rhinitis, Asthma, Clinical trial participant, COPD (chronic obstructive pulmonary disease) ([facility] [place] [address] Dysphagia, Dysphagia, GERD (gastroesophageal reflux disease), H/O cardiovascular stress test, Hiatal hernia, Hyperlipidemia, Hypertension, Irritable bowel syndrome with constipation, Obesity, Snores, and Wears glasses. has a past surgical history that includes Tubal ligation (2003); Colonoscopy (2015); Upper gastrointestinal endoscopy; Upper gastrointestinal endoscopy (N/A, 7/18/2017); Cardiac catheterization (01/16/2018); Cardiac catheterization (11/23/2015); Endoscopy, colon, diagnostic (2015); Endoscopy, colon, diagnostic (02/08/2018); pr colon ca scrn not hi rsk ind (N/A, 2/8/2018); Colonoscopy (02/08/2018); and Nose surgery. Social History     Socioeconomic History    Marital status:      Spouse name: Not on file    Number of children: Not on file    Years of education: Not on file    Highest education level: Not on file   Occupational History    Not on file   Social Needs    Financial resource strain: Not on file    Food insecurity     Worry: Not on file     Inability: Not on file    Transportation needs     Medical: Not on file     Non-medical: Not on file   Tobacco Use    Smoking status: Never Smoker    Smokeless tobacco: Never Used   Substance and Sexual Activity    Alcohol use:  Yes     Alcohol/week: 4.0 standard drinks     Types: 4 Cans of beer per week     Comment: occasionally    Drug use: No    Sexual activity: Not Currently     Partners: Male     Comment: tubal ligation    Lifestyle    Physical activity     Days per week: Not on file     Minutes per session: Not on file    Stress: Not on file   Relationships    Social connections     Talks on phone: Not on file     Gets together: Not on file     Attends [employer] service: Not on file     Active member of club or organization: Not on file     Attends meetings of clubs or organizations: Not on file     Relationship status: Not on file    Intimate partner violence     Fear of current or ex partner: Not on file     Emotionally abused: Not on file     Physically abused: Not on file     Forced sexual activity: Not on file   Other Topics Concern    Not on file   Social History Narrative    Not on file       Family History Problem Relation Age of Onset    Hypertension Mother     High Cholesterol Mother     Asthma Mother     Osteoarthritis Mother     Other Mother         overweight    Colon Cancer Father         Allergies:  Patient has no known allergies. Home Medications:  Prior to Admission medications    Medication Sig Start Date End Date Taking?  Authorizing Provider   cyclobenzaprine (FLEXERIL) 10 MG tablet Take 1 tablet by mouth nightly as needed for Muscle spasms 12/21/20 12/31/20 Yes Michaeleen Mail, DO   lidocaine 4 % external patch Place 1 patch onto the skin daily 12/21/20 1/20/21 Yes Michaeleen Mail, DO   RA ASPIRIN EC 81 MG EC tablet take 1 tablet by mouth once daily 10/12/20   Ethel Rosales MD   ferrous sulfate (FE TABS) 325 (65 Fe) MG EC tablet Take 1 tablet by mouth 2 times daily 8/11/20   Ethel Rosales MD   meloxicam [facility]) 7.5 MG tablet Take 1 tablet by mouth daily 8/11/20   Ethel Rosales MD   atorvastatin (LIPITOR) 20 MG tablet take 1 tablet by mouth once daily 8/11/20   Ethel Rosales MD   carvedilol (COREG) 6.25 MG tablet take 1 tablet by mouth twice a day 8/11/20   Ethel Rosales MD   valsartan-hydrochlorothiazide (DIOVAN-HCT) 80-12.5 MG per tablet Once a day 8/11/20   Ethel Rosales MD   senna (RA SENNA) 8.6 MG tablet take 1 tablet by mouth twice a day 8/11/20   Ethel Rosales MD   mometasone-formoterol [facility]) 200-5 MCG/ACT inhaler inhale 2 puffs by mouth twice a day 8/10/20   Zack Heck MD   albuterol sulfate HFA (PROAIR HFA) 108 (90 Base) MCG/ACT inhaler inhale 2 puffs by mouth every 6 hours if needed for wheezing 8/10/20   Zack Heck MD   omeprazole (PRILOSEC) 40 MG delayed release capsule Once daily as needed 7/23/20   Ethel Rosales MD   ACETAMINOPHEN EXTRA STRENGTH 500 MG tablet take 2 tablets by mouth every 6 hours if needed for pain 6/9/20   Ethel Rosales MD RA MELATONIN 5 MG TABS tablet take 1 tablet by mouth nightly 6/19/19   Marian Delgado MD albuterol (PROVENTIL) (2.5 MG/3ML) 0.083% nebulizer solution Take 3 mLs by nebulization every 6 hours as needed for Wheezing 4/8/19   Ethel Rosales MD   fluticasone [facility]) 50 MCG/ACT nasal spray 1 spray by Nasal route daily 5/3/18   Ethel Rosales MD   Multiple Vitamins-Minerals (WOMENS MULTI VITAMIN & MINERAL PO) Take 1 tablet by mouth daily    Historical Provider, MD       REVIEW OFSYSTEMS    (2-9 systems for level 4, 10 or more for level 5)      Review of Systems   Constitutional: Negative for chills and fever. HENT: Negative. Respiratory: Negative for cough, shortness of breath and wheezing. Cardiovascular: Positive for chest pain. Negative for palpitations and leg swelling. Gastrointestinal: Negative for abdominal pain, nausea and vomiting. Musculoskeletal: Negative for back pain and neck pain. Skin: Negative for rash and wound. Neurological: Negative for dizziness, syncope, weakness, light-headedness, numbness and headaches. PHYSICAL EXAM   (up to 7 for level 4, 8 or more forlevel 5)      INITIAL VITALS:   ED Triage Vitals   BP Temp Temp Source Pulse Resp SpO2 Height Weight   12/21/20 0643 12/21/20 0634 12/21/20 0634 12/21/20 0643 12/21/20 0643 12/21/20 0643 -- --   (!) 143/79 97.9 °F (36.6 °C) Temporal 93 21 98 %         Physical Exam  Vitals signs and nursing note reviewed. Constitutional:       General: She is not in acute distress. Appearance: Normal appearance. She is not ill-appearing, toxic-appearing or diaphoretic. Cardiovascular:      Rate and Rhythm: Normal rate and regular rhythm. Heart sounds: No murmur. No gallop. Pulmonary:      Effort: Pulmonary effort is normal.      Breath sounds: Normal breath sounds. Abdominal:      General: There is no distension. Palpations: Abdomen is soft. Tenderness: There is no abdominal tenderness. There is no guarding. Musculoskeletal:         General: No tenderness. Right lower leg: No edema. Left lower leg: No edema. Skin:     General: Skin is warm and dry. Neurological:      Mental Status: She is alert. DIFFERENTIAL  DIAGNOSIS     PLAN (LABS / IMAGING / EKG):  Orders Placed This Encounter   Procedures    XR CHEST (2 VW)    CBC WITH AUTO DIFFERENTIAL    BASIC METABOLIC PANEL    Troponin    Troponin    EKG 12 Lead       MEDICATIONS ORDERED:  Orders Placed This Encounter   Medications    ketorolac (TORADOL) injection 15 mg    lidocaine 4 % external patch 1 patch    cyclobenzaprine (FLEXERIL) 10 MG tablet     Sig: Take 1 tablet by mouth nightly as needed for Muscle spasms     Dispense:  10 tablet     Refill:  0    lidocaine 4 % external patch     Sig: Place 1 patch onto the skin daily     Dispense:  30 patch     Refill:  0       DDX: MSK, ACS    Initial MDM/Plan/ED course: 45 y.o. female who presents with chest pain. On exam vitals normal patient no acute distress. Physical exam unremarkable normal heart and lung sounds, abdomen soft nontender, no leg swelling calf tenderness. Chest pain reproducible with patient movements. Cardiac work-up obtained with negative troponins x2. Symptoms likely musculoskeletal as they are reproduced with the patient lifting heavy things and moving around and she does perform repetitive movements at her job. Patient treated with Toradol and lidocaine patch. Instructed to use anti-inflammatories, Flexeril, Lidoderm patch at home. Instructed patient to return if symptoms worsen. Patient agreed this plan was discharged home.     DIAGNOSTIC RESULTS / EMERGENCY DEPARTMENT COURSE / MDM     LABS:  Labs Reviewed   CBC WITH AUTO DIFFERENTIAL - Abnormal; Notable for the following components:       Result Value    RDW 14.5 (*)     Lymphocytes 50 (*)     Absolute Lymph # 3.98 (*)     All other components within normal limits   BASIC METABOLIC PANEL - Abnormal; Notable for the following components:    Glucose 112 (*)

## 2020-12-21 NOTE — ED PROVIDER NOTES
[facility]  ED  Emergency Department  Faculty Sign-Out Addendum     Care of Nabeel Dooley was assumed from previous attending and is being seen for Chest Pain  . The patient's initial evaluation and plan have been discussed with the prior provider who initially evaluated the patient. Handoff taken on the following patient from prior Attending Physician:    Omar Seat    I was available and discussed any additional care issues that arose and coordinated the management plans with the resident(s) caring for the patient during my duty period. Any areas of disagreement with residents documentation of care or procedures are noted on the chart. I was personally present for the key portions of any/all procedures during my duty period. I have documented in the chart those procedures where I was not present during the key portions. EMERGENCY DEPARTMENT COURSE / MEDICAL DECISION MAKING:       MEDICATIONS GIVEN:  Orders Placed This Encounter   Medications    ketorolac (TORADOL) injection 15 mg       LABS / RADIOLOGY:     Labs Reviewed   CBC WITH AUTO DIFFERENTIAL - Abnormal; Notable for the following components:       Result Value    RDW 14.5 (*)     Lymphocytes 50 (*)     Absolute Lymph # 3.98 (*)     All other components within normal limits   BASIC METABOLIC PANEL   TROPONIN   TROPONIN       No results found. RECENT VITALS:     Temp: 97.9 °F (36.6 °C),  Pulse: 93, Resp: 21, BP: (!) 143/79, SpO2: 98 %    This patient is a 45 y.o. Female with chest pain, negative cath 2 years ago. Heavy lifting recently at work. OUTSTANDING TASKS / RECOMMENDATIONS:    1. Troponin x2, rule out ACS  2. Anticipate discharge.       Cooper Navarro MD   Attending Emergency Physician  [facility]  ED       Cooper Navarro MD  12/21/20 2168

## 2020-12-21 NOTE — ED NOTES
Pt. Presents to the ED with c/o left sided chest pain. Pt. States she has no cardiac hx. But did have a cardiac cath before. Pt. Does states that the pain radiates to her LUE and she does have numbness and tingling at this time. Pt. States she feels as if her Left arm and left leg are not being circulated. Pt. Denies nausa and vomiting at this time. Pt. Denies changes in bowel patterns and in urination. Pt resting on stretcher, no respiratory distress noted, pt updated on plan of care, will continue to monitor, call light in reach.         Radha Roberts RN  12/21/20 7471

## 2020-12-21 NOTE — ED NOTES
Kapil collected, labeled and sent to lab with yellow stickers via tube system.       Owen Toure RN  12/21/20 1932

## 2020-12-22 LAB
EKG ATRIAL RATE: 81 BPM
EKG P AXIS: 74 DEGREES
EKG P-R INTERVAL: 166 MS
EKG Q-T INTERVAL: 356 MS
EKG QRS DURATION: 82 MS
EKG QTC CALCULATION (BAZETT): 413 MS
EKG R AXIS: 17 DEGREES
EKG T AXIS: 44 DEGREES
EKG VENTRICULAR RATE: 81 BPM

## 2020-12-22 PROCEDURE — 93010 ELECTROCARDIOGRAM REPORT: CPT | Performed by: INTERNAL MEDICINE

## 2021-01-06 DIAGNOSIS — M25.552 LEFT HIP PAIN: ICD-10-CM

## 2021-01-08 RX ORDER — IBUPROFEN 800 MG/1
TABLET ORAL
Qty: 30 TABLET | Refills: 3 | Status: SHIPPED | OUTPATIENT
Start: 2021-01-08 | End: 2021-07-09

## 2021-01-08 NOTE — TELEPHONE ENCOUNTER
E-scribing request for ibuprofen  pt has no future appt. Last seen 8/11/20. Please advise. Medication is on history list, writer is unable to schedule pt at this time.      Health Maintenance   Topic Date Due    Hepatitis C screen  1970    HIV screen  12/04/1984    Shingles Vaccine (1 of 2) 12/04/2019    Breast cancer screen  05/06/2021    Cervical cancer screen  08/10/2021    A1C test (Diabetic or Prediabetic)  10/06/2021    Lipid screen  10/06/2021    Potassium monitoring  12/21/2021    Creatinine monitoring  12/21/2021    Colon cancer screen colonoscopy  02/08/2023    DTaP/Tdap/Td vaccine (2 - Td) 02/05/2026    Flu vaccine  Completed    Pneumococcal 0-64 years Vaccine  Completed    Hepatitis A vaccine  Aged Out    Hepatitis B vaccine  Aged Out    Hib vaccine  Aged Out    Meningococcal (ACWY) vaccine  Aged Out             (applicable per patient's age: Cancer Screenings, Depression Screening, Fall Risk Screening, Immunizations)    Hemoglobin A1C (%)   Date Value   10/06/2020 5.7   03/03/2020 6.0   04/08/2019 5.6     LDL Cholesterol (mg/dL)   Date Value   10/06/2020 103     AST (U/L)   Date Value   06/23/2020 20     ALT (U/L)   Date Value   06/23/2020 14     BUN (mg/dL)   Date Value   12/21/2020 14      (goal A1C is < 7)   (goal LDL is <100) need 30-50% reduction from baseline     BP Readings from Last 3 Encounters:   12/21/20 (!) 143/79   10/19/20 (!) 142/80   10/01/20 124/76    (goal /80)      All Future Testing planned in [facility]:  Lab Frequency Next Occurrence   US PELVIS COMPLETE Once 03/17/2021   TRAVIS DIGITAL SCREEN W OR WO CAD BILATERAL Once 04/01/2021   Home Sleep Study Once 10/19/2020   Sleep Study with PAP Titration Once 10/21/2020       Next Visit Date:  Future Appointments   Date Time Provider Fortino Jean Baptiste   1/25/2021 10:45 AM Leonel Fine MD Resp Spec [id number]   1/27/2021  5:00 PM STA ULTRASOUND RM 3 STAZ US STA Radiolog 1/27/2021  5:30 PM STA SCR MAMMO RM 2 STAZ MAMMO STA Radiolog            Patient Active Problem List:     Hypertension     Asthma     GERD (gastroesophageal reflux disease)     S/P cardiac cath-Minimal disease 11/23/15 Dr. Fariha Tarango     Irritable bowel syndrome with constipation     Obesity     Allergic rhinitis     Hiatal hernia     Paresthesias     Left sided numbness     Dysphagia     Family history of colon cancer     DNS (deviated nasal septum)     Hypertrophy of both inferior nasal turbinates     Iron deficiency anemia secondary to inadequate dietary iron intake     Iron deficiency anemia, unspecified

## 2021-02-01 ENCOUNTER — HOSPITAL ENCOUNTER (EMERGENCY)
Age: 51
Discharge: HOME | End: 2021-02-01
Attending: EMERGENCY MEDICINE
Payer: MEDICARE

## 2021-02-01 VITALS
TEMPERATURE: 98.1 F | RESPIRATION RATE: 18 BRPM | BODY MASS INDEX: 39.27 KG/M2 | HEIGHT: 60 IN | WEIGHT: 200 LBS | SYSTOLIC BLOOD PRESSURE: 154 MMHG | DIASTOLIC BLOOD PRESSURE: 89 MMHG | HEART RATE: 71 BPM | OXYGEN SATURATION: 97 %

## 2021-02-01 DIAGNOSIS — S39.012A STRAIN OF LUMBAR REGION, INITIAL ENCOUNTER: Primary | ICD-10-CM

## 2021-02-01 DIAGNOSIS — M54.32 SCIATICA OF LEFT SIDE: ICD-10-CM

## 2021-02-01 LAB
BILIRUBIN URINE: NEGATIVE
COLOR: YELLOW
COMMENT UA: ABNORMAL
GLUCOSE URINE: NEGATIVE
KETONES, URINE: ABNORMAL
LEUKOCYTE ESTERASE, URINE: NEGATIVE
NITRITE, URINE: NEGATIVE
PH UA: 5 (ref 5–8)
PROTEIN UA: NEGATIVE
SPECIFIC GRAVITY UA: 1.02 (ref 1–1.03)
TURBIDITY: CLEAR
URINE HGB: NEGATIVE
UROBILINOGEN, URINE: NORMAL

## 2021-02-01 PROCEDURE — 6370000000 HC RX 637 (ALT 250 FOR IP): Performed by: STUDENT IN AN ORGANIZED HEALTH CARE EDUCATION/TRAINING PROGRAM

## 2021-02-01 PROCEDURE — 6360000002 HC RX W HCPCS: Performed by: STUDENT IN AN ORGANIZED HEALTH CARE EDUCATION/TRAINING PROGRAM

## 2021-02-01 PROCEDURE — 96372 THER/PROPH/DIAG INJ SC/IM: CPT

## 2021-02-01 PROCEDURE — 81003 URINALYSIS AUTO W/O SCOPE: CPT

## 2021-02-01 PROCEDURE — 99283 EMERGENCY DEPT VISIT LOW MDM: CPT

## 2021-02-01 RX ORDER — METHOCARBAMOL 750 MG/1
750 TABLET, FILM COATED ORAL 3 TIMES DAILY
Qty: 15 TABLET | Refills: 0 | Status: SHIPPED | OUTPATIENT
Start: 2021-02-01 | End: 2021-02-06

## 2021-02-01 RX ORDER — ONDANSETRON 4 MG/1
TABLET, ORALLY DISINTEGRATING ORAL
Status: DISCONTINUED
Start: 2021-02-01 | End: 2021-02-01 | Stop reason: HOSPADM

## 2021-02-01 RX ORDER — ACETAMINOPHEN 500 MG
1000 TABLET ORAL ONCE
Status: COMPLETED | OUTPATIENT
Start: 2021-02-01 | End: 2021-02-01

## 2021-02-01 RX ORDER — KETOROLAC TROMETHAMINE 30 MG/ML
30 INJECTION, SOLUTION INTRAMUSCULAR; INTRAVENOUS ONCE
Status: COMPLETED | OUTPATIENT
Start: 2021-02-01 | End: 2021-02-01

## 2021-02-01 RX ORDER — ONDANSETRON 4 MG/1
4 TABLET, ORALLY DISINTEGRATING ORAL ONCE
Status: COMPLETED | OUTPATIENT
Start: 2021-02-01 | End: 2021-02-01

## 2021-02-01 RX ORDER — LIDOCAINE 50 MG/G
1 PATCH TOPICAL DAILY
Qty: 10 PATCH | Refills: 0 | Status: SHIPPED | OUTPATIENT
Start: 2021-02-01 | End: 2021-07-09 | Stop reason: SDUPTHER

## 2021-02-01 RX ORDER — ONDANSETRON 4 MG/1
4 TABLET, ORALLY DISINTEGRATING ORAL EVERY 8 HOURS PRN
Qty: 10 TABLET | Refills: 0 | Status: SHIPPED | OUTPATIENT
Start: 2021-02-01 | End: 2021-04-06 | Stop reason: SDUPTHER

## 2021-02-01 RX ADMIN — ONDANSETRON 4 MG: 4 TABLET, ORALLY DISINTEGRATING ORAL at 09:56

## 2021-02-01 RX ADMIN — KETOROLAC TROMETHAMINE 30 MG: 30 INJECTION, SOLUTION INTRAMUSCULAR at 08:44

## 2021-02-01 RX ADMIN — ACETAMINOPHEN 1000 MG: 500 TABLET ORAL at 08:44

## 2021-02-01 ASSESSMENT — ENCOUNTER SYMPTOMS
BACK PAIN: 1
NAUSEA: 1
SHORTNESS OF BREATH: 0
VOMITING: 0
COLOR CHANGE: 0
ABDOMINAL PAIN: 0
DIARRHEA: 0
CONSTIPATION: 0

## 2021-02-01 ASSESSMENT — PAIN DESCRIPTION - FREQUENCY: FREQUENCY: CONTINUOUS

## 2021-02-01 ASSESSMENT — PAIN SCALES - GENERAL: PAINLEVEL_OUTOF10: 8

## 2021-02-01 ASSESSMENT — PAIN DESCRIPTION - LOCATION: LOCATION: BACK

## 2021-02-01 ASSESSMENT — PAIN DESCRIPTION - PAIN TYPE: TYPE: ACUTE PAIN

## 2021-02-01 NOTE — ED PROVIDER NOTES
[facility]  Emergency Department  Faculty Attestation     I performed a history and physical examination of the patient and discussed management with the resident. I reviewed the residents note and agree with the documented findings and plan of care. Any areas of disagreement are noted on the chart. I was personally present for the key portions of any procedures. I have documented in the chart those procedures where I was not present during the key portions. I have reviewed the emergency nurses triage note. I agree with the chief complaint, past medical history, past surgical history, allergies, medications, social and family history as documented unless otherwise noted below. For Physician Assistant/ Nurse Practitioner cases/documentation I have personally evaluated this patient and have completed at least one if not all key elements of the E/M (history, physical exam, and MDM). Additional findings are as noted.       Primary Care Physician:  Patito Vasquez MD    Screenings:  [address]       Chief Complaint   Patient presents with    Back Pain     since Friday, upper middle       RECENT VITALS:   Temp: 98.1 °F (36.7 °C),  Pulse: 71, Resp: 18, BP: (!) 154/89    LABS:  Labs Reviewed   URINE RT REFLEX TO CULTURE       Radiology  No orders to display         Attending Physician Additional  Notes She has left-sided low back pain with spasm after lifting/moving. There is some pain behind the left buttocks with radiation down behind the left knee. No dysuria frequency hematuria. No  vomiting. No fever chills or sweats. She gets nausea when the pain is severe. No abdominal pain. No pelvic pain. No unusual vaginal bleeding or discharge. On exam she is uncomfortable hypertensive afebrile. Per resident exam there is left paraspinous muscle spasm and tenderness. On my exam there is negative straight leg raising. Normal motor strength. Normal sensation. Normal pulses abdomen soft and nontender. Impression is muscle strain, lumbar pain, possible piriformis syndrome. Plan is urinalysis, analgesics, muscle relaxants, consider topical lidocaine, follow-up. Roman Sheehan.  Chioma Romero MD, [address] [facility]  Attending Emergency  Physician               Melissa Armijo MD  02/01/21 2182

## 2021-02-01 NOTE — ED NOTES
Pt would like to be seen as late as possible in the day today or tomorrow for cold shes had over a week, coughing, headache, congestion.  Best contact [phone] Urine sample obtained and sent to lab.       Kelly Jeff RN  02/01/21 [id number]

## 2021-02-01 NOTE — ED PROVIDER NOTES
[address]  ED  Emergency Department Encounter  Emergency Medicine Resident     Pt Name: Tabby Giles  MRN: [id number]  [place] 1970  Date of evaluation: 2/1/21  PCP:  Sami Meredith MD    [address] [place] [address]       Chief Complaint   Patient presents with    Back Pain     since Friday, upper middle       HISTORY OFPRESENT ILLNESS  (Location/Symptom, Timing/Onset, Context/Setting, Quality, Duration, Modifying Factors,Severity.)      Tabby Giles is a 45 y.o. female who presents with complaint of lower back pain. Patient states symptoms have been going on for the previous 3 days. 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 However she does states she does perform heavy lifting at her job. Neck pain is been gradually worsening is located left lower back para lumbar spinal musculature. There is some radiation posteriorly into the back of her thigh. Patient is taken Motrin with no relief for the pain. She denies urinary or bowel retention, no fevers, no history of intravenous drug use, no history of chronic steroid use, no immunosuppressive conditions. Patient has been able to ambulate normally. Patient rates pain as 8/10. She states the pain is constant with intermittent worsening. She does get associated nausea when the pain worsens however she has not vomited. PAST MEDICAL / SURGICAL / SOCIAL / FAMILY HISTORY      has a past medical history of Allergic rhinitis, Asthma, Clinical trial participant, COPD (chronic obstructive pulmonary disease) ([facility] Dysphagia, Dysphagia, GERD (gastroesophageal reflux disease), H/O cardiovascular stress test, Hiatal hernia, Hyperlipidemia, Hypertension, Irritable bowel syndrome with constipation, Obesity, Snores, and Wears glasses. has a past surgical history that includes Tubal ligation (2003); Colonoscopy (2015); Upper gastrointestinal endoscopy; Upper gastrointestinal endoscopy (N/A, 7/18/2017); Cardiac catheterization (01/16/2018); Cardiac catheterization (11/23/2015); Endoscopy, colon, diagnostic (2015); Endoscopy, colon, diagnostic (02/08/2018); pr colon ca scrn not hi rsk ind (N/A, 2/8/2018); Colonoscopy (02/08/2018); and Nose surgery. Social History     Socioeconomic History    Marital status:      Spouse name: Not on file    Number of children: Not on file    Years of education: Not on file    Highest education level: Not on file   Occupational History    Not on file   Social Needs    Financial resource strain: Not on file    Food insecurity     Worry: Not on file     Inability: Not on file    Transportation needs     Medical: Not on file     Non-medical: Not on file   Tobacco Use    Smoking status: Never Smoker    Smokeless tobacco: Never Used   Substance and Sexual Activity    Alcohol use:  Yes     Alcohol/week: 4.0 standard drinks     Types: 4 Cans of beer per week     Comment: occasionally    Drug use: No    Sexual activity: Not Currently     Partners: Male     Comment: tubal ligation    Lifestyle    Physical activity     Days per week: Not on file     Minutes per session: Not on file    Stress: Not on file   Relationships    Social connections     Talks on phone: Not on file     Gets together: Not on file     Attends [employer] service: Not on file     Active member of club or organization: Not on file     Attends meetings of clubs or organizations: Not on file     Relationship status: Not on file    Intimate partner violence     Fear of current or ex partner: Not on file     Emotionally abused: Not on file     Physically abused: Not on file     Forced sexual activity: Not on file   Other Topics Concern    Not on file   Social History Narrative    Not on file       Family History Problem Relation Age of Onset    Hypertension Mother     High Cholesterol Mother     Asthma Mother     Osteoarthritis Mother     Other Mother         overweight    Colon Cancer Father        Allergies:  Patient has no known allergies. Home Medications:  Prior to Admission medications    Medication Sig Start Date End Date Taking?  Authorizing Provider   methocarbamol (ROBAXIN-750) 750 MG tablet Take 1 tablet by mouth 3 times daily for 5 days 2/1/21 2/6/21 Yes Lior Brown DO   lidocaine (LIDODERM) 5 % Place 1 patch onto the skin daily for 10 days 12 hours on, 12 hours off. 2/1/21 2/11/21 Yes Lior Brown DO   diclofenac sodium (VOLTAREN) 1 % GEL Apply 2 g topically 2 times daily for 7 days 2/1/21 2/8/21 Yes Lior Brown DO   ondansetron (ZOFRAN ODT) 4 MG disintegrating tablet Take 1 tablet by mouth every 8 hours as needed for Nausea 2/1/21  Yes Lior Brown DO   ibuprofen (ADVIL;MOTRIN) 800 MG tablet take 1 tablet by mouth every 8 hours if needed for pain 1/8/21   Bernett Cogan, MD RA ASPIRIN EC 81 MG EC tablet take 1 tablet by mouth once daily 10/12/20   Bernett Cogan, MD   ferrous sulfate (FE TABS) 325 (65 Fe) MG EC tablet Take 1 tablet by mouth 2 times daily 8/11/20   Bernett Cogan, MD   meloxicam (MOBIC) 7.5 MG tablet Take 1 tablet by mouth daily 8/11/20   Bernett Cogan, MD   atorvastatin (LIPITOR) 20 MG tablet take 1 tablet by mouth once daily 8/11/20   Bernett Cogan, MD   carvedilol (COREG) 6.25 MG tablet take 1 tablet by mouth twice a day 8/11/20   Bernett Cogan, MD   valsartan-hydrochlorothiazide (DIOVAN-HCT) 80-12.5 MG per tablet Once a day 8/11/20   Bernett Cogan, MD   senna (RA SENNA) 8.6 MG tablet take 1 tablet by mouth twice a day 8/11/20   Bernett Cogan, MD   mometasone-formoterol [facility]) 200-5 MCG/ACT inhaler inhale 2 puffs by mouth twice a day 8/10/20   Lynette Jose MD albuterol sulfate HFA (PROAIR HFA) 108 (90 Base) MCG/ACT inhaler inhale 2 puffs by mouth every 6 hours if needed for wheezing 8/10/20   Claude Sprout, MD   omeprazole (PRILOSEC) 40 MG delayed release capsule Once daily as needed 7/23/20   Josefa Terry MD   ACETAMINOPHEN EXTRA STRENGTH 500 MG tablet take 2 tablets by mouth every 6 hours if needed for pain 6/9/20   Josefa Terry MD   RA MELATONIN 5 MG TABS tablet take 1 tablet by mouth nightly 6/19/19   Cynthia Dooley MD   albuterol (PROVENTIL) (2.5 MG/3ML) 0.083% nebulizer solution Take 3 mLs by nebulization every 6 hours as needed for Wheezing 4/8/19   Josefa Terry MD   fluticasone [facility]) 50 MCG/ACT nasal spray 1 spray by Nasal route daily 5/3/18   Josefa Terry MD   Multiple Vitamins-Minerals (WOMENS MULTI VITAMIN & MINERAL PO) Take 1 tablet by mouth daily    Historical Provider, MD       REVIEW OF SYSTEMS    (2-9 systems for level 4, 10 or more for level 5)      Review of Systems   Constitutional: Negative for chills and fever. HENT: Negative for congestion. Eyes: Negative for visual disturbance. Respiratory: Negative for shortness of breath. Cardiovascular: Negative for chest pain. Gastrointestinal: Positive for nausea. Negative for abdominal pain, constipation, diarrhea and vomiting. Genitourinary: Negative for decreased urine volume and difficulty urinating. Musculoskeletal: Positive for back pain. Negative for gait problem and neck pain. Skin: Negative for color change and rash. Neurological: Negative for dizziness, light-headedness and headaches. Psychiatric/Behavioral: Negative for confusion. PHYSICAL EXAM   (up to 7 for level 4, 8 or more for level 5)     INITIAL VITALS:    height is 5' (1.524 m) and weight is 200 lb (90.7 kg). Her oral temperature is 98.1 °F (36.7 °C). Her blood pressure is 154/89 (abnormal) and her pulse is 71. Her respiration is 18 and oxygen saturation is 97%.      Physical Exam Constitutional:       General: She is not in acute distress. Appearance: Normal appearance. She is not ill-appearing or toxic-appearing. Neck:      Musculoskeletal: Normal range of motion. No muscular tenderness. Cardiovascular:      Rate and Rhythm: Normal rate and regular rhythm. Heart sounds: No murmur. No gallop. Comments: Bilateral DP pulses equal and intact  Pulmonary:      Effort: Pulmonary effort is normal. No respiratory distress. Breath sounds: No wheezing. Abdominal:      General: Abdomen is flat. Palpations: Abdomen is soft. Tenderness: There is no abdominal tenderness. There is no guarding. Musculoskeletal:      Right lower leg: No edema. Left lower leg: No edema. Comments: Patient has pain to palpation left lumbar paraspinal musculature. There is no midline tenderness in the cervical, thoracic, or lumbar spine. Skin:     General: Skin is warm and dry. Capillary Refill: Capillary refill takes less than 2 seconds. Findings: No rash. Neurological:      General: No focal deficit present. Mental Status: She is alert and oriented to person, place, and time. Gait: Gait normal.      Comments: 5/5 strength bilateral lower extremity, no sensory deficit. Patellar reflexes are equal bilaterally.    Psychiatric:         Mood and Affect: Mood normal.         Behavior: Behavior normal.         DIFFERENTIAL  DIAGNOSIS     PLAN (LABS / IMAGING / EKG):  Orders Placed This Encounter   Procedures    Urinalysis Reflex to Culture       MEDICATIONS ORDERED:  Orders Placed This Encounter   Medications    acetaminophen (TYLENOL) tablet 1,000 mg    ketorolac (TORADOL) injection 30 mg    ondansetron (ZOFRAN-ODT) disintegrating tablet 4 mg    methocarbamol (ROBAXIN-750) 750 MG tablet     Sig: Take 1 tablet by mouth 3 times daily for 5 days     Dispense:  15 tablet     Refill:  0    lidocaine (LIDODERM) 5 % Sig: Place 1 patch onto the skin daily for 10 days 12 hours on, 12 hours off. Dispense:  10 patch     Refill:  0    diclofenac sodium (VOLTAREN) 1 % GEL     Sig: Apply 2 g topically 2 times daily for 7 days     Dispense:  28 g     Refill:  0    ondansetron (ZOFRAN ODT) 4 MG disintegrating tablet     Sig: Take 1 tablet by mouth every 8 hours as needed for Nausea     Dispense:  10 tablet     Refill:  0    ondansetron (ZOFRAN-ODT) 4 MG disintegrating tablet     OLIVER BLUE: cabinet override       DDX: Musculoskeletal sprain, musculoskeletal strain, musculoskeletal spasm, urinary tract infection    Initial MDM/Plan: 45 y.o. female who presents with lower back pain of 2 days duration. No red flag signs of back pain. Patient seen and examined, vital signs are normal.  She is well-appearing, speaking full sentences in no acute distress. Abdomen exam is benign. Patient has equal DP pulses bilaterally. Patient has 5/5 strength bilateral lower extremity with no sensory deficit and equal patellar reflexes bilaterally. Point-of-care ultrasound was performed at bedside to evaluate aorta, no aneurysm or dissection seen. Suspect musculoskeletal back pain with possible component of sciatica. Will treat pain with Tylenol and Toradol and discharged home. DIAGNOSTIC RESULTS / EMERGENCY DEPARTMENT COURSE / MDM     LABS:  Labs Reviewed   URINE RT REFLEX TO CULTURE - Abnormal; Notable for the following components:       Result Value    Ketones, Urine TRACE (*)     All other components within normal limits         RADIOLOGY:  No results found.         EMERGENCY DEPARTMENT COURSE: 58-year-old female presents with 2 days of left lower back pain with no inciting injury or trauma. Patient has no red flag signs of back pain that necessitate advanced imaging of the spine. Patient was given Toradol and Tylenol for pain as well as Zofran for nausea. Patient reported improvement in symptoms with this medication. Patient discharged home with PCP follow-up. Encouraged return emergency department for new numbness/weakness, changes in bowel or bladder habits, fevers, radicular symptoms, patient verbalized understanding and was discharged home in good condition. PROCEDURES:  None    CONSULTS:  None    CRITICAL CARE:  Please see attending note    FINAL IMPRESSION      1. Strain of lumbar region, initial encounter    2.  Sciatica of left side          DISPOSITION / PLAN     DISPOSITION Decision To Discharge 02/01/2021 09:57:18 AM        PATIENTREFERRED TO:  MD Gabbi Davenport [address] [facility] [address] [facility] [address]  [phone]    In 3 days        DISCHARGE MEDICATIONS:  Discharge Medication List as of 2/1/2021  9:57 AM      START taking these medications    Details   methocarbamol (ROBAXIN-750) 750 MG tablet Take 1 tablet by mouth 3 times daily for 5 days, Disp-15 tablet, R-0Print      lidocaine (LIDODERM) 5 % Place 1 patch onto the skin daily for 10 days 12 hours on, 12 hours off., Disp-10 patch, R-0Print      diclofenac sodium (VOLTAREN) 1 % GEL Apply 2 g topically 2 times daily for 7 days, Topical, 2 TIMES DAILY Starting Mon 2/1/2021, Until Mon 2/8/2021, For 7 days, Disp-28 g, R-0, Print             Nishi Gilmore DO  EmergencyMedicine Resident    (Please note that portions of this note were completed with a voice recognition program.  Efforts were made to edit the dictations but occasionally words are mis-transcribed.)       Nishi Gilmore DO  Resident  02/01/21 1126

## 2021-03-26 DIAGNOSIS — K21.00 CHRONIC REFLUX ESOPHAGITIS: ICD-10-CM

## 2021-03-26 RX ORDER — OMEPRAZOLE 40 MG/1
CAPSULE, DELAYED RELEASE ORAL
Qty: 60 CAPSULE | Refills: 3 | Status: SHIPPED | OUTPATIENT
Start: 2021-03-26 | End: 2021-07-09 | Stop reason: ALTCHOICE

## 2021-04-01 ENCOUNTER — TELEPHONE (OUTPATIENT)
Dept: INTERNAL MEDICINE | Age: 51
End: 2021-04-01

## 2021-04-01 NOTE — LETTER
[address]  [address] [address]  Phone: [phone]  Fax: [phone]    Jayden Helms MD        April 1, 1424    [place] [address]      Dear Octavio Mauricio: This letter is a reminder that you may have diagnostic testing that has not been completed. It is important to your well-being that these test(s) are performed. Please find the outstanding test(s) attached. If you could please have these completed before your next appointment. You can have the test completed at any [facility] facility or Lab. Please see the order for scheduling instructions. Any testing that needs completed other than blood work or xray's please call [phone] to schedule an appointment. Otherwise can be done at any [facility]. Please call our office at Dept: [phone] for additional information on the outstanding tests or let us know if they have been completed so we may update your chart. If you have any questions or concerns, please don't hesitate to call.     Sincerely,        Jayden Helms MD

## 2021-04-06 RX ORDER — ONDANSETRON 4 MG/1
4 TABLET, ORALLY DISINTEGRATING ORAL EVERY 8 HOURS PRN
Qty: 10 TABLET | Refills: 0 | Status: SHIPPED | OUTPATIENT
Start: 2021-04-06 | End: 2021-04-28

## 2021-04-06 NOTE — TELEPHONE ENCOUNTER
zofran refill     Pt has future appointment scheduled          Next Visit Date:  Future Appointments   Date Time Provider Fortino Jean Baptiste   4/9/2021  1:30 PM Destiney Ortiz MD Resp Spec [facility] Andrade   5/11/2021  8:30 AM Hai Avila MD [facility] IM [address] Maintenance   Topic Date Due    Hepatitis C screen  Never done    HIV screen  Never done    COVID-19 Vaccine (1) Never done    Shingles Vaccine (1 of 2) Never done    Breast cancer screen  05/06/2021    Cervical cancer screen  08/10/2021    A1C test (Diabetic or Prediabetic)  10/06/2021    Lipid screen  10/06/2021    Potassium monitoring  12/21/2021    Creatinine monitoring  12/21/2021    Colon cancer screen colonoscopy  02/08/2023    DTaP/Tdap/Td vaccine (2 - Td) 02/05/2026    Flu vaccine  Completed    Pneumococcal 0-64 years Vaccine  Completed    Hepatitis A vaccine  Aged Out    Hepatitis B vaccine  Aged Out    Hib vaccine  Aged Out    Meningococcal (ACWY) vaccine  Aged Out       Hemoglobin A1C (%)   Date Value   10/06/2020 5.7   03/03/2020 6.0   04/08/2019 5.6             ( goal A1C is < 7)   No results found for: LABMICR  LDL Cholesterol (mg/dL)   Date Value   10/06/2020 103   04/10/2019 74       (goal LDL is <100)   AST (U/L)   Date Value   06/23/2020 20     ALT (U/L)   Date Value   06/23/2020 14     BUN (mg/dL)   Date Value   12/21/2020 14     BP Readings from Last 3 Encounters:   02/01/21 (!) 154/89   12/21/20 (!) 143/79   10/19/20 (!) 142/80          (goal 120/80)    All Future Testing planned in [facility]  Lab Frequency Next Occurrence   US PELVIS COMPLETE Once 07/01/2021   TRAVIS DIGITAL SCREEN W OR WO CAD BILATERAL Once 04/01/2021   Sleep Study with PAP Titration Once 10/21/2020               Patient Active Problem List:     Hypertension     Asthma     GERD (gastroesophageal reflux disease)     S/P cardiac cath-Minimal disease 11/23/15 Dr. Haylie Howe     Irritable bowel syndrome with constipation     Obesity     Allergic rhinitis

## 2021-04-10 PROBLEM — J44.1 COPD WITH ACUTE EXACERBATION (HCC): Status: ACTIVE | Noted: 2021-04-10

## 2021-04-21 NOTE — TELEPHONE ENCOUNTER
Request for Senna.       Next Visit Date:  Future Appointments   Date Time Provider Fortino Englishi   5/11/2021  8:30 AM Obie Cavazos MD [facility] CORDELIA Aguirre   5/14/2021  3:45 PM Radha Berg  [address] Maintenance   Topic Date Due    Hepatitis C screen  Never done    HIV screen  Never done    COVID-19 Vaccine (1) Never done    Shingles Vaccine (1 of 2) Never done    Breast cancer screen  05/06/2021    Cervical cancer screen  08/10/2021    A1C test (Diabetic or Prediabetic)  10/06/2021    Lipid screen  10/06/2021    Potassium monitoring  12/21/2021    Creatinine monitoring  12/21/2021    Colon cancer screen colonoscopy  02/08/2023    DTaP/Tdap/Td vaccine (2 - Td) 02/05/2026    Flu vaccine  Completed    Pneumococcal 0-64 years Vaccine  Completed    Hepatitis A vaccine  Aged Out    Hepatitis B vaccine  Aged Out    Hib vaccine  Aged Out    Meningococcal (ACWY) vaccine  Aged Out       Hemoglobin A1C (%)   Date Value   10/06/2020 5.7   03/03/2020 6.0   04/08/2019 5.6             ( goal A1C is < 7)   No results found for: LABMICR  LDL Cholesterol (mg/dL)   Date Value   10/06/2020 103       (goal LDL is <100)   AST (U/L)   Date Value   06/23/2020 20     ALT (U/L)   Date Value   06/23/2020 14     BUN (mg/dL)   Date Value   12/21/2020 14     BP Readings from Last 3 Encounters:   02/01/21 (!) 154/89   12/21/20 (!) 143/79   10/19/20 (!) 142/80          (goal 120/80)    All Future Testing planned in [facility]  Lab Frequency Next Occurrence   US PELVIS COMPLETE Once 07/01/2021   TRAVIS DIGITAL SCREEN W OR WO CAD BILATERAL Once 10/01/2021   Sleep Study with PAP Titration Once 10/21/2020         Patient Active Problem List:     Hypertension     Asthma     GERD (gastroesophageal reflux disease)     S/P cardiac cath-Minimal disease 11/23/15 Dr. Armaan Vann     Irritable bowel syndrome with constipation     Obesity     Allergic rhinitis     Hiatal hernia     Paresthesias     Left sided numbness Dysphagia     Family history of colon cancer     DNS (deviated nasal septum)     Hypertrophy of both inferior nasal turbinates     Iron deficiency anemia secondary to inadequate dietary iron intake     Iron deficiency anemia, unspecified     COPD with acute exacerbation ([facility] [address]

## 2021-04-22 RX ORDER — SENNA PLUS 8.6 MG/1
TABLET ORAL
Qty: 60 TABLET | Refills: 5 | Status: SHIPPED | OUTPATIENT
Start: 2021-04-22 | End: 2021-09-29

## 2021-04-26 NOTE — TELEPHONE ENCOUNTER
Request for Zofran .       Next Visit Date:  Future Appointments   Date Time Provider Fortino Jean Baptiste   5/11/2021  8:30 AM Neil Rutherford MD [address] IM [address] [facility]   5/14/2021  3:45 PM Rissa Malone  [address] Maintenance   Topic Date Due    Hepatitis C screen  Never done    HIV screen  Never done    COVID-19 Vaccine (1) Never done    Shingles Vaccine (1 of 2) Never done    Breast cancer screen  05/06/2021    Cervical cancer screen  08/10/2021    A1C test (Diabetic or Prediabetic)  10/06/2021    Lipid screen  10/06/2021    Potassium monitoring  12/21/2021    Creatinine monitoring  12/21/2021    Colon cancer screen colonoscopy  02/08/2023    DTaP/Tdap/Td vaccine (2 - Td) 02/05/2026    Flu vaccine  Completed    Pneumococcal 0-64 years Vaccine  Completed    Hepatitis A vaccine  Aged Out    Hepatitis B vaccine  Aged Out    Hib vaccine  Aged Out    Meningococcal (ACWY) vaccine  Aged Out       Hemoglobin A1C (%)   Date Value   10/06/2020 5.7   03/03/2020 6.0   04/08/2019 5.6             ( goal A1C is < 7)   No results found for: LABMICR  LDL Cholesterol (mg/dL)   Date Value   10/06/2020 103       (goal LDL is <100)   AST (U/L)   Date Value   06/23/2020 20     ALT (U/L)   Date Value   06/23/2020 14     BUN (mg/dL)   Date Value   12/21/2020 14     BP Readings from Last 3 Encounters:   02/01/21 (!) 154/89   12/21/20 (!) 143/79   10/19/20 (!) 142/80          (goal 120/80)    All Future Testing planned in [facility]  Lab Frequency Next Occurrence   US PELVIS COMPLETE Once 07/01/2021   TRAVIS DIGITAL SCREEN W OR WO CAD BILATERAL Once 10/01/2021         Patient Active Problem List:     Hypertension     Asthma     GERD (gastroesophageal reflux disease)     S/P cardiac cath-Minimal disease 11/23/15 Dr. Janny Du     Irritable bowel syndrome with constipation     Obesity     Allergic rhinitis     Hiatal hernia     Paresthesias     Left sided numbness     Dysphagia     Family history of colon cancer

## 2021-04-28 RX ORDER — ONDANSETRON 4 MG/1
TABLET, ORALLY DISINTEGRATING ORAL
Qty: 10 TABLET | Refills: 0 | Status: SHIPPED | OUTPATIENT
Start: 2021-04-28 | End: 2021-06-10

## 2021-06-09 NOTE — TELEPHONE ENCOUNTER
Request for Zofran .       Next Visit Date:  Future Appointments   Date Time Provider Fortino Ita   6/18/2021  9:00 AM Nati Madera MD [address] [facility]   Topic Date Due    Hepatitis C screen  Never done    COVID-19 Vaccine (1) Never done    HIV screen  Never done    Shingles Vaccine (1 of 2) Never done    Breast cancer screen  05/06/2021    Cervical cancer screen  08/10/2021    A1C test (Diabetic or Prediabetic)  10/06/2021    Lipid screen  10/06/2021    Potassium monitoring  12/21/2021    Creatinine monitoring  12/21/2021    Colon cancer screen colonoscopy  02/08/2023    DTaP/Tdap/Td vaccine (2 - Td or Tdap) 02/05/2026    Pneumococcal 0-64 years Vaccine (2 of 2) 12/04/2034    Flu vaccine  Completed    Hepatitis A vaccine  Aged Out    Hepatitis B vaccine  Aged Out    Hib vaccine  Aged Out    Meningococcal (ACWY) vaccine  Aged Out       Hemoglobin A1C (%)   Date Value   10/06/2020 5.7   03/03/2020 6.0   04/08/2019 5.6             ( goal A1C is < 7)   No results found for: LABMICR  LDL Cholesterol (mg/dL)   Date Value   10/06/2020 103       (goal LDL is <100)   AST (U/L)   Date Value   06/23/2020 20     ALT (U/L)   Date Value   06/23/2020 14     BUN (mg/dL)   Date Value   12/21/2020 14     BP Readings from Last 3 Encounters:   02/01/21 (!) 154/89   12/21/20 (!) 143/79   10/19/20 (!) 142/80          (goal 120/80)    All Future Testing planned in [facility]  Lab Frequency Next Occurrence   US PELVIS COMPLETE Once 07/01/2021   TRAVIS DIGITAL SCREEN W OR WO CAD BILATERAL Once 10/01/2021         Patient Active Problem List:     Hypertension     Asthma     GERD (gastroesophageal reflux disease)     S/P cardiac cath-Minimal disease 11/23/15 Dr. Marian Nguyen     Irritable bowel syndrome with constipation     Obesity     Allergic rhinitis     Hiatal hernia     Paresthesias     Left sided numbness     Dysphagia     Family history of colon cancer     DNS (deviated nasal septum) Hypertrophy of both inferior nasal turbinates     Iron deficiency anemia secondary to inadequate dietary iron intake     Iron deficiency anemia, unspecified     [facility]ation ([facility]

## 2021-06-10 RX ORDER — ONDANSETRON 4 MG/1
TABLET, ORALLY DISINTEGRATING ORAL
Qty: 10 TABLET | Refills: 0 | Status: SHIPPED | OUTPATIENT
Start: 2021-06-10 | End: 2021-07-07

## 2021-06-11 DIAGNOSIS — E78.2 MIXED HYPERLIPIDEMIA: ICD-10-CM

## 2021-06-11 NOTE — TELEPHONE ENCOUNTER
E-scribe request for Lipitor. Please review and e-scribe if applicable.        Next Visit Date:  Future Appointments   Date Time Provider Fortino Jean Baptiste   6/18/2021  9:00 AM Halina Teran  [address] Maintenance   Topic Date Due    Hepatitis C screen  Never done    COVID-19 Vaccine (1) Never done    HIV screen  Never done    Shingles Vaccine (1 of 2) Never done    Breast cancer screen  05/06/2021    Cervical cancer screen  08/10/2021    A1C test (Diabetic or Prediabetic)  10/06/2021    Lipid screen  10/06/2021    Potassium monitoring  12/21/2021    Creatinine monitoring  12/21/2021    Colon cancer screen colonoscopy  02/08/2023    DTaP/Tdap/Td vaccine (2 - Td or Tdap) 02/05/2026    Pneumococcal 0-64 years Vaccine (2 of 2) 12/04/2034    Flu vaccine  Completed    Hepatitis A vaccine  Aged Out    Hepatitis B vaccine  Aged Out    Hib vaccine  Aged Out    Meningococcal (ACWY) vaccine  Aged Out               (applicable per patient's age: Cancer Screenings, Depression Screening, Fall Risk Screening, Immunizations)    Hemoglobin A1C (%)   Date Value   10/06/2020 5.7   03/03/2020 6.0   04/08/2019 5.6     LDL Cholesterol (mg/dL)   Date Value   10/06/2020 103     AST (U/L)   Date Value   06/23/2020 20     ALT (U/L)   Date Value   06/23/2020 14     BUN (mg/dL)   Date Value   12/21/2020 14      (goal A1C is < 7)   (goal LDL is <100) need 30-50% reduction from baseline     BP Readings from Last 3 Encounters:   02/01/21 (!) 154/89   12/21/20 (!) 143/79   10/19/20 (!) 142/80    (goal /80)      All Future Testing planned in [facility]:  Lab Frequency Next Occurrence   US PELVIS COMPLETE Once 07/01/2021   TRAVIS DIGITAL SCREEN W OR WO CAD BILATERAL Once 10/01/2021            Patient Active Problem List:     Hypertension     Asthma     GERD (gastroesophageal reflux disease)     S/P cardiac cath-Minimal disease 11/23/15 Dr. Matthew Escobar     Irritable bowel syndrome with constipation     Obesity     Allergic rhinitis

## 2021-06-14 RX ORDER — ATORVASTATIN CALCIUM 20 MG/1
TABLET, FILM COATED ORAL
Qty: 30 TABLET | Refills: 5 | Status: SHIPPED | OUTPATIENT
Start: 2021-06-14 | End: 2022-02-01 | Stop reason: SDUPTHER

## 2021-06-19 NOTE — TELEPHONE ENCOUNTER
Pt is calling stating shes been talking to her insurance company all morning and they told her for her to get her pro air inhaler authorized all they need is a letter from her PCP stating that she can not use the ventolin inhaler because it makes her have more trouble with breathing. Pt is requesting the letter be faxed to [phone] with urgent on it  -- the insurance company told her they would take care of it within 24 hours       Health Maintenance   Topic Date Due    HIV screen  12/04/1984    Flu vaccine (1) 09/01/2018    A1C test (Diabetic or Prediabetic)  05/03/2019    Potassium monitoring  09/14/2019    Creatinine monitoring  09/14/2019    Cervical cancer screen  08/10/2021    Lipid screen  10/10/2022    Colon cancer screen colonoscopy  02/08/2023    DTaP/Tdap/Td vaccine (2 - Td) 02/05/2026    Pneumococcal med risk  Completed             (applicable per patient's age: Cancer Screenings, Depression Screening, Fall Risk Screening, Immunizations)    Hemoglobin A1C (%)   Date Value   05/03/2018 6.0   10/10/2017 5.9   10/11/2016 5.5     LDL Cholesterol (mg/dL)   Date Value   10/10/2017 67     AST (U/L)   Date Value   05/02/2017 17     ALT (U/L)   Date Value   05/02/2017 16     BUN (mg/dL)   Date Value   09/14/2018 9      (goal A1C is < 7)   (goal LDL is <100) need 30-50% reduction from baseline     BP Readings from Last 3 Encounters:   09/15/18 134/79   08/10/18 114/70   05/03/18 124/84    (goal /80)      All Future Testing planned in [facility]:  Lab Frequency Next Occurrence   TRAVIS Screen Routine Once 01/24/2018       Next Visit Date:  No future appointments.          Patient Active Problem List:     Hypertension     Asthma     GERD (gastroesophageal reflux disease)     Hypokalemia     S/P cardiac cath-Minimal disease 11/23/15 Dr. Werner File     Irritable bowel syndrome with constipation     Obesity     Allergic rhinitis     Hiatal hernia     Paresthesias     Left sided numbness     Dysphagia English

## 2021-06-23 RX ORDER — LIDOCAINE 50 MG/G
PATCH TOPICAL
Qty: 30 PATCH | OUTPATIENT
Start: 2021-06-23

## 2021-07-07 DIAGNOSIS — J45.50 UNCOMPLICATED SEVERE PERSISTENT ASTHMA: ICD-10-CM

## 2021-07-07 RX ORDER — ONDANSETRON 4 MG/1
TABLET, ORALLY DISINTEGRATING ORAL
Qty: 10 TABLET | Refills: 0 | Status: SHIPPED | OUTPATIENT
Start: 2021-07-07 | End: 2021-07-20

## 2021-07-07 NOTE — TELEPHONE ENCOUNTER
Refill request for zofran. If appropriate please send medication(s) to patients pharmacy.     Next appt: 7/9/2021      Health Maintenance   Topic Date Due    Hepatitis C screen  Never done    COVID-19 Vaccine (1) Never done    HIV screen  Never done    Shingles Vaccine (1 of 2) Never done    Breast cancer screen  05/06/2021    Cervical cancer screen  08/10/2021    Flu vaccine (1) 09/01/2021    A1C test (Diabetic or Prediabetic)  10/06/2021    Lipid screen  10/06/2021    Potassium monitoring  12/21/2021    Creatinine monitoring  12/21/2021    Colon cancer screen colonoscopy  02/08/2023    DTaP/Tdap/Td vaccine (2 - Td or Tdap) 02/05/2026    Pneumococcal 0-64 years Vaccine (2 of 2 - PPSV23) 12/04/2034    Hepatitis A vaccine  Aged Out    Hepatitis B vaccine  Aged Out    Hib vaccine  Aged Out    Meningococcal (ACWY) vaccine  Aged Out       Hemoglobin A1C (%)   Date Value   10/06/2020 5.7   03/03/2020 6.0   04/08/2019 5.6             ( goal A1C is < 7)   No results found for: LABMICR  LDL Cholesterol (mg/dL)   Date Value   10/06/2020 103       (goal LDL is <100)   AST (U/L)   Date Value   06/23/2020 20     ALT (U/L)   Date Value   06/23/2020 14     BUN (mg/dL)   Date Value   12/21/2020 14     BP Readings from Last 3 Encounters:   02/01/21 (!) 154/89   12/21/20 (!) 143/79   10/19/20 (!) 142/80          (goal 120/80)          Patient Active Problem List:     Hypertension     Asthma     GERD (gastroesophageal reflux disease)     S/P cardiac cath-Minimal disease 11/23/15 Dr. Cm Holcomb     Irritable bowel syndrome with constipation     Obesity     Allergic rhinitis     Hiatal hernia     Paresthesias     Left sided numbness     Dysphagia     Family history of colon cancer     DNS (deviated nasal septum)     Hypertrophy of both inferior nasal turbinates     Iron deficiency anemia secondary to inadequate dietary iron intake     Iron deficiency anemia, unspecified     COPD with acute exacerbation ([address]

## 2021-07-09 ENCOUNTER — OFFICE VISIT (OUTPATIENT)
Dept: INTERNAL MEDICINE | Age: 51
End: 2021-07-09
Payer: MEDICARE

## 2021-07-09 ENCOUNTER — HOSPITAL ENCOUNTER (OUTPATIENT)
Age: 51
Discharge: HOME | End: 2021-07-11
Payer: MEDICARE

## 2021-07-09 ENCOUNTER — HOSPITAL ENCOUNTER (OUTPATIENT)
Dept: GENERAL RADIOLOGY | Age: 51
Discharge: HOME | End: 2021-07-11
Payer: MEDICARE

## 2021-07-09 ENCOUNTER — HOSPITAL ENCOUNTER (OUTPATIENT)
Age: 51
Discharge: HOME | End: 2021-07-09
Payer: MEDICARE

## 2021-07-09 VITALS
HEART RATE: 69 BPM | DIASTOLIC BLOOD PRESSURE: 88 MMHG | HEIGHT: 60 IN | TEMPERATURE: 98.2 F | SYSTOLIC BLOOD PRESSURE: 136 MMHG | BODY MASS INDEX: 40.52 KG/M2 | WEIGHT: 206.4 LBS

## 2021-07-09 DIAGNOSIS — R73.02 IGT (IMPAIRED GLUCOSE TOLERANCE): ICD-10-CM

## 2021-07-09 DIAGNOSIS — M25.552 LEFT HIP PAIN: ICD-10-CM

## 2021-07-09 DIAGNOSIS — Z11.4 SCREENING FOR HIV (HUMAN IMMUNODEFICIENCY VIRUS): ICD-10-CM

## 2021-07-09 DIAGNOSIS — E78.2 MIXED HYPERLIPIDEMIA: ICD-10-CM

## 2021-07-09 DIAGNOSIS — I10 ESSENTIAL HYPERTENSION: ICD-10-CM

## 2021-07-09 DIAGNOSIS — M70.61 TROCHANTERIC BURSITIS OF BOTH HIPS: ICD-10-CM

## 2021-07-09 DIAGNOSIS — Z11.59 ENCOUNTER FOR HEPATITIS C SCREENING TEST FOR LOW RISK PATIENT: ICD-10-CM

## 2021-07-09 DIAGNOSIS — N93.9 ABNORMAL UTERINE BLEEDING (AUB): ICD-10-CM

## 2021-07-09 DIAGNOSIS — E66.01 MORBID OBESITY WITH BMI OF 40.0-44.9, ADULT (HCC): ICD-10-CM

## 2021-07-09 DIAGNOSIS — J45.50 SEVERE PERSISTENT ASTHMA WITHOUT COMPLICATION: ICD-10-CM

## 2021-07-09 DIAGNOSIS — K21.00 CHRONIC REFLUX ESOPHAGITIS: ICD-10-CM

## 2021-07-09 DIAGNOSIS — I10 ESSENTIAL HYPERTENSION: Primary | ICD-10-CM

## 2021-07-09 DIAGNOSIS — Z23 NEED FOR PROPHYLACTIC VACCINATION AND INOCULATION AGAINST VARICELLA: ICD-10-CM

## 2021-07-09 DIAGNOSIS — Z98.890 S/P CARDIAC CATH: ICD-10-CM

## 2021-07-09 DIAGNOSIS — M70.62 TROCHANTERIC BURSITIS OF BOTH HIPS: ICD-10-CM

## 2021-07-09 DIAGNOSIS — D50.9 IRON DEFICIENCY ANEMIA, UNSPECIFIED IRON DEFICIENCY ANEMIA TYPE: ICD-10-CM

## 2021-07-09 PROBLEM — J44.1 COPD WITH ACUTE EXACERBATION (HCC): Status: RESOLVED | Noted: 2021-04-10 | Resolved: 2021-07-09

## 2021-07-09 LAB
ALBUMIN SERPL-MCNC: 4.3 G/DL (ref 3.5–5.2)
ALBUMIN/GLOBULIN RATIO: 1.5 (ref 1–2.5)
ALP BLD-CCNC: 56 U/L (ref 35–104)
ALT SERPL-CCNC: 21 U/L (ref 5–33)
ANION GAP SERPL CALCULATED.3IONS-SCNC: 10 MMOL/L (ref 9–17)
AST SERPL-CCNC: 20 U/L
BILIRUB SERPL-MCNC: 0.26 MG/DL (ref 0.3–1.2)
BUN BLDV-MCNC: 7 MG/DL (ref 6–20)
BUN/CREAT BLD: ABNORMAL (ref 9–20)
CALCIUM SERPL-MCNC: 9.6 MG/DL (ref 8.6–10.4)
CHLORIDE BLD-SCNC: 101 MMOL/L (ref 98–107)
CO2: 26 MMOL/L (ref 20–31)
CREAT SERPL-MCNC: 0.7 MG/DL (ref 0.5–0.9)
GFR AFRICAN AMERICAN: >60 ML/MIN
GFR NON-AFRICAN AMERICAN: >60 ML/MIN
GFR SERPL CREATININE-BSD FRML MDRD: ABNORMAL ML/MIN/{1.73_M2}
GFR SERPL CREATININE-BSD FRML MDRD: ABNORMAL ML/MIN/{1.73_M2}
GLUCOSE BLD-MCNC: 99 MG/DL (ref 70–99)
HBA1C MFR BLD: 5.6 %
HCT VFR BLD CALC: 38.2 % (ref 36.3–47.1)
HEMOGLOBIN: 11.8 G/DL (ref 11.9–15.1)
HEPATITIS C ANTIBODY: NONREACTIVE
HIV AG/AB: NONREACTIVE
MCH RBC QN AUTO: 27 PG (ref 25.2–33.5)
MCHC RBC AUTO-ENTMCNC: 30.9 G/DL (ref 28.4–34.8)
MCV RBC AUTO: 87.4 FL (ref 82.6–102.9)
NRBC AUTOMATED: 0 PER 100 WBC
PDW BLD-RTO: 14.9 % (ref 11.8–14.4)
PLATELET # BLD: 309 K/UL (ref 138–453)
PMV BLD AUTO: 10.7 FL (ref 8.1–13.5)
POTASSIUM SERPL-SCNC: 4.3 MMOL/L (ref 3.7–5.3)
RBC # BLD: 4.37 M/UL (ref 3.95–5.11)
SODIUM BLD-SCNC: 137 MMOL/L (ref 135–144)
TOTAL PROTEIN: 7.1 G/DL (ref 6.4–8.3)
WBC # BLD: 7.9 K/UL (ref 3.5–11.3)

## 2021-07-09 PROCEDURE — G8417 CALC BMI ABV UP PARAM F/U: HCPCS | Performed by: INTERNAL MEDICINE

## 2021-07-09 PROCEDURE — 73502 X-RAY EXAM HIP UNI 2-3 VIEWS: CPT

## 2021-07-09 PROCEDURE — 83036 HEMOGLOBIN GLYCOSYLATED A1C: CPT | Performed by: INTERNAL MEDICINE

## 2021-07-09 PROCEDURE — 85027 COMPLETE CBC AUTOMATED: CPT

## 2021-07-09 PROCEDURE — 1036F TOBACCO NON-USER: CPT | Performed by: INTERNAL MEDICINE

## 2021-07-09 PROCEDURE — 3017F COLORECTAL CA SCREEN DOC REV: CPT | Performed by: INTERNAL MEDICINE

## 2021-07-09 PROCEDURE — 87389 HIV-1 AG W/HIV-1&-2 AB AG IA: CPT

## 2021-07-09 PROCEDURE — 86803 HEPATITIS C AB TEST: CPT

## 2021-07-09 PROCEDURE — G8427 DOCREV CUR MEDS BY ELIG CLIN: HCPCS | Performed by: INTERNAL MEDICINE

## 2021-07-09 PROCEDURE — 36415 COLL VENOUS BLD VENIPUNCTURE: CPT

## 2021-07-09 PROCEDURE — 99214 OFFICE O/P EST MOD 30 MIN: CPT | Performed by: INTERNAL MEDICINE

## 2021-07-09 PROCEDURE — 80053 COMPREHEN METABOLIC PANEL: CPT

## 2021-07-09 PROCEDURE — 99211 OFF/OP EST MAY X REQ PHY/QHP: CPT | Performed by: INTERNAL MEDICINE

## 2021-07-09 RX ORDER — OMEPRAZOLE 40 MG/1
CAPSULE, DELAYED RELEASE ORAL
Qty: 30 CAPSULE | Refills: 3 | Status: SHIPPED | OUTPATIENT
Start: 2021-07-09 | End: 2022-02-01 | Stop reason: SDUPTHER

## 2021-07-09 RX ORDER — IBUPROFEN 800 MG/1
TABLET ORAL
Qty: 30 TABLET | Refills: 3 | Status: CANCELLED | OUTPATIENT
Start: 2021-07-09

## 2021-07-09 RX ORDER — LANOLIN ALCOHOL/MO/W.PET/CERES
325 CREAM (GRAM) TOPICAL
Qty: 30 TABLET | Refills: 5 | Status: SHIPPED | OUTPATIENT
Start: 2021-07-09 | End: 2022-02-01

## 2021-07-09 RX ORDER — CARVEDILOL 6.25 MG/1
TABLET ORAL
Qty: 60 TABLET | Refills: 5 | Status: SHIPPED | OUTPATIENT
Start: 2021-07-09 | End: 2022-01-03

## 2021-07-09 RX ORDER — ASPIRIN 81 MG/1
TABLET ORAL
Qty: 30 TABLET | Refills: 5 | Status: SHIPPED | OUTPATIENT
Start: 2021-07-09 | End: 2022-08-30 | Stop reason: SDUPTHER

## 2021-07-09 RX ORDER — VALSARTAN AND HYDROCHLOROTHIAZIDE 80; 12.5 MG/1; MG/1
TABLET, FILM COATED ORAL
Qty: 30 TABLET | Refills: 5 | Status: SHIPPED | OUTPATIENT
Start: 2021-07-09 | End: 2022-02-01 | Stop reason: SDUPTHER

## 2021-07-09 RX ORDER — LIDOCAINE 50 MG/G
1 PATCH TOPICAL DAILY
Qty: 10 PATCH | Refills: 0 | Status: SHIPPED | OUTPATIENT
Start: 2021-07-09 | End: 2022-05-25

## 2021-07-09 RX ORDER — LANOLIN ALCOHOL/MO/W.PET/CERES
325 CREAM (GRAM) TOPICAL 2 TIMES DAILY
Qty: 60 TABLET | Refills: 5 | Status: CANCELLED | OUTPATIENT
Start: 2021-07-09

## 2021-07-09 SDOH — ECONOMIC STABILITY: FOOD INSECURITY: WITHIN THE PAST 12 MONTHS, YOU WORRIED THAT YOUR FOOD WOULD RUN OUT BEFORE YOU GOT MONEY TO BUY MORE.: SOMETIMES TRUE

## 2021-07-09 SDOH — ECONOMIC STABILITY: FOOD INSECURITY: WITHIN THE PAST 12 MONTHS, THE FOOD YOU BOUGHT JUST DIDN'T LAST AND YOU DIDN'T HAVE MONEY TO GET MORE.: NEVER TRUE

## 2021-07-09 ASSESSMENT — ENCOUNTER SYMPTOMS
SHORTNESS OF BREATH: 0
BLOOD IN STOOL: 0
BACK PAIN: 0
ABDOMINAL PAIN: 0
WHEEZING: 0
COUGH: 0
CONSTIPATION: 0

## 2021-07-09 ASSESSMENT — PATIENT HEALTH QUESTIONNAIRE - PHQ9
SUM OF ALL RESPONSES TO PHQ QUESTIONS 1-9: 0
SUM OF ALL RESPONSES TO PHQ9 QUESTIONS 1 & 2: 0
SUM OF ALL RESPONSES TO PHQ QUESTIONS 1-9: 0
2. FEELING DOWN, DEPRESSED OR HOPELESS: 0
SUM OF ALL RESPONSES TO PHQ QUESTIONS 1-9: 0
1. LITTLE INTEREST OR PLEASURE IN DOING THINGS: 0

## 2021-07-09 ASSESSMENT — SOCIAL DETERMINANTS OF HEALTH (SDOH): HOW HARD IS IT FOR YOU TO PAY FOR THE VERY BASICS LIKE FOOD, HOUSING, MEDICAL CARE, AND HEATING?: SOMEWHAT HARD

## 2021-07-09 NOTE — PROGRESS NOTES
[facility]    Progress Note    Date of patient's visit: 7/9/2021    Patient's Name:  Brayan Bullock    YOB: 1970            Patient Care Team:  Claudio Dey MD as PCP - General (Internal Medicine)  Claudio Dey MD as PCP - [facility] Empaneled Provider    REASON FOR VISIT: Routine outpatient follow     Chief Complaint   Patient presents with    Hypertension     questioning dose of meds, states she's been on them a while    Health Maintenance     labs pended, shingrix pended    Hip Pain     bilateral, 6 months onset, positional relief from pain, constant and worse at night         HISTORY OF PRESENT ILLNESS:    History was obtained from the patient. Brayan Bullock is a 45 y.o. is here for follow-up. She has numerous medical problems but overall doing well. Main concern is bilateral hip pain which is on the lateral aspect. Is been going on for a few months. She is worse at night when she lays on her side. She has been taking some NSAIDs without much relief. No groin pain or back pain right now. She has had previously some lumbar strains but currently asymptomatic. She has chronic reflux esophagitis. She has had esophageal strictures which have been dilated several times in the past.  She is maintained on a PPI daily and she says symptoms are controlled. She avoids eating certain foods that stick in her food pipe but overall feeling much better with no reflux or dysphagia currently. She continues to have abnormal uterine bleeding with heavy bleeding that last 2 weeks out of a month. She was supposed to have a pelvic ultrasound last year and follow-up with gynecologist but had some insurance issues and could not keep follow-up. Will advise her to get her pelvic ultrasound done and follow-up with GYN for endometrial biopsy and possible D&C. She is also due for her mammogram.  She will need a Pap smear as well. No chest pain.   Cardiac cath was done 2 or 3 years ago. She is compliant with all her meds. She has been taking iron supplements daily. Currently her CBC the last 2 times have been normal.  We will check a repeat CBC and then advise her to either hold or continue iron based on results. She is interested in HIV and hepatitis C screening. She is interested in shingles vaccine but refuses Covid vaccine. She has been following up with pulmonologist.  Asthma has been under control. She is compliant with her inhalers. She had a sleep study which showed snoring with no sleep apnea couple of years ago. No results found for this visit on 07/09/21. Coronary angiogram 2018  Angiographic Findings      Cardiac Arteries and Lesion Findings     LMCA: Normal 0% stenosis.     LAD: Normal 0% stenosis.     LCx: Normal 0% stenosis.     RCA: Normal 0% stenosis.      Coronary Tree      Dominance: Right       EGD 2017  PREOPERATIVE DIAGNOSIS: dysphagia.      PROCEDURES:   1) Transoral Upper Endoscopy, Savary dilation.      POSTOPERATIVE DIAGNOSIS:   Tortuous esophagus        Colonoscopy 2018  PREOPERATIVE DIAGNOSIS: family history of colon cancer.      PROCEDURES:   Transanal Colonoscopy --screening.      POSTPROCEDURE DIAGNOSIS:  Redundant colon         EGD 2016  Procedure:                 Esophagogastroduodenoscopy with biopsies (esophageal), esophageal dilation (18 mm savary with moderate resistance)  Date:                           6/30/2016   Endoscopist:             CLAUS Aldridge     Preoperative Diagnosis:     1. Dysphagia   2. History of esophageal stricture     Postoperative Diagnosis:    1. Esophageal stricture  2. Hiatal hernia     Colonoscopy 2016     Preoperative Diagnosis:    3. Screening for colon cancer.     Postoperative Diagnosis:    3.  Normal exam.     Past Medical History:   Diagnosis Date    Allergic rhinitis     Asthma     Clinical trial participant 11/23/2015 11/23/20015    COPD (chronic obstructive pulmonary disease) [address] [address]     Dysphagia     Dysphagia     has needed EGD with dilatation in the past    Family history of colon cancer 8/9/2017    Father    GERD (gastroesophageal reflux disease)     H/O cardiovascular stress test 01/15/2018    ABNORMAL    Hiatal hernia     Hyperlipidemia     Hypertension     Iron deficiency anemia secondary to inadequate dietary iron intake 5/13/2020    Iron deficiency anemia, unspecified 5/13/2020    Irritable bowel syndrome with constipation 1/21/2016    Obesity     Snores     states has tested negative for sleep apnea    Wears glasses        Past Surgical History:   Procedure Laterality Date    CARDIAC CATHETERIZATION  01/16/2018    CARDIAC CATHETERIZATION  11/23/2015     Minimal non obstructive CAD    COLONOSCOPY  2015    COLONOSCOPY  02/08/2018    Redundant colon    ENDOSCOPY, COLON, DIAGNOSTIC  2015    with dilatation    ENDOSCOPY, COLON, DIAGNOSTIC  02/08/2018    WNL     NOSE SURGERY      turbinate     MO COLON CA SCRN NOT HI RSK IND N/A 2/8/2018    COLONOSCOPY performed by Demetria Vargas MD at [address] [place] [address]    UPPER GASTROINTESTINAL ENDOSCOPY      The endoscopic exam showed mildly tortuous esophagus.  Savary dilation performed x 42 and 45 F.     UPPER GASTROINTESTINAL ENDOSCOPY N/A 7/18/2017    EGD DILATION SAVORY performed by Demetria Vargas MD at [facility]         ALLERGIES    No Known Allergies    MEDICATIONS:      Current Outpatient Medications on File Prior to Visit   Medication Sig Dispense Refill    ondansetron (ZOFRAN-ODT) 4 MG disintegrating tablet dissolve 1 tablet ON TONGUE every 8 hours if needed for nausea and vomiting 10 tablet 0    atorvastatin (LIPITOR) 20 MG tablet take 1 tablet by mouth once daily 30 tablet 5    senna (RA SENNA) 8.6 MG tablet take 1 tablet by mouth twice a day 60 tablet 5    omeprazole (PRILOSEC) 40 MG delayed release capsule take 1 capsule by mouth once daily if needed 60 capsule 3    ibuprofen (ADVIL;MOTRIN) 800 MG tablet take 1 tablet by mouth every 8 hours if needed for pain 30 tablet 3    RA ASPIRIN EC 81 MG EC tablet take 1 tablet by mouth once daily 30 tablet 5    ferrous sulfate (FE TABS) 325 (65 Fe) MG EC tablet Take 1 tablet by mouth 2 times daily 60 tablet 5    meloxicam (MOBIC) 7.5 MG tablet Take 1 tablet by mouth daily 30 tablet 5    carvedilol (COREG) 6.25 MG tablet take 1 tablet by mouth twice a day 60 tablet 5    valsartan-hydrochlorothiazide (DIOVAN-HCT) 80-12.5 MG per tablet Once a day 30 tablet 5    mometasone-formoterol (DULERA) 200-5 MCG/ACT inhaler inhale 2 puffs by mouth twice a day 1 Inhaler 6    albuterol sulfate HFA (PROAIR HFA) 108 (90 Base) MCG/ACT inhaler inhale 2 puffs by mouth every 6 hours if needed for wheezing 1 Inhaler 6    ACETAMINOPHEN EXTRA STRENGTH 500 MG tablet take 2 tablets by mouth every 6 hours if needed for pain 30 tablet 2    RA MELATONIN 5 MG TABS tablet take 1 tablet by mouth nightly 30 tablet 0    albuterol (PROVENTIL) (2.5 MG/3ML) 0.083% nebulizer solution Take 3 mLs by nebulization every 6 hours as needed for Wheezing 120 each 3    fluticasone (FLONASE) 50 MCG/ACT nasal spray 1 spray by Nasal route daily 1 Bottle 3    Multiple Vitamins-Minerals (WOMENS MULTI VITAMIN & MINERAL PO) Take 1 tablet by mouth daily       Current Facility-Administered Medications on File Prior to Visit   Medication Dose Route Frequency Provider Last Rate Last Admin    0.9 % sodium chloride infusion   Intravenous Continuous Carley Pérez MD 30 mL/hr at 05/26/15 1008 New Bag at 05/26/15 1008       HISTORY    Reviewed and no change from previous record. Krisetn Darnell  reports that she has never smoked.  She has never used smokeless tobacco.    FAMILY HISTORY:    Reviewed and No change from previous visit    HEALTH MAINTENANCE DUE:      Health Maintenance Due   Topic Date Due    Hepatitis C screen  Never done    COVID-19 Vaccine (1) Never done    HIV screen  Never done   Jose Antonio Plummer Shingles Vaccine (1 of 2) Never done    Breast cancer screen  05/06/2021       REVIEW OF SYSTEMS:    12 point review of symptoms completed and found to be normal except noted in the HPI    Review of Systems   Constitutional: Negative for chills, fatigue, fever and unexpected weight change. Respiratory: Negative for cough, shortness of breath and wheezing. Cardiovascular: Negative for chest pain, palpitations and leg swelling. Gastrointestinal: Negative for abdominal pain, blood in stool and constipation. Endocrine: Negative for polydipsia and polyuria. Genitourinary: Positive for menstrual problem. Negative for dysuria and flank pain. Musculoskeletal: Positive for arthralgias. Negative for back pain, gait problem and joint swelling. Allergic/Immunologic: Positive for environmental allergies. Negative for immunocompromised state. Neurological: Negative for dizziness, weakness, numbness and headaches. Hematological: Negative for adenopathy. Does not bruise/bleed easily. Psychiatric/Behavioral: Negative for dysphoric mood. The patient is not nervous/anxious. PHYSICAL EXAM:     Vitals:    07/09/21 0841 07/09/21 0848   BP: (!) 140/94 136/88   Site: Left Upper Arm    Position: Sitting    Cuff Size: Large Adult    Pulse: 69    Temp: 98.2 °F (36.8 °C)    TempSrc: Infrared    Weight: 206 lb 6.4 oz (93.6 kg)    Height: 5' (1.524 m)      Body mass index is 40.31 kg/m². BP Readings from Last 3 Encounters:   07/09/21 136/88   02/01/21 (!) 154/89   12/21/20 (!) 143/79        Wt Readings from Last 3 Encounters:   07/09/21 206 lb 6.4 oz (93.6 kg)   02/01/21 200 lb (90.7 kg)   11/04/20 200 lb (90.7 kg)       Physical Exam  Vitals and nursing note reviewed. Constitutional:       Appearance: Normal appearance. She is obese. HENT:      Head: Normocephalic and atraumatic. Eyes:      General: No scleral icterus. Extraocular Movements: Extraocular movements intact.       Conjunctiva/sclera: Dispense: 30 tablet; Refill: 5  - carvedilol (COREG) 6.25 MG tablet; take 1 tablet by mouth twice a day  Dispense: 60 tablet; Refill: 5  - Comprehensive Metabolic Panel; Future    2. IGT (impaired glucose tolerance)    - POCT glycosylated hemoglobin (Hb A1C)  - Comprehensive Metabolic Panel; Future    3. Severe persistent asthma without complication  Stable     4. Trochanteric bursitis of both hips  NSAID's prn    - XR HIP RIGHT (2-3 VIEWS); Future  - XR HIP LEFT (2-3 VIEWS); Future  - Fortino Short MD, Orthopedic Surgery, [address]    5. Abnormal uterine bleeding (AUB)  Get pelvic us    - Krystal Escalante MD, Gynecology, [place]  - CBC; Future    6. Mixed hyperlipidemia  On statin    - Comprehensive Metabolic Panel; Future    7. Chronic reflux esophagitis    - omeprazole (PRILOSEC) 40 MG delayed release capsule; take 1 capsule by mouth once daily if needed  Dispense: 30 capsule; Refill: 3    8. Left hip pain    - XR HIP RIGHT (2-3 VIEWS); Future  - XR HIP LEFT (2-3 VIEWS); Future  - Jose M Carballo MD, Orthopedic Surgery, [address]    9. Need for prophylactic vaccination and inoculation against varicella    - zoster recombinant adjuvanted vaccine [facility]) 50 MCG/0.5ML SUSR injection; Inject 0.5 mLs into the muscle once for 1 dose 50 MCG IM then repeat 2-6 months. Dispense: 1 each; Refill: 1    10. S/P cardiac cath-Minimal disease 11/23/15 Dr. Vu Chapraro  Statin    - aspirin (RA ASPIRIN EC) 81 MG EC tablet; take 1 tablet by mouth once daily  Dispense: 30 tablet; Refill: 5    11. Morbid obesity with BMI of 40.0-44.9, adult ([facility] [address]      12. Screening for HIV (human immunodeficiency virus)    - HIV Screen; Future    13. Encounter for hepatitis C screening test for low risk patient    - Hepatitis C Antibody; Future          FOLLOW UP AND INSTRUCTIONS:   Return in about 6 months (around 1/9/2022).     1. Beatriz received counseling on the following healthy behaviors: nutrition, exercise and medication adherence    2. Reviewed prior labs and health maintenance. 3. Discussed use, benefit, and side effects of prescribed medications. Barriers to medication compliance addressed. All patient questions answered. Pt voiced understanding.      4. Patient given educational materials - see patient instructions    Haris Hercules  Attending Physician, [address] [place] [address], Internal Medicine Residency Program  [address] [place] [address]  7/9/2021, 8:57 AM

## 2021-07-12 DIAGNOSIS — D50.9 IRON DEFICIENCY ANEMIA, UNSPECIFIED IRON DEFICIENCY ANEMIA TYPE: ICD-10-CM

## 2021-07-12 DIAGNOSIS — J45.50 UNCOMPLICATED SEVERE PERSISTENT ASTHMA: ICD-10-CM

## 2021-07-12 RX ORDER — MELOXICAM 7.5 MG/1
7.5 TABLET ORAL DAILY
Qty: 30 TABLET | Refills: 5 | Status: CANCELLED | OUTPATIENT
Start: 2021-07-12

## 2021-07-12 RX ORDER — LANOLIN ALCOHOL/MO/W.PET/CERES
325 CREAM (GRAM) TOPICAL
Qty: 30 TABLET | Refills: 5 | Status: CANCELLED | OUTPATIENT
Start: 2021-07-12

## 2021-07-12 RX ORDER — CHOLECALCIFEROL (VITAMIN D3) 125 MCG
CAPSULE ORAL
Qty: 30 TABLET | Refills: 3 | Status: SHIPPED | OUTPATIENT
Start: 2021-07-12

## 2021-07-12 RX ORDER — ALBUTEROL SULFATE 90 UG/1
AEROSOL, METERED RESPIRATORY (INHALATION)
Qty: 1 INHALER | Refills: 6 | Status: SHIPPED | OUTPATIENT
Start: 2021-07-12 | End: 2022-02-01 | Stop reason: SDUPTHER

## 2021-07-12 NOTE — TELEPHONE ENCOUNTER
Request for medication refill albuterol inhaler, ferrous sulfate, meloxicam, dulera .     Next Visit Date:pt on wait list til, 7/9/21, last seen 1/9/21  Future Appointments   Date Time Provider Fortino Jean Baptiste   7/13/2021  9:30 AM STA ULTRASOUND RM 3 STAZ US STA Radiolog   8/24/2021  2:30 PM STA SCR MAMMO RM 2 STAZ MAMMO STA Radiolog       Health Maintenance   Topic Date Due    COVID-19 Vaccine (1) Never done    Shingles Vaccine (1 of 2) Never done    Breast cancer screen  05/06/2021    Cervical cancer screen  08/10/2021    Flu vaccine (1) 09/01/2021    Lipid screen  10/06/2021    Potassium monitoring  07/09/2022    Creatinine monitoring  07/09/2022    Colon cancer screen colonoscopy  02/08/2023    DTaP/Tdap/Td vaccine (2 - Td or Tdap) 02/05/2026    Pneumococcal 0-64 years Vaccine (2 of 2 - PPSV23) 12/04/2034    Hepatitis C screen  Completed    HIV screen  Completed    Hepatitis A vaccine  Aged Out    Hepatitis B vaccine  Aged Out    Hib vaccine  Aged Out    Meningococcal (ACWY) vaccine  Aged Out       Hemoglobin A1C (%)   Date Value   07/09/2021 5.6   10/06/2020 5.7   03/03/2020 6.0             ( goal A1C is < 7)   No results found for: LABMICR  LDL Cholesterol (mg/dL)   Date Value   10/06/2020 103       (goal LDL is <100)   AST (U/L)   Date Value   07/09/2021 20     ALT (U/L)   Date Value   07/09/2021 21     BUN (mg/dL)   Date Value   07/09/2021 7     BP Readings from Last 3 Encounters:   07/09/21 136/88   02/01/21 (!) 154/89   12/21/20 (!) 143/79          (goal 120/80)    All Future Testing planned in [facility]  Lab Frequency Next Occurrence   US PELVIS COMPLETE Once 07/01/2021   TRAVIS DIGITAL SCREEN W OR WO CAD BILATERAL Once 10/01/2021         Patient Active Problem List:     Hypertension     Asthma     GERD (gastroesophageal reflux disease)     S/P cardiac cath-Minimal disease 11/23/15 Dr. Damian Carolina     Irritable bowel syndrome with constipation     Obesity     Allergic rhinitis     Hiatal hernia Paresthesias     Left sided numbness     Dysphagia     Family history of colon cancer     DNS (deviated nasal septum)     Hypertrophy of both inferior nasal turbinates     Iron deficiency anemia secondary to inadequate dietary iron intake     Iron deficiency anemia, unspecified

## 2021-07-13 ENCOUNTER — HOSPITAL ENCOUNTER (OUTPATIENT)
Dept: ULTRASOUND IMAGING | Age: 51
Discharge: HOME | End: 2021-07-15
Payer: MEDICARE

## 2021-07-13 DIAGNOSIS — N92.0 MENORRHAGIA WITH REGULAR CYCLE: ICD-10-CM

## 2021-07-13 DIAGNOSIS — D50.9 IRON DEFICIENCY ANEMIA, UNSPECIFIED IRON DEFICIENCY ANEMIA TYPE: ICD-10-CM

## 2021-07-13 PROCEDURE — 76856 US EXAM PELVIC COMPLETE: CPT

## 2021-07-15 ENCOUNTER — TELEPHONE (OUTPATIENT)
Dept: OBGYN | Age: 51
End: 2021-07-15

## 2021-07-15 NOTE — TELEPHONE ENCOUNTER
Patient had an [address] done on 7-13-21 and according to the patient, her PCP called her and said she needed to be seen as soon as possible by her gyn. Please contact patient regarding an appt. Thanks!

## 2021-07-19 NOTE — TELEPHONE ENCOUNTER
Request for Zofran .     Pt on wait list for 6 month f/u in January    Next Visit Date:  Future Appointments   Date Time Provider Fortino Ita   7/20/2021  8:50 AM Johnie Morley MD ORTHO SPECIA [facility]   8/24/2021  2:30 PM STA SCR MAMMO RM 2 STAZ MAMMO STA Radiolog       Health Maintenance   Topic Date Due    COVID-19 Vaccine (1) Never done    Breast cancer screen  05/06/2021    Cervical cancer screen  08/10/2021    Flu vaccine (1) 09/01/2021    Shingles Vaccine (2 of 2) 09/03/2021    Lipid screen  10/06/2021    Potassium monitoring  07/09/2022    Creatinine monitoring  07/09/2022    Colon cancer screen colonoscopy  02/08/2023    DTaP/Tdap/Td vaccine (2 - Td or Tdap) 02/05/2026    Pneumococcal 0-64 years Vaccine (2 of 2 - PPSV23) 12/04/2034    Hepatitis C screen  Completed    HIV screen  Completed    Hepatitis A vaccine  Aged Out    Hepatitis B vaccine  Aged Out    Hib vaccine  Aged Out    Meningococcal (ACWY) vaccine  Aged Out       Hemoglobin A1C (%)   Date Value   07/09/2021 5.6   10/06/2020 5.7   03/03/2020 6.0             ( goal A1C is < 7)   No results found for: LABMICR  LDL Cholesterol (mg/dL)   Date Value   10/06/2020 103       (goal LDL is <100)   AST (U/L)   Date Value   07/09/2021 20     ALT (U/L)   Date Value   07/09/2021 21     BUN (mg/dL)   Date Value   07/09/2021 7     BP Readings from Last 3 Encounters:   07/09/21 136/88   02/01/21 (!) 154/89   12/21/20 (!) 143/79          (goal 120/80)    All Future Testing planned in [facility]  Lab Frequency Next Occurrence   TRAVIS DIGITAL SCREEN W OR WO CAD BILATERAL Once 10/01/2021         Patient Active Problem List:     Hypertension     Asthma     GERD (gastroesophageal reflux disease)     S/P cardiac cath-Minimal disease 11/23/15 Dr. Stefania Kessler     Irritable bowel syndrome with constipation     Obesity     Allergic rhinitis     Hiatal hernia     Paresthesias     Left sided numbness     Dysphagia     Family history of colon cancer     DNS (deviated

## 2021-07-20 RX ORDER — ONDANSETRON 4 MG/1
TABLET, ORALLY DISINTEGRATING ORAL
Qty: 10 TABLET | Refills: 0 | Status: SHIPPED | OUTPATIENT
Start: 2021-07-20 | End: 2021-08-10

## 2021-08-10 RX ORDER — ONDANSETRON 4 MG/1
TABLET, ORALLY DISINTEGRATING ORAL
Qty: 10 TABLET | Refills: 5 | Status: SHIPPED | OUTPATIENT
Start: 2021-08-10 | End: 2022-02-18 | Stop reason: SDUPTHER

## 2021-08-10 NOTE — TELEPHONE ENCOUNTER
Request for Zofran.     Pt on wait list for 6 month f/u in January    Next Visit Date:  Future Appointments   Date Time Provider Fortino Jean Baptiste   8/24/2021  8:45 AM Shaniqua Sharma MD [facility] OB/Gyn [id number]   8/24/2021  2:30 PM STA SCR MAMMO RM 2 STAZ MAMMO STA Radiolog       Health Maintenance   Topic Date Due    COVID-19 Vaccine (1) Never done    Breast cancer screen  05/06/2021    Cervical cancer screen  08/10/2021    Flu vaccine (1) 09/01/2021    Shingles Vaccine (2 of 2) 09/03/2021    Lipid screen  10/06/2021    Potassium monitoring  07/09/2022    Creatinine monitoring  07/09/2022    Colon cancer screen colonoscopy  02/08/2023    DTaP/Tdap/Td vaccine (2 - Td or Tdap) 02/05/2026    Pneumococcal 0-64 years Vaccine (2 of 2 - PPSV23) 12/04/2034    Hepatitis C screen  Completed    HIV screen  Completed    Hepatitis A vaccine  Aged Out    Hepatitis B vaccine  Aged Out    Hib vaccine  Aged Out    Meningococcal (ACWY) vaccine  Aged Out       Hemoglobin A1C (%)   Date Value   07/09/2021 5.6   10/06/2020 5.7   03/03/2020 6.0             ( goal A1C is < 7)   No results found for: LABMICR  LDL Cholesterol (mg/dL)   Date Value   10/06/2020 103       (goal LDL is <100)   AST (U/L)   Date Value   07/09/2021 20     ALT (U/L)   Date Value   07/09/2021 21     BUN (mg/dL)   Date Value   07/09/2021 7     BP Readings from Last 3 Encounters:   07/09/21 136/88   02/01/21 (!) 154/89   12/21/20 (!) 143/79          (goal 120/80)    All Future Testing planned in [facility]  Lab Frequency Next Occurrence   TRAVIS DIGITAL SCREEN W OR WO CAD BILATERAL Once 10/01/2021         Patient Active Problem List:     Hypertension     Asthma     GERD (gastroesophageal reflux disease)     S/P cardiac cath-Minimal disease 11/23/15 Dr. Angel Perea     Irritable bowel syndrome with constipation     Obesity     Allergic rhinitis     Hiatal hernia     Paresthesias     Left sided numbness     Dysphagia     Family history of colon cancer     DNS (deviated nasal septum)     Hypertrophy of both inferior nasal turbinates     Iron deficiency anemia secondary to inadequate dietary iron intake     Iron deficiency anemia, unspecified

## 2021-08-24 ENCOUNTER — HOSPITAL ENCOUNTER (OUTPATIENT)
Dept: MAMMOGRAPHY | Age: 51
Discharge: HOME | End: 2021-08-26
Payer: MEDICARE

## 2021-08-24 DIAGNOSIS — Z12.31 SCREENING MAMMOGRAM FOR HIGH-RISK PATIENT: ICD-10-CM

## 2021-08-24 PROCEDURE — 77063 BREAST TOMOSYNTHESIS BI: CPT

## 2021-09-07 ENCOUNTER — HOSPITAL ENCOUNTER (OUTPATIENT)
Age: 51
Setting detail: SPECIMEN
Discharge: HOME | End: 2021-09-07
Payer: MEDICARE

## 2021-09-07 ENCOUNTER — OFFICE VISIT (OUTPATIENT)
Dept: OBGYN | Age: 51
End: 2021-09-07
Payer: MEDICARE

## 2021-09-07 VITALS
HEART RATE: 71 BPM | DIASTOLIC BLOOD PRESSURE: 75 MMHG | HEIGHT: 60 IN | WEIGHT: 205 LBS | SYSTOLIC BLOOD PRESSURE: 130 MMHG | BODY MASS INDEX: 40.25 KG/M2

## 2021-09-07 DIAGNOSIS — Z01.419 WELL WOMAN EXAM WITH ROUTINE GYNECOLOGICAL EXAM: Primary | ICD-10-CM

## 2021-09-07 DIAGNOSIS — N95.1 PERIMENOPAUSAL: ICD-10-CM

## 2021-09-07 DIAGNOSIS — Z00.00 PREVENTATIVE HEALTH CARE: ICD-10-CM

## 2021-09-07 DIAGNOSIS — R23.2 HOT FLASHES: ICD-10-CM

## 2021-09-07 PROCEDURE — 99396 PREV VISIT EST AGE 40-64: CPT | Performed by: OBSTETRICS & GYNECOLOGY

## 2021-09-07 NOTE — PROGRESS NOTES
History and Physical    Arielle Martell  [birth year]              45 y.o. Chief Complaint   Patient presents with    Gynecologic Exam     annual with US results   Kang Results       Patient's last menstrual period was 09/01/2021 (exact date). Primary Care Physician: Deepika Srinivasan MD    The patient was seen and examined. She has no chief complaint today and is here for her annual exam.  Her bowels are regular. There are no voiding complaints. She denies any bloating. She denies vaginal discharge and was counseled on STD's and the need for barrier contraception. HPI : Arielle Martell is a 45 y.o. female [id number]    Pt here for annual exam.    She states that she is still having heavy periods, and they can last 10 days. This started about 5 years ago. Pt states that wine and caffeine make her periods heavy as well. She is concerned as to why she has not gone through menopause yet. Pt states her mother stopped having periods at age 39. Pt is getting night sweats, so she may be perimenopausal.  She also did not have a period last month in August.  She did start a period on Sept 1, 2021, and is currently still on her period. We had a lengthy discussion on possible treatment options for her. Because pt is likely perimenopausal, one option is to just do expectant management and see if she stops having periods on her own. The fact that she is having hot flashes and skipped a period last month, there is a strong likelihood that she is going through menopause. We also discussed medical interventions - strongly suggested Mirena IUD because of it's medical indication for treatment of heavy periods. Pt states she is not interested in birth control. We also discussed surgical interventions like endometrial ablation and hysterectomy. She is aware of surgery being more invasive. She would like to consider her options and then get back to us.     I did explain that we should also perform an EMB and Violence:     Fear of Current or Ex-Partner:     Emotionally Abused:     Physically Abused:     Sexually Abused:        MEDICATIONS:  Current Outpatient Medications   Medication Sig Dispense Refill    ondansetron (ZOFRAN-ODT) 4 MG disintegrating tablet dissolve 1 tablet ON TONGUE every 8 hours if needed for nausea and vomiting 10 tablet 5    albuterol sulfate HFA (PROAIR HFA) 108 (90 Base) MCG/ACT inhaler inhale 2 puffs by mouth every 6 hours if needed for wheezing 1 Inhaler 6    mometasone-formoterol (DULERA) 200-5 MCG/ACT inhaler inhale 2 puffs by mouth twice a day 1 Inhaler 6    melatonin (RA MELATONIN) 5 MG TABS tablet take 1 tablet by mouth nightly 30 tablet 3    omeprazole (PRILOSEC) 40 MG delayed release capsule take 1 capsule by mouth once daily if needed 30 capsule 3    aspirin (RA ASPIRIN EC) 81 MG EC tablet take 1 tablet by mouth once daily 30 tablet 5    valsartan-hydroCHLOROthiazide (DIOVAN-HCT) 80-12.5 MG per tablet Once a day 30 tablet 5    carvedilol (COREG) 6.25 MG tablet take 1 tablet by mouth twice a day 60 tablet 5    ferrous sulfate (FE TABS) 325 (65 Fe) MG EC tablet Take 1 tablet by mouth daily (with breakfast) 30 tablet 5    atorvastatin (LIPITOR) 20 MG tablet take 1 tablet by mouth once daily 30 tablet 5    senna (RA SENNA) 8.6 MG tablet take 1 tablet by mouth twice a day 60 tablet 5    meloxicam (MOBIC) 7.5 MG tablet Take 1 tablet by mouth daily 30 tablet 5    ACETAMINOPHEN EXTRA STRENGTH 500 MG tablet take 2 tablets by mouth every 6 hours if needed for pain 30 tablet 2    albuterol (PROVENTIL) (2.5 MG/3ML) 0.083% nebulizer solution Take 3 mLs by nebulization every 6 hours as needed for Wheezing 120 each 3    fluticasone (FLONASE) 50 MCG/ACT nasal spray 1 spray by Nasal route daily 1 Bottle 3    Multiple Vitamins-Minerals (WOMENS MULTI VITAMIN & MINERAL PO) Take 1 tablet by mouth daily       No current facility-administered medications for this visit. Facility-Administered Medications Ordered in Other Visits   Medication Dose Route Frequency Provider Last Rate Last Admin    0.9 % sodium chloride infusion   IntraVENous Continuous Madelaine Lemus MD 30 mL/hr at 05/26/15 1008 New Bag at 05/26/15 1008           ALLERGIES:  Allergies as of 09/07/2021    (No Known Allergies)       Symptoms of decreased mood absent  Symptoms of anhedonia absent      Immunization status: up to date and documented. Gynecologic History:  Menarche: 14 yo  Menopause: pt is likely perimenopausal.     Patient's last menstrual period was 09/01/2021 (exact date). Sexually Active: not currently, is  though    STD History: Yes - gonorrhea and trich - but was treated     Permanent Sterilization: Yes -  2003  Reversible Birth Control: No        Hormone Replacement Exposure: No      Genetic Qualified Family History of Breast, Ovarian , Colon or Uterine Cancer: pt's father had colon cancer and patient had colonoscopy herself 2016     If YES see scanned worksheet. Preventative Health Testing:    Health Maintenance:  Health Maintenance Due   Topic Date Due    COVID-19 Vaccine (2 - Pfizer 2-dose series) 08/28/2021    Flu vaccine (1) 09/01/2021    Shingles Vaccine (2 of 2) 09/03/2021    Cervical cancer screen  08/10/2021       Date of Last Pap Smear: 8/10/2018 - negative cytology (endometrial cells on pap but pt is not postmenopausal), no HPV testing done  Abnormal Pap Smear History: as above  Colposcopy History:   Date of Last Mammogram: 8/24/21 - birads 1  Date of Last Colonoscopy: 1/14/2016 - next due 2026 per procedure op note  Date of Last Bone Density: n/a      ________________________________________________________________________        REVIEW OF SYSTEMS:       A minimum of an eleven point review of systems was completed. Review Of Systems (11 point):  Constitutional:+hot flashes. No fever, chills or malaise;  No weight change or fatigue  Head and Eyes: No vision changes, Headache, Dizziness or trauma in last 12 months  ENT ROS: No hearing, Tinnitis, sinus or taste problems  Hematological and Lymphatic ROS:No Lymphoma, Von Willebrand's, Hemophillia or Bleeding History  Psych ROS: No Depression, Homicidal thoughts,suicidal thoughts, or anxiety  Breast ROS: No breast abnormalities or lumps  Respiratory ROS: No SOB, Pneumoniae,Cough, or Pulmonary Embolism   Cardiovascular ROS: No Chest Pain with Exertion, Palpitations, Syncope, Edema, Arrhythmia  Gastrointestinal ROS: No Indigestion, Heartburn, Nausea, vomiting, Diarrhea, Constipation,or Bowel Changes; No Bloody Stools or melena  Genito-Urinary ROS:+heavy periods, likely perimenopausal. No Dysuria, Hematuria or Nocturia. No Urinary Incontinence or Vaginal Discharge  Musculoskeletal ROS: No Arthralgia, Arthritis,Gout,Osteoporosis or Rheumatism  Neurological ROS: No CVA, Migraines, Epilepsy, Seizure Hx, or Limb Weakness  Dermatological ROS: No Rash, Itching, Hives, Mole Changes or Cancer                                                                                                                                                                                                                                  PHYSICAL Exam:     Constitutional:  Vitals:    09/07/21 1131   BP: 130/75   Site: Right Upper Arm   Position: Sitting   Cuff Size: Large Adult   Pulse: 71   Weight: 205 lb (93 kg)   Height: 5' (1.524 m)       Chaperone for Intimate Exam   Chaperone was offered and accepted as part of the rooming process.  Chaperone: Kerri Brooks MA          General Appearance: This  is a well Developed, well Nourished, well groomed female. Her BMI was reviewed. Nutritional decision making was discussed. Skin:  There was a Normal Inspection of the skin without rashes or lesions. There were no rashes. (Papular, Maculopapular, Hives, Pustular, Macular)     There were no lesions (Ulcers, Erythema, Abn.  Appearing Nevi) Lymphatic:  No Lymph Nodes were Palpable in the neck , axilla or groin.  0 # Of Lymph Nodes; Location ; Character [Normal]  [Shotty] [Tender] [Enlarged]     Neck and EENT:  The neck was supple. There were no masses   The thyroid was not enlarged and had no masses. Perrla, EOMI B/L, TMI B/L No Abnormalities. Throat inspected-No exudates or Masses, Nares Patent No Masses        Respiratory: The lungs were auscultated and found to be clear. There were no rales, rhonchi or wheezes. There was a good respiratory effort. Cardiovascular: The heart was in a regular rate and rhythm. . No S3 or S4. There was no murmur appreciated. Location, grade, and radiation are not applicable. Extremities: The patients extremities were without calf tenderness, edema, or varicosities. There was full range of motion in all four extremities. Pulses in all four extremities were appreciated and are 2/4. Abdomen: The abdomen was soft and non-tender. There were good bowel sounds in all quadrants and there was no guarding, rebound or rigidity. On evaluation there was no evidence of hepatosplenomegaly and there was no costal vertebral yanira tenderness bilaterally. No hernias were appreciated. Abdominal Scars: none    Psych: The patient had a normal Orientation to: Time, Place, Person, and Situation  There is no Mood / Affect changes    Breast:  (Chest)  normal appearance, no masses or tenderness, Inspection negative, No nipple retraction or dimpling, No nipple discharge or bleeding, No axillary or supraclavicular adenopathy, Normal to palpation without dominant masses, Taught monthly breast self examination  Self breast exams were reviewed in detail. Literature was given.     Pelvic Exam:  External genitalia: normal general appearance  Urinary system: urethral meatus normal  Vaginal: normal mucosa without prolapse or lesions, normal rugae and moderate amount of menses in vaginal vault, vaginitis probe obtained  Cervix: normal appearance, thin prep PAP obtained and GC prep obtained - pt made aware that we will likely have to re-collect her pap because of her still being on her period  Adnexa: normal bimanual exam  Uterus: anteverted, mid-position and enlarged (12-14cm)  Negative bladder sweep, no lymphadenopathy, negative CMT          Musculosk:  Normal Gait and station was noted. Digits were evaluated without abnormal findings. Range of motion, stability and strength were evaluated and found to be appropriate for the patients age. ASSESSMENT:      45 y.o. Annual   Diagnosis Orders   1. Well woman exam with routine gynecological exam  PAP Smear    Chlamydia Trachomatis & Neisseria gonorrhoeae (GC) by amplified detection    Vaginitis DNA Probe   2. Preventative health care  PAP Smear   3. Hot flashes     4.  Perimenopausal            Chief Complaint   Patient presents with    Gynecologic Exam     annual with US results    Results          Past Medical History:   Diagnosis Date    Allergic rhinitis     Asthma     Clinical trial participant 11/23/2015 11/23/20015    COPD (chronic obstructive pulmonary disease) ([facility] [address]     Dysphagia     Dysphagia     has needed EGD with dilatation in the past    Family history of colon cancer 8/9/2017    Father    GERD (gastroesophageal reflux disease)     H/O cardiovascular stress test 01/15/2018    ABNORMAL    Hiatal hernia     Hyperlipidemia     Hypertension     Iron deficiency anemia secondary to inadequate dietary iron intake 5/13/2020    Iron deficiency anemia, unspecified 5/13/2020    Irritable bowel syndrome with constipation 1/21/2016    Obesity     [facility] has tested negative for sleep apnea    Wears glasses          Patient Active Problem List    Diagnosis Date Noted    Iron deficiency anemia secondary to inadequate dietary iron intake 05/13/2020    Iron deficiency anemia, unspecified 05/13/2020    Obesity 05/11/2020    Allergic and no family history. 3. Bone density studies every 2-3 years. Begin at 73 yo. If no fracture history or osteoporosis family history. (significant). 4. Colonoscopy begin at 40 yo. Repeat every ten years if negative and no family history. 5. Calcium of 3012-2556 mg/day in split dosing  6. Vitamin D 400-800 IU/day  7. All other preventative health recommendations will be managed by the patients Primary care physician. PLAN:  Pap smear collected - will follow ASCCP guidelines  Vaginal cultures collected - will treat if appropriate  Counseled patient on expectant mgmt, medical interventions and surgical interventions for her AUB - pt to weigh options and let us know how she would like to proceed. Return asap, for EMB/EndoSee - can be with resident. Repeat Annual every 1 year  Cervical Cytology Evaluation begins at 24years old. If Negative Cytology, Follow-up screening per current guidelines. Mammograms every 1 year. If 37 yo and last mammogram was negative. Calcium and Vitamin D dosing reviewed. Colonoscopy screening reviewed as well as onset for bone density testing. Birth control and barrier recommendations discussed. STD counseling and prevention reviewed. Gardisil counseling completed for all patients 10-37 yo. Routine health maintenance per patients PCP. The patient, Pamela Gillis is a 45 y.o. female, was seen with a total time spent of 20 minutes for the visit on this date of service by the E/M provider. The time component had both face to face and non face to face time spent in determining the total time component. Counseling and education regarding her diagnosis listed below and her options regarding those diagnoses were also included in determining her time component. Diagnosis Orders   1. Well woman exam with routine gynecological exam  PAP Smear    Chlamydia Trachomatis & Neisseria gonorrhoeae (GC) by amplified detection    Vaginitis DNA Probe   2.  Preventative health care  PAP Smear   3. Hot flashes     4. Perimenopausal          The patient had her preventative health maintenance recommendations and follow-up reviewed with her at the completion of her visit.     Marv Perez, DO

## 2021-09-08 DIAGNOSIS — Z01.419 WELL WOMAN EXAM WITH ROUTINE GYNECOLOGICAL EXAM: ICD-10-CM

## 2021-09-08 LAB
DIRECT EXAM: NORMAL
Lab: NORMAL
SPECIMEN DESCRIPTION: NORMAL

## 2021-09-10 LAB
HPV SAMPLE: NORMAL
HPV, GENOTYPE 16: NOT DETECTED
HPV, GENOTYPE 18: NOT DETECTED
HPV, HIGH RISK OTHER: NOT DETECTED
HPV, INTERPRETATION: NORMAL
SPECIMEN DESCRIPTION: NORMAL

## 2021-09-14 LAB — CYTOLOGY REPORT: NORMAL

## 2021-09-16 DIAGNOSIS — Z00.00 PREVENTATIVE HEALTH CARE: ICD-10-CM

## 2021-09-16 DIAGNOSIS — Z01.419 WELL WOMAN EXAM WITH ROUTINE GYNECOLOGICAL EXAM: ICD-10-CM

## 2021-09-29 RX ORDER — SENNA PLUS 8.6 MG/1
TABLET ORAL
Qty: 60 TABLET | Refills: 5 | Status: SHIPPED | OUTPATIENT
Start: 2021-09-29 | End: 2022-03-01 | Stop reason: SDUPTHER

## 2021-09-29 NOTE — TELEPHONE ENCOUNTER
Request for Justin Alonzo on wait list for 6 month f/u in January     Next Visit Date:  Future Appointments   Date Time Provider Fortino Jean Baptiste   10/5/2021  2:45 PM DO [facility] OB/Gyn [address] Maintenance   Topic Date Due    COVID-19 Vaccine (2 - Pfizer 2-dose series) 08/28/2021    Flu vaccine (1) 09/01/2021    Shingles Vaccine (2 of 2) 09/03/2021    Lipid screen  10/06/2021    Potassium monitoring  07/09/2022    Creatinine monitoring  07/09/2022    Colon cancer screen colonoscopy  02/08/2023    Breast cancer screen  08/24/2023    DTaP/Tdap/Td vaccine (2 - Td or Tdap) 02/05/2026    Cervical cancer screen  09/07/2026    Pneumococcal 0-64 years Vaccine (2 of 2 - PPSV23) 12/04/2034    Hepatitis C screen  Completed    HIV screen  Completed    Hepatitis A vaccine  Aged Out    Hepatitis B vaccine  Aged Out    Hib vaccine  Aged Out    Meningococcal (ACWY) vaccine  Aged Out       Hemoglobin A1C (%)   Date Value   07/09/2021 5.6   10/06/2020 5.7   03/03/2020 6.0             ( goal A1C is < 7)   No results found for: LABMICR  LDL Cholesterol (mg/dL)   Date Value   10/06/2020 103       (goal LDL is <100)   AST (U/L)   Date Value   07/09/2021 20     ALT (U/L)   Date Value   07/09/2021 21     BUN (mg/dL)   Date Value   07/09/2021 7     BP Readings from Last 3 Encounters:   09/07/21 130/75   07/09/21 136/88   02/01/21 (!) 154/89          (goal 120/80)    All Future Testing planned in [facility]  Lab Frequency Next Occurrence         Patient Active Problem List:     Hypertension     Asthma     GERD (gastroesophageal reflux disease)     S/P cardiac cath-Minimal disease 11/23/15 Dr. Cristian Osullivan     Irritable bowel syndrome with constipation     Obesity     Allergic rhinitis     Hiatal hernia     Paresthesias     Left sided numbness     Dysphagia     Family history of colon cancer     DNS (deviated nasal septum)     Hypertrophy of both inferior nasal turbinates     Iron deficiency anemia secondary to inadequate dietary iron intake     Iron deficiency anemia, unspecified

## 2021-10-01 ENCOUNTER — TELEPHONE (OUTPATIENT)
Dept: INTERNAL MEDICINE | Age: 51
End: 2021-10-01

## 2021-10-01 NOTE — TELEPHONE ENCOUNTER
[facility] [employer] 30 day med cert received and completed form placed in in dr Solano-Conklin Squdiane 1st form pt dx asthma     Pt called for CHRISTIANE to be completed     Pt on wait list til jan, 2022

## 2021-11-09 ENCOUNTER — TELEPHONE (OUTPATIENT)
Dept: INTERNAL MEDICINE | Age: 51
End: 2021-11-09

## 2021-11-10 DIAGNOSIS — J34.3 NASAL TURBINATE HYPERTROPHY: ICD-10-CM

## 2021-11-10 NOTE — TELEPHONE ENCOUNTER
Request for flonase.       Next Visit Date:  Future Appointments   Date Time Provider Fortino Jean Baptiste   2/1/2022  2:30 PM Meenakshi Goldberg MD [id number] [facility]   Topic Date Due    COVID-19 Vaccine (2 - Pfizer 3-dose booster series) 08/28/2021    Flu vaccine (1) 09/01/2021    Shingles Vaccine (2 of 2) 09/03/2021    Lipid screen  10/06/2021    Potassium monitoring  07/09/2022    Creatinine monitoring  07/09/2022    Colon cancer screen colonoscopy  02/08/2023    Breast cancer screen  08/24/2023    DTaP/Tdap/Td vaccine (2 - Td or Tdap) 02/05/2026    Cervical cancer screen  09/07/2026    Pneumococcal 0-64 years Vaccine (2 of 2 - PPSV23) 12/04/2034    Hepatitis C screen  Completed    HIV screen  Completed    Hepatitis A vaccine  Aged Out    Hepatitis B vaccine  Aged Out    Hib vaccine  Aged Out    Meningococcal (ACWY) vaccine  Aged Out       Hemoglobin A1C (%)   Date Value   07/09/2021 5.6   10/06/2020 5.7   03/03/2020 6.0             ( goal A1C is < 7)   No results found for: LABMICR  LDL Cholesterol (mg/dL)   Date Value   10/06/2020 103       (goal LDL is <100)   AST (U/L)   Date Value   07/09/2021 20     ALT (U/L)   Date Value   07/09/2021 21     BUN (mg/dL)   Date Value   07/09/2021 7     BP Readings from Last 3 Encounters:   09/07/21 130/75   07/09/21 136/88   02/01/21 (!) 154/89          (goal 120/80)    All Future Testing planned in [facility]  Lab Frequency Next Occurrence         Patient Active Problem List:     Hypertension     Asthma     GERD (gastroesophageal reflux disease)     S/P cardiac cath-Minimal disease 11/23/15 Dr. Henna Hernandez     Irritable bowel syndrome with constipation     Obesity     Allergic rhinitis     Hiatal hernia     Paresthesias     Left sided numbness     Dysphagia     Family history of colon cancer     DNS (deviated nasal septum)     Hypertrophy of both inferior nasal turbinates     Iron deficiency anemia secondary to inadequate dietary iron intake

## 2021-11-11 RX ORDER — FLUTICASONE PROPIONATE 50 MCG
SPRAY, SUSPENSION (ML) NASAL
Qty: 16 G | Refills: 3 | Status: SHIPPED | OUTPATIENT
Start: 2021-11-11

## 2021-11-17 ENCOUNTER — HOSPITAL ENCOUNTER (OUTPATIENT)
Dept: GENERAL RADIOLOGY | Age: 51
Discharge: HOME | End: 2021-11-19
Payer: MEDICARE

## 2021-11-17 ENCOUNTER — HOSPITAL ENCOUNTER (OUTPATIENT)
Age: 51
Discharge: HOME | End: 2021-11-17
Payer: MEDICARE

## 2021-11-17 ENCOUNTER — OFFICE VISIT (OUTPATIENT)
Dept: INTERNAL MEDICINE | Age: 51
End: 2021-11-17
Payer: MEDICARE

## 2021-11-17 ENCOUNTER — HOSPITAL ENCOUNTER (OUTPATIENT)
Age: 51
Discharge: HOME | End: 2021-11-19
Payer: MEDICARE

## 2021-11-17 VITALS
HEIGHT: 60 IN | HEART RATE: 76 BPM | OXYGEN SATURATION: 97 % | BODY MASS INDEX: 41.43 KG/M2 | WEIGHT: 211 LBS | DIASTOLIC BLOOD PRESSURE: 65 MMHG | SYSTOLIC BLOOD PRESSURE: 131 MMHG

## 2021-11-17 DIAGNOSIS — D50.9 IRON DEFICIENCY ANEMIA, UNSPECIFIED IRON DEFICIENCY ANEMIA TYPE: ICD-10-CM

## 2021-11-17 DIAGNOSIS — Z23 NEEDS FLU SHOT: ICD-10-CM

## 2021-11-17 DIAGNOSIS — Z13.220 LIPID SCREENING: ICD-10-CM

## 2021-11-17 DIAGNOSIS — M75.42 IMPINGEMENT SYNDROME OF SHOULDER REGION, LEFT: ICD-10-CM

## 2021-11-17 DIAGNOSIS — M75.42 IMPINGEMENT SYNDROME OF SHOULDER REGION, LEFT: Primary | ICD-10-CM

## 2021-11-17 LAB
CHOLESTEROL, FASTING: 173 MG/DL
CHOLESTEROL/HDL RATIO: 3.5
HDLC SERPL-MCNC: 49 MG/DL
IRON SATURATION: 58 % (ref 20–55)
IRON: 187 UG/DL (ref 37–145)
LDL CHOLESTEROL: 99 MG/DL (ref 0–130)
TOTAL IRON BINDING CAPACITY: 322 UG/DL (ref 250–450)
TRIGLYCERIDE, FASTING: 123 MG/DL
UNSATURATED IRON BINDING CAPACITY: 135 UG/DL (ref 112–347)
VLDLC SERPL CALC-MCNC: NORMAL MG/DL (ref 1–30)

## 2021-11-17 PROCEDURE — 3017F COLORECTAL CA SCREEN DOC REV: CPT | Performed by: STUDENT IN AN ORGANIZED HEALTH CARE EDUCATION/TRAINING PROGRAM

## 2021-11-17 PROCEDURE — G8417 CALC BMI ABV UP PARAM F/U: HCPCS | Performed by: STUDENT IN AN ORGANIZED HEALTH CARE EDUCATION/TRAINING PROGRAM

## 2021-11-17 PROCEDURE — G8482 FLU IMMUNIZE ORDER/ADMIN: HCPCS | Performed by: STUDENT IN AN ORGANIZED HEALTH CARE EDUCATION/TRAINING PROGRAM

## 2021-11-17 PROCEDURE — 80061 LIPID PANEL: CPT

## 2021-11-17 PROCEDURE — 99213 OFFICE O/P EST LOW 20 MIN: CPT | Performed by: STUDENT IN AN ORGANIZED HEALTH CARE EDUCATION/TRAINING PROGRAM

## 2021-11-17 PROCEDURE — 83550 IRON BINDING TEST: CPT

## 2021-11-17 PROCEDURE — 36415 COLL VENOUS BLD VENIPUNCTURE: CPT

## 2021-11-17 PROCEDURE — 99211 OFF/OP EST MAY X REQ PHY/QHP: CPT | Performed by: INTERNAL MEDICINE

## 2021-11-17 PROCEDURE — 1036F TOBACCO NON-USER: CPT | Performed by: STUDENT IN AN ORGANIZED HEALTH CARE EDUCATION/TRAINING PROGRAM

## 2021-11-17 PROCEDURE — G8427 DOCREV CUR MEDS BY ELIG CLIN: HCPCS | Performed by: STUDENT IN AN ORGANIZED HEALTH CARE EDUCATION/TRAINING PROGRAM

## 2021-11-17 PROCEDURE — 83540 ASSAY OF IRON: CPT

## 2021-11-17 PROCEDURE — 73030 X-RAY EXAM OF SHOULDER: CPT

## 2021-11-17 PROCEDURE — 90688 IIV4 VACCINE SPLT 0.5 ML IM: CPT | Performed by: STUDENT IN AN ORGANIZED HEALTH CARE EDUCATION/TRAINING PROGRAM

## 2021-11-17 RX ORDER — MELOXICAM 7.5 MG/1
7.5 TABLET ORAL DAILY
Qty: 30 TABLET | Refills: 2 | Status: SHIPPED | OUTPATIENT
Start: 2021-11-17

## 2021-11-17 ASSESSMENT — ENCOUNTER SYMPTOMS
EYE DISCHARGE: 0
CHOKING: 0
ABDOMINAL PAIN: 0
VOMITING: 0
COUGH: 0
EYE ITCHING: 0
CHEST TIGHTNESS: 0
NAUSEA: 0

## 2021-11-17 NOTE — PROGRESS NOTES
Attending Physician Statement  I have discussed the care of Terryl Medico, including pertinent history and exam findings,  with the resident. I have reviewed the key elements of all parts of the encounter with the resident. I agree with the assessment, plan and orders as documented by the resident.   (GE Modifier)

## 2021-11-17 NOTE — PATIENT INSTRUCTIONS
Patient Education        Rotator Cuff Problems: Care Instructions  Overview     The rotator cuff is a group of tendons and muscles around the shoulder that keeps the shoulder joint stable. It is what allows you to raise and rotate your arm. Over time, daily wear and exercise can cause the tendons to rub on the bones of your shoulder. This is called impingement. This condition may cause the tendons to bruise, degenerate, or tear. In many people, these problems do not cause pain. When they do cause pain, you can do things to reduce the pain and swelling. These include rest, physical therapy, ice and heat, and anti-inflammatory medicine. If you still have pain after trying these treatments, you and your doctor can discuss having a steroid injection or surgery. Follow-up care is a key part of your treatment and safety. Be sure to make and go to all appointments, and call your doctor if you are having problems. It's also a good idea to know your test results and keep a list of the medicines you take. How can you care for yourself at home? · Be safe with medicines. Read and follow all instructions on the label. ? If the doctor gave you a prescription medicine for pain, take it as prescribed. ? If you are not taking a prescription pain medicine, ask your doctor if you can take an over-the-counter medicine. · Put ice or a cold pack on your shoulder for 10 to 20 minutes at a time. Try to do this every 1 to 2 hours for the next 3 days (when you are awake). Put a thin cloth between the ice pack and your skin. · After 2 or 3 days, if you don't have swelling, apply heat. Put a warm water bottle, a heating pad set on low, or a warm cloth on your shoulder. Do not go to sleep with a heating pad on your skin. Put a thin cloth between the heating pad and your skin. While holding a warm cloth on your shoulder, lean forward so your arm hangs freely, and gently swing your arm back and forth like a pendulum.  You also can do this standing under a warm shower. · Follow your doctor's advice for physical therapy. When your doctor says it is okay, try these stretching exercises. Do them slowly to avoid injury. Put a warm, wet towel on your shoulder before exercising. Stop any exercise that increases pain. ? Range-of-motion exercises. If it is not too painful, stretch your arm in four directions: across the body, up the back, to the side, and overhead. ? Pendulum exercise. Lean forward and hold onto a table or the back of a chair with your good arm. Bend at the waist, letting the arm with the sore shoulder hang straight down. Swing your arm back and forth like a pendulum, then in circles that start small and slowly grow larger. This exercise does not use the arm muscles. Instead, use your legs and your hips to create movement that makes your arm swing freely. Try this for about 5 minutes, several times a day. ? Wall climbing (to the side). Stand with your side to a wall so that your fingers can just touch it. Then turn so your body is turned slightly toward the wall. Walk the fingers of your injured arm up the wall as high as pain permits. Try not to shrug your shoulder up toward your ear as you move your arm up. Hold that position for a count of 15 to 30 seconds. Walk your fingers down to the starting position. Repeat 2 to 4 times, trying to reach higher each time. ? Wall climbing (to the front). Face a wall, standing so your fingers can just touch it. Walk the fingers of your affected arm up the wall as high as pain permits. Try not to shrug your shoulder up toward your ear as you move your arm up. Hold that position for a count of 15 to 30 seconds. Slowly walk your fingers to the starting position. Repeat 2 to 4 times, trying to reach higher each time. · Rest your shoulder when you are not doing stretches and other exercises. Your doctor may tell you to wait for the pain to go away before doing exercises.  Do not lift heavy bags of groceries, play sports, or do anything else that makes you twist or stress your shoulder. Avoid activities where you move your affected arm above your head. When should you call for help? Call your doctor now or seek immediate medical care if:    · You have severe pain.     · You cannot move your shoulder or arm.     · You have tingling or numbness in your arm or hand.     · Your arm or hand is cool or pale. Watch closely for changes in your health, and be sure to contact your doctor if:    · Your pain gets worse.     · You have new or worse swelling in your arm or hand.     · You do not get better as expected. Where can you learn more? Go to https://[employer]. org and sign in to your [employer] account. Enter E207 in the [employer] box to learn more about \"Rotator Cuff Problems: Care Instructions. \"     If you do not have an account, please click on the \"Sign Up Now\" link. Current as of: July 1, 2021               Content Version: 13.0  © 2006-2021 [employer]. Care instructions adapted under license by [facility] ([facility]). If you have questions about a medical condition or this instruction, always ask your healthcare professional. [place] [address] any warranty or liability for your use of this information. Patient Education        Rotator Cuff: Exercises  Introduction  Here are some examples of exercises for you to try. The exercises may be suggested for a condition or for rehabilitation. Start each exercise slowly. Ease off the exercises if you start to have pain. You will be told when to start these exercises and which ones will work best for you. How to do the exercises  Pendulum swing    If you have pain in your back, do not do this exercise. 1. Hold on to a table or the back of a chair with your good arm. Then bend forward a little and let your sore arm hang straight down. This exercise does not use the arm muscles.  Rather, use your legs and your hips to create movement that makes your arm swing freely. 2. Use the movement from your hips and legs to guide the slightly swinging arm back and forth like a pendulum (or elephant trunk). Then guide it in circles that start small (about the size of a dinner plate). Make the circles a bit larger each day, as your pain allows. 3. Do this exercise for 5 minutes, 5 to 7 times each day. 4. As you have less pain, try bending over a little farther to do this exercise. This will increase the amount of movement at your shoulder. Posterior stretching exercise    1. Hold the elbow of your injured arm with your other hand. 2. Use your hand to pull your injured arm gently up and across your body. You will feel a gentle stretch across the back of your injured shoulder. 3. Hold for at least 15 to 30 seconds. Then slowly lower your arm. 4. Repeat 2 to 4 times. Up-the-back stretch    Your doctor or physical therapist may want you to wait to do this stretch until you have regained most of your range of motion and strength. You can do this stretch in different ways. Hold any of these stretches for at least 15 to 30 seconds. Repeat them 2 to 4 times. 1. Light stretch: Put your hand in your back pocket. Let it rest there to stretch your shoulder. 2. Moderate stretch: With your other hand, hold your injured arm (palm outward) behind your back by the wrist. Pull your arm up gently to stretch your shoulder. 3. Advanced stretch: Put a towel over your other shoulder. Put the hand of your injured arm behind your back. Now hold the back end of the towel. With the other hand, hold the front end of the towel in front of your body. Pull gently on the front end of the towel. This will bring your hand farther up your back to stretch your shoulder. Overhead stretch    1. Standing about an arm's length away, grasp onto a solid surface. You could use a countertop, a doorknob, or the back of a sturdy chair.   2. With your knees slightly bent, bend forward with your arms straight. Lower your upper body, and let your shoulders stretch. 3. As your shoulders are able to stretch farther, you may need to take a step or two backward. 4. Hold for at least 15 to 30 seconds. Then stand up and relax. If you had stepped back during your stretch, step forward so you can keep your hands on the solid surface. 5. Repeat 2 to 4 times. Shoulder flexion (lying down)    To make a wand for this exercise, use a piece of PVC pipe or a broom handle with the broom removed. Make the wand about a foot wider than your shoulders. 1. Lie on your back, holding a wand with both hands. Your palms should face down as you hold the wand. 2. Keeping your elbows straight, slowly raise your arms over your head. Raise them until you feel a stretch in your shoulders, upper back, and chest.  3. Hold for 15 to 30 seconds. 4. Repeat 2 to 4 times. Shoulder rotation (lying down)    To make a wand for this exercise, use a piece of PVC pipe or a broom handle with the broom removed. Make the wand about a foot wider than your shoulders. 1. Lie on your back. Hold a wand with both hands with your elbows bent and palms up. 2. Keep your elbows close to your body, and move the wand across your body toward the sore arm. 3. Hold for 8 to 12 seconds. 4. Repeat 2 to 4 times. Wall climbing (to the side)    Avoid any movement that is straight to your side, and be careful not to arch your back. Your arm should stay about 30 degrees to the front of your side. 1. Stand with your side to a wall so that your fingers can just touch it at an angle about 30 degrees toward the front of your body. 2. Walk the fingers of your injured arm up the wall as high as pain permits. Try not to shrug your shoulder up toward your ear as you move your arm up. 3. Hold that position for a count of at least 15 to 20.  4. Walk your fingers back down to the starting position.   5. Repeat at least 2 to 4 times. Try to reach higher each time. Wall climbing (to the front)    During this stretching exercise, be careful not to arch your back. 1. Face a wall, and stand so your fingers can just touch it. 2. Keeping your shoulder down, walk the fingers of your injured arm up the wall as high as pain permits. (Don't shrug your shoulder up toward your ear.)  3. Hold your arm in that position for at least 15 to 30 seconds. 4. Slowly walk your fingers back down to where you started. 5. Repeat at least 2 to 4 times. Try to reach higher each time. Shoulder blade squeeze    1. Stand with your arms at your sides, and squeeze your shoulder blades together. Do not raise your shoulders up as you squeeze. 2. Hold 6 seconds. 3. Repeat 8 to 12 times. Scapular exercise: Arm reach    1. Lie flat on your back. This exercise is a very slight motion that starts with your arms raised (elbows straight, arms straight). 2. From this position, reach higher toward the jacki or ceiling. Keep your elbows straight. All motion should be from your shoulder blade only. 3. Relax your arms back to where you started. 4. Repeat 8 to 12 times. Arm raise to the side    During this strengthening exercise, your arm should stay about 30 degrees to the front of your side. 1. Slowly raise your injured arm to the side, with your thumb facing up. Raise your arm 60 degrees at the most (shoulder level is 90 degrees). 2. Hold the position for 3 to 5 seconds. Then lower your arm back to your side. If you need to, bring your \"good\" arm across your body and place it under the elbow as you lower your injured arm. Use your good arm to keep your injured arm from dropping down too fast.  3. Repeat 8 to 12 times. 4. When you first start out, don't hold any extra weight in your hand. As you get stronger, you may use a 1-pound to 2-pound dumbbell or a small can of food. Shoulder flexor and extensor exercise    These are isometric exercises.  That means you contract your muscles without actually moving. 1. Push forward (flex): Stand facing a wall or doorjamb, about 6 inches or less back. Hold your injured arm against your body. Make a closed fist with your thumb on top. Then gently push your hand forward into the wall with about 25% to 50% of your strength. Don't let your body move backward as you push. Hold for about 6 seconds. Relax for a few seconds. Repeat 8 to 12 times. 2. Push backward (extend): Stand with your back flat against a wall. Your upper arm should be against the wall, with your elbow bent 90 degrees (your hand straight ahead). Push your elbow gently back against the wall with about 25% to 50% of your strength. Don't let your body move forward as you push. Hold for about 6 seconds. Relax for a few seconds. Repeat 8 to 12 times. Scapular exercise: Wall push-ups    This exercise is best done with your fingers somewhat turned out, rather than straight up and down. 1. Stand facing a wall, about 12 inches to 18 inches away. 2. Place your hands on the wall at shoulder height. 3. Slowly bend your elbows and bring your face to the wall. Keep your back and hips straight. 4. Push back to where you started. 5. Repeat 8 to 12 times. 6. When you can do this exercise against a wall comfortably, you can try it against a counter. You can then slowly progress to the end of a couch, then to a sturdy chair, and finally to the floor. Scapular exercise: Retraction    For this exercise, you will need elastic exercise material, such as surgical tubing or Thera-Band. 1. Put the band around a solid object at about waist level. (A bedpost will work well.) Each hand should hold an end of the band. 2. With your elbows at your sides and bent to 90 degrees, pull the band back. Your shoulder blades should move toward each other. Then move your arms back where you started. 3. Repeat 8 to 12 times.   4. If you have good range of motion in your shoulders, try this exercise with your arms lifted out to the sides. Keep your elbows at a 90-degree angle. Raise the elastic band up to about shoulder level. Pull the band back to move your shoulder blades toward each other. Then move your arms back where you started. Internal rotator strengthening exercise    1. Start by tying a piece of elastic exercise material to a doorknob. You can use surgical tubing or Thera-Band. 2. Stand or sit with your shoulder relaxed and your elbow bent 90 degrees. Your upper arm should rest comfortably against your side. Squeeze a rolled towel between your elbow and your body for comfort. This will help keep your arm at your side. 3. Hold one end of the elastic band in the hand of the painful arm. 4. Slowly rotate your forearm toward your body until it touches your belly. Slowly move it back to where you started. 5. Keep your elbow and upper arm firmly tucked against the towel roll or at your side. 6. Repeat 8 to 12 times. External rotator strengthening exercise    1. Start by tying a piece of elastic exercise material to a doorknob. You can use surgical tubing or Thera-Band. (You may also hold one end of the band in each hand.)  2. Stand or sit with your shoulder relaxed and your elbow bent 90 degrees. Your upper arm should rest comfortably against your side. Squeeze a rolled towel between your elbow and your body for comfort. This will help keep your arm at your side. 3. Hold one end of the elastic band with the hand of the painful arm. 4. Start with your forearm across your belly. Slowly rotate the forearm out away from your body. Keep your elbow and upper arm tucked against the towel roll or the side of your body until you begin to feel tightness in your shoulder. Slowly move your arm back to where you started. 5. Repeat 8 to 12 times. Follow-up care is a key part of your treatment and safety. Be sure to make and go to all appointments, and call your doctor if you are having problems.  It's also a good idea year, as soon as it is available. You cannot get the flu from the vaccine. The vaccine prevents most cases of the flu. But even when the vaccine doesn't prevent the flu, it can make symptoms less severe and reduce the chance of problems from the flu. Sometimes, young children and people who have an immune system problem may have a slight fever or muscle aches or pains 6 to 12 hours after getting the shot. These symptoms usually last 1 or 2 days. Follow-up care is a key part of your treatment and safety. Be sure to make and go to all appointments, and call your doctor if you are having problems. It's also a good idea to know your test results and keep a list of the medicines you take. Who should get the flu vaccine? Everyone age 7 months or older should get a flu vaccine each year. It lowers the chance of getting and spreading the flu. The vaccine is very important for people who are at high risk for getting other health problems from the flu. This includes:  · Anyone 48years of age or older. · People who live in a long-term care center, such as a nursing home. · All children 6 months through 25years of age. · Adults and children 6 months and older who have long-term heart or lung problems, such as asthma. · Adults and children 6 months and older who needed medical care or were in a hospital during the past year because of diabetes, chronic kidney disease, or a weak immune system (including HIV or AIDS). · Women who will be pregnant during the flu season. · People who have any condition that can make it hard to breathe or swallow (such as a brain injury or muscle disorders). · People who can give the flu to others who are at high risk for problems from the flu. This includes all health care workers and close contacts of people age 72 or older. Who should not get the flu vaccine?   The person who gives the vaccine may tell you not to get it if you:  · Have a severe allergy to eggs or any part of the vaccine. · Have had a severe reaction to a flu vaccine in the past.  · Have had Guillain-Barré syndrome (GBS). · Are sick with a fever. (Get the vaccine when symptoms are gone.)  How can you care for yourself at home? · If you or your child has a sore arm or a slight fever after the vaccine, take an over-the-counter pain medicine, such as acetaminophen (Tylenol) or ibuprofen (Advil, Motrin). Read and follow all instructions on the label. Do not give aspirin to anyone younger than 20. It has been linked to Reye syndrome, a serious illness. · Do not take two or more pain medicines at the same time unless the doctor told you to. Many pain medicines have acetaminophen, which is Tylenol. Too much acetaminophen (Tylenol) can be harmful. When should you call for help? Call 911 anytime you think you may need emergency care. For example, call if after getting the flu vaccine:    · You have symptoms of a severe reaction to the flu vaccine. Symptoms of a severe reaction may include:  ? Severe difficulty breathing. ? Sudden raised, red areas (hives) all over your body. ? Severe lightheadedness. Call your doctor now or seek immediate medical care if after getting the flu vaccine:    · You think you are having a reaction to the flu vaccine, such as a new fever. Watch closely for changes in your health, and be sure to contact your doctor if you have any problems. Where can you learn more? Go to [email or web address]://[email or web address] org and sign in to your [employer] account. Enter D686 in the [facility] box to learn more about \"Influenza (Flu) Vaccine: Care Instructions. \"     If you do not have an account, please click on the \"Sign Up Now\" link. Current as of: August 31, 2020               Content Version: 13.0  © 7529-8375 [employer]. Care instructions adapted under license by [facility] | [employer] [facility] ([facility]).  If you have questions about a medical condition or this instruction, always ask your healthcare professional. [address] any warranty or liability for your use of this information.

## 2021-11-17 NOTE — PROGRESS NOTES
[facility] [address] [address]  Dept: [phone]  Dept Fax: [phone]    Office Progress/Follow Up Note  Date ofpatient's visit: 11/17/2021  Patient's Name:  Sinan Henao YOB: 1970            Patient Care Team:  Jesus Perales MD as PCP - General (Internal Medicine)  Jesus Perales MD as PCP - [facility]  ================================================================    REASON FOR VISIT/CHIEF COMPLAINT:  Shoulder Pain (1 week follow up / lip discoloration )    HISTORY OF PRESENTING ILLNESS:  History was obtained from: patient, electronic medical record. Gonzalo jama 45 y.o. 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 She tried using lidocaine patch which helped with the pain. Patient states that she also heard a ' popping' sound 1 week ago while she was lifting her laundry basket. She has tried using ibuprofen with some pain relief. Patient also complains of unilateral numbness and tingling in the fingers of the right hand, worse at night. This could be related to carpal tunnel syndrome symptoms as she has repetitive movements as part of her work routine. She is unable to bear weight on the shoulder, is unable to lie on her left shoulder at night. On physical exam, Murphy test positive, Apley's test positive. Patient is experiencing pain with passive as well as active movements. No history of diabetes. 2.  Discoloration of lips patient states that she has been noticing: No lip discoloration over the past 1 year, denies smoking, vaping, e-cigarette use. Denies any lymphadenopathy, use of new lipsticks or products. No freckles, macules, lentigo noted.  No discoloration of gingiva or buccal Negative for cold intolerance and heat intolerance. Genitourinary: Negative for dysuria and hematuria. Menorrhagia   Neurological: Negative for light-headedness and headaches. Psychiatric/Behavioral: Negative for agitation and behavioral problems. PHYSICAL EXAM:  Vitals:    11/17/21 0911   BP: 131/65   Site: Right Upper Arm   Position: Sitting   Cuff Size: Large Adult   Pulse: 76   SpO2: 97%   Weight: 211 lb (95.7 kg)   Height: 5' (1.524 m)     BP Readings from Last 3 Encounters:   11/17/21 131/65   09/07/21 130/75   07/09/21 136/88        Physical Exam  Constitutional:       Appearance: She is obese. HENT:      Head: Normocephalic. Right Ear: External ear normal.      Left Ear: External ear normal.      Nose: Nose normal.      Mouth/Throat:      Mouth: Mucous membranes are moist.      Comments: discoloration of lips  Eyes:      Pupils: Pupils are equal, round, and reactive to light. Cardiovascular:      Rate and Rhythm: Normal rate. Pulses: Normal pulses. Pulmonary:      Effort: Pulmonary effort is normal.      Breath sounds: Normal breath sounds. Abdominal:      General: Bowel sounds are normal.      Palpations: Abdomen is soft. Musculoskeletal:         General: Normal range of motion. Right shoulder: Normal.      Left shoulder: Tenderness present. No swelling or bony tenderness. Normal strength. Cervical back: Normal range of motion. Comments: Murphy test positive  Scratch off test positive   Skin:     General: Skin is warm. Neurological:      General: No focal deficit present. Mental Status: She is alert and oriented to person, place, and time.            DIAGNOSTIC FINDINGS:  CBC:  Lab Results   Component Value Date    WBC 7.9 07/09/2021    HGB 11.8 07/09/2021     07/09/2021       BMP:    Lab Results   Component Value Date     07/09/2021    K 4.3 07/09/2021     07/09/2021    CO2 26 07/09/2021    BUN 7 07/09/2021    CREATININE 0.70 07/09/2021    GLUCOSE 99 07/09/2021       HEMOGLOBIN A1C:   Lab Results   Component Value Date    LABA1C 5.6 07/09/2021       FASTING LIPID PANEL:  Lab Results   Component Value Date    CHOL 171 10/06/2020    HDL 49 10/06/2020    TRIG 96 10/06/2020       ASSESSMENT AND PLAN:  Wade Grey was seen today for shoulder pain. Diagnoses and all orders for this visit:    Acute pain of left shoulder    Needs flu shot  -     INFLUENZA, QUADV, 3 YRS AND OLDER, IM, MDV, 0.5ML (AFLURIA QUADV)  -     SD IMMUNIZ ADMIN,1 SINGLE/COMB VAC/TOXOID    Iron deficiency anemia, unspecified iron deficiency anemia type    Lipid screening      FOLLOW UP AND INSTRUCTIONS:  No follow-ups on file. · Wade Grey received counseling on the following healthy behaviors: nutrition, exercise and medication adherence    · Discussed use, benefit, and side effects of prescribed medications. Barriers to medication compliance addressed. All patient questions answered. Pt voiced understanding. · Patient given educational materials - see patient instructions    Diann Lujan MD  PGY2  Internal Medicine  11/17/2021, 10:03 AM    This note is created with the assistance of a speech-recognition program. While intending to generate a document that actually reflects the content of thevisit, the document can still have some mistakes which may not have been identified and corrected by editing.

## 2021-11-22 ENCOUNTER — TELEPHONE (OUTPATIENT)
Dept: INTERNAL MEDICINE | Age: 51
End: 2021-11-22

## 2021-11-22 NOTE — TELEPHONE ENCOUNTER
Pt calling she wants to know the results on her shoulder and recent labs. Pt states she is still having shoulder pain and it feels like it gets stuck.  Please review and advise

## 2021-12-02 ENCOUNTER — TELEPHONE (OUTPATIENT)
Dept: INTERNAL MEDICINE | Age: 51
End: 2021-12-02

## 2021-12-09 DIAGNOSIS — J45.50 UNCOMPLICATED SEVERE PERSISTENT ASTHMA: ICD-10-CM

## 2021-12-09 NOTE — TELEPHONE ENCOUNTER
Pt calling she states every time this time of year she gets a script for prednisone due to the weather changing and her asthma. She states it always makes the transition better to the cold weather. Medication pended. Writer did informed pt that she might have to be seen to get medication.      Health Maintenance   Topic Date Due    Shingles Vaccine (2 of 2) 09/03/2021    COVID-19 Vaccine (3 - Booster for Pfizer series) 03/07/2022    Potassium monitoring  07/09/2022    Creatinine monitoring  07/09/2022    Lipid screen  11/17/2022    Colon cancer screen colonoscopy  02/08/2023    Breast cancer screen  08/24/2023    DTaP/Tdap/Td vaccine (2 - Td or Tdap) 02/05/2026    Cervical cancer screen  09/07/2026    Pneumococcal 0-64 years Vaccine (2 of 2 - PPSV23) 12/04/2034    Flu vaccine  Completed    Hepatitis C screen  Completed    HIV screen  Completed    Hepatitis A vaccine  Aged Out    Hepatitis B vaccine  Aged Out    Hib vaccine  Aged Out    Meningococcal (ACWY) vaccine  Aged Out             (applicable per patient's age: Cancer Screenings, Depression Screening, Fall Risk Screening, Immunizations)    Hemoglobin A1C (%)   Date Value   07/09/2021 5.6   10/06/2020 5.7   03/03/2020 6.0     LDL Cholesterol (mg/dL)   Date Value   11/17/2021 99     AST (U/L)   Date Value   07/09/2021 20     ALT (U/L)   Date Value   07/09/2021 21     BUN (mg/dL)   Date Value   07/09/2021 7      (goal A1C is < 7)   (goal LDL is <100) need 30-50% reduction from baseline     BP Readings from Last 3 Encounters:   11/17/21 131/65   09/07/21 130/75   07/09/21 136/88    (goal /80)      All Future Testing planned in [facility]:  Lab Frequency Next Occurrence       Next Visit Date:  Future Appointments   Date Time Provider Fortino Jean Baptiste   2/1/2022  2:30 PM Unknown MD [place] [id number]            Patient Active Problem List:     Hypertension     Asthma     GERD (gastroesophageal reflux disease)     S/P cardiac cath-Minimal disease 11/23/15 Dr. Jef Harvey     Irritable bowel syndrome with constipation     Obesity     Allergic rhinitis     Hiatal hernia     Paresthesias     Left sided numbness     Dysphagia     Family history of colon cancer     DNS (deviated nasal septum)     Hypertrophy of both inferior nasal turbinates     Iron deficiency anemia secondary to inadequate dietary iron intake     Iron deficiency anemia, unspecified

## 2021-12-10 RX ORDER — PREDNISONE 10 MG/1
30 TABLET ORAL DAILY
Qty: 12 TABLET | Refills: 1 | Status: CANCELLED | OUTPATIENT
Start: 2021-12-10 | End: 2021-12-14

## 2022-01-01 NOTE — ANESTHESIA PRE PROCEDURE
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 [place] [address]     Dysphagia     Dysphagia     has needed EGD with dilatation in the past    GERD (gastroesophageal reflux disease)     H/O cardiovascular stress test 01/15/2018    ABNORMAL    Hiatal hernia     Hyperlipidemia     Hypertension     Irritable bowel syndrome with constipation 1/21/2016    Obesity     Snores     states has tested negative for sleep apnea    Wears glasses        Past Surgical History:        Procedure Laterality Date    CARDIAC CATHETERIZATION  01/16/2018    CARDIAC CATHETERIZATION  11/23/2015     Minimal non obstructive CAD    COLONOSCOPY  2015    ENDOSCOPY, COLON, DIAGNOSTIC  2015    with dilatation    TUBAL LIGATION      UPPER GASTROINTESTINAL ENDOSCOPY      The endoscopic exam showed mildly tortuous esophagus.  Savary dilation performed x 42 and 45 F.     UPPER GASTROINTESTINAL ENDOSCOPY N/A 7/18/2017    EGD DILATION SAVORY performed by Joceline De La Rosa MD at [facility] Endoscopy       Social History:    Social History   Substance Use Topics    Smoking status: Never Smoker    Smokeless tobacco: Never Used    Alcohol use 0.0 oz/week      Comment: occasionally                                Counseling given: Not Answered      Vital Signs (Current):   Vitals:    02/08/18 0848 02/08/18 0852   BP:  (!) 148/76   Pulse:  63   Resp:  18   Temp:  98.6 °F (37 °C)   TempSrc:  Oral   SpO2:  100%   Weight: 198 lb 3.2 oz (89.9 kg)    Height: 5' (1.524 m)                                               BP Readings from Last 3 Encounters:   02/08/18 (!) 148/76   01/16/18 (!) 104/59   12/19/17 128/78       NPO Status: Time of last liquid consumption: 1915                        Time of last solid consumption: 2030                        Date of last liquid consumption:

## 2022-02-01 ENCOUNTER — OFFICE VISIT (OUTPATIENT)
Dept: INTERNAL MEDICINE | Age: 52
End: 2022-02-01
Payer: MEDICARE

## 2022-02-01 VITALS
SYSTOLIC BLOOD PRESSURE: 125 MMHG | WEIGHT: 208 LBS | TEMPERATURE: 98.1 F | OXYGEN SATURATION: 97 % | DIASTOLIC BLOOD PRESSURE: 71 MMHG | BODY MASS INDEX: 40.62 KG/M2 | HEART RATE: 71 BPM

## 2022-02-01 DIAGNOSIS — M70.61 TROCHANTERIC BURSITIS OF RIGHT HIP: ICD-10-CM

## 2022-02-01 DIAGNOSIS — I10 ESSENTIAL HYPERTENSION: Primary | ICD-10-CM

## 2022-02-01 DIAGNOSIS — R13.19 ESOPHAGEAL DYSPHAGIA: ICD-10-CM

## 2022-02-01 DIAGNOSIS — E78.2 MIXED HYPERLIPIDEMIA: ICD-10-CM

## 2022-02-01 DIAGNOSIS — K21.00 CHRONIC REFLUX ESOPHAGITIS: ICD-10-CM

## 2022-02-01 DIAGNOSIS — R73.02 IGT (IMPAIRED GLUCOSE TOLERANCE): ICD-10-CM

## 2022-02-01 DIAGNOSIS — J45.50 SEVERE PERSISTENT ASTHMA WITHOUT COMPLICATION: ICD-10-CM

## 2022-02-01 DIAGNOSIS — N93.9 ABNORMAL UTERINE BLEEDING (AUB): ICD-10-CM

## 2022-02-01 PROCEDURE — 99211 OFF/OP EST MAY X REQ PHY/QHP: CPT | Performed by: INTERNAL MEDICINE

## 2022-02-01 PROCEDURE — 1036F TOBACCO NON-USER: CPT | Performed by: INTERNAL MEDICINE

## 2022-02-01 PROCEDURE — G8482 FLU IMMUNIZE ORDER/ADMIN: HCPCS | Performed by: INTERNAL MEDICINE

## 2022-02-01 PROCEDURE — G8417 CALC BMI ABV UP PARAM F/U: HCPCS | Performed by: INTERNAL MEDICINE

## 2022-02-01 PROCEDURE — 99214 OFFICE O/P EST MOD 30 MIN: CPT | Performed by: INTERNAL MEDICINE

## 2022-02-01 PROCEDURE — 3017F COLORECTAL CA SCREEN DOC REV: CPT | Performed by: INTERNAL MEDICINE

## 2022-02-01 PROCEDURE — G8427 DOCREV CUR MEDS BY ELIG CLIN: HCPCS | Performed by: INTERNAL MEDICINE

## 2022-02-01 RX ORDER — ATORVASTATIN CALCIUM 20 MG/1
TABLET, FILM COATED ORAL
Qty: 30 TABLET | Refills: 5 | Status: SHIPPED | OUTPATIENT
Start: 2022-02-01 | End: 2022-07-12 | Stop reason: SDUPTHER

## 2022-02-01 RX ORDER — CARVEDILOL 6.25 MG/1
TABLET ORAL
Qty: 60 TABLET | Refills: 5 | Status: SHIPPED | OUTPATIENT
Start: 2022-02-01 | End: 2022-07-12 | Stop reason: SDUPTHER

## 2022-02-01 RX ORDER — OMEPRAZOLE 40 MG/1
CAPSULE, DELAYED RELEASE ORAL
Qty: 30 CAPSULE | Refills: 5 | Status: SHIPPED | OUTPATIENT
Start: 2022-02-01 | End: 2022-10-25 | Stop reason: SDUPTHER

## 2022-02-01 RX ORDER — PREDNISONE 20 MG/1
20 TABLET ORAL 2 TIMES DAILY
Qty: 10 TABLET | Refills: 0 | Status: SHIPPED | OUTPATIENT
Start: 2022-02-01 | End: 2022-02-06

## 2022-02-01 RX ORDER — ALBUTEROL SULFATE 2.5 MG/3ML
2.5 SOLUTION RESPIRATORY (INHALATION) EVERY 6 HOURS PRN
Qty: 120 EACH | Refills: 3 | Status: SHIPPED | OUTPATIENT
Start: 2022-02-01 | End: 2022-07-12 | Stop reason: SDUPTHER

## 2022-02-01 RX ORDER — ALBUTEROL SULFATE 90 UG/1
AEROSOL, METERED RESPIRATORY (INHALATION)
Qty: 1 EACH | Refills: 5 | Status: SHIPPED | OUTPATIENT
Start: 2022-02-01 | End: 2022-07-12 | Stop reason: SDUPTHER

## 2022-02-01 RX ORDER — SIMETHICONE 80 MG
80 TABLET,CHEWABLE ORAL 4 TIMES DAILY PRN
Qty: 180 TABLET | Refills: 3 | Status: SHIPPED | OUTPATIENT
Start: 2022-02-01

## 2022-02-01 RX ORDER — VALSARTAN AND HYDROCHLOROTHIAZIDE 80; 12.5 MG/1; MG/1
TABLET, FILM COATED ORAL
Qty: 30 TABLET | Refills: 5 | Status: SHIPPED | OUTPATIENT
Start: 2022-02-01 | End: 2022-07-12 | Stop reason: SDUPTHER

## 2022-02-01 NOTE — PROGRESS NOTES
[facility]    Progress Note    Date of patient's visit: 2/1/2022    Patient's Name:  Arnaldo Prieto    YOB: 1970            Patient Care Team:  Farhat Ray MD as PCP - General (Internal Medicine)  Farhat Ray MD as PCP - [facility]    REASON FOR VISIT: Routine outpatient follow     Chief Complaint   Patient presents with    Hypertension     6 month f/u --has not taken medication today     Asthma     Pt states that with the weather right now she tends to have flare ups and would like to know if she can have a script for prednisone     Gas     Pt would like to know if she can be prescribed simethicon, she states her jaredabdn has it and she tried it and it has helped          HISTORY OF PRESENT ILLNESS:    History was obtained from the patient. Arnaldo Prieto is a 45 y.o. is here for follow-up. She is complaining of continuing pain in the right hip on the lateral aspect. It hurts when she lays on it. She is not complaining of left hip pain. X-rays have shown mild degenerative disease. She agrees to see orthopedics for a cortisone injection for bursitis. She cannot take NSAIDs because of chronic reflux esophagitis. She has had dysphagia. It is worsening. She has not had an EGD since 2017. She has been maintained on a PPI. No abdominal pain. She does have bloating and is requesting simethicone. She has to be very selective with her food as she cannot swallow and she is almost not eating any meat products or vegetables. Advised to see GI for another upper endoscopy. She has a family history of colon cancer and has had 2 colonoscopies. No blood in her stool. She also has been having abnormal uterine bleeding. She has scheduled a follow-up with GYN for endometrial biopsy. Pap smear was normal recently. She has severe asthma. In the cold weather she sometimes can feel it flaring up.   She is requesting a prescription for prednisone to keep to use as needed. Currently she is not wheezing. She does have a nebulizer at home. EGD 2016          Procedure:                 Esophagogastroduodenoscopy with biopsies (esophageal), esophageal dilation (18 mm savary with moderate resistance)  Date:                           6/30/2016   Endoscopist:             CLAUS Cunningham.     Preoperative Diagnosis:     1. Dysphagia   2. History of esophageal stricture     Postoperative Diagnosis:    1. Esophageal stricture  2. Hiatal hernia      Colonoscopy 2018       RECOMMENDATIONS:   1) Follow up with referring provider, as previously scheduled. 2) Continue interval colon cancer screening (i.e repeat colonoscopy in 5 years)  Colonoscopy 2016  Findings:      1. Normal exam.     Recommendations:   1. Repeat colonoscopy in 10 years.   2. Follow up with primary care physician.     EGD 2017  PREOPERATIVE DIAGNOSIS: dysphagia.      PROCEDURES:   1) Transoral Upper Endoscopy, Savary dilation.      POSTOPERATIVE DIAGNOSIS:   Tortuous esophagus        Past Medical History:   Diagnosis Date    Allergic rhinitis     Asthma     Clinical trial participant 11/23/2015 11/23/20015    COPD (chronic obstructive pulmonary disease) ([facility] [place] [address]     Dysphagia     Dysphagia     has needed EGD with dilatation in the past    Family history of colon cancer 8/9/2017    Father    GERD (gastroesophageal reflux disease)     H/O cardiovascular stress test 01/15/2018    ABNORMAL    Hiatal hernia     Hyperlipidemia     Hypertension     Iron deficiency anemia secondary to inadequate dietary iron intake 5/13/2020    Iron deficiency anemia, unspecified 5/13/2020    Irritable bowel syndrome with constipation 1/21/2016    Obesity     Snores     states has tested negative for sleep apnea    Wears glasses        Past Surgical History:   Procedure Laterality Date    CARDIAC CATHETERIZATION  01/16/2018    CARDIAC CATHETERIZATION  11/23/2015     Minimal non obstructive CAD    COLONOSCOPY  2015    COLONOSCOPY  02/08/2018    Redundant colon    ENDOSCOPY, COLON, DIAGNOSTIC  2015    with dilatation    ENDOSCOPY, COLON, DIAGNOSTIC  02/08/2018    WNL     NOSE SURGERY      turbinate     [place] COLON CA SCRN NOT HI RSK IND N/A 2/8/2018    COLONOSCOPY performed by Mary Garcia MD at [address] [facility] [address]    UPPER GASTROINTESTINAL ENDOSCOPY      The endoscopic exam showed mildly tortuous esophagus.  Savary dilation performed x 42 and 45 F.     UPPER GASTROINTESTINAL ENDOSCOPY N/A 7/18/2017    EGD DILATION SAVORY performed by Mary Garcia MD at [facility] Endoscopy         ALLERGIES    No Known Allergies    MEDICATIONS:      Current Outpatient Medications on File Prior to Visit   Medication Sig Dispense Refill    carvedilol (COREG) 6.25 MG tablet take 1 tablet by mouth twice a day 60 tablet 1    meloxicam (MOBIC) 7.5 MG tablet Take 1 tablet by mouth daily 30 tablet 2    fluticasone (FLONASE) 50 MCG/ACT nasal spray instill 2 sprays into each nostril once daily 16 g 3    senna (RA SENNA) 8.6 MG tablet take 1 tablet by mouth twice a day 60 tablet 5    ondansetron (ZOFRAN-ODT) 4 MG disintegrating tablet dissolve 1 tablet ON TONGUE every 8 hours if needed for nausea and vomiting 10 tablet 5    albuterol sulfate HFA (PROAIR HFA) 108 (90 Base) MCG/ACT inhaler inhale 2 puffs by mouth every 6 hours if needed for wheezing 1 Inhaler 6    mometasone-formoterol (DULERA) 200-5 MCG/ACT inhaler inhale 2 puffs by mouth twice a day 1 Inhaler 6    melatonin (RA MELATONIN) 5 MG TABS tablet take 1 tablet by mouth nightly 30 tablet 3    omeprazole (PRILOSEC) 40 MG delayed release capsule take 1 capsule by mouth once daily if needed 30 capsule 3    aspirin (RA ASPIRIN EC) 81 MG EC tablet take 1 tablet by mouth once daily 30 tablet 5    valsartan-hydroCHLOROthiazide (DIOVAN-HCT) 80-12.5 MG per tablet Once a day 30 tablet 5    ferrous sulfate (FE TABS) 325 (65 Fe) MG EC tablet Take 1 tablet by mouth daily (with breakfast) 30 tablet 5    atorvastatin (LIPITOR) 20 MG tablet take 1 tablet by mouth once daily 30 tablet 5    ACETAMINOPHEN EXTRA STRENGTH 500 MG tablet take 2 tablets by mouth every 6 hours if needed for pain 30 tablet 2    albuterol (PROVENTIL) (2.5 MG/3ML) 0.083% nebulizer solution Take 3 mLs by nebulization every 6 hours as needed for Wheezing 120 each 3    Multiple Vitamins-Minerals (WOMENS MULTI VITAMIN & MINERAL PO) Take 1 tablet by mouth daily       Current Facility-Administered Medications on File Prior to Visit   Medication Dose Route Frequency Provider Last Rate Last Admin    0.9 % sodium chloride infusion   IntraVENous Continuous Barby Hurst MD 30 mL/hr at 05/26/15 1008 New Bag at 05/26/15 1008       HISTORY    Reviewed and no change from previous record. Marvin Evans  reports that she has never smoked. She has never used smokeless tobacco.    FAMILY HISTORY:    Reviewed and No change from previous visit    HEALTH MAINTENANCE DUE:    There are no preventive care reminders to display for this patient. REVIEW OF SYSTEMS:    12 point review of symptoms completed and found to be normal except noted in the HPI    Review of Systems   Constitutional: Negative for chills, fatigue and fever. Eyes: Negative for photophobia and visual disturbance. Respiratory: Negative for cough, shortness of breath and wheezing. Cardiovascular: Negative for chest pain, palpitations and leg swelling. Gastrointestinal: Positive for abdominal pain and constipation. Negative for blood in stool. Bloating, GERD   Endocrine: Negative for polydipsia and polyuria. Genitourinary: Negative for dysuria and hematuria. Musculoskeletal: Positive for arthralgias and back pain. Neurological: Negative for dizziness, weakness and headaches. Hematological: Negative for adenopathy. Does not bruise/bleed easily.         PHYSICAL EXAM:     Vitals:    02/01/22 1435 02/01/22 1444   BP: (!) 145/83 125/71   Site: Left Upper Arm Left Upper Arm   Position: Sitting Sitting   Cuff Size: Large Adult Large Adult   Pulse: 75 71   Temp: 98.1 °F (36.7 °C)    TempSrc: Infrared    SpO2: 97%    Weight: 208 lb (94.3 kg)      Body mass index is 40.62 kg/m². BP Readings from Last 3 Encounters:   02/01/22 125/71   11/17/21 131/65   09/07/21 130/75        Wt Readings from Last 3 Encounters:   02/01/22 208 lb (94.3 kg)   11/17/21 211 lb (95.7 kg)   09/07/21 205 lb (93 kg)       Physical Exam  Vitals and nursing note reviewed. Constitutional:       Appearance: Normal appearance. HENT:      Head: Normocephalic and atraumatic. Eyes:      General: No scleral icterus. Extraocular Movements: Extraocular movements intact. Conjunctiva/sclera: Conjunctivae normal.      Pupils: Pupils are equal, round, and reactive to light. Cardiovascular:      Rate and Rhythm: Normal rate and regular rhythm. Heart sounds: No murmur heard. Pulmonary:      Effort: Pulmonary effort is normal.      Breath sounds: Normal breath sounds. No wheezing. Abdominal:      Palpations: Abdomen is soft. Tenderness: There is no abdominal tenderness. Musculoskeletal:      Cervical back: Normal range of motion and neck supple. Right hip: Tenderness (trochanter) present. No bony tenderness. Normal range of motion. Left hip: Normal.      Right lower leg: No edema. Left lower leg: No edema. Skin:     General: Skin is warm and dry. Neurological:      General: No focal deficit present. Mental Status: She is alert and oriented to person, place, and time.                LABORATORY FINDINGS:    CBC:  Lab Results   Component Value Date    WBC 7.9 07/09/2021    HGB 11.8 07/09/2021     07/09/2021     BMP:    Lab Results   Component Value Date     07/09/2021    K 4.3 07/09/2021     07/09/2021    CO2 26 07/09/2021    BUN 7 07/09/2021    CREATININE 0.70 07/09/2021    GLUCOSE 99 07/09/2021     HEMOGLOBIN A1C:   Lab Results   Component Value Date    LABA1C 5.6 07/09/2021     MICROALBUMIN URINE: No results found for: MICROALBUR  FASTING LIPID PANEL:  Lab Results   Component Value Date    CHOL 171 10/06/2020    HDL 49 11/17/2021    TRIG 96 10/06/2020     Lab Results   Component Value Date    LDLCHOLESTEROL 99 11/17/2021       LIVER PROFILE:  Lab Results   Component Value Date    ALT 21 07/09/2021    AST 20 07/09/2021    PROT 7.1 07/09/2021    BILITOT 0.26 07/09/2021    BILIDIR 0.12 05/02/2017    LABALBU 4.3 07/09/2021      THYROID FUNCTION:   Lab Results   Component Value Date    TSH 2.04 05/15/2020      URINEANALYSIS: No results found for: LABURIN  ASSESSMENT AND PLAN:    1. Essential hypertension    - carvedilol (COREG) 6.25 MG tablet; 1 tablet 2/day  Dispense: 60 tablet; Refill: 5  - valsartan-hydroCHLOROthiazide (DIOVAN-HCT) 80-12.5 MG per tablet; Once a day  Dispense: 30 tablet; Refill: 5    2. IGT (impaired glucose tolerance)  Monitor yearly     3. Severe persistent asthma without complication    - albuterol (PROVENTIL) (2.5 MG/3ML) 0.083% nebulizer solution; Take 3 mLs by nebulization every 6 hours as needed for Wheezing  Dispense: 120 each; Refill: 3  - predniSONE (DELTASONE) 20 MG tablet; Take 1 tablet by mouth 2 times daily for 5 days  Dispense: 10 tablet; Refill: 0    4. Trochanteric bursitis of right hip    - [address] [place] [address] Zenaidadomingo Rowan DO, Orthopedic Surgery, [facility]    5. Chronic reflux esophagitis    - omeprazole (PRILOSEC) 40 MG delayed release capsule; take 1 capsule by mouth once daily if needed  Dispense: 30 capsule; Refill: 5  - Sampson George MD, Gastroenterology, [place]    6. Abnormal uterine bleeding (AUB)  Follow up with Gyn     7. Mixed hyperlipidemia    - atorvastatin (LIPITOR) 20 MG tablet; 1 tablet once daily  Dispense: 30 tablet; Refill: 5    8.  Esophageal dysphagia    - Sampson George MD, Gastroenterology, [place]          FOLLOW UP AND INSTRUCTIONS:   Return in about 6

## 2022-02-01 NOTE — PATIENT INSTRUCTIONS
Patient Education        Gas and Bloating: Care Instructions  Your Care Instructions     Gas and bloating can be uncomfortable and embarrassing problems. All people pass gas, but some people produce more gas than others, sometimes enough to cause distress. It is normal to pass gas from 6 to 20 times per day. Excess gas usually is not caused by a serious health problem. Gas and bloating usually are caused by something you eat or drink, including some food supplements and medicines. Gas and bloating are usually harmless and go away without treatment. However, changing your diet can help end the problem. Some over-the-counter medicines can help prevent gas and relieve bloating. Follow-up care is a key part of your treatment and safety. Be sure to make and go to all appointments, and call your doctor if you are having problems. It's also a good idea to know your test results and keep a list of the medicines you take. How can you care for yourself at home? · Keep a food diary if you think a food gives you gas. Write down what you eat or drink. Also record when you get gas. If you notice that a food seems to cause your gas each time, avoid it and see if the gas goes away. Examples of foods that cause gas include:  ? Fried and fatty foods. ? Beans. ? Vegetables such as artichokes, asparagus, broccoli, brussels sprouts, cabbage, cauliflower, cucumbers, green peppers, onions, peas, radishes, and raw potatoes. ? Fruits such as apricots, bananas, melons, peaches, pears, prunes, and raw apples. ? Wheat and wheat bran. · Soak dry beans in water overnight, then dump the water and cook the soaked beans in new water. This can help prevent gas and bloating. · If you have problems with lactose, avoid dairy products such as milk and cheese. · Try not to swallow air. Do not drink through a straw, gulp your food, or chew gum. · Take an over-the-counter medicine. Read and follow all instructions on the label. ?  Food enzymes, such as Beano, can be added to gas-producing foods to prevent gas. ? Antacids, such as Maalox Anti-Gas and Mylanta Gas, can relieve bloating by making you burp. Be careful when you take over-the-counter antacid medicines. Many of these medicines have aspirin in them. Read the label to make sure that you are not taking more than the recommended dose. Too much aspirin can be harmful. ? Activated charcoal tablets, such as CharcoCaps, may decrease odor from gas you pass. ? If you have problems with lactose, you can take medicines such as Dairy Ease and Lactaid with dairy products to prevent gas and bloating. · Get some exercise regularly. When should you call for help? Call 911 anytime you think you may need emergency care. For example, call if:    · You have gas and signs of a heart attack, such as:  ? Chest pain or pressure. ? Sweating. ? Shortness of breath. ? Nausea or vomiting. ? Pain that spreads from the chest to the neck, jaw, or one or both shoulders or arms. ? Dizziness or lightheadedness. ? A fast or uneven pulse. After calling 911, chew 1 adult-strength aspirin. Wait for an ambulance. Do not try to drive yourself. Call your doctor now or seek immediate medical care if:    · You have severe belly pain.     · You have blood in your stool. Watch closely for changes in your health, and be sure to contact your doctor if:    · You have blood or pus in your urine.     · Your urine is cloudy or smells bad.     · You are burping and have trouble swallowing.     · You feel bloated and have swelling in your belly.     · You do not get better as expected. Where can you learn more? Go to [email or web address].Cinemad.tv. org and sign in to your Stitcher account. Enter T937 in the Rated People box to learn more about \"Gas and Bloating: Care Instructions. \"     If you do not have an account, please click on the \"Sign Up Now\" link.   Current as of: July 1, 2021               Content Version: 13.1  © 8580-6182 Healthwise, Incorporated. Care instructions adapted under license by [facility] ([facility]). If you have questions about a medical condition or this instruction, always ask your healthcare professional. Anne Rodgers any warranty or liability for your use of this information.       -Pt due for 6 month f/u in August-- pt to call in July to set up an appt--reminder in [facility] to contact patient as well--AVS given to patient    Referral to Ortho dropped into work queue for [employer] [facility] [employer], they will contact the patient to schedule. Phone number given to patient. [address] [facility], [address] [facility], [facility] [place] [facility], [address] [address]   [address] [place] [address] Rd   [phone]    Referral to GI dropped into work queue for [facility], they will contact the patient to schedule. Phone number given to patient. [facility]- MD Irlanda Meza [address], [address], [address] [facility]   [phone]    -B. Simeon Medina

## 2022-02-03 ENCOUNTER — TELEPHONE (OUTPATIENT)
Dept: GASTROENTEROLOGY | Age: 52
End: 2022-02-03

## 2022-02-03 NOTE — TELEPHONE ENCOUNTER
sarah w/ Dr Sofia Williamson - new return - chronic reflux - phc adv     1st attempt - left message for pt to call and schedule appt    2nd attempt - letter sent

## 2022-02-04 ENCOUNTER — TELEPHONE (OUTPATIENT)
Dept: OBGYN | Age: 52
End: 2022-02-04

## 2022-02-04 NOTE — TELEPHONE ENCOUNTER
Called Pt, to cancel and reschedule appt, due to weather. No answer, left message for pt to call the office and reschedule appt. Thank You.

## 2022-02-13 ENCOUNTER — APPOINTMENT (OUTPATIENT)
Dept: CT IMAGING | Age: 52
End: 2022-02-13
Payer: MEDICARE

## 2022-02-13 ENCOUNTER — APPOINTMENT (OUTPATIENT)
Dept: GENERAL RADIOLOGY | Age: 52
End: 2022-02-13
Payer: MEDICARE

## 2022-02-13 ENCOUNTER — HOSPITAL ENCOUNTER (OUTPATIENT)
Age: 52
Setting detail: OBSERVATION
Discharge: HOME | End: 2022-02-14
Attending: EMERGENCY MEDICINE | Admitting: EMERGENCY MEDICINE
Payer: MEDICARE

## 2022-02-13 VITALS
SYSTOLIC BLOOD PRESSURE: 114 MMHG | HEART RATE: 75 BPM | BODY MASS INDEX: 40.84 KG/M2 | OXYGEN SATURATION: 98 % | WEIGHT: 208 LBS | DIASTOLIC BLOOD PRESSURE: 69 MMHG | HEIGHT: 60 IN | RESPIRATION RATE: 18 BRPM | TEMPERATURE: 98.2 F

## 2022-02-13 DIAGNOSIS — R07.89 ATYPICAL CHEST PAIN: Primary | ICD-10-CM

## 2022-02-13 LAB
ABSOLUTE EOS #: 0.15 K/UL (ref 0–0.44)
ABSOLUTE IMMATURE GRANULOCYTE: 0.03 K/UL (ref 0–0.3)
ABSOLUTE LYMPH #: 4.07 K/UL (ref 1.1–3.7)
ABSOLUTE MONO #: 0.72 K/UL (ref 0.1–1.2)
ANION GAP SERPL CALCULATED.3IONS-SCNC: 13 MMOL/L (ref 9–17)
BASOPHILS # BLD: 0 % (ref 0–2)
BASOPHILS ABSOLUTE: 0.04 K/UL (ref 0–0.2)
BNP INTERPRETATION: NORMAL
BUN BLDV-MCNC: 8 MG/DL (ref 6–20)
BUN/CREAT BLD: ABNORMAL (ref 9–20)
CALCIUM SERPL-MCNC: 9.3 MG/DL (ref 8.6–10.4)
CHLORIDE BLD-SCNC: 102 MMOL/L (ref 98–107)
CO2: 22 MMOL/L (ref 20–31)
CREAT SERPL-MCNC: 0.65 MG/DL (ref 0.5–0.9)
DIFFERENTIAL TYPE: ABNORMAL
EOSINOPHILS RELATIVE PERCENT: 2 % (ref 1–4)
GFR AFRICAN AMERICAN: >60 ML/MIN
GFR NON-AFRICAN AMERICAN: >60 ML/MIN
GFR SERPL CREATININE-BSD FRML MDRD: ABNORMAL ML/MIN/{1.73_M2}
GFR SERPL CREATININE-BSD FRML MDRD: ABNORMAL ML/MIN/{1.73_M2}
GLUCOSE BLD-MCNC: 107 MG/DL (ref 70–99)
HCT VFR BLD CALC: 40.9 % (ref 36.3–47.1)
HEMOGLOBIN: 12.7 G/DL (ref 11.9–15.1)
IMMATURE GRANULOCYTES: 0 %
LYMPHOCYTES # BLD: 45 % (ref 24–43)
MCH RBC QN AUTO: 27.2 PG (ref 25.2–33.5)
MCHC RBC AUTO-ENTMCNC: 31.1 G/DL (ref 28.4–34.8)
MCV RBC AUTO: 87.6 FL (ref 82.6–102.9)
MONOCYTES # BLD: 8 % (ref 3–12)
NRBC AUTOMATED: 0 PER 100 WBC
PDW BLD-RTO: 14.6 % (ref 11.8–14.4)
PLATELET # BLD: 355 K/UL (ref 138–453)
PLATELET ESTIMATE: ABNORMAL
PMV BLD AUTO: 11.1 FL (ref 8.1–13.5)
POTASSIUM SERPL-SCNC: 4 MMOL/L (ref 3.7–5.3)
PRO-BNP: 25 PG/ML
RBC # BLD: 4.67 M/UL (ref 3.95–5.11)
RBC # BLD: ABNORMAL 10*6/UL
SARS-COV-2, RAPID: NOT DETECTED
SEG NEUTROPHILS: 45 % (ref 36–65)
SEGMENTED NEUTROPHILS ABSOLUTE COUNT: 4.05 K/UL (ref 1.5–8.1)
SODIUM BLD-SCNC: 137 MMOL/L (ref 135–144)
SPECIMEN DESCRIPTION: NORMAL
TROPONIN INTERP: NORMAL
TROPONIN INTERP: NORMAL
TROPONIN T: NORMAL NG/ML
TROPONIN T: NORMAL NG/ML
TROPONIN, HIGH SENSITIVITY: <6 NG/L (ref 0–14)
TROPONIN, HIGH SENSITIVITY: <6 NG/L (ref 0–14)
WBC # BLD: 9.1 K/UL (ref 3.5–11.3)
WBC # BLD: ABNORMAL 10*3/UL

## 2022-02-13 PROCEDURE — 96372 THER/PROPH/DIAG INJ SC/IM: CPT

## 2022-02-13 PROCEDURE — 80048 BASIC METABOLIC PNL TOTAL CA: CPT

## 2022-02-13 PROCEDURE — 99285 EMERGENCY DEPT VISIT HI MDM: CPT

## 2022-02-13 PROCEDURE — 83880 ASSAY OF NATRIURETIC PEPTIDE: CPT

## 2022-02-13 PROCEDURE — 71045 X-RAY EXAM CHEST 1 VIEW: CPT

## 2022-02-13 PROCEDURE — 70450 CT HEAD/BRAIN W/O DYE: CPT

## 2022-02-13 PROCEDURE — 2580000003 HC RX 258: Performed by: STUDENT IN AN ORGANIZED HEALTH CARE EDUCATION/TRAINING PROGRAM

## 2022-02-13 PROCEDURE — 96374 THER/PROPH/DIAG INJ IV PUSH: CPT

## 2022-02-13 PROCEDURE — 93005 ELECTROCARDIOGRAM TRACING: CPT | Performed by: STUDENT IN AN ORGANIZED HEALTH CARE EDUCATION/TRAINING PROGRAM

## 2022-02-13 PROCEDURE — G0378 HOSPITAL OBSERVATION PER HR: HCPCS

## 2022-02-13 PROCEDURE — 96376 TX/PRO/DX INJ SAME DRUG ADON: CPT

## 2022-02-13 PROCEDURE — 6360000002 HC RX W HCPCS: Performed by: STUDENT IN AN ORGANIZED HEALTH CARE EDUCATION/TRAINING PROGRAM

## 2022-02-13 PROCEDURE — 84484 ASSAY OF TROPONIN QUANT: CPT

## 2022-02-13 PROCEDURE — 6370000000 HC RX 637 (ALT 250 FOR IP): Performed by: STUDENT IN AN ORGANIZED HEALTH CARE EDUCATION/TRAINING PROGRAM

## 2022-02-13 PROCEDURE — 96375 TX/PRO/DX INJ NEW DRUG ADDON: CPT

## 2022-02-13 PROCEDURE — 85025 COMPLETE CBC W/AUTO DIFF WBC: CPT

## 2022-02-13 PROCEDURE — 87635 SARS-COV-2 COVID-19 AMP PRB: CPT

## 2022-02-13 RX ORDER — ONDANSETRON 2 MG/ML
4 INJECTION INTRAMUSCULAR; INTRAVENOUS EVERY 6 HOURS PRN
Status: DISCONTINUED | OUTPATIENT
Start: 2022-02-13 | End: 2022-02-14 | Stop reason: HOSPADM

## 2022-02-13 RX ORDER — ACETAMINOPHEN 325 MG/1
650 TABLET ORAL EVERY 4 HOURS PRN
Status: DISCONTINUED | OUTPATIENT
Start: 2022-02-13 | End: 2022-02-14 | Stop reason: HOSPADM

## 2022-02-13 RX ORDER — ASPIRIN 81 MG/1
324 TABLET, CHEWABLE ORAL ONCE
Status: COMPLETED | OUTPATIENT
Start: 2022-02-13 | End: 2022-02-13

## 2022-02-13 RX ORDER — CHOLECALCIFEROL (VITAMIN D3) 125 MCG
5 CAPSULE ORAL NIGHTLY PRN
Status: DISCONTINUED | OUTPATIENT
Start: 2022-02-13 | End: 2022-02-14 | Stop reason: HOSPADM

## 2022-02-13 RX ORDER — VALSARTAN 80 MG/1
80 TABLET ORAL DAILY
Status: DISCONTINUED | OUTPATIENT
Start: 2022-02-13 | End: 2022-02-14 | Stop reason: HOSPADM

## 2022-02-13 RX ORDER — ONDANSETRON 4 MG/1
4 TABLET, ORALLY DISINTEGRATING ORAL EVERY 8 HOURS PRN
Status: DISCONTINUED | OUTPATIENT
Start: 2022-02-13 | End: 2022-02-14 | Stop reason: HOSPADM

## 2022-02-13 RX ORDER — VALSARTAN AND HYDROCHLOROTHIAZIDE 80; 12.5 MG/1; MG/1
1 TABLET, FILM COATED ORAL DAILY
Status: DISCONTINUED | OUTPATIENT
Start: 2022-02-13 | End: 2022-02-13 | Stop reason: RX

## 2022-02-13 RX ORDER — PANTOPRAZOLE SODIUM 40 MG/1
40 TABLET, DELAYED RELEASE ORAL
Status: DISCONTINUED | OUTPATIENT
Start: 2022-02-14 | End: 2022-02-14 | Stop reason: HOSPADM

## 2022-02-13 RX ORDER — ATORVASTATIN CALCIUM 20 MG/1
20 TABLET, FILM COATED ORAL DAILY
Status: DISCONTINUED | OUTPATIENT
Start: 2022-02-13 | End: 2022-02-14 | Stop reason: HOSPADM

## 2022-02-13 RX ORDER — ASPIRIN 81 MG/1
81 TABLET ORAL DAILY
Status: DISCONTINUED | OUTPATIENT
Start: 2022-02-13 | End: 2022-02-14 | Stop reason: HOSPADM

## 2022-02-13 RX ORDER — KETOROLAC TROMETHAMINE 15 MG/ML
15 INJECTION, SOLUTION INTRAMUSCULAR; INTRAVENOUS ONCE
Status: COMPLETED | OUTPATIENT
Start: 2022-02-13 | End: 2022-02-13

## 2022-02-13 RX ORDER — SODIUM CHLORIDE 0.9 % (FLUSH) 0.9 %
5-40 SYRINGE (ML) INJECTION PRN
Status: DISCONTINUED | OUTPATIENT
Start: 2022-02-13 | End: 2022-02-14 | Stop reason: HOSPADM

## 2022-02-13 RX ORDER — SODIUM CHLORIDE 0.9 % (FLUSH) 0.9 %
5-40 SYRINGE (ML) INJECTION EVERY 12 HOURS SCHEDULED
Status: DISCONTINUED | OUTPATIENT
Start: 2022-02-13 | End: 2022-02-14 | Stop reason: HOSPADM

## 2022-02-13 RX ORDER — CARVEDILOL 6.25 MG/1
6.25 TABLET ORAL 2 TIMES DAILY WITH MEALS
Status: DISCONTINUED | OUTPATIENT
Start: 2022-02-13 | End: 2022-02-14 | Stop reason: HOSPADM

## 2022-02-13 RX ORDER — HYDROCHLOROTHIAZIDE 25 MG/1
12.5 TABLET ORAL DAILY
Status: DISCONTINUED | OUTPATIENT
Start: 2022-02-13 | End: 2022-02-14 | Stop reason: HOSPADM

## 2022-02-13 RX ORDER — SODIUM CHLORIDE 9 MG/ML
25 INJECTION, SOLUTION INTRAVENOUS PRN
Status: DISCONTINUED | OUTPATIENT
Start: 2022-02-13 | End: 2022-02-14 | Stop reason: HOSPADM

## 2022-02-13 RX ORDER — KETOROLAC TROMETHAMINE 30 MG/ML
15 INJECTION, SOLUTION INTRAMUSCULAR; INTRAVENOUS ONCE
Status: COMPLETED | OUTPATIENT
Start: 2022-02-13 | End: 2022-02-13

## 2022-02-13 RX ADMIN — KETOROLAC TROMETHAMINE 15 MG: 15 INJECTION, SOLUTION INTRAMUSCULAR; INTRAVENOUS at 11:13

## 2022-02-13 RX ADMIN — ATORVASTATIN CALCIUM 20 MG: 20 TABLET, FILM COATED ORAL at 13:36

## 2022-02-13 RX ADMIN — ONDANSETRON 4 MG: 4 TABLET, ORALLY DISINTEGRATING ORAL at 13:42

## 2022-02-13 RX ADMIN — Medication 81 MG: at 13:36

## 2022-02-13 RX ADMIN — ONDANSETRON 4 MG: 2 INJECTION INTRAMUSCULAR; INTRAVENOUS at 21:33

## 2022-02-13 RX ADMIN — ENOXAPARIN SODIUM 40 MG: 100 INJECTION SUBCUTANEOUS at 13:37

## 2022-02-13 RX ADMIN — ASPIRIN 324 MG: 81 TABLET, CHEWABLE ORAL at 09:08

## 2022-02-13 RX ADMIN — SODIUM CHLORIDE, PRESERVATIVE FREE 10 ML: 5 INJECTION INTRAVENOUS at 20:00

## 2022-02-13 RX ADMIN — VALSARTAN 80 MG: 80 TABLET, FILM COATED ORAL at 13:35

## 2022-02-13 RX ADMIN — ACETAMINOPHEN 650 MG: 325 TABLET ORAL at 15:46

## 2022-02-13 RX ADMIN — Medication 5 MG: at 21:36

## 2022-02-13 RX ADMIN — KETOROLAC TROMETHAMINE 15 MG: 30 INJECTION, SOLUTION INTRAMUSCULAR at 21:27

## 2022-02-13 RX ADMIN — CARVEDILOL 6.25 MG: 6.25 TABLET, FILM COATED ORAL at 18:18

## 2022-02-13 RX ADMIN — HYDROCHLOROTHIAZIDE 12.5 MG: 25 TABLET ORAL at 13:35

## 2022-02-13 ASSESSMENT — PAIN SCALES - GENERAL
PAINLEVEL_OUTOF10: 7
PAINLEVEL_OUTOF10: 8
PAINLEVEL_OUTOF10: 5
PAINLEVEL_OUTOF10: 7
PAINLEVEL_OUTOF10: 8

## 2022-02-13 ASSESSMENT — PAIN DESCRIPTION - PAIN TYPE: TYPE: ACUTE PAIN

## 2022-02-13 ASSESSMENT — PAIN DESCRIPTION - LOCATION: LOCATION: CHEST

## 2022-02-13 ASSESSMENT — PAIN DESCRIPTION - DESCRIPTORS: DESCRIPTORS: SORE;SHARP

## 2022-02-13 ASSESSMENT — HEART SCORE: ECG: 1

## 2022-02-13 ASSESSMENT — PAIN DESCRIPTION - ORIENTATION: ORIENTATION: MID;LEFT

## 2022-02-13 NOTE — ED NOTES
The following labs labeled with pt sticker and tubed to lab:     [] Blue     [] Lavender   [] on ice  [] Green/yellow  [] Green/black [] on ice  [] Yellow  [] Red  [] Pink      [x] COVID-19 swab    [x] Rapid  [] PCR  [] Flu swab  [] Peds Viral Panel     [] Urine Sample  [] Pelvic Cultures  [] Blood Cultures      Padilla Lara RN  02/13/22 1025

## 2022-02-13 NOTE — ED NOTES
The following labs labeled with pt sticker and tubed to lab:     [x] Blue     [x] Lavender   [] on ice  [x] Green/yellow  [x] Green/black [] on ice  [x] Yellow  [] Red  [] Pink      [] COVID-19 swab    [] Rapid  [] PCR  [] Flu swab  [] Peds Viral Panel     [] Urine Sample  [] Pelvic Cultures  [] Blood Cultures     Krzysztof Valera RN  02/13/22 1683

## 2022-02-13 NOTE — PLAN OF CARE
Reviewed stress test with Dr. Georigna Vinson needs a heart cath . Will add him on for tomorrow. Will keep patient NPO after midnight. I have discussed risks (including but not limited to vascular injury, infection, hematoma, contrast induced kidney dysfunction, CVA and MI), benefits, alternatives in detail. All questions answered. Patient and daughter agree to proceed.

## 2022-02-13 NOTE — ED NOTES
Patient resting comfortably and in no acute distress. Respirations even and nonlabored. Patient denies any needs at this time. Bed in lowest position. Call light within reach. Will continue to monitor.      Krzysztof Valera RN  02/13/22 1017

## 2022-02-13 NOTE — ED NOTES
Patient presents to ED for evaluation of chest pain. Patient reports midsternal to left sided chest pain radiating down her left arm. Pain started Friday intermittently while walking around at work. Constant today. Associated numbness/tingling feeling in her left jaw, left arm, and left leg. Some associated nausea reported. No vomiting. Patient reports some shortness of breath on Friday that was relieved with her inhaler that she has due to hx asthma. Patient denies shortness of breath currently. Patient takes baby aspirin daily and also treats for hypertension and hyperlipidemia. Patient has not taken any of her home meds yet today.      Sourav Javier RN  02/13/22 1019

## 2022-02-13 NOTE — ED PROVIDER NOTES
[facility] ED  Emergency Department Encounter  Emergency Medicine Resident     Pt Name: Hasmukh Millan  MRN: [id number]  [place] 1970  Date of evaluation: 2/13/22  PCP:  Jayden Helms MD    [address] [place] [address]       Chief Complaint   Patient presents with    Chest Pain       HISTORY [facility]  (Location/Symptom, Timing/Onset, Context/Setting, Quality, Duration, Modifying Factors,Severity.)      Hasmukh Millan is a 45 y. o.yo female who presents with chest pain. Patient here with 2 days of chest pain on the left radiating to the left shoulder, states that she has some numbness on the upper and lower extremity that started earlier about a week has no focal deficits or weakness however feels like she is slightly normal left side. Denies recent fevers or chills or traumatic injuries, states that the pain is not reproduced when she presses on that area of her chest.  Denies shortness of breath, headache or vision changes. Denies calf tenderness or leg pain or recent diagnosis of cancer or recent surgeries. No history of DVT or PE. PAST MEDICAL / SURGICAL / SOCIAL / FAMILY HISTORY      has a past medical history of Allergic rhinitis, Asthma, Clinical trial participant, COPD (chronic obstructive pulmonary disease) ([facility] [address] Dysphagia, Dysphagia, Family history of colon cancer, GERD (gastroesophageal reflux disease), H/O cardiovascular stress test, Hiatal hernia, Hyperlipidemia, Hypertension, Iron deficiency anemia secondary to inadequate dietary iron intake, Iron deficiency anemia, unspecified, Irritable bowel syndrome with constipation, Obesity, Snores, and Wears glasses. has a past surgical history that includes Tubal ligation (2003); Colonoscopy (2015); Upper gastrointestinal endoscopy; Upper gastrointestinal endoscopy (N/A, 7/18/2017); Cardiac catheterization (01/16/2018); Cardiac catheterization (11/23/2015); Endoscopy, colon, diagnostic (2015);  Endoscopy, colon, diagnostic (02/08/2018); pr colon ca scrn not hi rsk ind (N/A, 2/8/2018); Colonoscopy (02/08/2018); and Nose surgery. Social History     Socioeconomic History    Marital status:      Spouse name: Not on file    Number of children: Not on file    Years of education: Not on file    Highest education level: Not on file   Occupational History    Not on file   Tobacco Use    Smoking status: Never Smoker    Smokeless tobacco: Never Used   Vaping Use    Vaping Use: Never used   Substance and Sexual Activity    Alcohol use: Yes     Alcohol/week: 4.0 standard drinks     Types: 4 Cans of beer per week     Comment: occasionally    Drug use: No    Sexual activity: Not Currently     Partners: Male     Comment: tubal ligation    Other Topics Concern    Not on file   Social History Narrative    Not on file     Social Determinants of Health     Financial Resource Strain: Medium Risk    Difficulty of Paying Living Expenses: Somewhat hard   Food Insecurity: Food Insecurity Present    Worried About Running Out of Food in the Last Year: Sometimes true    [place] of Food in the Last Year: Never true   Transportation Needs:     Lack of Transportation (Medical): Not on file    Lack of Transportation (Non-Medical):  Not on file   Physical Activity:     Days of Exercise per Week: Not on file    Minutes of Exercise per Session: Not on file   Stress:     Feeling of Stress : Not on file   Social Connections:     Frequency of Communication with Friends and Family: Not on file    Frequency of Social Gatherings with Friends and Family: Not on file    Attends Adventism Services: Not on file    Active Member of Clubs or Organizations: Not on file    Attends Club or Organization Meetings: Not on file    Marital Status: Not on file   Intimate Partner Violence:     Fear of Current or Ex-Partner: Not on file    Emotionally Abused: Not on file    Physically Abused: Not on file    Sexually Abused: Not on file   Housing Stability:     Unable to Pay for Housing in the Last Year: Not on file    Number of Places Lived in the Last Year: Not on file    Unstable Housing in the Last Year: Not on file       Family History   Problem Relation Age of Onset    Hypertension Mother     High Cholesterol Mother     Asthma Mother     Osteoarthritis Mother     Other Mother         overweight    Colon Cancer Father         Allergies:  Patient has no known allergies. Home Medications:  Prior to Admission medications    Medication Sig Start Date End Date Taking?  Authorizing Provider   atorvastatin (LIPITOR) 20 MG tablet 1 tablet once daily 2/1/22   Juan Aguero MD   carvedilol (COREG) 6.25 MG tablet 1 tablet 2/day 2/1/22   Juan Aguero MD   albuterol sulfate HFA (PROAIR HFA) 108 (90 Base) MCG/ACT inhaler inhale 2 puffs by mouth every 6 hours if needed for wheezing 2/1/22   Juan Aguero MD   mometasone-formoterol [facility]) 200-5 MCG/ACT inhaler inhale 2 puffs by mouth twice a day 2/1/22   Juan Aguero MD   omeprazole (PRILOSEC) 40 MG delayed release capsule take 1 capsule by mouth once daily if needed 2/1/22   Juan Aguero MD   valsartan-hydroCHLOROthiazide (DIOVAN-HCT) 80-12.5 MG per tablet Once a day 2/1/22   Juan Aguero MD   albuterol (PROVENTIL) (2.5 MG/3ML) 0.083% nebulizer solution Take 3 mLs by nebulization every 6 hours as needed for Wheezing 2/1/22   Juan Aguero MD   simethicone (MYLICON) 80 MG chewable tablet Take 1 tablet by mouth 4 times daily as needed for Flatulence 2/1/22   Juan Aguero MD   meloxicam NAGI DANIEL [facility]) 7.5 MG tablet Take 1 tablet by mouth daily 11/17/21   Juan Aguero MD   fluticasone [facility]) 50 MCG/ACT nasal spray instill 2 sprays into each nostril once daily 11/11/21   Juan Aguero MD   senna (RA SENNA) 8.6 MG tablet take 1 tablet by mouth twice a day 9/29/21   Juan Aguero MD   ondansetron (ZOFRAN-ODT) 4 MG disintegrating tablet dissolve 1 tablet ON TONGUE every 8 hours if needed for nausea and vomiting 8/10/21   Arun Evans MD   melatonin (RA MELATONIN) 5 MG TABS tablet take 1 tablet by mouth nightly 7/12/21   Arun Evans MD   aspirin (RA ASPIRIN EC) 81 MG EC tablet take 1 tablet by mouth once daily 7/9/21   Arun Evans MD   ACETAMINOPHEN EXTRA STRENGTH 500 MG tablet take 2 tablets by mouth every 6 hours if needed for pain 6/9/20   Arun Evans MD   Multiple Vitamins-Minerals (WOMENS MULTI VITAMIN & MINERAL PO) Take 1 tablet by mouth daily    Historical Provider, MD       REVIEW OFSYSTEMS    (2-9 systems for level 4, 10 or more for level 5)      Review of Systems    PHYSICAL EXAM   (up to 7 for level 4, 8 or more forlevel 5)      ED TRIAGE VITALS BP: 126/86, Temp: 97.7 °F (36.5 °C), Pulse: 68, Resp: 13, SpO2: 96 %    Vitals:    02/13/22 0902 02/13/22 0917 02/13/22 1004 02/13/22 1018   BP: 126/86 137/71 (!) 136/116 127/84   Pulse: 68 69 73 73   Resp: 13 12     Temp: 97.7 °F (36.5 °C)      TempSrc: Oral      SpO2: 96% 96% 96% 95%   Weight: 208 lb (94.3 kg)      Height: 5' (1.524 m)          Physical Exam    DIFFERENTIAL  DIAGNOSIS     PLAN (LABS / IMAGING / EKG):  Orders Placed This Encounter   Procedures    SARS-CoV-2 NAAT (Rapid)    XR CHEST PORTABLE    CT Head WO Contrast    CBC Auto Differential    Basic Metabolic Panel w/ Reflex to MG    Troponin    Brain Natriuretic Peptide    Troponin    EKG 12 Lead    Insert peripheral IV    Saline lock IV    PATIENT STATUS (FROM ED OR OR/PROCEDURAL) Observation       MEDICATIONS ORDERED:  Orders Placed This Encounter   Medications    aspirin chewable tablet 324 mg       DDX:     Card ischemia, pneumonia, COVID-19, pneumothorax, intracranial ischemia    Initial MDM/Plan: 45 y.o. female who presents with chest pain.      Card ischemia, pneumonia, COVID-19, pneumothorax, intracranial ischemia  Onset chest pain 2 days  EKG unremarkable  Tropes baseline repeat x2  Heart score of 5   Increased risk factors, hypertension hyperlipidemia obesity  Chest x-ray unremarkable for pneumothorax or pneumonia  COVID-19 pending  Aspirin, Toradol for chest pain  BNP negative    Disposition:  Admission to ETU for cardiac evaluation    DIAGNOSTIC RESULTS / EMERGENCYDEPARTMENT COURSE / MDM     LABS:  Results for orders placed or performed during the hospital encounter of 02/13/22   CBC Auto Differential   Result Value Ref Range    WBC 9.1 3.5 - 11.3 k/uL    RBC 4.67 3.95 - 5.11 m/uL    Hemoglobin 12.7 11.9 - 15.1 g/dL    Hematocrit 40.9 36.3 - 47.1 %    MCV 87.6 82.6 - 102.9 fL    MCH 27.2 25.2 - 33.5 pg    MCHC 31.1 28.4 - 34.8 g/dL    RDW 14.6 (H) 11.8 - 14.4 %    Platelets 062 152 - 281 k/uL    MPV 11.1 8.1 - 13.5 fL    NRBC Automated 0.0 0.0 per 100 WBC    Differential Type NOT REPORTED     Seg Neutrophils 45 36 - 65 %    Lymphocytes 45 (H) 24 - 43 %    Monocytes 8 3 - 12 %    Eosinophils % 2 1 - 4 %    Basophils 0 0 - 2 %    Immature Granulocytes 0 0 %    Segs Absolute 4.05 1.50 - 8.10 k/uL    Absolute Lymph # 4.07 (H) 1.10 - 3.70 k/uL    Absolute Mono # 0.72 0.10 - 1.20 k/uL    Absolute Eos # 0.15 0.00 - 0.44 k/uL    Basophils Absolute 0.04 0.00 - 0.20 k/uL    Absolute Immature Granulocyte 0.03 0.00 - 0.30 k/uL    WBC Morphology NOT REPORTED     RBC Morphology ANISOCYTOSIS PRESENT     Platelet Estimate NOT REPORTED    Basic Metabolic Panel w/ Reflex to MG   Result Value Ref Range    Glucose 107 (H) 70 - 99 mg/dL    BUN 8 6 - 20 mg/dL    CREATININE 0.65 0.50 - 0.90 mg/dL    Bun/Cre Ratio NOT REPORTED 9 - 20    Calcium 9.3 8.6 - 10.4 mg/dL    Sodium 137 135 - 144 mmol/L    Potassium 4.0 3.7 - 5.3 mmol/L    Chloride 102 98 - 107 mmol/L    CO2 22 20 - 31 mmol/L    Anion Gap 13 9 - 17 mmol/L    GFR Non-African American >60 >60 mL/min    GFR African American >60 >60 mL/min    GFR Comment          GFR Staging NOT REPORTED    Troponin   Result Value Ref Range    Troponin, High Sensitivity <6 0 - 14 ng/L    Troponin T NOT REPORTED <0.03 ng/mL    Troponin Interp NOT REPORTED    Brain Natriuretic Peptide   Result Value Ref Range    Pro-BNP 25 <300 pg/mL    BNP Interpretation NOT REPORTED    Troponin   Result Value Ref Range    Troponin, High Sensitivity <6 0 - 14 ng/L    Troponin T NOT REPORTED <0.03 ng/mL    Troponin Interp NOT REPORTED        RADIOLOGY:  CT Head WO Contrast   Final Result   Negative CT brain with no acute intracranial abnormality. XR CHEST PORTABLE   Final Result   Negative chest.             EKG  ST segment elevations or depressions seen on EKG    EKG Interpretation    Interpreted by me    Rhythm: normal sinus   Rate: normal  Axis: normal  Ectopy: none  Conduction: normal  ST Segments: no acute change  T Waves: no acute change  Q Waves: none    Clinical Impression: no acute changes and normal EKG    All EKG's are interpreted by the Emergency Department Physicianwho either signs or Co-signs this chart in the absence of a cardiologist.    EMERGENCY DEPARTMENT COURSE:  ED Course as of 02/13/22 1047   Sun Feb 13, 2022   4496 Patient seen and assessed in the emergency department no acute respiratory cardiovascular distress. Patient here with 2 days of chest pain on the left radiating to the left shoulder, states that she has some numbness on the upper and lower extremity that started earlier about a week has no focal deficits or weakness however feels like she is slightly normal left side. Denies recent fevers or chills or traumatic injuries, states that the pain is not reproduced when she presses on that area of her chest.  Denies shortness of breath, headache or vision changes. Denies calf tenderness or leg pain or recent diagnosis of cancer or recent surgeries.   No history of DVT or PE.    [PS]   5768 Due to the numbness that is located more on the left side  will get CT head for evaluation for ischemia [PS]      ED Course User Index  [PS] Junior Cloud MD          PROCEDURES:  None    CONSULTS:  IP CONSULT TO CARDIOLOGY    CRITICAL CARE:  Please see attending note    FINAL IMPRESSION      1. Atypical chest pain          DISPOSITION / PLAN     DISPOSITION Admitted 02/13/2022 10:28:32 AM      PATIENT REFERRED TO:  No follow-up provider specified.     DISCHARGE MEDICATIONS:  New Prescriptions    No medications on file       Bhargav Canales MD  Emergency Medicine Resident    (Please note that portions of this note were completed with a voice recognition program.Efforts were made to edit the dictations but occasionally words are mis-transcribed.)        Bhargav Canales MD  Resident  02/13/22 8201

## 2022-02-13 NOTE — ED PROVIDER NOTES
[address] [facility]     Emergency Department     Faculty Attestation    I performed a history and physical examination of the patient and discussed management with the resident. I have reviewed and agree with the residents findings including all diagnostic interpretations, and treatment plans as written at the time of my review. Any areas of disagreement are noted on the chart. I was personally present for the key portions of any procedures. I have documented in the chart those procedures where I was not present during the key portions. For Physician Assistant/ Nurse Practitioner cases/documentation I have personally evaluated this patient and have completed at least one if not all key elements of the E/M (history, physical exam, and MDM). Additional findings are as noted. This patient was evaluated in the Emergency Department for symptoms described in the history of present illness. The patient was evaluated in the context of the global COVID-19 pandemic, which necessitated consideration that the patient might be at risk for infection with the SARS-CoV-2 virus that causes COVID-19. Institutional protocols and algorithms that pertain to the evaluation of patients at risk for COVID-19 are in a state of rapid change based on information released by regulatory bodies including the CDC and federal and state organizations. These policies and algorithms were followed during the patient's care in the ED. Primary Care Physician: Nohemi Heck MD    History: This is a 45 y.o. female who presents to the Emergency Department with complaint of chest pain that began yesterday. Pressure like pain with no associated sob or diaphoresis. The patient also complains of left sided numbness in the face arm and leg    Physical:   height is 5' (1.524 m) and weight is 208 lb (94.3 kg). Her oral temperature is 97.7 °F (36.5 °C).   no facial asymmetry, motor 5/5, sensation to LT intact. CTAB, RRR, abd soft ntnd     Impression: Chest pain, numbness resolved     Plan: CT, CXR, EKG, CBC,BMP, Troponin      EKG Interpretation . NSR,rate 69, normal VT interval, Normal QRS, normal QT    (Please note that portions of this note were completed with a voice recognition program.  Efforts were made to edit the dictations but occasionally words are mis-transcribed.)    Jarad Tim.  Brody Dalton MD, [facility]  Attending Emergency Medicine Physician        Slime Sloan MD  02/13/22 1934

## 2022-02-13 NOTE — ED NOTES
Report given to Jacob George RN.  All questions answered at this time     [facility]  02/13/22 6278

## 2022-02-13 NOTE — H&P
[facility]  CDU / OBSERVATION eNCOUnter  Resident Note     Pt Name: Arnaldo Prieto  MRN: [id number]  [place] 1970  Date of evaluation: 2/13/22  Patient's PCP is :  Farhat Ray MD    [address] [place] [address]       Chief Complaint   Patient presents with    Chest Pain         HISTORY OF PRESENT ILLNESS    Arnaldo Prieto is a 45 y.o. female who presents complaints of atypical chest pain. Reportedly she has had chest pain for the last 3 days. She reports it started on Friday. She states that radiates to her left shoulder and the left side of her face. She denies dyspnea on exertion, diaphoresis, shortness of breath or pleuritic chest pain. She does note that she is experienced some left lower extremity swelling. Nothing makes the pain better and nothing makes the pain worse. Patient had a cardiac catheterization in 2018 with normal coronary arteries noted and a normal left ventricular ejection fraction of 65%. Location/Symptom: Chest pain  Timing/Onset: 3 days ago  Provocation: None  Quality: Pressure  Radiation: Left arm and face  Severity: Moderate  Timing/Duration: Consistent  Modifying Factors: None    REVIEW OF SYSTEMS       General ROS - No fevers, No malaise   Ophthalmic ROS - No discharge, No changes in vision  ENT ROS -  No sore throat, No rhinorrhea,   Respiratory ROS - no shortness of breath, no cough, no  wheezing  Cardiovascular ROS - chest pain, no dyspnea on exertion  Gastrointestinal ROS - No abdominal pain, no nausea or vomiting, no change in bowel habits, no black or bloody stools  Genito-Urinary ROS - No dysuria, trouble voiding, or hematuria  Musculoskeletal ROS -left arm pain  Neurological ROS - No headache, no dizziness/lightheadedness, No focal weakness, no loss of sensation  Dermatological ROS - No lesions, No rash     (PQRS) Advance directives on face sheet per hospital policy.  No change unless specifically mentioned in chart    PAST MEDICAL HISTORY    has a past medical history of Allergic rhinitis, Asthma, Clinical trial participant, COPD (chronic obstructive pulmonary disease) ([facility] [address] Dysphagia, Dysphagia, Family history of colon cancer, GERD (gastroesophageal reflux disease), H/O cardiovascular stress test, Hiatal hernia, Hyperlipidemia, Hypertension, Iron deficiency anemia secondary to inadequate dietary iron intake, Iron deficiency anemia, unspecified, Irritable bowel syndrome with constipation, Obesity, Snores, and Wears glasses. I have reviewed the past medical history with the patient and it is pertinent to this complaint. SURGICAL HISTORY      has a past surgical history that includes Tubal ligation (2003); Colonoscopy (2015); Upper gastrointestinal endoscopy; Upper gastrointestinal endoscopy (N/A, 7/18/2017); Cardiac catheterization (01/16/2018); Cardiac catheterization (11/23/2015); Endoscopy, colon, diagnostic (2015); Endoscopy, colon, diagnostic (02/08/2018); pr colon ca scrn not hi rsk ind (N/A, 2/8/2018); Colonoscopy (02/08/2018); and Nose surgery. I have reviewed and agree with Surgical History entered and it is pertinent to this complaint. CURRENT MEDICATIONS     aspirin EC tablet 81 mg, Daily  atorvastatin (LIPITOR) tablet 20 mg, Daily  carvedilol (COREG) tablet 6.25 mg, BID WC  [START ON 2/14/2022] pantoprazole (PROTONIX) tablet 40 mg, QAM AC  sodium chloride flush 0.9 % injection 5-40 mL, 2 times per day  sodium chloride flush 0.9 % injection 5-40 mL, PRN  0.9 % sodium chloride infusion, PRN  enoxaparin (LOVENOX) injection 40 mg, Daily  acetaminophen (TYLENOL) tablet 650 mg, Q4H PRN  ondansetron (ZOFRAN-ODT) disintegrating tablet 4 mg, Q8H PRN   Or  ondansetron (ZOFRAN) injection 4 mg, Q6H PRN    0.9 % sodium chloride infusion, Continuous        All medication charted and reviewed. ALLERGIES     has No Known Allergies. FAMILY HISTORY     She indicated that her mother is alive. She indicated that her father is alive.      family history includes Asthma in her mother; Colon Cancer in her father; High Cholesterol in her mother; Hypertension in her mother; Osteoarthritis in her mother; Other in her mother. The patient denies any pertinent family history. I have reviewed and agree with the family history entered. I have reviewed the Family History and it is not significant to the case    SOCIAL HISTORY      reports that she has never smoked. She has never used smokeless tobacco. She reports current alcohol use of about 4.0 standard drinks of alcohol per week. She reports that she does not use drugs. I have reviewed and agree with all Social.  There are no concerns for substance abuse/use. PHYSICAL EXAM     INITIAL VITALS:  height is 5' (1.524 m) and weight is 208 lb (94.3 kg). Her oral temperature is 97.7 °F (36.5 °C). Her blood pressure is 130/119 (abnormal) and her pulse is 78. Her respiration is 12 and oxygen saturation is 97%.       CONSTITUTIONAL: AOx4, no apparent distress, appears stated age    HEAD: normocephalic, atraumatic   EYES: PERRLA, EOMI    ENT: moist mucous membranes, uvula midline   NECK: supple, symmetric   BACK: symmetric   LUNGS: clear to auscultation bilaterally   CARDIOVASCULAR: regular rate and rhythm, no murmurs, rubs or gallops   ABDOMEN: soft, non-tender, non-distended with normal active bowel sounds   NEUROLOGIC:  MAEx4, no focal sensory or motor deficits   MUSCULOSKELETAL: no clubbing, cyanosis or edema I did not appreciate any swelling in either lower extremity, Homans' sign negative   SKIN: no rash or wounds       DIAGNOSTIC RESULTS     EKG: All EKG's are interpreted by the Observation Physician who either signs or Co-signs this chart in the absence of a cardiologist.     EKG Interpretation     Interpreted by observation physician    Rhythm: normal sinus   Rate: normal  Axis: normal  Ectopy: none  Conduction: normal  ST Segments: no acute change  T Waves: Abnormal foci with single T wave inversions/biphasic Absolute Lymph # 4.07 (*)     All other components within normal limits   BASIC METABOLIC PANEL W/ REFLEX TO MG FOR LOW K - Abnormal; Notable for the following components:    Glucose 107 (*)     All other components within normal limits   COVID-19, RAPID   TROPONIN   BRAIN NATRIURETIC PEPTIDE   TROPONIN       SCREENING TOOLS:    HEART Risk Score for Chest Pain Patients   History and Physical Exam Suspicion Level  (Nausea, Vomiting, Diaphoresis, Radiation, Exertion)   Moderately Suspicious (1 pt)   EKG Interpretation   Non-Specific Repolarization Disturbance (1 pt)   Age of Patient (in years)   55-63 (1 pt)   Risk Factors   3 Risk Factors (2 pts)   Troponin Levels   = Normal Limit (0 pts)  TOTAL:    Percent Risk for Major Adverse Cardiac Event (MACE)  4-7 pts indicates moderate risk for MACE  20.3% (OBS)    CDU IMPRESSION / PLAN      Linda Garcia is a 45 y.o. female who presents with for evaluation of atypical chest pain. She has unstable angina. Chest pain described as pressure radiates to her left shoulder left face. She had a cardiac catheterization performed in 2018 that showed normal coronaries with a left ventricular ejection fraction of 65%. Her heart score is 5. Plan for observation stay with cardiology evaluation for atypical chest pain. · Follow cardiology recommendations  · Continue home medications and pain control  · Monitor vitals, labs, and imaging  · DISPO: pending consults and clinical improvement    CONSULTS:    IP CONSULT TO CARDIOLOGY    PROCEDURES:  Not indicated       PATIENT REFERRED TO:    No follow-up provider specified. --  Jack Munson MD   Emergency Medicine Resident     This dictation was generated by voice recognition computer software. Although all attempts are made to edit the dictation for accuracy, there may be errors in the transcription that are not intended.

## 2022-02-13 NOTE — ED NOTES
The following labs labeled with pt sticker and tubed to lab:     [] Blue     [] Lavender   [] on ice  [x] Green/yellow  [] Green/black [] on ice  [] Yellow  [] Red  [] Pink      [] COVID-19 swab    [] Rapid  [] PCR  [] Flu swab  [] Peds Viral Panel     [] Urine Sample  [] Pelvic Cultures  [] Blood Cultures      Wayne Prince RN  02/13/22 [id number]

## 2022-02-13 NOTE — ED NOTES
Patient transported from ED room 22 to [id number] via wheelchair by Vicente Palacios RN  02/13/22 2522

## 2022-02-13 NOTE — PROGRESS NOTES
[address]  CDU / OBSERVATION ENCOUNTER  ATTENDING NOTE       I performed a history and physical examination of the patient and discussed management with the resident or midlevel provider. I reviewed the resident or midlevel provider's note and agree with the documented findings and plan of care. Any areas of disagreement are noted on the chart. I was personally present for the key portions of any procedures. I have documented in the chart those procedures where I was not present during the key portions. I have reviewed the nurses notes. I agree with the chief complaint, past medical history, past surgical history, allergies, medications, social and family history as documented unless otherwise noted below. The Family history, social history, and ROS are effectively unchanged since admission unless noted elsewhere in the chart. Patient with symptoms consistent with angina. Patient with pressure-like chest pain with radiation left arm. Patient had symptoms occurring with exertion. Patient had heart score 5 read by ED. No EKG changes or cardiac enzyme changes. Patient admitted for further cardiac rule out. Patient had negative cardiac catheterization in 2018. Patient with some discomfort but will trend troponins. Will also order stress testing tomorrow. Will provide analgesia.   Patient for further evaluation after stress test    Sarah Israel MD  Attending Emergency  Physician

## 2022-02-14 ENCOUNTER — APPOINTMENT (OUTPATIENT)
Dept: NUCLEAR MEDICINE | Age: 52
End: 2022-02-14
Payer: MEDICARE

## 2022-02-14 LAB
EKG ATRIAL RATE: 66 BPM
EKG ATRIAL RATE: 69 BPM
EKG P AXIS: 68 DEGREES
EKG P AXIS: 73 DEGREES
EKG P-R INTERVAL: 148 MS
EKG P-R INTERVAL: 174 MS
EKG Q-T INTERVAL: 386 MS
EKG Q-T INTERVAL: 406 MS
EKG QRS DURATION: 72 MS
EKG QRS DURATION: 82 MS
EKG QTC CALCULATION (BAZETT): 413 MS
EKG QTC CALCULATION (BAZETT): 425 MS
EKG R AXIS: 31 DEGREES
EKG R AXIS: 8 DEGREES
EKG T AXIS: 30 DEGREES
EKG VENTRICULAR RATE: 66 BPM
EKG VENTRICULAR RATE: 69 BPM
LV EF: 75 %
LVEF MODALITY: NORMAL
TROPONIN INTERP: NORMAL
TROPONIN T: NORMAL NG/ML
TROPONIN, HIGH SENSITIVITY: 7 NG/L (ref 0–14)

## 2022-02-14 PROCEDURE — 3430000000 HC RX DIAGNOSTIC RADIOPHARMACEUTICAL: Performed by: STUDENT IN AN ORGANIZED HEALTH CARE EDUCATION/TRAINING PROGRAM

## 2022-02-14 PROCEDURE — 6360000002 HC RX W HCPCS: Performed by: STUDENT IN AN ORGANIZED HEALTH CARE EDUCATION/TRAINING PROGRAM

## 2022-02-14 PROCEDURE — G0378 HOSPITAL OBSERVATION PER HR: HCPCS

## 2022-02-14 PROCEDURE — 78452 HT MUSCLE IMAGE SPECT MULT: CPT

## 2022-02-14 PROCEDURE — 36415 COLL VENOUS BLD VENIPUNCTURE: CPT

## 2022-02-14 PROCEDURE — 93010 ELECTROCARDIOGRAM REPORT: CPT | Performed by: INTERNAL MEDICINE

## 2022-02-14 PROCEDURE — 6370000000 HC RX 637 (ALT 250 FOR IP): Performed by: STUDENT IN AN ORGANIZED HEALTH CARE EDUCATION/TRAINING PROGRAM

## 2022-02-14 PROCEDURE — 93017 CV STRESS TEST TRACING ONLY: CPT

## 2022-02-14 PROCEDURE — 2580000003 HC RX 258: Performed by: STUDENT IN AN ORGANIZED HEALTH CARE EDUCATION/TRAINING PROGRAM

## 2022-02-14 PROCEDURE — A9500 TC99M SESTAMIBI: HCPCS | Performed by: STUDENT IN AN ORGANIZED HEALTH CARE EDUCATION/TRAINING PROGRAM

## 2022-02-14 PROCEDURE — 84484 ASSAY OF TROPONIN QUANT: CPT

## 2022-02-14 RX ORDER — SODIUM CHLORIDE 0.9 % (FLUSH) 0.9 %
5-40 SYRINGE (ML) INJECTION PRN
Status: DISCONTINUED | OUTPATIENT
Start: 2022-02-14 | End: 2022-02-14

## 2022-02-14 RX ORDER — SODIUM CHLORIDE 0.9 % (FLUSH) 0.9 %
10 SYRINGE (ML) INJECTION PRN
Status: DISCONTINUED | OUTPATIENT
Start: 2022-02-14 | End: 2022-02-14 | Stop reason: HOSPADM

## 2022-02-14 RX ORDER — NITROGLYCERIN 0.4 MG/1
0.4 TABLET SUBLINGUAL EVERY 5 MIN PRN
Status: DISCONTINUED | OUTPATIENT
Start: 2022-02-14 | End: 2022-02-14

## 2022-02-14 RX ORDER — METOPROLOL TARTRATE 5 MG/5ML
5 INJECTION INTRAVENOUS EVERY 5 MIN PRN
Status: DISCONTINUED | OUTPATIENT
Start: 2022-02-14 | End: 2022-02-14

## 2022-02-14 RX ORDER — ATROPINE SULFATE 0.1 MG/ML
0.5 INJECTION INTRAVENOUS EVERY 5 MIN PRN
Status: DISCONTINUED | OUTPATIENT
Start: 2022-02-14 | End: 2022-02-14

## 2022-02-14 RX ORDER — AMINOPHYLLINE DIHYDRATE 25 MG/ML
50 INJECTION, SOLUTION INTRAVENOUS PRN
Status: DISCONTINUED | OUTPATIENT
Start: 2022-02-14 | End: 2022-02-14

## 2022-02-14 RX ORDER — ALBUTEROL SULFATE 90 UG/1
2 AEROSOL, METERED RESPIRATORY (INHALATION) PRN
Status: DISCONTINUED | OUTPATIENT
Start: 2022-02-14 | End: 2022-02-14

## 2022-02-14 RX ORDER — NAPROXEN 500 MG/1
500 TABLET ORAL 2 TIMES DAILY PRN
Qty: 20 TABLET | Refills: 0 | Status: SHIPPED | OUTPATIENT
Start: 2022-02-14 | End: 2022-02-24

## 2022-02-14 RX ORDER — SODIUM CHLORIDE 9 MG/ML
500 INJECTION, SOLUTION INTRAVENOUS CONTINUOUS PRN
Status: DISCONTINUED | OUTPATIENT
Start: 2022-02-14 | End: 2022-02-14

## 2022-02-14 RX ADMIN — TETRAKIS(2-METHOXYISOBUTYLISOCYANIDE)COPPER(I) TETRAFLUOROBORATE 40 MILLICURIE: 1 INJECTION, POWDER, LYOPHILIZED, FOR SOLUTION INTRAVENOUS at 09:56

## 2022-02-14 RX ADMIN — VALSARTAN 80 MG: 80 TABLET, FILM COATED ORAL at 08:24

## 2022-02-14 RX ADMIN — TETRAKIS(2-METHOXYISOBUTYLISOCYANIDE)COPPER(I) TETRAFLUOROBORATE 16 MILLICURIE: 1 INJECTION, POWDER, LYOPHILIZED, FOR SOLUTION INTRAVENOUS at 07:47

## 2022-02-14 RX ADMIN — REGADENOSON 0.4 MG: 0.08 INJECTION, SOLUTION INTRAVENOUS at 09:58

## 2022-02-14 RX ADMIN — ATORVASTATIN CALCIUM 20 MG: 20 TABLET, FILM COATED ORAL at 08:24

## 2022-02-14 RX ADMIN — PANTOPRAZOLE SODIUM 40 MG: 40 TABLET, DELAYED RELEASE ORAL at 08:24

## 2022-02-14 RX ADMIN — SODIUM CHLORIDE, PRESERVATIVE FREE 10 ML: 5 INJECTION INTRAVENOUS at 09:31

## 2022-02-14 RX ADMIN — SODIUM CHLORIDE, PRESERVATIVE FREE 10 ML: 5 INJECTION INTRAVENOUS at 09:56

## 2022-02-14 RX ADMIN — SODIUM CHLORIDE, PRESERVATIVE FREE 10 ML: 5 INJECTION INTRAVENOUS at 07:47

## 2022-02-14 RX ADMIN — SODIUM CHLORIDE, PRESERVATIVE FREE 10 ML: 5 INJECTION INTRAVENOUS at 09:59

## 2022-02-14 RX ADMIN — HYDROCHLOROTHIAZIDE 12.5 MG: 25 TABLET ORAL at 08:24

## 2022-02-14 NOTE — DISCHARGE SUMMARY
CDU Discharge Summary        Patient:  Osbaldo Ortez  YOB: 1970    MRN: [id number]   Acct: [de-identified]    Primary Care Physician: Arun Evans MD    Admit date:  2/13/2022  8:18 AM  Discharge date: 2/14/2022 12:47 PM     Discharge Diagnoses:     Acute chest pain due to unknown  Improved with NSAIDs and rest    Follow-up:  Call today/tomorrow for a follow up appointment with Arun Evans MD , or return to the Emergency Room with worsening symptoms    Stressed to patient the importance of following up with primary care doctor for further workup/management of symptoms. Pt verbalizes understanding and agrees with plan.     Discharge Medications:  Changes to medications            Medication List      START taking these medications    naproxen 500 MG tablet  Commonly known as: NAPROSYN  Take 1 tablet by mouth 2 times daily as needed for Pain        CONTINUE taking these medications    Acetaminophen Extra Strength 500 MG tablet  Generic drug: acetaminophen  take 2 tablets by mouth every 6 hours if needed for pain     * albuterol sulfate  (90 Base) MCG/ACT inhaler  Commonly known as: ProAir HFA  inhale 2 puffs by mouth every 6 hours if needed for wheezing     * albuterol (2.5 MG/3ML) 0.083% nebulizer solution  Commonly known as: PROVENTIL  Take 3 mLs by nebulization every 6 hours as needed for Wheezing     aspirin 81 MG EC tablet  Commonly known as: RA Aspirin EC  take 1 tablet by mouth once daily     atorvastatin 20 MG tablet  Commonly known as: LIPITOR  1 tablet once daily     carvedilol 6.25 MG tablet  Commonly known as: COREG  1 tablet 2/day     fluticasone 50 MCG/ACT nasal spray  Commonly known as: FLONASE  instill 2 sprays into each nostril once daily     melatonin 5 MG Tabs tablet  Commonly known as: RA Melatonin  take 1 tablet by mouth nightly     meloxicam 7.5 MG tablet  Commonly known as: Mobic  Take 1 tablet by mouth daily     mometasone-formoterol 200-5 MCG/ACT inhaler  Commonly known as: Dulera  inhale 2 puffs by mouth twice a day     omeprazole 40 MG delayed release capsule  Commonly known as: PRILOSEC  take 1 capsule by mouth once daily if needed     ondansetron 4 MG disintegrating tablet  Commonly known as: ZOFRAN-ODT  dissolve 1 tablet ON TONGUE every 8 hours if needed for nausea and vomiting     senna 8.6 MG tablet  Commonly known as: RA Senna  take 1 tablet by mouth twice a day     simethicone 80 MG chewable tablet  Commonly known as: MYLICON  Take 1 tablet by mouth 4 times daily as needed for Flatulence     valsartan-hydroCHLOROthiazide 80-12.5 MG per tablet  Commonly known as: DIOVAN-HCT  Once a day     WOMENS MULTI VITAMIN & MINERAL PO         * This list has 2 medication(s) that are the same as other medications prescribed for you. Read the directions carefully, and ask your doctor or other care provider to review them with you. Where to Get Your Medications      You can get these medications from any pharmacy    Bring a paper prescription for each of these medications  · naproxen 500 MG tablet         Diet:  No diet orders on file , Advance as tolerated     Activity:  As tolerated    Consultants: None    Procedures:  Not indicated     Diagnostic Test:   Results for orders placed or performed during the hospital encounter of 02/13/22   SARS-CoV-2 NAAT (Rapid)    Specimen: Nasopharyngeal Swab   Result Value Ref Range    Specimen Description . NASOPHARYNGEAL SWAB     SARS-CoV-2, Rapid Not Detected Not Detected   CBC Auto Differential   Result Value Ref Range    WBC 9.1 3.5 - 11.3 k/uL    RBC 4.67 3.95 - 5.11 m/uL    Hemoglobin 12.7 11.9 - 15.1 g/dL    Hematocrit 40.9 36.3 - 47.1 %    MCV 87.6 82.6 - 102.9 fL    MCH 27.2 25.2 - 33.5 pg    MCHC 31.1 28.4 - 34.8 g/dL    RDW 14.6 (H) 11.8 - 14.4 %    Platelets 120 674 - 089 k/uL    MPV 11.1 8.1 - 13.5 fL    NRBC Automated 0.0 0.0 per 100 WBC    Differential Type NOT REPORTED     Seg Neutrophils 45 36 - 65 %    Lymphocytes 45 (H) 24 - 43 %    Monocytes 8 3 - 12 %    Eosinophils % 2 1 - 4 %    Basophils 0 0 - 2 %    Immature Granulocytes 0 0 %    Segs Absolute 4.05 1.50 - 8.10 k/uL    Absolute Lymph # 4.07 (H) 1.10 - 3.70 k/uL    Absolute Mono # 0.72 0.10 - 1.20 k/uL    Absolute Eos # 0.15 0.00 - 0.44 k/uL    Basophils Absolute 0.04 0.00 - 0.20 k/uL    Absolute Immature Granulocyte 0.03 0.00 - 0.30 k/uL    WBC Morphology NOT REPORTED     RBC Morphology ANISOCYTOSIS PRESENT     Platelet Estimate NOT REPORTED    Basic Metabolic Panel w/ Reflex to MG   Result Value Ref Range    Glucose 107 (H) 70 - 99 mg/dL    BUN 8 6 - 20 mg/dL    CREATININE 0.65 0.50 - 0.90 mg/dL    Bun/Cre Ratio NOT REPORTED 9 - 20    Calcium 9.3 8.6 - 10.4 mg/dL    Sodium 137 135 - 144 mmol/L    Potassium 4.0 3.7 - 5.3 mmol/L    Chloride 102 98 - 107 mmol/L    CO2 22 20 - 31 mmol/L    Anion Gap 13 9 - 17 mmol/L    GFR Non-African American >60 >60 mL/min    GFR African American >60 >60 mL/min    GFR Comment          GFR Staging NOT REPORTED    Troponin   Result Value Ref Range    Troponin, High Sensitivity <6 0 - 14 ng/L    Troponin T NOT REPORTED <0.03 ng/mL    Troponin Interp NOT REPORTED    Brain Natriuretic Peptide   Result Value Ref Range    Pro-BNP 25 <300 pg/mL    BNP Interpretation NOT REPORTED    Troponin   Result Value Ref Range    Troponin, High Sensitivity <6 0 - 14 ng/L    Troponin T NOT REPORTED <0.03 ng/mL    Troponin Interp NOT REPORTED    Troponin   Result Value Ref Range    Troponin, High Sensitivity 7 0 - 14 ng/L    Troponin T NOT REPORTED <0.03 ng/mL    Troponin Interp NOT REPORTED    EKG 12 Lead   Result Value Ref Range    Ventricular Rate 69 BPM    Atrial Rate 69 BPM    P-R Interval 148 ms    QRS Duration 72 ms    Q-T Interval 386 ms    QTc Calculation (Bazett) 413 ms    P Axis 68 degrees    R Axis 8 degrees   EKG 12 Lead   Result Value Ref Range    Ventricular Rate 66 BPM    Atrial Rate 66 BPM    P-R Interval 174 ms    QRS Duration 82 ms    Q-T Interval 406 ms    QTc Calculation (Bazett) 425 ms    P Axis 73 degrees    R Axis 31 degrees    T Axis 30 degrees     CT Head WO Contrast    Result Date: 2/13/2022  EXAMINATION: CT OF THE HEAD WITHOUT CONTRAST  2/13/2022 9:28 am TECHNIQUE: CT of the head was performed without the administration of intravenous contrast. Dose modulation, iterative reconstruction, and/or weight based adjustment of the mA/kV was utilized to reduce the radiation dose to as low as reasonably achievable. COMPARISON: MRI brain 04/23/2019 HISTORY: Reason for Exam: eval for ischemia FINDINGS: BRAIN/VENTRICLES: No acute intracranial hemorrhage, mass effect or midline shift. No abnormal extra-axial fluid collection. The gray-white differentiation is maintained without evidence of an acute infarct. No evidence of hydrocephalus. ORBITS: The visualized portion of the orbits demonstrate no acute abnormality. Remote left lamina papyracea fracture. SINUSES: The visualized paranasal sinuses and mastoid air cells appear clear. SOFT TISSUES/SKULL:  No acute abnormality of the visualized skull or soft tissues. Negative CT brain with no acute intracranial abnormality. XR CHEST PORTABLE    Result Date: 2/13/2022  EXAMINATION: ONE XRAY VIEW OF THE CHEST 2/13/2022 8:55 am COMPARISON: 12/21/2020 HISTORY: ORDERING SYSTEM PROVIDED HISTORY: chest pain FINDINGS: The lungs are without acute focal process. No effusion or pneumothorax. The cardiomediastinal silhouette is normal.  The osseous structures are intact without acute process. Negative chest.     NM MYOCARDIAL SPECT REST EXERCISE OR RX    Result Date: 2/14/2022  EXAMINATION: MYOCARDIAL PERFUSION IMAGING 2/14/2022 9:53 am TECHNIQUE: For the rest study, 16 mCi of Tc 99 labeled sestamibi were injected. SPECT images were acquired. Under cardiology supervision, 0.4mg [employer] was infused. After pharmacologic stress, 40 mCi of Tc 99 labeled sestamibi were injected.   SPECT images with ECG gating were acquired. COMPARISON: None Available. HISTORY: ORDERING SYSTEM PROVIDED HISTORY: chest pain TECHNOLOGIST PROVIDED HISTORY: chest pain Reason for Exam: Chest pain Procedure Type->Rx Is the patient pregnant?->No Reason for Exam: chest pain, hypertension Relevant Medical/Surgical History: hx of cardiac cath 2015, 2016 FINDINGS: Images interpreted utilizing [employer] and [employer]. There is no evidence for a significant reversible or fixed perfusion defect. The gated images show no wall motion abnormalities. Normal myocardial thickening. Perfusion scores are visually adjusted to account for artifact. Summed stress score:  0 Summed rest score:  0 Summed reversibility score:  0 Function: End diastolic volume:  03TM Left ventricular ejection fraction:  75% TID score:  1.02 (scores greater than 1.39 are considered elevated for Lexiscan stress with Tc99m) Notes concerning risk stratification: Risk stratification incorporates both clinical history and some testing results. Final risk determination is the responsibility of the ordering provider as other patient information and test results may increase or decrease the risk assessment reported for this examination. Risk stratification criteria are adapted from \"Noninvasive Risk Stratification\" criteria from [employer]. Al, ACC/AATS/AHA/ASE/ASNC/SCAI/SCCT/STS 2017 Appropriate Use Criteria For Coronary Revascularization in Patients With Stable Ischemic Heart Disease [facility] Volume 69, Issue 17, May 2017 High risk (>3% annual death or MI) 1. Severe resting LV dysfunction (LVEF <35%) not readily explained by non coronary causes 2. Resting perfusion abnormalities greater than 10% of the myocardium in patients without prior history or evidence of MI 3. Stress-induced perfusion abnormalities encumbering greater than or equal to 10% myocardium or stress segmental scores indicating multiple vascular territories with abnormalities 4.  Stress-induced LV dilatation (TID influence of narcotics/ pain killers. Condition: Good    Patient stable and ready for discharge home. I have discussed plan of care with patient and they are in understanding. They were instructed to read discharge paperwork. All of their questions and concerns were addressed. Time Spent: 0 day      --  Qasim Cooper MD  Emergency Medicine Resident Physician    This dictation was generated by voice recognition computer software. Although all attempts are made to edit the dictation for accuracy, there may be errors in the transcription that are not intended.

## 2022-02-14 NOTE — CARE COORDINATION
Case Management Initial Discharge Plan  Suha Van             Met with:patient to discuss discharge plans. Information verified: address, contacts, phone number, , insurance Yes  Insurance Provider: [employer]    Emergency Contact/Next of Kin name & number:  Luther Mercado   Who are involved in patient's support system? family     PCP: Geovany Godinez MD  Date of last visit: last wk      Discharge Planning    Living Arrangements:    live with     Home has 1 stories  6 stairs to climb to get into front door    Patient able to perform ADL's:Independent    Current Services (outpatient & in home) none  DME equipment: none  DME provider: none    Is patient receiving oral anticoagulation therapy? No          Potential Assistance Needed:   f/u pcp       Evaluation: no    Expected Discharge date:       Patient expects to be discharged to:   home    If home: is the family and/or caregiver wiling & able to provide support at home? yes  Who will be providing this support?       Transportation provider: family   Transportation arrangements needed for discharge: No    Readmission Risk              Risk of Unplanned Readmission:  0             Does patient have a readmission risk score greater than 14?: No  If yes, follow-up appointment must be made within 7 days of discharge.      Goals of Care: safe transition plan       Educated yes on transitional options, provided freedom of choice and are agreeable with plan      Discharge Plan: return home with            Electronically signed by Timothy Godinez RN on 22 at 7:36 PM EST

## 2022-02-14 NOTE — PROCEDURES
[address]                  [facility]                              CARDIAC STRESS TEST    PATIENT NAME: Marry Gonzalez                    :        1970  MED REC NO:   [id number]                             ROOM:       7654  ACCOUNT NO:   [de-identified]                           ADMIT DATE: 2022  PROVIDER:     Kylee Fagan    DATE OF STUDY:  2022    ORDERING PROVIDER: Dr. Bette Lyons PROVIDER: Dr. Vences Shown: Dr. Ashkan Byrd:    Indication: Chest pain    Procedure explained and consent signed. Medications: Lexiscan, 0.4 mg  Resting Heart rate: 61 bpm  Resting blood pressure:  132/82 mm/Hg  Infusion heart rate:  101 bpm  Infusion blood pressure:  136/69 mm/Hg  Resting EKG: Abnormal, T inversion leads III and AVF  Stress heart response: Normal Response  Stress BP response: Appropriate  Stress EKG(S): No changes seen  Chest discomfort: 7/10 Chest pain during, resolved after test.  Ischemic EKG changes: None    IMPRESSION:  Electrocardiographically Negative Lexiscan stress study. Radio-isotope  results to follow from the department of Nuclear Medicine.              Taj Gibson    D: 2022 15:05:59       T: 2022 15:07:00     [id number]  Job#: [id number]     Doc#: Unknown    CC:

## 2022-02-14 NOTE — PROGRESS NOTES
OBS/CDU   RESIDENT NOTE      Patients PCP is:  Bud Arreaga MD        SUBJECTIVE      No acute events overnight. She states that her chest pain is improved. She has been n.p.o. all night. She has not had any issues with difficulty breathing nausea or vomiting. She is resting comfortably. She has no questions or concerns at this time. PHYSICAL EXAM      General: NAD, AO X 3  Heent: EMOI, PERRL  Neck: SUPPLE, NO JVD  Cardiovascular: RRR, S1S2  Pulmonary: CTAB, NO SOB  Abdomen: SOFT, NTTP, ND, +BS  Extremities: +2/4 PULSES DISTAL, NO SWELLING  Neuro / Psych: NO NUMBNESS OR TINGLING, MENTATION AT BASELINE    PERTINENT TEST /EXAMS      I have reviewed all available laboratory results. MEDICATIONS CURRENT   aspirin EC tablet 81 mg, Daily  atorvastatin (LIPITOR) tablet 20 mg, Daily  carvedilol (COREG) tablet 6.25 mg, BID WC  pantoprazole (PROTONIX) tablet 40 mg, QAM AC  sodium chloride flush 0.9 % injection 5-40 mL, 2 times per day  sodium chloride flush 0.9 % injection 5-40 mL, PRN  0.9 % sodium chloride infusion, PRN  enoxaparin (LOVENOX) injection 40 mg, Daily  acetaminophen (TYLENOL) tablet 650 mg, Q4H PRN  ondansetron (ZOFRAN-ODT) disintegrating tablet 4 mg, Q8H PRN   Or  ondansetron (ZOFRAN) injection 4 mg, Q6H PRN  valsartan (DIOVAN) tablet 80 mg, Daily   And  hydroCHLOROthiazide (HYDRODIURIL) tablet 12.5 mg, Daily  melatonin tablet 5 mg, Nightly PRN    0.9 % sodium chloride infusion, Continuous        All medication charted and reviewed. CONSULTS      None    ASSESSMENT/PLAN       Fermin Ribeiro is a 45 y.o. female who presents with anginal chest pain. Patient had a heart score of 5 in the emergency department. Placed in the observation unit for further cardiac work up.     · Follow-up stress test  · Continue home medications and pain control  · Monitor vitals, labs, and imaging  · DISPO: pending consults and clinical improvement    --  Chrissy Ibarra MD  Emergency Medicine Resident Physician This dictation was generated by voice recognition computer software. Although all attempts are made to edit the dictation for accuracy, there may be errors in the transcription that are not intended.

## 2022-02-14 NOTE — PROGRESS NOTES
[address]  CDU / OBSERVATION ENCOUNTER  ATTENDING NOTE       I performed a history and physical examination of the patient and discussed management with the resident or midlevel provider. I reviewed the resident or midlevel provider's note and agree with the documented findings and plan of care. Any areas of disagreement are noted on the chart. I was personally present for the key portions of any procedures. I have documented in the chart those procedures where I was not present during the key portions. I have reviewed the nurses notes. I agree with the chief complaint, past medical history, past surgical history, allergies, medications, social and family history as documented unless otherwise noted below. The Family history, social history, and ROS are effectively unchanged since admission unless noted elsewhere in the chart. Patient with symptoms consistent with anginal discomfort. Patient with heart score of 4-5. Patient for stress testing today. Further disposition after testing and reevaluation    Stress test low risk. Patient responded nonsteroidals.   Will pursue outpatient follow-up with nonsteroidal treatment    Juve Kat MD  Attending Emergency  Physician

## 2022-02-16 RX ORDER — ONDANSETRON 4 MG/1
TABLET, ORALLY DISINTEGRATING ORAL
Qty: 10 TABLET | Refills: 5 | OUTPATIENT
Start: 2022-02-16

## 2022-02-18 RX ORDER — ONDANSETRON 4 MG/1
4 TABLET, ORALLY DISINTEGRATING ORAL EVERY 12 HOURS PRN
Qty: 15 TABLET | Refills: 1 | Status: SHIPPED | OUTPATIENT
Start: 2022-02-18 | End: 2022-08-11

## 2022-02-18 NOTE — TELEPHONE ENCOUNTER
Request for zofran.       Next Visit Date: Patient is on the waitlist for August.   Future Appointments   Date Time Provider Frotino Jean Baptiste   3/22/2022 10:45 AM Andrey Malhotra MD [facility] [address] Maintenance   Topic Date Due    COVID-19 Vaccine (3 - Booster for Pfizer series) 02/07/2022    Shingles Vaccine (2 of 2) 02/01/2023 (Originally 9/3/2021)    Depression Screen  07/09/2022    Lipid screen  11/17/2022    Colorectal Cancer Screen  02/08/2023    Potassium monitoring  02/13/2023    Creatinine monitoring  02/13/2023    Breast cancer screen  08/24/2023    Diabetes screen  07/09/2024    DTaP/Tdap/Td vaccine (2 - Td or Tdap) 02/05/2026    Cervical cancer screen  09/07/2026    Pneumococcal 0-64 years Vaccine (2 of 2 - PPSV23) 12/04/2034    Flu vaccine  Completed    Hepatitis C screen  Completed    HIV screen  Completed    Hepatitis A vaccine  Aged Out    Hepatitis B vaccine  Aged Out    Hib vaccine  Aged Out    Meningococcal (ACWY) vaccine  Aged Out       Hemoglobin A1C (%)   Date Value   07/09/2021 5.6   10/06/2020 5.7   03/03/2020 6.0             ( goal A1C is < 7)   No results found for: LABMICR  LDL Cholesterol (mg/dL)   Date Value   11/17/2021 99       (goal LDL is <100)   AST (U/L)   Date Value   07/09/2021 20     ALT (U/L)   Date Value   07/09/2021 21     BUN (mg/dL)   Date Value   02/13/2022 8     BP Readings from Last 3 Encounters:   02/13/22 114/69   02/01/22 125/71   11/17/21 131/65          (goal 120/80)    All Future Testing planned in [facility]  Lab Frequency Next Occurrence         Patient Active Problem List:     Hypertension     Asthma     GERD (gastroesophageal reflux disease)     S/P cardiac cath-Minimal disease 11/23/15 Dr. Sharin Primrose     Irritable bowel syndrome with constipation     Obesity     Allergic rhinitis     Hiatal hernia     Paresthesias     Left sided numbness     Dysphagia     Family history of colon cancer     DNS (deviated nasal septum)     Hypertrophy of both inferior nasal turbinates     Iron deficiency anemia secondary to inadequate dietary iron intake     Iron deficiency anemia, unspecified     Atypical chest pain

## 2022-02-23 ASSESSMENT — ENCOUNTER SYMPTOMS
BLOOD IN STOOL: 0
PHOTOPHOBIA: 0
BACK PAIN: 1
COUGH: 0
SHORTNESS OF BREATH: 0
WHEEZING: 0
ABDOMINAL PAIN: 1
CONSTIPATION: 1

## 2022-02-28 DIAGNOSIS — M25.552 LEFT HIP PAIN: ICD-10-CM

## 2022-02-28 RX ORDER — IBUPROFEN 800 MG/1
TABLET ORAL
Qty: 30 TABLET | Refills: 3 | OUTPATIENT
Start: 2022-02-28

## 2022-02-28 RX ORDER — SENNA PLUS 8.6 MG/1
TABLET ORAL
Qty: 60 TABLET | Refills: 5 | OUTPATIENT
Start: 2022-02-28

## 2022-02-28 NOTE — TELEPHONE ENCOUNTER
Request for Senna , Ibuprofen      Next Visit Date:  Future Appointments   Date Time Provider Fortino Ita   3/22/2022 10:45 AM Yuridia Everett MD [facility] [address] Maintenance   Topic Date Due    COVID-19 Vaccine (3 - Booster for Pfizer series) 02/07/2022    Shingles Vaccine (2 of 2) 02/01/2023 (Originally 9/3/2021)    Depression Screen  07/09/2022    Lipid screen  11/17/2022    Colorectal Cancer Screen  02/08/2023    Potassium monitoring  02/13/2023    Creatinine monitoring  02/13/2023    Breast cancer screen  08/24/2023    Diabetes screen  07/09/2024    DTaP/Tdap/Td vaccine (2 - Td or Tdap) 02/05/2026    Cervical cancer screen  09/07/2026    Pneumococcal 0-64 years Vaccine (2 of 2 - PPSV23) 12/04/2034    Flu vaccine  Completed    Hepatitis C screen  Completed    HIV screen  Completed    Hepatitis A vaccine  Aged Out    Hepatitis B vaccine  Aged Out    Hib vaccine  Aged Out    Meningococcal (ACWY) vaccine  Aged Out       Hemoglobin A1C (%)   Date Value   07/09/2021 5.6   10/06/2020 5.7   03/03/2020 6.0             ( goal A1C is < 7)   No results found for: LABMICR  LDL Cholesterol (mg/dL)   Date Value   11/17/2021 99       (goal LDL is <100)   AST (U/L)   Date Value   07/09/2021 20     ALT (U/L)   Date Value   07/09/2021 21     BUN (mg/dL)   Date Value   02/13/2022 8     BP Readings from Last 3 Encounters:   02/13/22 114/69   02/01/22 125/71   11/17/21 131/65          (goal 120/80)    All Future Testing planned in [facility]  Lab Frequency Next Occurrence         Patient Active Problem List:     Hypertension     Asthma     GERD (gastroesophageal reflux disease)     S/P cardiac cath-Minimal disease 11/23/15 Dr. Ailyn Dietrich     Irritable bowel syndrome with constipation     Obesity     Allergic rhinitis     Hiatal hernia     Paresthesias     Left sided numbness     Dysphagia     Family history of colon cancer     DNS (deviated nasal septum)     Hypertrophy of both inferior nasal turbinates     Iron deficiency anemia secondary to inadequate dietary iron intake     Iron deficiency anemia, unspecified     Atypical chest pain

## 2022-03-01 RX ORDER — IBUPROFEN 800 MG/1
TABLET ORAL
Qty: 30 TABLET | Refills: 0 | Status: SHIPPED | OUTPATIENT
Start: 2022-03-01 | End: 2022-05-25

## 2022-03-01 RX ORDER — SENNA PLUS 8.6 MG/1
TABLET ORAL
Qty: 30 TABLET | Refills: 5 | Status: SHIPPED | OUTPATIENT
Start: 2022-03-01 | End: 2022-08-16

## 2022-05-05 ENCOUNTER — TELEPHONE (OUTPATIENT)
Dept: OBGYN | Age: 52
End: 2022-05-05

## 2022-05-25 DIAGNOSIS — M25.552 LEFT HIP PAIN: ICD-10-CM

## 2022-05-25 RX ORDER — LIDOCAINE 50 MG/G
PATCH TOPICAL
Qty: 10 PATCH | Refills: 0 | Status: SHIPPED | OUTPATIENT
Start: 2022-05-25 | End: 2022-08-30 | Stop reason: SDUPTHER

## 2022-05-25 RX ORDER — IBUPROFEN 800 MG/1
TABLET ORAL
Qty: 30 TABLET | Refills: 0 | Status: SHIPPED | OUTPATIENT
Start: 2022-05-25 | End: 2022-06-28

## 2022-06-20 ENCOUNTER — TELEPHONE (OUTPATIENT)
Dept: INTERNAL MEDICINE | Age: 52
End: 2022-06-20

## 2022-06-20 NOTE — TELEPHONE ENCOUNTER
----- Message from Maira Farris sent at 6/20/2022  3:27 PM EDT -----  Subject: Appointment Request    Reason for Call: Routine Existing Condition Follow Up (Diabetes)    QUESTIONS  Type of Appointment? Established Patient  Reason for appointment request? Available appointments did not meet   patient need  Additional Information for Provider? Patient is needing to get in for her   Blood pressure. She is wanting to be seen sometime this week.   ---------------------------------------------------------------------------  --------------  CALL BACK INFO  What is the best way for the office to contact you? OK to leave message on   voicemail  Preferred Call Back Phone Number? [phone]  ---------------------------------------------------------------------------  --------------  SCRIPT ANSWERS  Relationship to Patient? Self  Is this follow up request related to routine Diabetes Management? Yes  Have you been diagnosed with COVID-19 in the past 10 days? No  (Service Expert  click yes below to proceed with [email or web address] As Usual   Scheduling)?  Yes

## 2022-06-24 DIAGNOSIS — M25.552 LEFT HIP PAIN: ICD-10-CM

## 2022-06-24 NOTE — TELEPHONE ENCOUNTER
Request for IBU.       Next Visit Date:  Future Appointments   Date Time Provider Fortino Jean Baptiste   6/28/2022  2:00 PM Angel Sheppard MD [facility] [address] Maintenance   Topic Date Due    Pneumococcal 0-64 years Vaccine (2 - PCV) 01/21/2017    COVID-19 Vaccine (3 - Booster for Pfizer series) 02/07/2022    Depression Screen  07/09/2022    Shingles vaccine (2 of 2) 02/01/2023 (Originally 9/3/2021)    Lipids  11/17/2022    Colorectal Cancer Screen  02/08/2023    Breast cancer screen  08/24/2023    Diabetes screen  07/09/2024    DTaP/Tdap/Td vaccine (2 - Td or Tdap) 02/05/2026    Cervical cancer screen  09/07/2026    Flu vaccine  Completed    Hepatitis C screen  Completed    HIV screen  Completed    Hepatitis A vaccine  Aged Out    Hepatitis B vaccine  Aged Out    Hib vaccine  Aged Out    Meningococcal (ACWY) vaccine  Aged Out       Hemoglobin A1C (%)   Date Value   07/09/2021 5.6   10/06/2020 5.7   03/03/2020 6.0             ( goal A1C is < 7)   No results found for: LABMICR  LDL Cholesterol (mg/dL)   Date Value   11/17/2021 99       (goal LDL is <100)   AST (U/L)   Date Value   07/09/2021 20     ALT (U/L)   Date Value   07/09/2021 21     BUN (mg/dL)   Date Value   02/13/2022 8     BP Readings from Last 3 Encounters:   02/13/22 114/69   02/01/22 125/71   11/17/21 131/65          (goal 120/80)    All Future Testing planned in [facility]  Lab Frequency Next Occurrence         Patient Active Problem List:     Hypertension     Asthma     GERD (gastroesophageal reflux disease)     S/P cardiac cath-Minimal disease 11/23/15 Dr. Deborah Lopez     Irritable bowel syndrome with constipation     Obesity     Allergic rhinitis     Hiatal hernia     Paresthesias     Left sided numbness     Dysphagia     Family history of colon cancer     DNS (deviated nasal septum)     Hypertrophy of both inferior nasal turbinates     Iron deficiency anemia secondary to inadequate dietary iron intake     Iron deficiency anemia, unspecified     Atypical chest pain

## 2022-06-28 ENCOUNTER — OFFICE VISIT (OUTPATIENT)
Dept: INTERNAL MEDICINE | Age: 52
End: 2022-06-28
Payer: MEDICARE

## 2022-06-28 VITALS
HEART RATE: 64 BPM | SYSTOLIC BLOOD PRESSURE: 120 MMHG | TEMPERATURE: 97.7 F | WEIGHT: 202 LBS | OXYGEN SATURATION: 98 % | BODY MASS INDEX: 39.45 KG/M2 | DIASTOLIC BLOOD PRESSURE: 68 MMHG

## 2022-06-28 DIAGNOSIS — J06.9 UPPER RESPIRATORY TRACT INFECTION, UNSPECIFIED TYPE: Primary | ICD-10-CM

## 2022-06-28 DIAGNOSIS — R73.02 IGT (IMPAIRED GLUCOSE TOLERANCE): ICD-10-CM

## 2022-06-28 DIAGNOSIS — E78.2 MIXED HYPERLIPIDEMIA: ICD-10-CM

## 2022-06-28 DIAGNOSIS — I10 ESSENTIAL HYPERTENSION: ICD-10-CM

## 2022-06-28 DIAGNOSIS — J45.50 SEVERE PERSISTENT ASTHMA WITHOUT COMPLICATION: ICD-10-CM

## 2022-06-28 PROCEDURE — 99213 OFFICE O/P EST LOW 20 MIN: CPT | Performed by: INTERNAL MEDICINE

## 2022-06-28 PROCEDURE — 99211 OFF/OP EST MAY X REQ PHY/QHP: CPT | Performed by: INTERNAL MEDICINE

## 2022-06-28 PROCEDURE — 3017F COLORECTAL CA SCREEN DOC REV: CPT | Performed by: INTERNAL MEDICINE

## 2022-06-28 PROCEDURE — G8417 CALC BMI ABV UP PARAM F/U: HCPCS | Performed by: INTERNAL MEDICINE

## 2022-06-28 PROCEDURE — G8427 DOCREV CUR MEDS BY ELIG CLIN: HCPCS | Performed by: INTERNAL MEDICINE

## 2022-06-28 PROCEDURE — 1036F TOBACCO NON-USER: CPT | Performed by: INTERNAL MEDICINE

## 2022-06-28 RX ORDER — BLOOD PRESSURE TEST KIT
KIT MISCELLANEOUS
Qty: 1 EACH | Refills: 0 | Status: SHIPPED | OUTPATIENT
Start: 2022-06-28 | End: 2022-10-25

## 2022-06-28 RX ORDER — IBUPROFEN 800 MG/1
TABLET ORAL
Qty: 30 TABLET | Refills: 0 | Status: SHIPPED | OUTPATIENT
Start: 2022-06-28 | End: 2022-10-27

## 2022-06-28 RX ORDER — AZITHROMYCIN 250 MG/1
250 TABLET, FILM COATED ORAL SEE ADMIN INSTRUCTIONS
Qty: 6 TABLET | Refills: 0 | Status: SHIPPED | OUTPATIENT
Start: 2022-06-28 | End: 2022-07-03

## 2022-06-28 RX ORDER — FEXOFENADINE HCL 180 MG/1
180 TABLET ORAL DAILY
Qty: 30 TABLET | Refills: 0 | Status: SHIPPED | OUTPATIENT
Start: 2022-06-28 | End: 2022-07-28

## 2022-06-28 ASSESSMENT — ENCOUNTER SYMPTOMS
COUGH: 1
EYE ITCHING: 0
SHORTNESS OF BREATH: 0
SINUS PRESSURE: 1
SINUS PAIN: 0
TROUBLE SWALLOWING: 1
SORE THROAT: 1
EYE DISCHARGE: 0
VOICE CHANGE: 1
WHEEZING: 0
EYE REDNESS: 0
RHINORRHEA: 1

## 2022-06-28 ASSESSMENT — PATIENT HEALTH QUESTIONNAIRE - PHQ9
2. FEELING DOWN, DEPRESSED OR HOPELESS: 0
1. LITTLE INTEREST OR PLEASURE IN DOING THINGS: 0
SUM OF ALL RESPONSES TO PHQ QUESTIONS 1-9: 0
SUM OF ALL RESPONSES TO PHQ QUESTIONS 1-9: 0
SUM OF ALL RESPONSES TO PHQ9 QUESTIONS 1 & 2: 0
SUM OF ALL RESPONSES TO PHQ QUESTIONS 1-9: 0
SUM OF ALL RESPONSES TO PHQ QUESTIONS 1-9: 0

## 2022-06-28 NOTE — PROGRESS NOTES
[facility]    Progress Note    Date of patient's visit: 6/28/2022    Patient's Name:  Pamela Gillis    YOB: 1970            Patient Care Team:  Sun Guthrie MD as PCP - General (Internal Medicine)  Sun Guthrie MD as PCP - [facility] Elizabeth Bailey Provider    REASON FOR VISIT: Routine outpatient follow     Chief Complaint   Patient presents with    Hypertension     pt states that her R arm has been running higher than her L arm -- thinks it is her bP cuff would like new script     Health Maintenance     vaccines declined     Pharyngitis     Pt c/o having sore throat and cough x 3 days-- states she gets tested for COVID at work so she knows she is negative for that          HISTORY OF PRESENT ILLNESS:    History was obtained from the patient. Pamela Gillis is a 45 y.o. is here for follow-up of hypertension but she also complains of nasal congestion and cough with a lot of green phlegm. She says she is having some headaches from the coughing. She has some sinus pressure. She has a history of deviated nasal septum and underwent septoplasty and turbinate reduction in 2020. She was advised to continue saline nasal rinse after that. She has not seen ENT since then. She has asthma which she says is stable currently. She has not seen her pulmonologist in some time. She has not had to use her prednisone or other inhaler or nebulizer in some time. She denies fever or chills. No difficulty swallowing. She has a scratchy throat from cough and some change in her voice from the cough. She was in the ER earlier this year. She was having chest pain. Stress test was negative. She is compliant with all her medications.     NM stress test 2022  Impression       Normal study.       Risk stratification: Low          SEPTOPLASTY & BILATERAL TURBINATE REDUCTION N/A 6/25/2020   Performed by Alberto Amado MD at [address] [place] [address] 2002         Past Medical History:   Diagnosis Date    Allergic rhinitis     Asthma     Clinical trial participant 11/23/2015 11/23/20015    COPD (chronic obstructive pulmonary disease) (HCC)     Dysphagia     Dysphagia     has needed EGD with dilatation in the past    Family history of colon cancer 8/9/2017    Father    GERD (gastroesophageal reflux disease)     H/O cardiovascular stress test 01/15/2018    ABNORMAL    Hiatal hernia     Hyperlipidemia     Hypertension     Iron deficiency anemia secondary to inadequate dietary iron intake 5/13/2020    Iron deficiency anemia, unspecified 5/13/2020    Irritable bowel syndrome with constipation 1/21/2016    Obesity     Snores     states has tested negative for sleep apnea    Wears glasses        Past Surgical History:   Procedure Laterality Date    CARDIAC CATHETERIZATION  01/16/2018    CARDIAC CATHETERIZATION  11/23/2015     Minimal non obstructive CAD    COLONOSCOPY  2015    COLONOSCOPY  02/08/2018    Redundant colon    ENDOSCOPY, COLON, DIAGNOSTIC  2015    with dilatation    ENDOSCOPY, COLON, DIAGNOSTIC  02/08/2018    WNL     NOSE SURGERY      turbinate     MN COLON CA SCRN NOT HI RSK IND N/A 2/8/2018    COLONOSCOPY performed by Carito Turcios MD at [address] [place] [address] [address]  2003    UPPER GASTROINTESTINAL ENDOSCOPY      The endoscopic exam showed mildly tortuous esophagus. Savary dilation performed x 42 and 45 F.     UPPER GASTROINTESTINAL ENDOSCOPY N/A 7/18/2017    EGD DILATION SAVORY performed by Carito Tucrios MD at [facility]         ALLERGIES    No Known Allergies    MEDICATIONS:      Current Outpatient Medications on File Prior to Visit   Medication Sig Dispense Refill    ibuprofen (ADVIL;MOTRIN) 800 MG tablet take 1 tablet by mouth every 8 hours AS NEEDED FOR PAIN 30 tablet 0    lidocaine (LIDODERM) 5 % apply 1 patch TO THE AFFECTED AREA DAILY. FOR 10 DAYS LEAVE ON FOR 12 HOURS AND THEN OFF FOR 12 HOURS.  10 patch 0    senna (RA SENNA) 8.6 MG tablet take 1 tablet by mouth once a day 30 tablet 5    ondansetron (ZOFRAN-ODT) 4 MG disintegrating tablet Take 1 tablet by mouth every 12 hours as needed for Nausea or Vomiting 15 tablet 1    atorvastatin (LIPITOR) 20 MG tablet 1 tablet once daily 30 tablet 5    carvedilol (COREG) 6.25 MG tablet 1 tablet 2/day 60 tablet 5    albuterol sulfate HFA (PROAIR HFA) 108 (90 Base) MCG/ACT inhaler inhale 2 puffs by mouth every 6 hours if needed for wheezing 1 each 5    mometasone-formoterol (DULERA) 200-5 MCG/ACT inhaler inhale 2 puffs by mouth twice a day 1 each 5    omeprazole (PRILOSEC) 40 MG delayed release capsule take 1 capsule by mouth once daily if needed 30 capsule 5    valsartan-hydroCHLOROthiazide (DIOVAN-HCT) 80-12.5 MG per tablet Once a day 30 tablet 5    albuterol (PROVENTIL) (2.5 MG/3ML) 0.083% nebulizer solution Take 3 mLs by nebulization every 6 hours as needed for Wheezing 120 each 3    simethicone (MYLICON) 80 MG chewable tablet Take 1 tablet by mouth 4 times daily as needed for Flatulence 180 tablet 3    meloxicam (MOBIC) 7.5 MG tablet Take 1 tablet by mouth daily 30 tablet 2    fluticasone (FLONASE) 50 MCG/ACT nasal spray instill 2 sprays into each nostril once daily 16 g 3    melatonin (RA MELATONIN) 5 MG TABS tablet take 1 tablet by mouth nightly 30 tablet 3    aspirin (RA ASPIRIN EC) 81 MG EC tablet take 1 tablet by mouth once daily 30 tablet 5    ACETAMINOPHEN EXTRA STRENGTH 500 MG tablet take 2 tablets by mouth every 6 hours if needed for pain 30 tablet 2    Multiple Vitamins-Minerals (WOMENS MULTI VITAMIN & MINERAL PO) Take 1 tablet by mouth daily        Current Facility-Administered Medications on File Prior to Visit   Medication Dose Route Frequency Provider Last Rate Last Admin    0.9 % sodium chloride infusion   IntraVENous Continuous Thuan Garcia MD 30 mL/hr at 05/26/15 1008 New Bag at 05/26/15 1008       HISTORY    Reviewed and no change from previous record. Key Thomas  reports that she has never smoked. She has never used smokeless tobacco.    FAMILY HISTORY:    Reviewed and No change from previous visit    HEALTH MAINTENANCE DUE:      Health Maintenance Due   Topic Date Due    Pneumococcal 0-64 years Vaccine (2 - PCV) 01/21/2017    COVID-19 Vaccine (3 - Booster for Pfizer series) 02/07/2022    Depression Screen  07/09/2022       REVIEW OF SYSTEMS:    12 point review of symptoms completed and found to be normal except noted in the HPI    Review of Systems   Constitutional: Negative for chills, fatigue, fever and unexpected weight change. HENT: Positive for congestion, postnasal drip, rhinorrhea, sinus pressure, sore throat, trouble swallowing and voice change. Negative for sinus pain. Eyes: Negative for discharge, redness and itching. Respiratory: Positive for cough. Negative for shortness of breath and wheezing. Cardiovascular: Negative for chest pain, palpitations and leg swelling. Neurological: Positive for headaches. Negative for dizziness, seizures and weakness. Hematological: Negative for adenopathy. Does not bruise/bleed easily. PHYSICAL EXAM:     Vitals:    06/28/22 1425 06/28/22 1427   BP: 120/74 120/68   Site: Right Upper Arm Left Upper Arm   Position: Sitting Sitting   Cuff Size: Large Adult Large Adult   Pulse: 65 64   Temp: 97.7 °F (36.5 °C)    TempSrc: Infrared    SpO2: 98%    Weight: 202 lb (91.6 kg)      Body mass index is 39.45 kg/m². BP Readings from Last 3 Encounters:   06/28/22 120/68   02/13/22 114/69   02/01/22 125/71        Wt Readings from Last 3 Encounters:   06/28/22 202 lb (91.6 kg)   02/13/22 208 lb (94.3 kg)   02/01/22 208 lb (94.3 kg)       Physical Exam  Vitals and nursing note reviewed. Constitutional:       Appearance: Normal appearance. She is not ill-appearing. HENT:      Head: Normocephalic and atraumatic.       Right Ear: Tympanic membrane normal.      Left Ear: Tympanic membrane normal.      Nose: Congestion present. Comments: Swollen turbinates b/l with bluish discoloration     Mouth/Throat:      Mouth: Mucous membranes are moist.      Pharynx: Oropharynx is clear. No oropharyngeal exudate or posterior oropharyngeal erythema. Eyes:      Extraocular Movements: Extraocular movements intact. Conjunctiva/sclera: Conjunctivae normal.      Pupils: Pupils are equal, round, and reactive to light. Cardiovascular:      Rate and Rhythm: Normal rate and regular rhythm. Heart sounds: No murmur heard. Pulmonary:      Effort: Pulmonary effort is normal.      Breath sounds: Normal breath sounds. No wheezing. Musculoskeletal:      Cervical back: Neck supple. Right lower leg: No edema. Left lower leg: No edema. Lymphadenopathy:      Cervical: No cervical adenopathy. Neurological:      General: No focal deficit present. Mental Status: She is alert and oriented to person, place, and time.                LABORATORY FINDINGS:    CBC:  Lab Results   Component Value Date    WBC 9.1 02/13/2022    HGB 12.7 02/13/2022     02/13/2022     BMP:    Lab Results   Component Value Date     02/13/2022    K 4.0 02/13/2022     02/13/2022    CO2 22 02/13/2022    BUN 8 02/13/2022    CREATININE 0.65 02/13/2022    GLUCOSE 107 02/13/2022     HEMOGLOBIN A1C:   Lab Results   Component Value Date    LABA1C 5.6 07/09/2021     MICROALBUMIN URINE: No results found for: MICROALBUR  FASTING LIPID PANEL:  Lab Results   Component Value Date    CHOL 171 10/06/2020    HDL 49 11/17/2021    TRIG 96 10/06/2020     Lab Results   Component Value Date    LDLCHOLESTEROL 99 11/17/2021       LIVER PROFILE:  Lab Results   Component Value Date    ALT 21 07/09/2021    AST 20 07/09/2021    PROT 7.1 07/09/2021    BILITOT 0.26 07/09/2021    BILIDIR 0.12 05/02/2017    LABALBU 4.3 07/09/2021      THYROID FUNCTION:   Lab Results   Component Value Date    TSH 2.04 05/15/2020      URINEANALYSIS: No results found for: LABURIN  ASSESSMENT AND PLAN:    1. Upper respiratory tract infection, unspecified type    - fexofenadine (ALLEGRA) 180 MG tablet; Take 1 tablet by mouth daily  Dispense: 30 tablet; Refill: 0  - azithromycin (ZITHROMAX) 250 MG tablet; Take 1 tablet by mouth See Admin Instructions for 5 days 500mg on day 1 followed by 250mg on days 2 - 5  Dispense: 6 tablet; Refill: 0    2. Essential hypertension  Same meds    - Blood Pressure Monitoring (3 SERIES BP MONITOR/UPPER ARM) JOHNNY; Check BP 2/day  Dispense: 1 each; Refill: 0    3. Severe persistent asthma without complication  Controlled     4. IGT (impaired glucose tolerance)  Labs next visit     5. Mixed hyperlipidemia  On statins          FOLLOW UP AND INSTRUCTIONS:   Return in about 6 months (around 12/28/2022). 1. Beatriz received counseling on the following healthy behaviors: nutrition, exercise and medication adherence    2. Reviewed prior labs and health maintenance. 3. Discussed use, benefit, and side effects of prescribed medications. Barriers to medication compliance addressed. All patient questions answered. Pt voiced understanding.      4. Patient given educational materials - see patient instructions    Kendra Cata  Attending Physician, [address] [place] [address], Internal Medicine Residency Program  [address] [place] [address]  6/28/2022, 2:57 PM

## 2022-06-28 NOTE — LETTER
[address]  [address] [facility] [address] [address]  Phone: [phone]  Fax: [phone]    Evangelist Barragan MD        June 28, 9215     Patient: Nola Christie   YOB: 1970   Date of Visit: 6/28/2022       To Whom it May Concern:    Theodore Aparicio was seen in my clinic on 6/28/2022. If you have any questions or concerns, please don't hesitate to call.     Sincerely,         Evangelist Barragan MD

## 2022-06-28 NOTE — PATIENT INSTRUCTIONS
-Pt due for 6 month f/u in December-- pt to call in November to set up an appt--reminder in [facility] to contact patient as well--AVS given to patient    -EDGAR Holley

## 2022-06-30 ENCOUNTER — TELEPHONE (OUTPATIENT)
Dept: INTERNAL MEDICINE | Age: 52
End: 2022-06-30

## 2022-06-30 DIAGNOSIS — J06.9 UPPER RESPIRATORY TRACT INFECTION, UNSPECIFIED TYPE: Primary | ICD-10-CM

## 2022-06-30 RX ORDER — GUAIFENESIN 600 MG/1
600 TABLET, EXTENDED RELEASE ORAL 2 TIMES DAILY
Qty: 30 TABLET | Refills: 0 | Status: SHIPPED | OUTPATIENT
Start: 2022-06-30 | End: 2022-07-15

## 2022-06-30 NOTE — TELEPHONE ENCOUNTER
Pt is calling the office to let you know that she is still coughing, her cough is productive, pt states she coughing up greenish mucus, pt want some cough medication sent to her pharmacy [facility] on [address] and pt want a extension on going back to work, pt supposed to return tomorrow but pt want to go back on Tuesday thanks

## 2022-07-01 ENCOUNTER — HOSPITAL ENCOUNTER (OUTPATIENT)
Age: 52
Discharge: HOME | End: 2022-07-03
Payer: MEDICARE

## 2022-07-01 ENCOUNTER — HOSPITAL ENCOUNTER (OUTPATIENT)
Dept: GENERAL RADIOLOGY | Age: 52
Discharge: HOME | End: 2022-07-03
Payer: MEDICARE

## 2022-07-01 DIAGNOSIS — J06.9 UPPER RESPIRATORY TRACT INFECTION, UNSPECIFIED TYPE: ICD-10-CM

## 2022-07-01 PROCEDURE — 71046 X-RAY EXAM CHEST 2 VIEWS: CPT

## 2022-07-12 ENCOUNTER — OFFICE VISIT (OUTPATIENT)
Dept: INTERNAL MEDICINE | Age: 52
End: 2022-07-12
Payer: MEDICARE

## 2022-07-12 VITALS
HEIGHT: 60 IN | HEART RATE: 63 BPM | DIASTOLIC BLOOD PRESSURE: 93 MMHG | BODY MASS INDEX: 40.09 KG/M2 | WEIGHT: 204.2 LBS | SYSTOLIC BLOOD PRESSURE: 146 MMHG | OXYGEN SATURATION: 98 %

## 2022-07-12 DIAGNOSIS — I10 ESSENTIAL HYPERTENSION: ICD-10-CM

## 2022-07-12 DIAGNOSIS — J45.51 SEVERE PERSISTENT ASTHMA WITH ACUTE EXACERBATION: Primary | ICD-10-CM

## 2022-07-12 DIAGNOSIS — E78.2 MIXED HYPERLIPIDEMIA: ICD-10-CM

## 2022-07-12 DIAGNOSIS — J18.9 COMMUNITY ACQUIRED PNEUMONIA OF RIGHT LUNG, UNSPECIFIED PART OF LUNG: ICD-10-CM

## 2022-07-12 PROCEDURE — 99214 OFFICE O/P EST MOD 30 MIN: CPT | Performed by: INTERNAL MEDICINE

## 2022-07-12 PROCEDURE — 1036F TOBACCO NON-USER: CPT | Performed by: INTERNAL MEDICINE

## 2022-07-12 PROCEDURE — G8417 CALC BMI ABV UP PARAM F/U: HCPCS | Performed by: INTERNAL MEDICINE

## 2022-07-12 PROCEDURE — G8427 DOCREV CUR MEDS BY ELIG CLIN: HCPCS | Performed by: INTERNAL MEDICINE

## 2022-07-12 PROCEDURE — 3017F COLORECTAL CA SCREEN DOC REV: CPT | Performed by: INTERNAL MEDICINE

## 2022-07-12 RX ORDER — ALBUTEROL SULFATE 90 UG/1
AEROSOL, METERED RESPIRATORY (INHALATION)
Qty: 1 EACH | Refills: 5 | Status: SHIPPED | OUTPATIENT
Start: 2022-07-12

## 2022-07-12 RX ORDER — PREDNISONE 20 MG/1
20 TABLET ORAL 2 TIMES DAILY
Qty: 10 TABLET | Refills: 0 | Status: SHIPPED | OUTPATIENT
Start: 2022-07-12 | End: 2022-07-17

## 2022-07-12 RX ORDER — LEVOFLOXACIN 500 MG/1
500 TABLET, FILM COATED ORAL DAILY
Qty: 5 TABLET | Refills: 0 | Status: SHIPPED | OUTPATIENT
Start: 2022-07-12 | End: 2022-07-17

## 2022-07-12 RX ORDER — VALSARTAN AND HYDROCHLOROTHIAZIDE 80; 12.5 MG/1; MG/1
TABLET, FILM COATED ORAL
Qty: 30 TABLET | Refills: 5 | Status: SHIPPED | OUTPATIENT
Start: 2022-07-12

## 2022-07-12 RX ORDER — ATORVASTATIN CALCIUM 20 MG/1
TABLET, FILM COATED ORAL
Qty: 30 TABLET | Refills: 5 | Status: SHIPPED | OUTPATIENT
Start: 2022-07-12

## 2022-07-12 RX ORDER — ALBUTEROL SULFATE 2.5 MG/3ML
2.5 SOLUTION RESPIRATORY (INHALATION) EVERY 6 HOURS PRN
Qty: 120 EACH | Refills: 3 | Status: SHIPPED | OUTPATIENT
Start: 2022-07-12

## 2022-07-12 RX ORDER — CARVEDILOL 6.25 MG/1
TABLET ORAL
Qty: 60 TABLET | Refills: 5 | Status: SHIPPED | OUTPATIENT
Start: 2022-07-12

## 2022-07-12 ASSESSMENT — ENCOUNTER SYMPTOMS
RHINORRHEA: 0
COUGH: 1
CHEST TIGHTNESS: 1
PHOTOPHOBIA: 0
SHORTNESS OF BREATH: 1
WHEEZING: 1

## 2022-07-12 NOTE — PROGRESS NOTES
[facility]    Progress Note    Date of patient's visit: 7/12/2022    Patient's Name:  Francesca Benitez    YOB: 1970            Patient Care Team:  Vassie Holstein, MD as PCP - General (Internal Medicine)  Vassie Holstein, MD as PCP - [facility]    REASON FOR VISIT: Routine outpatient follow     Chief Complaint   Patient presents with    Asthma    Cough     patient states that her cough is no better         HISTORY OF PRESENT ILLNESS:    History was obtained from the patient. Francesca Benitez is a 45 y.o. is here for complaints of continuing cough and tightness in her chest.  She was treated for an upper respiratory infection earlier this month. She took Z-Roby. She says she is not having expectoration or fever or chills but she feels a tightness in her chest.  At night she is hearing a purring noise from her chest.  She continues to have a dry cough. Her nasal symptoms have improved. She is denying postnasal drip. No fever or chills. She had a chest x-ray which showed no infiltrates but some bilateral atelectasis which was minimal.  No pleuritic process was noted. Patient is denying pleuritic pain. She states she has been using her nebulizer twice a day.     CXR     FINDINGS:   Stable normal cardiac silhouette       There are minimal linear bibasilar densities.  Otherwise clear lungs.  No   significant pleural process           Impression   Minimal linear bibasilar atelectasis                 Past Medical History:   Diagnosis Date    Allergic rhinitis     Asthma     Clinical trial participant 11/23/2015 11/23/20015    COPD (chronic obstructive pulmonary disease) ([facility] [address]     Dysphagia     Dysphagia     has needed EGD with dilatation in the past    Family history of colon cancer 8/9/2017    Father    GERD (gastroesophageal reflux disease)     H/O cardiovascular stress test 01/15/2018    ABNORMAL    Hiatal hernia     Hyperlipidemia  Hypertension     Iron deficiency anemia secondary to inadequate dietary iron intake 5/13/2020    Iron deficiency anemia, unspecified 5/13/2020    Irritable bowel syndrome with constipation 1/21/2016    Obesity     Snores     states has tested negative for sleep apnea    Wears glasses        Past Surgical History:   Procedure Laterality Date    CARDIAC CATHETERIZATION  01/16/2018    CARDIAC CATHETERIZATION  11/23/2015     Minimal non obstructive CAD    COLONOSCOPY  2015    COLONOSCOPY  02/08/2018    Redundant colon    ENDOSCOPY, COLON, DIAGNOSTIC  2015    with dilatation    ENDOSCOPY, COLON, DIAGNOSTIC  02/08/2018    WNL     NASAL SEPTOPLASTY W/ TURBINOPLASTY  2020        NOSE SURGERY      turbinate     NV COLON CA SCRN NOT HI RSK IND N/A 2/8/2018    COLONOSCOPY performed by Tasha Joya MD at [address]  2003    UPPER GASTROINTESTINAL ENDOSCOPY      The endoscopic exam showed mildly tortuous esophagus. Savary dilation performed x 42 and 45 F.     UPPER GASTROINTESTINAL ENDOSCOPY N/A 7/18/2017    EGD DILATION SAVORY performed by Tasha Joya MD at [facility]         ALLERGIES    No Known Allergies    MEDICATIONS:      Current Outpatient Medications on File Prior to Visit   Medication Sig Dispense Refill    guaiFENesin (MUCINEX) 600 MG extended release tablet Take 1 tablet by mouth 2 times daily for 15 days 30 tablet 0    ibuprofen (ADVIL;MOTRIN) 800 MG tablet take 1 tablet by mouth every 8 hours AS NEEDED FOR PAIN 30 tablet 0    fexofenadine (ALLEGRA) 180 MG tablet Take 1 tablet by mouth daily 30 tablet 0    Blood Pressure Monitoring (3 SERIES BP MONITOR/UPPER ARM) JOHNNY Check BP 2/day 1 each 0    lidocaine (LIDODERM) 5 % apply 1 patch TO THE AFFECTED AREA DAILY. FOR 10 DAYS LEAVE ON FOR 12 HOURS AND THEN OFF FOR 12 HOURS.  10 patch 0    senna (RA SENNA) 8.6 MG tablet take 1 tablet by mouth once a day 30 tablet 5    ondansetron (ZOFRAN-ODT) 4 MG tablet; 1 tablet 2/day  Dispense: 60 tablet; Refill: 5    4. Mixed hyperlipidemia    - atorvastatin (LIPITOR) 20 MG tablet; 1 tablet once daily  Dispense: 30 tablet; Refill: 5          FOLLOW UP AND INSTRUCTIONS:   Return in about 3 months (around 10/12/2022). 1. Beatriz received counseling on the following healthy behaviors: nutrition, exercise and medication adherence    2. Reviewed prior labs and health maintenance. 3. Discussed use, benefit, and side effects of prescribed medications. Barriers to medication compliance addressed. All patient questions answered. Pt voiced understanding.      4. Patient given educational materials - see patient instructions    Jackeline Turcios  Attending Physician, [address] [place] [address], Internal Medicine Residency Program  [address] [place] [address]  7/12/2022, 3:31 PM

## 2022-07-12 NOTE — LETTER
[address]  [address] [address]  Phone: [phone]  Fax: [phone]    Denise Etienne MD        July 12, 0813     Patient: Dwight Phalen   YOB: 1970   Date of Visit: 7/12/2022       To Whom it May Concern:    Cheryl Clay was seen in my clinic on 7/12/2022. She may return to work on 07/18/2022. She will be off work from 07/12/2022 due to illness. If you have any questions or concerns, please don't hesitate to call.     Sincerely,         Denise Etienne MD

## 2022-07-12 NOTE — LETTER
[address]  [address] [address] [address]  Phone: [phone]  Fax: [phone]    Prince Sahu MD        July 12, 0406     Patient: Cheyenne Dietrich   YOB: 1970   Date of Visit: 7/12/2022       To Whom it May Concern:    Trevor Rivera was seen in my clinic on 7/12/2022. She may return to work on 07/18/2022. She will be off work from 07/12/2022 due to illness. If you have any questions or concerns, please don't hesitate to call.     Sincerely,         Prince Sahu MD

## 2022-07-13 ENCOUNTER — TELEPHONE (OUTPATIENT)
Dept: INTERNAL MEDICINE | Age: 52
End: 2022-07-13

## 2022-07-13 DIAGNOSIS — J18.9 COMMUNITY ACQUIRED PNEUMONIA, UNSPECIFIED LATERALITY: Primary | ICD-10-CM

## 2022-07-13 NOTE — TELEPHONE ENCOUNTER
Pt calling the office requesting chest xray , pt states she still having symptom. Pt want a call back at .  Thanks

## 2022-07-14 NOTE — TELEPHONE ENCOUNTER
Pt calling the office, writer asked pt did she finish her medication, pt stated she will be done with the antibiotic on Saturday and pt prednisone still currently taking.  thank

## 2022-07-16 DIAGNOSIS — Z98.890 S/P CARDIAC CATH: ICD-10-CM

## 2022-07-18 NOTE — TELEPHONE ENCOUNTER
Request for Aspirin.       Next Visit Date:  Future Appointments   Date Time Provider Fortino Englishi   7/29/2022  1:30 PM Seth Ponce  [address] Maintenance   Topic Date Due    Pneumococcal 0-64 years Vaccine (2 - PCV) 01/21/2017    COVID-19 Vaccine (3 - Booster for Pfizer series) 02/07/2022    Shingles vaccine (2 of 2) 02/01/2023 (Originally 9/3/2021)    Flu vaccine (1) 09/01/2022    Lipids  11/17/2022    Colorectal Cancer Screen  02/08/2023    Depression Screen  06/28/2023    Breast cancer screen  08/24/2023    Diabetes screen  07/09/2024    DTaP/Tdap/Td vaccine (2 - Td or Tdap) 02/05/2026    Cervical cancer screen  09/07/2026    Hepatitis C screen  Completed    HIV screen  Completed    Hepatitis A vaccine  Aged Out    Hepatitis B vaccine  Aged Out    Hib vaccine  Aged Out    Meningococcal (ACWY) vaccine  Aged Out       Hemoglobin A1C (%)   Date Value   07/09/2021 5.6   10/06/2020 5.7   03/03/2020 6.0             ( goal A1C is < 7)   No results found for: LABMICR  LDL Cholesterol (mg/dL)   Date Value   11/17/2021 99       (goal LDL is <100)   AST (U/L)   Date Value   07/09/2021 20     ALT (U/L)   Date Value   07/09/2021 21     BUN (mg/dL)   Date Value   02/13/2022 8     BP Readings from Last 3 Encounters:   07/12/22 (!) 146/93   06/28/22 120/68   02/13/22 114/69          (goal 120/80)    All Future Testing planned in [facility]  Lab Frequency Next Occurrence   XR CHEST (2 VW) Once 10/14/2022         Patient Active Problem List:     Hypertension     Asthma     GERD (gastroesophageal reflux disease)     S/P cardiac cath-Minimal disease 11/23/15 Dr. Tacos Herzog     Irritable bowel syndrome with constipation     Obesity     Allergic rhinitis     Hiatal hernia     Paresthesias     Left sided numbness     Dysphagia     Family history of colon cancer     DNS (deviated nasal septum)     Hypertrophy of both inferior nasal turbinates     Iron deficiency anemia secondary to inadequate dietary iron intake     Iron deficiency anemia, unspecified     Atypical chest pain

## 2022-07-19 ENCOUNTER — HOSPITAL ENCOUNTER (OUTPATIENT)
Dept: GENERAL RADIOLOGY | Age: 52
Discharge: HOME | End: 2022-07-21
Payer: MEDICARE

## 2022-07-19 ENCOUNTER — HOSPITAL ENCOUNTER (OUTPATIENT)
Age: 52
Discharge: HOME | End: 2022-07-21
Payer: MEDICARE

## 2022-07-19 DIAGNOSIS — J18.9 COMMUNITY ACQUIRED PNEUMONIA, UNSPECIFIED LATERALITY: ICD-10-CM

## 2022-07-19 PROCEDURE — 71046 X-RAY EXAM CHEST 2 VIEWS: CPT

## 2022-07-20 NOTE — TELEPHONE ENCOUNTER
Spoke with patient advised her of results, she stated she feels much better just phlegm in her chest mostly at night but she's taking things 1 day at a time

## 2022-07-29 ENCOUNTER — OFFICE VISIT (OUTPATIENT)
Dept: PULMONOLOGY | Age: 52
End: 2022-07-29
Payer: MEDICARE

## 2022-07-29 ENCOUNTER — HOSPITAL ENCOUNTER (OUTPATIENT)
Age: 52
Discharge: HOME | End: 2022-07-29
Payer: MEDICARE

## 2022-07-29 VITALS
DIASTOLIC BLOOD PRESSURE: 79 MMHG | TEMPERATURE: 97.2 F | OXYGEN SATURATION: 97 % | WEIGHT: 208.6 LBS | RESPIRATION RATE: 12 BRPM | SYSTOLIC BLOOD PRESSURE: 129 MMHG | HEART RATE: 70 BPM | BODY MASS INDEX: 40.95 KG/M2 | HEIGHT: 60 IN

## 2022-07-29 DIAGNOSIS — J45.50 UNCOMPLICATED SEVERE PERSISTENT ASTHMA: Primary | ICD-10-CM

## 2022-07-29 DIAGNOSIS — R76.8 ELEVATED IGE LEVEL: ICD-10-CM

## 2022-07-29 DIAGNOSIS — J45.50 UNCOMPLICATED SEVERE PERSISTENT ASTHMA: ICD-10-CM

## 2022-07-29 DIAGNOSIS — J34.3 NASAL TURBINATE HYPERTROPHY: ICD-10-CM

## 2022-07-29 DIAGNOSIS — K21.9 GASTROESOPHAGEAL REFLUX DISEASE WITHOUT ESOPHAGITIS: ICD-10-CM

## 2022-07-29 DIAGNOSIS — K44.9 HIATAL HERNIA: ICD-10-CM

## 2022-07-29 DIAGNOSIS — I10 ESSENTIAL HYPERTENSION: ICD-10-CM

## 2022-07-29 LAB
EOSINOPHILS ABSOLUTE: 200 /UL (ref 200–400)
IGE: 444 IU/ML

## 2022-07-29 PROCEDURE — 82785 ASSAY OF IGE: CPT

## 2022-07-29 PROCEDURE — G8427 DOCREV CUR MEDS BY ELIG CLIN: HCPCS | Performed by: INTERNAL MEDICINE

## 2022-07-29 PROCEDURE — 99214 OFFICE O/P EST MOD 30 MIN: CPT | Performed by: INTERNAL MEDICINE

## 2022-07-29 PROCEDURE — 36415 COLL VENOUS BLD VENIPUNCTURE: CPT

## 2022-07-29 PROCEDURE — 3017F COLORECTAL CA SCREEN DOC REV: CPT | Performed by: INTERNAL MEDICINE

## 2022-07-29 PROCEDURE — 85048 AUTOMATED LEUKOCYTE COUNT: CPT

## 2022-07-29 PROCEDURE — G8417 CALC BMI ABV UP PARAM F/U: HCPCS | Performed by: INTERNAL MEDICINE

## 2022-07-29 PROCEDURE — 1036F TOBACCO NON-USER: CPT | Performed by: INTERNAL MEDICINE

## 2022-07-29 RX ORDER — BUDESONIDE, GLYCOPYRROLATE, AND FORMOTEROL FUMARATE 160; 9; 4.8 UG/1; UG/1; UG/1
1 AEROSOL, METERED RESPIRATORY (INHALATION) 2 TIMES DAILY
Qty: 2 EACH | Refills: 0 | COMMUNITY
Start: 2022-07-29 | End: 2022-09-19

## 2022-07-29 NOTE — PATIENT INSTRUCTIONS
I have given you a sample of BREZTRI. Stop your Johney Ely inhaler     Start the sample of BREZTRI - one puff IN AM AND 1 PUFF IN EVENING  , CAN DO 2 PUFF - AM AND PM .    Once you sample is finished go back to using Johney Ely    Please inform office if Coye Ehrich is better than Johney Ely     If Coye Ehrich is better - I will send a prescription .     Electronically signed by Kendra Junior MD on 7/29/2022 at 1:46 PM

## 2022-07-29 NOTE — PROGRESS NOTES
Grace Cotto  [birth year]      Grace Cotto  45 y.o. female presented for follow up for asthma   Patient is following up in the clinic after 2020. She is complaining of exertional dyspnea which has progressed. Has wheezing. Denies hemoptysis no purulent sputum she is using Dulera and uses albuterol daily. Does not exercise. No change in home situation. No new animals and no new changes at the job. She works as an health aide. Last visit  Patient is complaining of sore throat. She denies any cough or hemoptysis but with the weather change she is requiring more albuterol doing it 3-4 times a day there is no wheezing but she notices chest tightness. She is using Dulera and Pulmicort nebulized along with albuterol. Her  tells her that she still snores with loudly and gasps and chokes for breath throughout the night sometimes she wakes up gasping and choking. She feels tired and fatigued and unrested in the morning. Previous sleep study in 2016 showed snoring however since then patient has put on significant weight  She also has acid reflux ,she is on Prilosec  Advised her to continue with GERD precautions with head of the bed elevation and avoiding laying down within 2 hours of eating and to take Tums at night      Last visit    I reviewed Ms. Fadumo Marinelli  IgE level and eosinophil level . Her IgE level is elevated eosinophil level is low . She continues to have nocturnal wheeze and is on Dulera 2 puffs twice a day with a spacer uses albuterol in the morning and at night before going to bed her GERD is under control  Denies any cough with purulent sputum does have shortness of breath with exertion  She did follow-up with ENT and there is a plan to proceed with turbinectomy. I have advised her to continue to use Flonase for her chronic rhinitis. Last visit   Patient did not follow-up in clinic after April 2019.   She did go see ENT and was prescribed Flonase 2 sprays each nostril once a day but she is using 1 spray each nostril once a day. Her asthma is poorly controlled she is needing rescue medication albuterol nebulized once a day and albuterol inhaler 2 times a day daily. She is doing Dulera 2 puffs inhaled twice a day but she still notices a wheeze. Symptoms are triggered by chemicals dust animal dander and she tries to avoid all these. She also feels short winded with exertion. GERD is under control  She did not have allergy testing at Dr. Ganesh Myers because she did not follow-up with him. Last visit  She has been have snorting , snoring at night and wakes up choking . She had previous sleep study which was negative . She did see ENT who continued Flonase but that has not helped . She also has sob - good days and bad days   Uses Advair once or twice a day - mostly once - because she forgets . Also uses albuterol nebulized in am   melendrez snot use albuterol mdi   No asthma exacerbation since last visit   Has been having headaches       Previous visit  Patient had lost her insurance, so could not do the Xolair injections. I'll follow up with ENT. She is doing a little bit better than before using albuterol once every day. No nocturnal use. I have asked her to avoid triggers, which is chemical like Lysol bleach and also smoke. Her  is smoking and she is getting passively exposed. Also encouraged her to quit marijuana. She denies any cough, any hemoptysis since last visit used prednisone once did not have any ER visit for asthma          Previous visit  Since last visit patient is still complaining of symptoms daily. She uses albuterol 3 times a day and at least 3-4 times  night in a week. Dyspneic with exertion, which is grade 2 at work at home has had one course of prednisone again. Since her last visit and see back.   She tried Symbicort and Spiriva, but prefer causing burning in the chest and she using Advair now which does not dose had been increased from 250 to 500 / 50 On her last visit. She is also using Singulair  Avoiding allergens and is not smoking  Today, she is willing very fatigued and tired due to her asthma even though there are no rhonchi  Her IgG level is elevated, and based on her weight. She would need 300 mg of Xolair once a month. I discussed the side effects, the pros and cons of this and she is agreeable to proceed    Review of Systems   Constitutional: Negative. HENT: Negative for congestion, rhinorrhea and sneezing. Negative for postnasal drip and sinus pain. Eyes: Negative. Respiratory: Positive for cough, shortness of breath and wheezing. Negative for apnea, choking and chest tightness. Cardiovascular: Negative. Gastrointestinal: Negative. Genitourinary: Negative. Neurological: Negative.            Immunization History   Administered Date(s) Administered    COVID-19, PFIZER PURPLE top, DILUTE for use, (age 15 y+), 30mcg/0.3mL 08/07/2021, 09/07/2021    Influenza Vaccine, unspecified formulation 10/09/2017    Influenza Virus Vaccine 12/21/2015    Influenza Whole 12/21/2015    Influenza, Quadv, 6 mo and older, IM (Fluzone, Flulaval) 10/09/2017    Influenza, Quadv, IM, (6 mo and older Fluzone, Flulaval, Fluarix and 3 yrs and older Afluria) 11/17/2021    Influenza, Quadv, IM, PF (6 mo and older Fluzone, Flulaval, Fluarix, and 3 yrs and older Afluria) 11/13/2015, 10/19/2020    Influenza, Triv, 3 Years and older, IM (Afluria (5 yrs and older) 10/10/2016    Pneumococcal Polysaccharide ([id number]) 01/21/2016    Tdap (Boostrix, Adacel) 02/05/2016    Zoster Recombinant (Shingrix) 07/09/2021          PAST MEDICAL HISTORY:         Diagnosis Date    Allergic rhinitis     Asthma     Clinical trial participant 11/23/2015 11/23/20015    COPD (chronic obstructive pulmonary disease) ([facility] [address]     Dysphagia     Dysphagia     has needed EGD with dilatation in the past    Family history of colon cancer 8/9/2017    Father    GERD (gastroesophageal reflux disease) H/O cardiovascular stress test 01/15/2018    ABNORMAL    Hiatal hernia     Hyperlipidemia     Hypertension     Iron deficiency anemia secondary to inadequate dietary iron intake 5/13/2020    Iron deficiency anemia, unspecified 5/13/2020    Irritable bowel syndrome with constipation 1/21/2016    Obesity     Snores     states has tested negative for sleep apnea    Wears glasses        Family History:       Problem Relation Age of Onset    Hypertension Mother     High Cholesterol Mother     Asthma Mother     Osteoarthritis Mother     Other Mother         overweight    Colon Cancer Father        SURGICAL HISTORY:   Past Surgical History:   Procedure Laterality Date    CARDIAC CATHETERIZATION  01/16/2018    CARDIAC CATHETERIZATION  11/23/2015     Minimal non obstructive CAD    COLONOSCOPY  2015    COLONOSCOPY  02/08/2018    Redundant colon    ENDOSCOPY, COLON, DIAGNOSTIC  2015    with dilatation    ENDOSCOPY, COLON, DIAGNOSTIC  02/08/2018    WNL     NASAL SEPTOPLASTY W/ TURBINOPLASTY  2020        NOSE SURGERY      turbinate     AR COLON CA SCRN NOT HI RSK IND N/A 2/8/2018    COLONOSCOPY performed by Sanjeev Miller MD at [address] [place] [address]    UPPER GASTROINTESTINAL ENDOSCOPY      The endoscopic exam showed mildly tortuous esophagus. Savary dilation performed x 42 and 45 F.     UPPER GASTROINTESTINAL ENDOSCOPY N/A 7/18/2017    EGD DILATION SAVORY performed by Sanjeev Miller MD at [facility]              Not in a hospital admission. No Known Allergies  Social History     Tobacco Use   Smoking Status Never   Smokeless Tobacco Never     Prior to Admission medications    Medication Sig Start Date End Date Taking?  Authorizing Provider   [facility]ic[facility] Does not apply route 7/29/2022 TO 7/30 /2027 7/29/22  Yes Bebeto Mejía MD   albuterol sulfate HFA (PROAIR HFA) 108 (90 Base) MCG/ACT inhaler inhale 2 puffs by mouth every 6 hours if needed for wheezing 7/12/22  Yes Xochitl Ortiz MD albuterol (PROVENTIL) (2.5 MG/3ML) 0.083% nebulizer solution Take 3 mLs by nebulization every 6 hours as needed for Wheezing 7/12/22  Yes Anderson Frey MD   valsartan-hydroCHLOROthiazide (DIOVAN-HCT) 80-12.5 MG per tablet Once a day 7/12/22  Yes Anderson Frey MD   carvedilol (COREG) 6.25 MG tablet 1 tablet 2/day 7/12/22  Yes Anderson Frey MD   atorvastatin (LIPITOR) 20 MG tablet 1 tablet once daily 7/12/22  Yes Anderson Frey MD   ibuprofen (ADVIL;MOTRIN) 800 MG tablet take 1 tablet by mouth every 8 hours AS NEEDED FOR PAIN 6/28/22  Yes Anderson Frey MD   Blood Pressure Monitoring (3 SERIES BP MONITOR/UPPER ARM) JOHNNY Check BP 2/day 6/28/22  Yes Anderson Frey MD   lidocaine (LIDODERM) 5 % apply 1 patch TO THE AFFECTED AREA DAILY.  FOR 10 DAYS LEAVE ON FOR 12 HOURS AND THEN OFF FOR 12 HOURS. 5/25/22  Yes Anderson Frey MD   senna (RA SENNA) 8.6 MG tablet take 1 tablet by mouth once a day 3/1/22  Yes Anderson Frey MD   ondansetron (ZOFRAN-ODT) 4 MG disintegrating tablet Take 1 tablet by mouth every 12 hours as needed for Nausea or Vomiting 2/18/22  Yes Anderson Frey MD   mometasone-formoterol [facility]) 200-5 MCG/ACT inhaler inhale 2 puffs by mouth twice a day 2/1/22  Yes Anderson Frey MD   omeprazole (PRILOSEC) 40 MG delayed release capsule take 1 capsule by mouth once daily if needed 2/1/22  Yes Anderson Frey MD   simethicone (MYLICON) 80 MG chewable tablet Take 1 tablet by mouth 4 times daily as needed for Flatulence 2/1/22  Yes Anderson Frey MD   meloxicam (MOBIC) 7.5 MG tablet Take 1 tablet by mouth daily 11/17/21  Yes Anderson Frey MD   fluticasone [facility]) 50 MCG/ACT nasal spray instill 2 sprays into each nostril once daily 11/11/21  Yes Anderson Frey MD   melatonin (RA MELATONIN) 5 MG TABS tablet take 1 tablet by mouth nightly 7/12/21  Yes Anderson Frey MD   aspirin (RA ASPIRIN EC) 81 MG EC tablet take 1 tablet by mouth once daily 7/9/21  Yes Anderson Frey MD 0.00 - 0.34 kU/L Final 01/31/2020  3:40 PM [place] <0.34  0.00 - 0.34 kU/L Final 01/31/2020  3:40  [address]   Allergen Hormodendrum IgE <0.34  0.00 - 0.34 kUL/L Final 01/31/2020  3:40 PM [address] <0.34  0.00 - 0.34 kU/L Final 01/31/2020  3:40  [address] [place] IgE <0.34  0.00 - 0.34 kU/L Final 01/31/2020  3:40  [address] IgE <0.34  0.00 - 0.34 kU/L Final 01/31/2020  3:40  [address]   Aspergillus Fumigatus <0.34  0.00 - 0.34 kU/L Final 01/31/2020  3:40  [address]   D. pteronyssinus <0.34  0.00 - 0.34 kU/L Final 01/31/2020  3:40  [address]   D. Farinae <0.34  0.00 - 0.34 kU/L Final 01/31/2020  3:40 PM [address] [place] IgE <0.34  0.00 - 0.34 kU/L Final 01/31/2020  3:40  [address]   Allergen, [facility] IgE <0.34  0.00 - 0.34 kU/L Final 01/31/2020  3:40 PM [facility].  [place] <0.34  0.00 - 0.34 kU/L Final 01/31/2020  3:40  [address]   Short [place](A parviz.) IgE <0.34  0.00 - 0.34 kU/L Final 01/31/2020  3:40  [address]   Cockroach IgE <0.34  0.00 - 0.34 kU/L Final 01/31/2020  3:40  [address]   Allergen [place] <0.34  0.00 - 0.34 kU/L Final 01/31/2020  3:40 PM [address] [place] IgE <0.34  0.00 - 0.34 kU/L Final 01/31/2020  3:40 PM [address] Tree IgE <0.34  0.00 - 0.34 kU/L Final 01/31/2020  3:40  [address]   Mouse Epithelial <0.34  0.00 - 0.34 kU/L Final 01/31/2020  3:40  [address]   Mucor Racemosus <0.34  0.00 - 0.34 kU/L Final 01/31/2020  3:40  [address]   Allergen [place], IGE <0.34  0.00 - 0.34 kU/L Final 01/31/2020  3:40  [address] [place] IgE <0.34  0.00 - 0.34 kU/L Final 01/31/2020  3:40  [address] [place] IgE <0.34                Assessment   Diagnosis Orders   1. Uncomplicated severe persistent asthma  Handicap Placard MISC    Eosinophil Count    IgE    Full PFT Study With Bronchodilator      2. Hiatal hernia        3. Gastroesophageal reflux disease without esophagitis, controlled on omeprazole        4. Elevated IgE level  IgE      5. Essential hypertension        6. Nasal turbinate hypertrophy -post nasal turbinectomy          Patient's IgE level is elevated she has severe persistent asthma maximized on bronchodilator therapy will recommend start Xolair injection. I reviewed risks and benefits of her Xolair with patient but she does not want to start Xolair at this present time. Plan: We will repeat IgE and eosinophil count. Patient has previously refused Xolair injection. Will give trial of BREZTRI. Instructions given to stop Dulera while using BREZTRI. Weight loss is strongly encouraged at this will help her dyspnea. Follow-up in 4 to 6 weeks with PFT and testing            Requested Prescriptions     Pending Prescriptions Disp Refills    Budeson-Glycopyrrol-Formoterol (BREZTRI AEROSPHERE) 160-9-4.8 MCG/ACT AERO 2 each 0     Sig: Inhale 1 puff into the lungs 2 times daily EXP [id number] /  LOT [id number]     Signed Prescriptions Disp Refills    Handicap Placard MISC 1 each 0     Sig: by Does not apply route 7/29/2022 TO 7/30 /2027       There are no discontinued medications. Licha Ortez received counseling on the following healthy behaviors: nutrition, exercise and medication adherence    Patient given educational materials : see patient instruction     Discussed use, benefit, and side effects of prescribed medications. Barriers to medication compliance addressed. All patient questions answered. Pt voiced understanding.    Electronically signed by Kenyon Edouard MD on 7/29/2022 at 6:18 PM

## 2022-08-10 NOTE — TELEPHONE ENCOUNTER
Request for Zofran.       Next Visit Date:  Future Appointments   Date Time Provider Fortino Jean Baptiste   9/19/2022  8:30 AM SCHEDULE, [facility] SPEC Resp [facility] Maintenance   Topic Date Due    Pneumococcal 0-64 years Vaccine (2 - PCV) 01/21/2017    COVID-19 Vaccine (3 - Booster for Pfizer series) 02/07/2022    Shingles vaccine (2 of 2) 02/01/2023 (Originally 9/3/2021)    Flu vaccine (1) 09/01/2022    Lipids  11/17/2022    Colorectal Cancer Screen  02/08/2023    Depression Screen  06/28/2023    Breast cancer screen  08/24/2023    Diabetes screen  07/09/2024    DTaP/Tdap/Td vaccine (2 - Td or Tdap) 02/05/2026    Cervical cancer screen  09/07/2026    Hepatitis C screen  Completed    HIV screen  Completed    Hepatitis A vaccine  Aged Out    Hepatitis B vaccine  Aged Out    Hib vaccine  Aged Out    Meningococcal (ACWY) vaccine  Aged Out       Hemoglobin A1C (%)   Date Value   07/09/2021 5.6   10/06/2020 5.7   03/03/2020 6.0             ( goal A1C is < 7)   No results found for: LABMICR  LDL Cholesterol (mg/dL)   Date Value   11/17/2021 99       (goal LDL is <100)   AST (U/L)   Date Value   07/09/2021 20     ALT (U/L)   Date Value   07/09/2021 21     BUN (mg/dL)   Date Value   02/13/2022 8     BP Readings from Last 3 Encounters:   07/29/22 129/79   07/12/22 (!) 146/93   06/28/22 120/68          (goal 120/80)    All Future Testing planned in [facility]  Lab Frequency Next Occurrence   Full PFT Study With Bronchodilator Once 07/29/2022         Patient Active Problem List:     Hypertension     Asthma     GERD (gastroesophageal reflux disease)     S/P cardiac cath-Minimal disease 11/23/15 Dr. Jhoan Haq     Irritable bowel syndrome with constipation     Obesity     Allergic rhinitis     Hiatal hernia     Paresthesias     Left sided numbness     Dysphagia     Family history of colon cancer     DNS (deviated nasal septum)     Hypertrophy of both inferior nasal turbinates     Iron deficiency anemia secondary to inadequate dietary iron intake     Iron deficiency anemia, unspecified     Atypical chest pain

## 2022-08-11 ENCOUNTER — TELEPHONE (OUTPATIENT)
Dept: PULMONOLOGY | Age: 52
End: 2022-08-11

## 2022-08-11 RX ORDER — ONDANSETRON 4 MG/1
TABLET, ORALLY DISINTEGRATING ORAL
Qty: 15 TABLET | Refills: 1 | Status: SHIPPED | OUTPATIENT
Start: 2022-08-11 | End: 2022-10-24

## 2022-08-12 NOTE — TELEPHONE ENCOUNTER
Marcela Johnson MD  You 14 hours ago (5:52 PM)     RJ    I am covering Dr. Aria Hood in basket. According to Dr. Aria Hood note follow-up in 4 to 6 weeks please make sure that she has appointment 4 to 6 weeks from her last visit with Dr. Юлия Delgado.   Her eosinophil count is okay but her IgE level is elevated and it was elevated before but she need to follow-up so Dr. Юлия Delgado can discuss with her as he was asking to start Xolair

## 2022-08-15 ENCOUNTER — TELEPHONE (OUTPATIENT)
Dept: PULMONOLOGY | Age: 52
End: 2022-08-15

## 2022-08-16 RX ORDER — SENNOSIDES 8.6 MG
TABLET ORAL
Qty: 90 TABLET | Refills: 0 | Status: SHIPPED | OUTPATIENT
Start: 2022-08-16

## 2022-08-16 NOTE — TELEPHONE ENCOUNTER
Request for Senna.       Next Visit Date:  Future Appointments   Date Time Provider Fortino Jean Baptiste   9/19/2022  8:30 AM SCHEDULE, [facility]AT[facility] SPEC Resp [facility] Maintenance   Topic Date Due    Pneumococcal 0-64 years Vaccine (2 - PCV) 01/21/2017    COVID-19 Vaccine (3 - Booster for Pfizer series) 02/07/2022    Shingles vaccine (2 of 2) 02/01/2023 (Originally 9/3/2021)    Flu vaccine (1) 09/01/2022    Lipids  11/17/2022    Colorectal Cancer Screen  02/08/2023    Depression Screen  06/28/2023    Breast cancer screen  08/24/2023    Diabetes screen  07/09/2024    DTaP/Tdap/Td vaccine (2 - Td or Tdap) 02/05/2026    Cervical cancer screen  09/07/2026    Hepatitis C screen  Completed    HIV screen  Completed    Hepatitis A vaccine  Aged Out    Hepatitis B vaccine  Aged Out    Hib vaccine  Aged Out    Meningococcal (ACWY) vaccine  Aged Out       Hemoglobin A1C (%)   Date Value   07/09/2021 5.6   10/06/2020 5.7   03/03/2020 6.0             ( goal A1C is < 7)   No results found for: LABMICR  LDL Cholesterol (mg/dL)   Date Value   11/17/2021 99       (goal LDL is <100)   AST (U/L)   Date Value   07/09/2021 20     ALT (U/L)   Date Value   07/09/2021 21     BUN (mg/dL)   Date Value   02/13/2022 8     BP Readings from Last 3 Encounters:   07/29/22 129/79   07/12/22 (!) 146/93   06/28/22 120/68          (goal 120/80)    All Future Testing planned in [facility]  Lab Frequency Next Occurrence   Full PFT Study With Bronchodilator Once 07/29/2022         Patient Active Problem List:     Hypertension     Asthma     GERD (gastroesophageal reflux disease)     S/P cardiac cath-Minimal disease 11/23/15 Dr. Tacos Herzog     Irritable bowel syndrome with constipation     Obesity     Allergic rhinitis     Hiatal hernia     Paresthesias     Left sided numbness     Dysphagia     Family history of colon cancer     DNS (deviated nasal septum)     Hypertrophy of both inferior nasal turbinates     Iron deficiency anemia secondary to inadequate dietary iron intake     Iron deficiency anemia, unspecified     Atypical chest pain

## 2022-08-19 DIAGNOSIS — Z98.890 S/P CARDIAC CATH: ICD-10-CM

## 2022-08-22 RX ORDER — ASPIRIN 81 MG/1
TABLET ORAL
Qty: 30 TABLET | Refills: 5 | OUTPATIENT
Start: 2022-08-22

## 2022-08-23 DIAGNOSIS — Z98.890 S/P CARDIAC CATH: ICD-10-CM

## 2022-08-23 RX ORDER — ASPIRIN 81 MG/1
TABLET ORAL
Qty: 30 TABLET | Refills: 5 | OUTPATIENT
Start: 2022-08-23

## 2022-08-23 RX ORDER — LIDOCAINE 50 MG/G
PATCH TOPICAL
Qty: 10 PATCH | Refills: 0 | OUTPATIENT
Start: 2022-08-23

## 2022-08-26 DIAGNOSIS — Z98.890 S/P CARDIAC CATH: ICD-10-CM

## 2022-08-29 RX ORDER — ASPIRIN 81 MG/1
TABLET ORAL
Qty: 30 TABLET | Refills: 5 | OUTPATIENT
Start: 2022-08-29

## 2022-08-29 RX ORDER — LIDOCAINE 50 MG/G
PATCH TOPICAL
Qty: 10 PATCH | Refills: 0 | OUTPATIENT
Start: 2022-08-29

## 2022-08-29 NOTE — TELEPHONE ENCOUNTER
Request for Aspirin & lidocaine.       Next Visit Date:  Future Appointments   Date Time Provider Fortino Jean Baptiste   9/19/2022  8:30 AM SCHEDULE, [facility] SPE[facility] Resp Spec [id number]   10/25/2022  1:00 PM Link MD Maricarmen [facility] IM [address] Maintenance   Topic Date Due    Pneumococcal 0-64 years Vaccine (2 - PCV) 01/21/2017    COVID-19 Vaccine (3 - Booster for Pfizer series) 02/07/2022    Shingles vaccine (2 of 2) 02/01/2023 (Originally 9/3/2021)    Flu vaccine (1) 09/01/2022    Lipids  11/17/2022    Colorectal Cancer Screen  02/08/2023    Depression Screen  06/28/2023    Breast cancer screen  08/24/2023    Diabetes screen  07/09/2024    DTaP/Tdap/Td vaccine (2 - Td or Tdap) 02/05/2026    Cervical cancer screen  09/07/2026    Hepatitis C screen  Completed    HIV screen  Completed    Hepatitis A vaccine  Aged Out    Hepatitis B vaccine  Aged Out    Hib vaccine  Aged Out    Meningococcal (ACWY) vaccine  Aged Out       Hemoglobin A1C (%)   Date Value   07/09/2021 5.6   10/06/2020 5.7   03/03/2020 6.0             ( goal A1C is < 7)   No results found for: LABMICR  LDL Cholesterol (mg/dL)   Date Value   11/17/2021 99       (goal LDL is <100)   AST (U/L)   Date Value   07/09/2021 20     ALT (U/L)   Date Value   07/09/2021 21     BUN (mg/dL)   Date Value   02/13/2022 8     BP Readings from Last 3 Encounters:   07/29/22 129/79   07/12/22 (!) 146/93   06/28/22 120/68          (goal 120/80)    All Future Testing planned in [facility]  Lab Frequency Next Occurrence   Full PFT Study With Bronchodilator Once 07/29/2022         Patient Active Problem List:     Hypertension     Asthma     GERD (gastroesophageal reflux disease)     S/P cardiac cath-Minimal disease 11/23/15 Dr. Kassandra Mancilla     Irritable bowel syndrome with constipation     Obesity     Allergic rhinitis     Hiatal hernia     Paresthesias     Left sided numbness     Dysphagia     Family history of colon cancer     DNS (deviated nasal septum)     Hypertrophy of both inferior nasal turbinates     Iron deficiency anemia secondary to inadequate dietary iron intake     Iron deficiency anemia, unspecified     Atypical chest pain

## 2022-08-30 RX ORDER — ASPIRIN 81 MG/1
TABLET ORAL
Qty: 30 TABLET | Refills: 5 | Status: SHIPPED | OUTPATIENT
Start: 2022-08-30

## 2022-08-30 RX ORDER — ASPIRIN 81 MG/1
TABLET ORAL
Qty: 30 TABLET | Refills: 5 | OUTPATIENT
Start: 2022-08-30

## 2022-08-30 RX ORDER — LIDOCAINE 50 MG/G
PATCH TOPICAL
Qty: 10 PATCH | Refills: 0 | Status: SHIPPED | OUTPATIENT
Start: 2022-08-30 | End: 2022-09-28

## 2022-09-19 ENCOUNTER — OFFICE VISIT (OUTPATIENT)
Dept: PULMONOLOGY | Age: 52
End: 2022-09-19
Payer: MEDICARE

## 2022-09-19 VITALS
DIASTOLIC BLOOD PRESSURE: 89 MMHG | OXYGEN SATURATION: 98 % | HEIGHT: 60 IN | SYSTOLIC BLOOD PRESSURE: 128 MMHG | RESPIRATION RATE: 16 BRPM | BODY MASS INDEX: 39.66 KG/M2 | WEIGHT: 202 LBS | HEART RATE: 63 BPM

## 2022-09-19 DIAGNOSIS — K44.9 HIATAL HERNIA: ICD-10-CM

## 2022-09-19 DIAGNOSIS — J45.50 UNCOMPLICATED SEVERE PERSISTENT ASTHMA: Primary | ICD-10-CM

## 2022-09-19 DIAGNOSIS — K21.9 GASTROESOPHAGEAL REFLUX DISEASE WITHOUT ESOPHAGITIS: ICD-10-CM

## 2022-09-19 DIAGNOSIS — J34.3 NASAL TURBINATE HYPERTROPHY: ICD-10-CM

## 2022-09-19 DIAGNOSIS — R76.8 ELEVATED IGE LEVEL: ICD-10-CM

## 2022-09-19 DIAGNOSIS — I10 ESSENTIAL HYPERTENSION: ICD-10-CM

## 2022-09-19 PROCEDURE — 99214 OFFICE O/P EST MOD 30 MIN: CPT | Performed by: INTERNAL MEDICINE

## 2022-09-19 PROCEDURE — G8427 DOCREV CUR MEDS BY ELIG CLIN: HCPCS | Performed by: INTERNAL MEDICINE

## 2022-09-19 PROCEDURE — 94729 DIFFUSING CAPACITY: CPT | Performed by: INTERNAL MEDICINE

## 2022-09-19 PROCEDURE — 94010 BREATHING CAPACITY TEST: CPT | Performed by: INTERNAL MEDICINE

## 2022-09-19 PROCEDURE — G8417 CALC BMI ABV UP PARAM F/U: HCPCS | Performed by: INTERNAL MEDICINE

## 2022-09-19 PROCEDURE — 94726 PLETHYSMOGRAPHY LUNG VOLUMES: CPT | Performed by: INTERNAL MEDICINE

## 2022-09-19 PROCEDURE — 3017F COLORECTAL CA SCREEN DOC REV: CPT | Performed by: INTERNAL MEDICINE

## 2022-09-19 PROCEDURE — 1036F TOBACCO NON-USER: CPT | Performed by: INTERNAL MEDICINE

## 2022-09-19 NOTE — LETTER
[facility], [facility] [address] [address]  Phone: [phone]  Fax: [phone]    Nick Adkins MD        September 19, 9791     Patient: Tamiko Payton   YOB: 1970   Date of Visit: 9/19/2022       To Whom It May Concern:    Donald Barajas was seen in my office on 9/19/2022 for an appointment. If you have any questions or concerns, please don't hesitate to call.     Sincerely,        Nick Adkins MD

## 2022-09-19 NOTE — PATIENT INSTRUCTIONS
Provided 2 samples of Trelegy 100 to pt. Pt to inhale 1 puff once per. Pt will call if Trelegy is working better for her and Dr Marcos Scott will send in an rx to pharmacy.

## 2022-09-23 RX ORDER — FLUTICASONE FUROATE, UMECLIDINIUM BROMIDE AND VILANTEROL TRIFENATATE 100; 62.5; 25 UG/1; UG/1; UG/1
1 POWDER RESPIRATORY (INHALATION) DAILY
Qty: 2 EACH | Refills: 0 | COMMUNITY
Start: 2022-09-23 | End: 2022-10-25

## 2022-09-23 NOTE — PROGRESS NOTES
Dana Cummings  [birth year]      Dana Cummings  45 y.o. female presented for follow up for asthma   Reviewed pulmonary function test with patient. She did not like breztri. inhaler. IgE and eosinophil count reviewed with patient  Give a trial of Trelegy    Last visit   Patient is following up in the clinic after 2020. She is complaining of exertional dyspnea which has progressed. Has wheezing. Denies hemoptysis no purulent sputum she is using Dulera and uses albuterol daily. Does not exercise. No change in home situation. No new animals and no new changes at the job. She works as an health aide. Last visit  Patient is complaining of sore throat. She denies any cough or hemoptysis but with the weather change she is requiring more albuterol doing it 3-4 times a day there is no wheezing but she notices chest tightness. She is using Dulera and Pulmicort nebulized along with albuterol. Her  tells her that she still snores with loudly and gasps and chokes for breath throughout the night sometimes she wakes up gasping and choking. She feels tired and fatigued and unrested in the morning. Previous sleep study in 2016 showed snoring however since then patient has put on significant weight  She also has acid reflux ,she is on Prilosec  Advised her to continue with GERD precautions with head of the bed elevation and avoiding laying down within 2 hours of eating and to take Tums at night      Last visit    I reviewed Ms. Topher Khan  IgE level and eosinophil level . Her IgE level is elevated eosinophil level is low . She continues to have nocturnal wheeze and is on Dulera 2 puffs twice a day with a spacer uses albuterol in the morning and at night before going to bed her GERD is under control  Denies any cough with purulent sputum does have shortness of breath with exertion  She did follow-up with ENT and there is a plan to proceed with turbinectomy.   I have advised her to continue to use Flonase for her chronic rhinitis. Last visit   Patient did not follow-up in clinic after April 2019. She did go see ENT and was prescribed Flonase 2 sprays each nostril once a day but she is using 1 spray each nostril once a day. Her asthma is poorly controlled she is needing rescue medication albuterol nebulized once a day and albuterol inhaler 2 times a day daily. She is doing Dulera 2 puffs inhaled twice a day but she still notices a wheeze. Symptoms are triggered by chemicals dust animal dander and she tries to avoid all these. She also feels short winded with exertion. GERD is under control  She did not have allergy testing at Dr. Mariluz Coy because she did not follow-up with him. Last visit  She has been have snorting , snoring at night and wakes up choking . She had previous sleep study which was negative . She did see ENT who continued Flonase but that has not helped . She also has sob - good days and bad days   Uses Advair once or twice a day - mostly once - because she forgets . Also uses albuterol nebulized in am   melendrez snot use albuterol mdi   No asthma exacerbation since last visit   Has been having headaches       Previous visit  Patient had lost her insurance, so could not do the Xolair injections. I'll follow up with ENT. She is doing a little bit better than before using albuterol once every day. No nocturnal use. I have asked her to avoid triggers, which is chemical like Lysol bleach and also smoke. Her  is smoking and she is getting passively exposed. Also encouraged her to quit marijuana. She denies any cough, any hemoptysis since last visit used prednisone once did not have any ER visit for asthma          Previous visit  Since last visit patient is still complaining of symptoms daily. She uses albuterol 3 times a day and at least 3-4 times  night in a week. Dyspneic with exertion, which is grade 2 at work at home has had one course of prednisone again.   Since her last visit and see back.  She tried Symbicort and Spiriva, but prefer causing burning in the chest and she using Advair now which does not dose had been increased from 250 to 500 / 50 On her last visit. She is also using Singulair  Avoiding allergens and is not smoking  Today, she is willing very fatigued and tired due to her asthma even though there are no rhonchi  Her IgG level is elevated, and based on her weight. She would need 300 mg of Xolair once a month. I discussed the side effects, the pros and cons of this and she is agreeable to proceed    Review of Systems   Constitutional: Negative. HENT: Negative for congestion, rhinorrhea and sneezing. Negative for postnasal drip and sinus pain. Eyes: Negative. Respiratory: Positive for cough, shortness of breath and wheezing. Negative for apnea, choking and chest tightness. Cardiovascular: Negative. Gastrointestinal: Negative. Genitourinary: Negative. Neurological: Negative.            Immunization History   Administered Date(s) Administered    COVID-19, PFIZER PURPLE top, DILUTE for use, (age 15 y+), 30mcg/0.3mL 08/07/2021, 09/07/2021    Influenza Vaccine, unspecified formulation 10/09/2017    Influenza Virus Vaccine 12/21/2015    Influenza Whole 12/21/2015    Influenza, AFLURIA (age 1 yrs+), FLUZONE, (age 10 mo+), MDV, 0.5mL 11/17/2021    Influenza, FLUARIX, FLULAVAL, FLUZONE (age 10 mo+) AND AFLURIA, (age 1 y+), PF, 0.5mL 11/13/2015, 10/19/2020    Influenza, Quadv, 6 mo and older, IM (Fluzone, Flulaval) 10/09/2017    Influenza, Triv, 3 Years and older, IM (Afluria (5 yrs and older) 10/10/2016    Pneumococcal Polysaccharide ([id number]) 01/21/2016    Tdap (Boostrix, Adacel) 02/05/2016    Zoster Recombinant (Shingrix) 07/09/2021          PAST MEDICAL HISTORY:         Diagnosis Date    Allergic rhinitis     Asthma     Clinical trial participant 11/23/2015 11/23/20015    COPD (chronic obstructive pulmonary disease) ([facility] [address]     Dysphagia     Dysphagia     has needed EGD with dilatation in the past    Family history of colon cancer 8/9/2017    Father    GERD (gastroesophageal reflux disease)     H/O cardiovascular stress test 01/15/2018    ABNORMAL    Hiatal hernia     Hyperlipidemia     Hypertension     Iron deficiency anemia secondary to inadequate dietary iron intake 5/13/2020    Iron deficiency anemia, unspecified 5/13/2020    Irritable bowel syndrome with constipation 1/21/2016    Obesity     Snores     states has tested negative for sleep apnea    Wears glasses        Family History:       Problem Relation Age of Onset    Hypertension Mother     High Cholesterol Mother     Asthma Mother     Osteoarthritis Mother     Other Mother         overweight    Colon Cancer Father        SURGICAL HISTORY:   Past Surgical History:   Procedure Laterality Date    CARDIAC CATHETERIZATION  01/16/2018    CARDIAC CATHETERIZATION  11/23/2015     Minimal non obstructive CAD    COLONOSCOPY  2015    COLONOSCOPY  02/08/2018    Redundant colon    ENDOSCOPY, COLON, DIAGNOSTIC  2015    with dilatation    ENDOSCOPY, COLON, DIAGNOSTIC  02/08/2018    WNL     NASAL SEPTOPLASTY W/ TURBINOPLASTY  2020        NOSE SURGERY      turbinate     CT COLON CA SCRN NOT HI RSK IND N/A 2/8/2018    COLONOSCOPY performed by Anthony Juarez MD at [address] [facility]  2003    UPPER GASTROINTESTINAL ENDOSCOPY      The endoscopic exam showed mildly tortuous esophagus. Savary dilation performed x 42 and 45 F.     UPPER GASTROINTESTINAL ENDOSCOPY N/A 7/18/2017    EGD DILATION SAVORY performed by Anthony Juarez MD at [facility]              Not in a hospital admission. No Known Allergies  Social History     Tobacco Use   Smoking Status Never   Smokeless Tobacco Never     Prior to Admission medications    Medication Sig Start Date End Date Taking?  Authorizing Provider   fluticasone-umeclidin-vilant (TRELEGY ELLIPTA) 100-62.5-25 MCG/INH AEPB Inhale 1 puff into the lungs daily Lot: 3C6L  EXP: 02/2024   2 samples given 9/23/22  Yes Rita Lindsay MD   lidocaine (LIDODERM) 5 % apply 1 patch TO THE AFFECTED AREA DAILY. FOR 10 DAYS LEAVE ON FOR 12 HOURS AND THEN OFF FOR 12 HOURS.  8/30/22  Yes Shakeel Fowler MD   aspirin (RA ASPIRIN EC) 81 MG EC tablet take 1 tablet by mouth once daily 8/30/22  Yes Shakeel Fowler MD   senna (SENOKOT) 8.6 MG TABS tablet take 1 tablet by mouth once daily 8/16/22  Yes Shakeel Fowler MD   ondansetron (ZOFRAN-ODT) 4 MG disintegrating tablet take 1 tablet by mouth every 12 hours if needed for nausea and vomiting 8/11/22  Yes Shakeel Fowler MD   Handsvitlanaaudi Sis MISC by Does not apply route 7/29/2022 TO 7/30 /2027 7/29/22  Yes Rita Lindsay MD   albuterol sulfate HFA (PROAIR HFA) 108 (90 Base) MCG/ACT inhaler inhale 2 puffs by mouth every 6 hours if needed for wheezing 7/12/22  Yes Shakeel Fowler MD   albuterol (PROVENTIL) (2.5 MG/3ML) 0.083% nebulizer solution Take 3 mLs by nebulization every 6 hours as needed for Wheezing 7/12/22  Yes Shakeel Fowler MD   valsartan-hydroCHLOROthiazide (DIOVAN-HCT) 80-12.5 MG per tablet Once a day 7/12/22  Yes Shakeel Fowler MD   carvedilol (COREG) 6.25 MG tablet 1 tablet 2/day 7/12/22  Yes Shakeel Fowler MD   atorvastatin (LIPITOR) 20 MG tablet 1 tablet once daily 7/12/22  Yes Shakeel Fowler MD   ibuprofen (ADVIL;MOTRIN) 800 MG tablet take 1 tablet by mouth every 8 hours AS NEEDED FOR PAIN 6/28/22  Yes Shakeel Fowler MD   Blood Pressure Monitoring (3 SERIES BP MONITOR/UPPER ARM) JOHNNY Check BP 2/day 6/28/22  Yes Shakeel Fowler MD   mometasone-formoterol [facility]) 200-5 MCG/ACT inhaler inhale 2 puffs by mouth twice a day 2/1/22  Yes Shakeel Fowler MD   omeprazole (PRILOSEC) 40 MG delayed release capsule take 1 capsule by mouth once daily if needed 2/1/22  Yes Shakeel Fowler MD   simethicone (MYLICON) 80 MG chewable tablet Take 1 tablet by mouth 4 times daily as needed for Flatulence 2/1/22  Yes Shakeel Fowler MD meloxicam (MOBIC) 7.5 MG tablet Take 1 tablet by mouth daily 11/17/21  Yes Shyla Jones MD   fluticasone [facility]) 50 MCG/ACT nasal spray instill 2 sprays into each nostril once daily 11/11/21  Yes Shyla Jones MD   melatonin (RA MELATONIN) 5 MG TABS tablet take 1 tablet by mouth nightly 7/12/21  Yes Shyla Jones MD   ACETAMINOPHEN EXTRA STRENGTH 500 MG tablet take 2 tablets by mouth every 6 hours if needed for pain 6/9/20  Yes Shyla Jones MD   Multiple Vitamins-Minerals (WOMENS MULTI VITAMIN & MINERAL PO) Take 1 tablet by mouth daily    Yes Historical Provider, MD     Exam obese  Head and neck atraumatic, normocephalic    Lymph nodes-no cervical, supraclavicular lymphadenopathy    Neck-no JVP elevation    Lungs - Ventilating all lobes without any rales, rhonchi, wheezes or dullness. No bronchial breath sounds. Chest expansion equal bilaterally    CVS- S1, S2 regular. No S3 no S4, no murmurs    Abdomen-nontender, nondistended. Bowel sounds are present. No organomegaly    Lower extremity-no edema    Upper extremity-no edema    Neurological-grossly normal cranial nerves. No overt motor deficit     /89 (Site: Left Upper Arm)   Pulse 63   Resp 16   Ht 5' (1.524 m)   Wt 202 lb (91.6 kg)   SpO2 98% Comment: ra  BMI 39.45 kg/m²     Eosinophils Absolute 129Low   200 - 400 /uL       IgE 357High   <101 IU/mL Final 01/31/2020  3:40  [address]   Alternaria Alternata <0.34  0.00 - 0.34 kU/L Final 01/31/2020  3:40  [address]   Maple/Boxelder Tree <0.34  0.00 - 0.34 kU/L Final 01/31/2020  3:40  [address]   Cat Dander Antibody 4. 11High   0.00 - 0.34 kU/L Final 01/31/2020  3:40 PM [address] Tree <0.34  0.00 - 0.34 kU/L Final 01/31/2020  3:40 PM [place] <0.34  0.00 - 0.34 kU/L Final 01/31/2020  3:40  [address]   Milk, Cow's IgE <0.34  0.00 - 0.34 kU/L Final 01/31/2020  3:40  [address]   Peanut IgE <0.34  0.00 - 0.34 kU/L Final 01/31/2020  3:40  [address]   ALLERGEN PIGWEED ROUGH IGE <0.34  0.00 - 0.34 kU/L Final 01/31/2020  3:40 PM [address] <0.34  0.00 - 0.34 kU/L Final 01/31/2020  3:40 PM [place] <0.34  0.00 - 0.34 kU/L Final 01/31/2020  3:40  [address]   Allergen Hormodendrum IgE <0.34  0.00 - 0.34 kUL/L Final 01/31/2020  3:40 PM [address] Tree <0.34  0.00 - 0.34 kU/L Final 01/31/2020  3:40 PM [address] Tree IgE <0.34  0.00 - 0.34 kU/L Final 01/31/2020  3:40  [address] IgE <0.34  0.00 - 0.34 kU/L Final 01/31/2020  3:40  [address]   Aspergillus Fumigatus <0.34  0.00 - 0.34 kU/L Final 01/31/2020  3:40  [address]   D. pteronyssinus <0.34  0.00 - 0.34 kU/L Final 01/31/2020  3:40  [address]   D. Farinae <0.34  0.00 - 0.34 kU/L Final 01/31/2020  3:40 PM [address] [place] IgE <0.34  0.00 - 0.34 kU/L Final 01/31/2020  3:40  [address]   Allergen, MELLANSÖM, Hdz zulay IgE <0.34  0.00 - 0.34 kU/L Final 01/31/2020  3:40 PM [facility].  [place] <0.34  0.00 - 0.34 kU/L Final 01/31/2020  3:40  [address]   Short Ragwd(A parviz.) IgE <0.34  0.00 - 0.34 kU/L Final 01/31/2020  3:40  [address]   Cockroach IgE <0.34  0.00 - 0.34 kU/L Final 01/31/2020  3:40  [address]   Allergen [place] <0.34  0.00 - 0.34 kU/L Final 01/31/2020  3:40 PM [address] Tree IgE <0.34  0.00 - 0.34 kU/L Final 01/31/2020  3:40  [address]   Pecan Tree IgE <0.34  0.00 - 0.34 kU/L Final 01/31/2020  3:40  [address]   Mouse Epithelial <0.34 0.00 - 0.34 kU/L Final 01/31/2020  3:40  [address]   Mucor Racemosus <0.34  0.00 - 0.34 kU/L Final 01/31/2020  3:40  [address]   Allergen [place] Tree, IGE <0.34  0.00 - 0.34 kU/L Final 01/31/2020  3:40  [address]   Dog Dander IgE <0.34  0.00 - 0.34 kU/L Final 01/31/2020  3:40  [address]   Sheep [place] IgE <0.34                Assessment   Diagnosis Orders   1. Uncomplicated severe persistent asthma  Full PFT Study With Bronchodilator    VA RESPIRATORY FLOW VOLUME LOOP    VA DIFFUSING CAPACITY    VA PLETHYSMOGRAPHY LUNG VOLUMES W/WO AIRWAY RESIST    fluticasone-umeclidin-vilant (Alfrieda Louie ELLIPTA) 100-62.5-25 MCG/INH AEPB        Patient's IgE level is elevated she has severe persistent asthma maximized on bronchodilator therapy will recommend start Xolair injection. I reviewed risks and benefits of her Xolair with patient but she does not want to start Xolair at this present time. Plan:  PFT shows moderate obstructive ventilatory dysfunction with an FEV1 of 61% predicted and FVC 71% predicted . IgE level is chronically elevated . Eosinophil count is less than 200 . Discussed with patient again regarding Xolair but patient is not enthusiastic about starting Xolair   She did not tolerate Glennette Litter   Will give trial of Trelegy . Use albuterol as needed  Weight loss is encouraged.             Requested Prescriptions     Signed Prescriptions Disp Refills    fluticasone-umeclidin-vilant (TRELEGY ELLIPTA) 100-62.5-25 MCG/INH AEPB 2 each 0     Sig: Inhale 1 puff into the lungs daily Lot: 3C6L  EXP: 02/2024   2 samples given       Medications Discontinued During This Encounter   Medication Reason    Budeson-Glycopyrrol-Formoterol (BREZTRI AEROSPHERE) 160-9-4.8 MCG/ACT AERO LIST CLEANUP         Heraclio Byrne received counseling on the following healthy behaviors: nutrition, exercise and medication adherence    Patient given educational materials : see patient instruction     Discussed use, benefit, and side effects of prescribed medications. Barriers to medication compliance addressed. All patient questions answered. Pt voiced understanding.    Electronically signed by Emily Davis MD on 9/23/2022 at 7:00 PM

## 2022-09-23 NOTE — PROGRESS NOTES
Pulmonary function test   9/19/2022    Spirometry shows an obstructive flow-volume loop FEV1 61% predicted, FVC 71% predicted, FEV1 FVC ratio 69.     Lung volume by body box total lung capacity 103% predicted, % predicted consistent with air trapping    Diffusion capacity uncorrected 75% predicted corrected for alveolar volume 88% predicted    Impression  Moderate obstructive ventilatory dysfunction with hyperinflation    Clinical correlation advised    Electronically signed by Nick Adkins MD on 9/23/2022 at 7:00 PM

## 2022-09-28 RX ORDER — LIDOCAINE 50 MG/G
PATCH TOPICAL
Qty: 30 PATCH | Refills: 2 | Status: SHIPPED | OUTPATIENT
Start: 2022-09-28

## 2022-09-28 NOTE — TELEPHONE ENCOUNTER
Family history of colon cancer     DNS (deviated nasal septum)     Hypertrophy of both inferior nasal turbinates     Iron deficiency anemia secondary to inadequate dietary iron intake     Iron deficiency anemia, unspecified     Atypical chest pain

## 2022-10-21 NOTE — TELEPHONE ENCOUNTER
E-scribing request for ondansetron . Pt has future appt.        Health Maintenance   Topic Date Due    COVID-19 Vaccine (3 - Booster for Pfizer series) 11/02/2021    Flu vaccine (1) 08/01/2022    Lipids  11/17/2022    Shingles vaccine (2 of 2) 02/01/2023 (Originally 9/3/2021)    Colorectal Cancer Screen  02/08/2023    Depression Screen  06/28/2023    Breast cancer screen  08/24/2023    Diabetes screen  07/09/2024    DTaP/Tdap/Td vaccine (2 - Td or Tdap) 02/05/2026    Cervical cancer screen  09/07/2026    Pneumococcal 0-64 years Vaccine  Completed    Hepatitis C screen  Completed    HIV screen  Completed    Hepatitis A vaccine  Aged Out    Hib vaccine  Aged Out    Meningococcal (ACWY) vaccine  Aged Out             (applicable per patient's age: Cancer Screenings, Depression Screening, Fall Risk Screening, Immunizations)    Hemoglobin A1C (%)   Date Value   07/09/2021 5.6   10/06/2020 5.7   03/03/2020 6.0     LDL Cholesterol (mg/dL)   Date Value   11/17/2021 99     AST (U/L)   Date Value   07/09/2021 20     ALT (U/L)   Date Value   07/09/2021 21     BUN (mg/dL)   Date Value   02/13/2022 8      (goal A1C is < 7)   (goal LDL is <100) need 30-50% reduction from baseline     BP Readings from Last 3 Encounters:   09/19/22 128/89   07/29/22 129/79   07/12/22 (!) 146/93    (goal /80)      All Future Testing planned in [facility]:  Lab Frequency Next Occurrence       Next Visit Date:  Future Appointments   Date Time Provider Fortino Jean Baptiste   10/25/2022  1:00 PM [facility]   1/9/2023  9:15 AM Stephania Pa MD Resp [facility]            Patient Active Problem List:     Hypertension     Asthma     GERD (gastroesophageal reflux disease)     S/P cardiac cath-Minimal disease 11/23/15 Dr. Rita Mccarthy     Irritable bowel syndrome with constipation     Obesity     Allergic rhinitis     Hiatal hernia     Paresthesias     Left sided numbness     Dysphagia     Family history of colon cancer     DNS (deviated nasal septum)     Hypertrophy of both inferior nasal turbinates     Iron deficiency anemia secondary to inadequate dietary iron intake     Iron deficiency anemia, unspecified     Atypical chest pain

## 2022-10-21 NOTE — TELEPHONE ENCOUNTER
Refill request for DULERA 200-5 MCG/ACT inhaler. If appropriate please send medication(s) to patients pharmacy.     Next appt: 10/25/2022      Health Maintenance   Topic Date Due    COVID-19 Vaccine (3 - Booster for Pfizer series) 11/02/2021    Flu vaccine (1) 08/01/2022    Lipids  11/17/2022    Shingles vaccine (2 of 2) 02/01/2023 (Originally 9/3/2021)    Colorectal Cancer Screen  02/08/2023    Depression Screen  06/28/2023    Breast cancer screen  08/24/2023    Diabetes screen  07/09/2024    DTaP/Tdap/Td vaccine (2 - Td or Tdap) 02/05/2026    Cervical cancer screen  09/07/2026    Pneumococcal 0-64 years Vaccine  Completed    Hepatitis C screen  Completed    HIV screen  Completed    Hepatitis A vaccine  Aged Out    Hib vaccine  Aged Out    Meningococcal (ACWY) vaccine  Aged Out       Hemoglobin A1C (%)   Date Value   07/09/2021 5.6   10/06/2020 5.7   03/03/2020 6.0             ( goal A1C is < 7)   No results found for: LABMICR  LDL Cholesterol (mg/dL)   Date Value   11/17/2021 99       (goal LDL is <100)   AST (U/L)   Date Value   07/09/2021 20     ALT (U/L)   Date Value   07/09/2021 21     BUN (mg/dL)   Date Value   02/13/2022 8     BP Readings from Last 3 Encounters:   09/19/22 128/89   07/29/22 129/79   07/12/22 (!) 146/93          (goal 120/80)          Patient Active Problem List:     Hypertension     Asthma     GERD (gastroesophageal reflux disease)     S/P cardiac cath-Minimal disease 11/23/15 Dr. Nadja Chow     Irritable bowel syndrome with constipation     Obesity     Allergic rhinitis     Hiatal hernia     Paresthesias     Left sided numbness     Dysphagia     Family history of colon cancer     DNS (deviated nasal septum)     Hypertrophy of both inferior nasal turbinates     Iron deficiency anemia secondary to inadequate dietary iron intake     Iron deficiency anemia, unspecified     Atypical chest pain

## 2022-10-24 DIAGNOSIS — M25.552 LEFT HIP PAIN: ICD-10-CM

## 2022-10-24 RX ORDER — ONDANSETRON 4 MG/1
TABLET, ORALLY DISINTEGRATING ORAL
Qty: 15 TABLET | Refills: 1 | Status: SHIPPED | OUTPATIENT
Start: 2022-10-24

## 2022-10-24 NOTE — TELEPHONE ENCOUNTER
Refill request for ibuprofen (ADVIL;MOTRIN) 800 MG tablet. If appropriate please send medication(s) to patients pharmacy.     Next appt: 10/25/2022      Health Maintenance   Topic Date Due    COVID-19 Vaccine (3 - Booster for Pfizer series) 11/02/2021    Flu vaccine (1) 08/01/2022    Lipids  11/17/2022    Shingles vaccine (2 of 2) 02/01/2023 (Originally 9/3/2021)    Colorectal Cancer Screen  02/08/2023    Depression Screen  06/28/2023    Breast cancer screen  08/24/2023    Diabetes screen  07/09/2024    DTaP/Tdap/Td vaccine (2 - Td or Tdap) 02/05/2026    Cervical cancer screen  09/07/2026    Pneumococcal 0-64 years Vaccine  Completed    Hepatitis C screen  Completed    HIV screen  Completed    Hepatitis A vaccine  Aged Out    Hib vaccine  Aged Out    Meningococcal (ACWY) vaccine  Aged Out       Hemoglobin A1C (%)   Date Value   07/09/2021 5.6   10/06/2020 5.7   03/03/2020 6.0             ( goal A1C is < 7)   No results found for: LABMICR  LDL Cholesterol (mg/dL)   Date Value   11/17/2021 99       (goal LDL is <100)   AST (U/L)   Date Value   07/09/2021 20     ALT (U/L)   Date Value   07/09/2021 21     BUN (mg/dL)   Date Value   02/13/2022 8     BP Readings from Last 3 Encounters:   09/19/22 128/89   07/29/22 129/79   07/12/22 (!) 146/93          (goal 120/80)          Patient Active Problem List:     Hypertension     Asthma     GERD (gastroesophageal reflux disease)     S/P cardiac cath-Minimal disease 11/23/15 Dr. Jomar Neely     Irritable bowel syndrome with constipation     Obesity     Allergic rhinitis     Hiatal hernia     Paresthesias     Left sided numbness     Dysphagia     Family history of colon cancer     DNS (deviated nasal septum)     Hypertrophy of both inferior nasal turbinates     Iron deficiency anemia secondary to inadequate dietary iron intake     Iron deficiency anemia, unspecified     Atypical chest pain

## 2022-10-25 ENCOUNTER — OFFICE VISIT (OUTPATIENT)
Dept: INTERNAL MEDICINE | Age: 52
End: 2022-10-25
Payer: MEDICARE

## 2022-10-25 VITALS
DIASTOLIC BLOOD PRESSURE: 79 MMHG | SYSTOLIC BLOOD PRESSURE: 138 MMHG | WEIGHT: 204 LBS | OXYGEN SATURATION: 98 % | BODY MASS INDEX: 39.84 KG/M2 | HEART RATE: 70 BPM

## 2022-10-25 DIAGNOSIS — I10 ESSENTIAL HYPERTENSION: Primary | ICD-10-CM

## 2022-10-25 DIAGNOSIS — E66.01 CLASS 2 SEVERE OBESITY DUE TO EXCESS CALORIES WITH SERIOUS COMORBIDITY AND BODY MASS INDEX (BMI) OF 39.0 TO 39.9 IN ADULT (HCC): ICD-10-CM

## 2022-10-25 DIAGNOSIS — K21.00 CHRONIC REFLUX ESOPHAGITIS: ICD-10-CM

## 2022-10-25 DIAGNOSIS — R73.02 IGT (IMPAIRED GLUCOSE TOLERANCE): ICD-10-CM

## 2022-10-25 DIAGNOSIS — J45.50 SEVERE PERSISTENT ASTHMA WITHOUT COMPLICATION: ICD-10-CM

## 2022-10-25 LAB — HBA1C MFR BLD: 5.6 %

## 2022-10-25 PROCEDURE — G8482 FLU IMMUNIZE ORDER/ADMIN: HCPCS | Performed by: INTERNAL MEDICINE

## 2022-10-25 PROCEDURE — 99214 OFFICE O/P EST MOD 30 MIN: CPT | Performed by: INTERNAL MEDICINE

## 2022-10-25 PROCEDURE — 83036 HEMOGLOBIN GLYCOSYLATED A1C: CPT | Performed by: INTERNAL MEDICINE

## 2022-10-25 PROCEDURE — 3017F COLORECTAL CA SCREEN DOC REV: CPT | Performed by: INTERNAL MEDICINE

## 2022-10-25 PROCEDURE — 99211 OFF/OP EST MAY X REQ PHY/QHP: CPT | Performed by: INTERNAL MEDICINE

## 2022-10-25 PROCEDURE — 90686 IIV4 VACC NO PRSV 0.5 ML IM: CPT | Performed by: INTERNAL MEDICINE

## 2022-10-25 PROCEDURE — G8417 CALC BMI ABV UP PARAM F/U: HCPCS | Performed by: INTERNAL MEDICINE

## 2022-10-25 PROCEDURE — 3078F DIAST BP <80 MM HG: CPT | Performed by: INTERNAL MEDICINE

## 2022-10-25 PROCEDURE — 1036F TOBACCO NON-USER: CPT | Performed by: INTERNAL MEDICINE

## 2022-10-25 PROCEDURE — G8427 DOCREV CUR MEDS BY ELIG CLIN: HCPCS | Performed by: INTERNAL MEDICINE

## 2022-10-25 PROCEDURE — 3074F SYST BP LT 130 MM HG: CPT | Performed by: INTERNAL MEDICINE

## 2022-10-25 RX ORDER — OMEPRAZOLE 40 MG/1
CAPSULE, DELAYED RELEASE ORAL
Qty: 30 CAPSULE | Refills: 5 | Status: SHIPPED | OUTPATIENT
Start: 2022-10-25

## 2022-10-25 SDOH — ECONOMIC STABILITY: FOOD INSECURITY: WITHIN THE PAST 12 MONTHS, THE FOOD YOU BOUGHT JUST DIDN'T LAST AND YOU DIDN'T HAVE MONEY TO GET MORE.: NEVER TRUE

## 2022-10-25 SDOH — ECONOMIC STABILITY: FOOD INSECURITY: WITHIN THE PAST 12 MONTHS, YOU WORRIED THAT YOUR FOOD WOULD RUN OUT BEFORE YOU GOT MONEY TO BUY MORE.: NEVER TRUE

## 2022-10-25 ASSESSMENT — ENCOUNTER SYMPTOMS
RHINORRHEA: 0
CHEST TIGHTNESS: 1
COUGH: 0
SHORTNESS OF BREATH: 0
WHEEZING: 1
EYE ITCHING: 0
EYE REDNESS: 0

## 2022-10-25 ASSESSMENT — SOCIAL DETERMINANTS OF HEALTH (SDOH): HOW HARD IS IT FOR YOU TO PAY FOR THE VERY BASICS LIKE FOOD, HOUSING, MEDICAL CARE, AND HEATING?: NOT HARD AT ALL

## 2022-10-25 NOTE — PROGRESS NOTES
[facility]    Progress Note    Date of patient's visit: 10/25/2022    Patient's Name:  Ty Torres    YOB: 1970            Patient Care Team:  Gemma Wharton MD as PCP - General (Internal Medicine)  Gemma Wharton MD as PCP - [facility]    REASON FOR VISIT: Routine outpatient follow     Chief Complaint   Patient presents with    Asthma     Pt states that she has been having a lot of chest tightness when she breaths. Health Maintenance     Flu vaccine given          HISTORY OF PRESENT ILLNESS:    History was obtained from the patient. Ty Torres is a 45 y.o. is here for follow-up on hypertension and her severe asthma. Currently she is asymptomatic but she says whenever she is active she feels a lot of tightness in her chest.  She did see her pulmonologist twice. Her IgE levels are high and eosinophil level is about 200. Monoclonal antibodies due to numerous occurrences under lab values but at that time she was refusing. Currently she is here to discuss these options. She says she was given Trelegy but it is too strong for her and the powder is bothering her throat. She is advised to use a spacer device to see if that helps. She has last used prednisone in the summer couple of times. She has not had any ER visits. She has a nebulizer at home. She had allergy testing done which showed reactivity to cat dander. She does not have any pets at home. 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 She is also concerned about her weight and would like to lose some weight. She wants to talk to a nutritionist.  She has had previous several stress tests and a catheterization in 2015 which was negative. His test was in February of this year.         Results for POC orders placed in visit on 10/25/22   POCT glycosylated hemoglobin (Hb A1C)   Result Value Ref Range    Hemoglobin A1C 5.6 %       Past Medical History:   Diagnosis Date Allergic rhinitis     Asthma     Clinical trial participant 11/23/2015 11/23/20015    COPD (chronic obstructive pulmonary disease) ([facility] [address]     Dysphagia     Dysphagia     has needed EGD with dilatation in the past    Family history of colon cancer 8/9/2017    Father    GERD (gastroesophageal reflux disease)     H/O cardiovascular stress test 01/15/2018    ABNORMAL    Hiatal hernia     Hyperlipidemia     Hypertension     Iron deficiency anemia secondary to inadequate dietary iron intake 5/13/2020    Iron deficiency anemia, unspecified 5/13/2020    Irritable bowel syndrome with constipation 1/21/2016    Obesity     Snores     states has tested negative for sleep apnea    Wears glasses        Past Surgical History:   Procedure Laterality Date    CARDIAC CATHETERIZATION  01/16/2018    CARDIAC CATHETERIZATION  11/23/2015     Minimal non obstructive CAD    COLONOSCOPY  2015    COLONOSCOPY  02/08/2018    Redundant colon    ENDOSCOPY, COLON, DIAGNOSTIC  2015    with dilatation    ENDOSCOPY, COLON, DIAGNOSTIC  02/08/2018    WNL     NASAL SEPTOPLASTY W/ TURBINOPLASTY  2020        NOSE SURGERY      turbinate     IN COLON CA SCRN NOT HI RSK IND N/A 2/8/2018    COLONOSCOPY performed by Gibson Melendrez MD at [address] [place] [address]    UPPER GASTROINTESTINAL ENDOSCOPY      The endoscopic exam showed mildly tortuous esophagus.  Savary dilation performed x 42 and 45 F.     UPPER GASTROINTESTINAL ENDOSCOPY N/A 7/18/2017    EGD DILATION SAVORY performed by Gibson Melendrez MD at [facility]         ALLERGIES    No Known Allergies    MEDICATIONS:      Current Outpatient Medications on File Prior to Visit   Medication Sig Dispense Refill    ondansetron (ZOFRAN-ODT) 4 MG disintegrating tablet dissolve 1 tablet ON TONGUE every 12 hours if needed for nausea OR vomiting 15 tablet 1    mometasone-formoterol (DULERA) 200-5 MCG/ACT inhaler inhale 2 puffs by mouth and INTO THE LUNGS twice a day 13 g 2    lidocaine (LIDODERM) 5 % apply 1 patch TO THE AFFECTED AREA DAILY. LEAVE ON FOR 12 HOURS AND THEN OFF FOR 12 HOURS. 30 patch 2    aspirin (RA ASPIRIN EC) 81 MG EC tablet take 1 tablet by mouth once daily 30 tablet 5    senna (SENOKOT) 8.6 MG TABS tablet take 1 tablet by mouth once daily 90 tablet 0    albuterol sulfate HFA (PROAIR HFA) 108 (90 Base) MCG/ACT inhaler inhale 2 puffs by mouth every 6 hours if needed for wheezing 1 each 5    albuterol (PROVENTIL) (2.5 MG/3ML) 0.083% nebulizer solution Take 3 mLs by nebulization every 6 hours as needed for Wheezing 120 each 3    valsartan-hydroCHLOROthiazide (DIOVAN-HCT) 80-12.5 MG per tablet Once a day 30 tablet 5    carvedilol (COREG) 6.25 MG tablet 1 tablet 2/day 60 tablet 5    atorvastatin (LIPITOR) 20 MG tablet 1 tablet once daily 30 tablet 5    ibuprofen (ADVIL;MOTRIN) 800 MG tablet take 1 tablet by mouth every 8 hours AS NEEDED FOR PAIN 30 tablet 0    omeprazole (PRILOSEC) 40 MG delayed release capsule take 1 capsule by mouth once daily if needed 30 capsule 5    simethicone (MYLICON) 80 MG chewable tablet Take 1 tablet by mouth 4 times daily as needed for Flatulence 180 tablet 3    meloxicam (MOBIC) 7.5 MG tablet Take 1 tablet by mouth daily 30 tablet 2    fluticasone (FLONASE) 50 MCG/ACT nasal spray instill 2 sprays into each nostril once daily 16 g 3    melatonin (RA MELATONIN) 5 MG TABS tablet take 1 tablet by mouth nightly 30 tablet 3    Multiple Vitamins-Minerals (WOMENS MULTI VITAMIN & MINERAL PO) Take 1 tablet by mouth daily        Current Facility-Administered Medications on File Prior to Visit   Medication Dose Route Frequency Provider Last Rate Last Admin    0.9 % sodium chloride infusion   IntraVENous Continuous Chan Ferrer MD 30 mL/hr at 05/26/15 1008 New Bag at 05/26/15 1008       HISTORY    Reviewed and no change from previous record. Remigio Sierra  reports that she has never smoked.  She has never used smokeless tobacco.    FAMILY HISTORY:    Reviewed and No change from previous visit    HEALTH MAINTENANCE DUE:      Health Maintenance Due   Topic Date Due    COVID-19 Vaccine (3 - Booster for Pfizer series) 11/02/2021    Flu vaccine (1) 08/01/2022    Lipids  11/17/2022       REVIEW OF SYSTEMS:    12 point review of symptoms completed and found to be normal except noted in the HPI    Review of Systems   Constitutional:  Negative for fatigue and unexpected weight change. HENT:  Positive for congestion. Negative for postnasal drip and rhinorrhea. Eyes:  Negative for redness and itching. Respiratory:  Positive for chest tightness and wheezing. Negative for cough and shortness of breath. Cardiovascular:  Negative for chest pain, palpitations and leg swelling. Allergic/Immunologic: Positive for environmental allergies. Negative for immunocompromised state. Neurological:  Negative for dizziness, weakness and headaches. PHYSICAL EXAM:     Vitals:    10/25/22 1318   BP: 138/79   Site: Right Upper Arm   Position: Sitting   Cuff Size: Large Adult   Pulse: 70   SpO2: 98%   Weight: 204 lb (92.5 kg)     Body mass index is 39.84 kg/m². BP Readings from Last 3 Encounters:   10/25/22 138/79   09/19/22 128/89   07/29/22 129/79        Wt Readings from Last 3 Encounters:   10/25/22 204 lb (92.5 kg)   09/19/22 202 lb (91.6 kg)   07/29/22 208 lb 9.6 oz (94.6 kg)       Physical Exam  Vitals and nursing note reviewed. Constitutional:       Appearance: Normal appearance. HENT:      Head: Normocephalic. Eyes:      Extraocular Movements: Extraocular movements intact. Conjunctiva/sclera: Conjunctivae normal.      Pupils: Pupils are equal, round, and reactive to light. Cardiovascular:      Rate and Rhythm: Normal rate and regular rhythm. Heart sounds: No murmur heard. Pulmonary:      Effort: Pulmonary effort is normal.      Breath sounds: Normal breath sounds. No stridor. No wheezing. Musculoskeletal:      Right lower leg: No edema. Left lower leg: No edema. Neurological:      General: No focal deficit present. Mental Status: She is alert and oriented to person, place, and time. LABORATORY FINDINGS:    CBC:  Lab Results   Component Value Date/Time    WBC 9.1 02/13/2022 09:00 AM    HGB 12.7 02/13/2022 09:00 AM     02/13/2022 09:00 AM     BMP:    Lab Results   Component Value Date/Time     02/13/2022 09:00 AM    K 4.0 02/13/2022 09:00 AM     02/13/2022 09:00 AM    CO2 22 02/13/2022 09:00 AM    BUN 8 02/13/2022 09:00 AM    CREATININE 0.65 02/13/2022 09:00 AM    GLUCOSE 107 02/13/2022 09:00 AM     HEMOGLOBIN A1C:   Lab Results   Component Value Date/Time    LABA1C 5.6 07/09/2021 09:19 AM     MICROALBUMIN URINE: No results found for: MICROALBUR  FASTING LIPID PANEL:  Lab Results   Component Value Date    CHOL 171 10/06/2020    HDL 49 11/17/2021    TRIG 96 10/06/2020     Lab Results   Component Value Date    LDLCHOLESTEROL 99 11/17/2021       LIVER PROFILE:  Lab Results   Component Value Date/Time    ALT 21 07/09/2021 10:35 AM    AST 20 07/09/2021 10:35 AM    PROT 7.1 07/09/2021 10:35 AM    BILITOT 0.26 07/09/2021 10:35 AM    BILIDIR 0.12 05/02/2017 04:20 PM    LABALBU 4.3 07/09/2021 10:35 AM      THYROID FUNCTION:   Lab Results   Component Value Date/Time    TSH 2.04 05/15/2020 09:21 AM      URINEANALYSIS: No results found for: LABURIN  ASSESSMENT AND PLAN:    1. Essential hypertension  Controlled on current meds    - [address]    2. Severe persistent asthma without complication  Advised spacer  May benefit from Monoclonal antibodies. Advised to contact Pulmonology      3. Chronic reflux esophagitis      - omeprazole (PRILOSEC) 40 MG delayed release capsule; take 1 capsule by mouth once daily if needed  Dispense: 30 capsule; Refill: 5    4. IGT (impaired glucose tolerance)    - POCT glycosylated hemoglobin (Hb A1C)  - [facility]    5.  Class 2 severe obesity due to excess calories with serious comorbidity and body mass index (BMI) of 39.0 to 39.9 in adult [facility])    - [address]    Flu vaccine today  Refusing Covid booster  Due for colonoscopy next year  Mammogram next year. No fam h/o breast ca      FOLLOW UP AND INSTRUCTIONS:   Return in about 6 months (around 4/25/2023). Chase Noyola received counseling on the following healthy behaviors: nutrition, exercise, and medication adherence    Reviewed prior labs and health maintenance. Discussed use, benefit, and side effects of prescribed medications. Barriers to medication compliance addressed. All patient questions answered. Pt voiced understanding.          Veronika Palma  Attending Physician, [address] [place] [address], Internal Medicine Residency Program  [address] [place] [address]  10/25/2022, 1:26 PM

## 2022-10-27 RX ORDER — IBUPROFEN 800 MG/1
TABLET ORAL
Qty: 30 TABLET | Refills: 0 | Status: SHIPPED | OUTPATIENT
Start: 2022-10-27

## 2022-11-21 RX ORDER — SENNOSIDES 8.6 MG
TABLET ORAL
Qty: 90 TABLET | Refills: 1 | Status: SHIPPED | OUTPATIENT
Start: 2022-11-21

## 2022-11-21 NOTE — TELEPHONE ENCOUNTER
Request for Justin     Pt on waitlist for 6 month f.u in April     Next Visit Date:  Future Appointments   Date Time Provider Fortino Jean Baptiste   1/9/2023  9:15 AM Brinda Tong  [address] Maintenance   Topic Date Due    COVID-19 Vaccine (3 - Booster for Pfizer series) 11/02/2021    Lipids  11/17/2022    Shingles vaccine (2 of 2) 02/01/2023 (Originally 9/3/2021)    Colorectal Cancer Screen  02/08/2023    Depression Screen  06/28/2023    Breast cancer screen  08/24/2023    Diabetes screen  10/25/2025    DTaP/Tdap/Td vaccine (2 - Td or Tdap) 02/05/2026    Cervical cancer screen  09/07/2026    Flu vaccine  Completed    Pneumococcal 0-64 years Vaccine  Completed    Hepatitis C screen  Completed    HIV screen  Completed    Hepatitis A vaccine  Aged Out    Hib vaccine  Aged Out    Meningococcal (ACWY) vaccine  Aged Out       Hemoglobin A1C (%)   Date Value   10/25/2022 5.6   07/09/2021 5.6   10/06/2020 5.7             ( goal A1C is < 7)   No results found for: LABMICR  LDL Cholesterol (mg/dL)   Date Value   11/17/2021 99       (goal LDL is <100)   AST (U/L)   Date Value   07/09/2021 20     ALT (U/L)   Date Value   07/09/2021 21     BUN (mg/dL)   Date Value   02/13/2022 8     BP Readings from Last 3 Encounters:   10/25/22 138/79   09/19/22 128/89   07/29/22 129/79          (goal 120/80)    All Future Testing planned in [facility]  Lab Frequency Next Occurrence         Patient Active Problem List:     Hypertension     Asthma     GERD (gastroesophageal reflux disease)     S/P cardiac cath-Minimal disease 11/23/15 Dr. Nick Polk     Irritable bowel syndrome with constipation     Obesity     Allergic rhinitis     Hiatal hernia     Paresthesias     Left sided numbness     Dysphagia     Family history of colon cancer     DNS (deviated nasal septum)     Hypertrophy of both inferior nasal turbinates     Iron deficiency anemia secondary to inadequate dietary iron intake     Iron deficiency anemia, unspecified Atypical chest pain

## 2023-01-09 ENCOUNTER — OFFICE VISIT (OUTPATIENT)
Dept: PULMONOLOGY | Age: 53
End: 2023-01-09
Payer: MEDICARE

## 2023-01-09 VITALS
BODY MASS INDEX: 41.03 KG/M2 | DIASTOLIC BLOOD PRESSURE: 84 MMHG | SYSTOLIC BLOOD PRESSURE: 139 MMHG | HEIGHT: 60 IN | RESPIRATION RATE: 12 BRPM | WEIGHT: 209 LBS | OXYGEN SATURATION: 99 % | HEART RATE: 73 BPM

## 2023-01-09 DIAGNOSIS — J45.50 UNCOMPLICATED SEVERE PERSISTENT ASTHMA: Primary | ICD-10-CM

## 2023-01-09 DIAGNOSIS — K21.9 GASTROESOPHAGEAL REFLUX DISEASE WITHOUT ESOPHAGITIS: ICD-10-CM

## 2023-01-09 DIAGNOSIS — J32.9 CHRONIC RHINOSINUSITIS: ICD-10-CM

## 2023-01-09 DIAGNOSIS — J34.3 NASAL TURBINATE HYPERTROPHY: ICD-10-CM

## 2023-01-09 DIAGNOSIS — J31.0 CHRONIC RHINOSINUSITIS: ICD-10-CM

## 2023-01-09 DIAGNOSIS — R76.8 ELEVATED IGE LEVEL: ICD-10-CM

## 2023-01-09 DIAGNOSIS — K44.9 HIATAL HERNIA: ICD-10-CM

## 2023-01-09 PROCEDURE — G8427 DOCREV CUR MEDS BY ELIG CLIN: HCPCS | Performed by: INTERNAL MEDICINE

## 2023-01-09 PROCEDURE — 3074F SYST BP LT 130 MM HG: CPT | Performed by: INTERNAL MEDICINE

## 2023-01-09 PROCEDURE — G8482 FLU IMMUNIZE ORDER/ADMIN: HCPCS | Performed by: INTERNAL MEDICINE

## 2023-01-09 PROCEDURE — 3017F COLORECTAL CA SCREEN DOC REV: CPT | Performed by: INTERNAL MEDICINE

## 2023-01-09 PROCEDURE — 3078F DIAST BP <80 MM HG: CPT | Performed by: INTERNAL MEDICINE

## 2023-01-09 PROCEDURE — 99214 OFFICE O/P EST MOD 30 MIN: CPT | Performed by: INTERNAL MEDICINE

## 2023-01-09 PROCEDURE — 1036F TOBACCO NON-USER: CPT | Performed by: INTERNAL MEDICINE

## 2023-01-09 PROCEDURE — G8417 CALC BMI ABV UP PARAM F/U: HCPCS | Performed by: INTERNAL MEDICINE

## 2023-01-09 RX ORDER — GUAIFENESIN/DEXTROMETHORPHAN 100-10MG/5
5 SYRUP ORAL 3 TIMES DAILY PRN
Qty: 473 ML | Refills: 1 | Status: SHIPPED | OUTPATIENT
Start: 2023-01-09 | End: 2023-01-19

## 2023-01-14 ASSESSMENT — ENCOUNTER SYMPTOMS
SHORTNESS OF BREATH: 1
CHEST TIGHTNESS: 0
WHEEZING: 0
SINUS PRESSURE: 1
COUGH: 1
RHINORRHEA: 1

## 2023-01-25 ENCOUNTER — HOSPITAL ENCOUNTER (EMERGENCY)
Age: 53
Discharge: HOME | End: 2023-01-25
Attending: EMERGENCY MEDICINE
Payer: MEDICARE

## 2023-01-25 ENCOUNTER — APPOINTMENT (OUTPATIENT)
Dept: INTERVENTIONAL RADIOLOGY/VASCULAR | Age: 53
End: 2023-01-25
Payer: MEDICARE

## 2023-01-25 ENCOUNTER — APPOINTMENT (OUTPATIENT)
Dept: CT IMAGING | Age: 53
End: 2023-01-25
Payer: MEDICARE

## 2023-01-25 VITALS
BODY MASS INDEX: 41.23 KG/M2 | SYSTOLIC BLOOD PRESSURE: 157 MMHG | OXYGEN SATURATION: 95 % | TEMPERATURE: 98.4 F | RESPIRATION RATE: 12 BRPM | HEART RATE: 56 BPM | DIASTOLIC BLOOD PRESSURE: 76 MMHG | WEIGHT: 210 LBS | HEIGHT: 60 IN

## 2023-01-25 DIAGNOSIS — G93.2 IIH (IDIOPATHIC INTRACRANIAL HYPERTENSION): Primary | ICD-10-CM

## 2023-01-25 PROBLEM — G44.89 OTHER HEADACHE SYNDROME: Status: ACTIVE | Noted: 2023-01-25

## 2023-01-25 LAB
ABSOLUTE EOS #: 0.17 K/UL (ref 0–0.44)
ABSOLUTE IMMATURE GRANULOCYTE: 0.03 K/UL (ref 0–0.3)
ABSOLUTE LYMPH #: 2.78 K/UL (ref 1.1–3.7)
ABSOLUTE MONO #: 0.54 K/UL (ref 0.1–1.2)
ALBUMIN CSF: 149 MG/L (ref 70–350)
ANION GAP SERPL CALCULATED.3IONS-SCNC: 11 MMOL/L (ref 9–17)
APPEARANCE CSF: CLEAR
BASOPHILS # BLD: 0 % (ref 0–2)
BASOPHILS ABSOLUTE: 0.03 K/UL (ref 0–0.2)
BUN BLDV-MCNC: 7 MG/DL (ref 6–20)
C-REACTIVE PROTEIN: 4.8 MG/L (ref 0–5)
CALCIUM SERPL-MCNC: 9.4 MG/DL (ref 8.6–10.4)
CHLORIDE BLD-SCNC: 101 MMOL/L (ref 98–107)
CO2: 25 MMOL/L (ref 20–31)
CREAT SERPL-MCNC: 0.75 MG/DL (ref 0.5–0.9)
CRYPTOCOCCUS NEOFORMANS/GATTI CSF FILM ARR.: NOT DETECTED
CYTOMEGALOVIRUS (CMV) CSF FILM ARRAY: NOT DETECTED
ENTEROVIRUS CSF FILM ARRAY: NOT DETECTED
EOSINOPHILS RELATIVE PERCENT: 2 % (ref 1–4)
ESCHERICHIA COLI K1 CSF FILM ARRAY: NOT DETECTED
GFR SERPL CREATININE-BSD FRML MDRD: >60 ML/MIN/1.73M2
GLUCOSE BLD-MCNC: 101 MG/DL (ref 70–99)
GLUCOSE, CSF: 59 MG/DL (ref 40–70)
HAEMOPHILUS INFLUENZA CSF FILM ARRAY: NOT DETECTED
HCT VFR BLD CALC: 37.5 % (ref 36.3–47.1)
HEMOGLOBIN: 11.7 G/DL (ref 11.9–15.1)
HHV-6 (HERPESVIRUS 6) CSF FILM ARRAY: NOT DETECTED
HSV-1 CSF FILM ARRAY: NOT DETECTED
HSV-2 CSF FILM ARRAY: NOT DETECTED
IMMATURE GRANULOCYTES: 0 %
INR BLD: 1
LISTERIA MONOCYTOGENES CSF FILM ARRAY: NOT DETECTED
LYMPHOCYTES # BLD: 37 % (ref 24–43)
MAGNESIUM: 1.9 MG/DL (ref 1.6–2.6)
MCH RBC QN AUTO: 26.9 PG (ref 25.2–33.5)
MCHC RBC AUTO-ENTMCNC: 31.2 G/DL (ref 28.4–34.8)
MCV RBC AUTO: 86.2 FL (ref 82.6–102.9)
MONOCYTES # BLD: 7 % (ref 3–12)
NEISSERIA MENIGITIDIS CSF FILM ARRAY: NOT DETECTED
NRBC AUTOMATED: 0 PER 100 WBC
PARECHOVIRUS CSF FILM ARRAY: NOT DETECTED
PARTIAL THROMBOPLASTIN TIME: 25 SEC (ref 20.5–30.5)
PDW BLD-RTO: 15.3 % (ref 11.8–14.4)
PLATELET # BLD: 332 K/UL (ref 138–453)
PMV BLD AUTO: 10.3 FL (ref 8.1–13.5)
POTASSIUM SERPL-SCNC: 4.3 MMOL/L (ref 3.7–5.3)
PROTEIN CSF: 26.1 MG/DL (ref 15–45)
PROTHROMBIN TIME: 10.5 SEC (ref 9.1–12.3)
RBC # BLD: 4.35 M/UL (ref 3.95–5.11)
RBC # BLD: ABNORMAL 10*6/UL
RBC CSF: 0 CELLS/UL
SEDIMENTATION RATE, ERYTHROCYTE: 23 MM/HR (ref 0–30)
SEG NEUTROPHILS: 54 % (ref 36–65)
SEGMENTED NEUTROPHILS ABSOLUTE COUNT: 3.88 K/UL (ref 1.5–8.1)
SODIUM BLD-SCNC: 137 MMOL/L (ref 135–144)
SPECIMEN DESCRIPTION: NORMAL
STREPTOCOCCUS AGALACTIAE CSF FILM ARRAY: NOT DETECTED
STREPTOCOCCUS PNEUMONIAE CSF FILM ARRAY: NOT DETECTED
TUBE NUMBER CSF: 3
VARICELLA-ZOSTER CSF FILM ARRAY: NOT DETECTED
VOLUME CSF: 8
WBC # BLD: 7.4 K/UL (ref 3.5–11.3)
WBC CSF: 3 CELLS/UL
XANTHOCHROMIA: NORMAL

## 2023-01-25 PROCEDURE — 82042 OTHER SOURCE ALBUMIN QUAN EA: CPT

## 2023-01-25 PROCEDURE — 87483 CNS DNA AMP PROBE TYPE 12-25: CPT

## 2023-01-25 PROCEDURE — 83873 ASSAY OF CSF PROTEIN: CPT

## 2023-01-25 PROCEDURE — 85610 PROTHROMBIN TIME: CPT

## 2023-01-25 PROCEDURE — 2709999900 HC NON-CHARGEABLE SUPPLY

## 2023-01-25 PROCEDURE — 82945 GLUCOSE OTHER FLUID: CPT

## 2023-01-25 PROCEDURE — 70450 CT HEAD/BRAIN W/O DYE: CPT

## 2023-01-25 PROCEDURE — 87205 SMEAR GRAM STAIN: CPT

## 2023-01-25 PROCEDURE — 85730 THROMBOPLASTIN TIME PARTIAL: CPT

## 2023-01-25 PROCEDURE — 89050 BODY FLUID CELL COUNT: CPT

## 2023-01-25 PROCEDURE — C1894 INTRO/SHEATH, NON-LASER: HCPCS

## 2023-01-25 PROCEDURE — 84157 ASSAY OF PROTEIN OTHER: CPT

## 2023-01-25 PROCEDURE — 96375 TX/PRO/DX INJ NEW DRUG ADDON: CPT

## 2023-01-25 PROCEDURE — C1887 CATHETER, GUIDING: HCPCS

## 2023-01-25 PROCEDURE — 62270 DX LMBR SPI PNXR: CPT

## 2023-01-25 PROCEDURE — 6360000002 HC RX W HCPCS: Performed by: STUDENT IN AN ORGANIZED HEALTH CARE EDUCATION/TRAINING PROGRAM

## 2023-01-25 PROCEDURE — 88112 CYTOPATH CELL ENHANCE TECH: CPT

## 2023-01-25 PROCEDURE — 80048 BASIC METABOLIC PNL TOTAL CA: CPT

## 2023-01-25 PROCEDURE — 2580000003 HC RX 258: Performed by: STUDENT IN AN ORGANIZED HEALTH CARE EDUCATION/TRAINING PROGRAM

## 2023-01-25 PROCEDURE — 87070 CULTURE OTHR SPECIMN AEROBIC: CPT

## 2023-01-25 PROCEDURE — 96365 THER/PROPH/DIAG IV INF INIT: CPT

## 2023-01-25 PROCEDURE — 62328 DX LMBR SPI PNXR W/FLUOR/CT: CPT

## 2023-01-25 PROCEDURE — 85652 RBC SED RATE AUTOMATED: CPT

## 2023-01-25 PROCEDURE — 86592 SYPHILIS TEST NON-TREP QUAL: CPT

## 2023-01-25 PROCEDURE — 96366 THER/PROPH/DIAG IV INF ADDON: CPT

## 2023-01-25 PROCEDURE — 87015 SPECIMEN INFECT AGNT CONCNTJ: CPT

## 2023-01-25 PROCEDURE — 99284 EMERGENCY DEPT VISIT MOD MDM: CPT

## 2023-01-25 PROCEDURE — 6370000000 HC RX 637 (ALT 250 FOR IP): Performed by: STUDENT IN AN ORGANIZED HEALTH CARE EDUCATION/TRAINING PROGRAM

## 2023-01-25 PROCEDURE — 85025 COMPLETE CBC W/AUTO DIFF WBC: CPT

## 2023-01-25 PROCEDURE — 86140 C-REACTIVE PROTEIN: CPT

## 2023-01-25 PROCEDURE — 83735 ASSAY OF MAGNESIUM: CPT

## 2023-01-25 RX ORDER — SODIUM CHLORIDE 9 MG/ML
INJECTION, SOLUTION INTRAVENOUS PRN
Status: DISCONTINUED | OUTPATIENT
Start: 2023-01-25 | End: 2023-01-26 | Stop reason: HOSPADM

## 2023-01-25 RX ORDER — ACETAZOLAMIDE 250 MG/1
500 TABLET ORAL 2 TIMES DAILY
Qty: 84 TABLET | Refills: 0 | Status: SHIPPED | OUTPATIENT
Start: 2023-02-02 | End: 2023-02-23

## 2023-01-25 RX ORDER — ACETAZOLAMIDE 250 MG/1
250 TABLET ORAL 2 TIMES DAILY
Qty: 14 TABLET | Refills: 0 | Status: SHIPPED | OUTPATIENT
Start: 2023-01-25 | End: 2023-02-01

## 2023-01-25 RX ORDER — PROCHLORPERAZINE EDISYLATE 5 MG/ML
10 INJECTION INTRAMUSCULAR; INTRAVENOUS ONCE
Status: COMPLETED | OUTPATIENT
Start: 2023-01-25 | End: 2023-01-25

## 2023-01-25 RX ORDER — SODIUM CHLORIDE 0.9 % (FLUSH) 0.9 %
5-40 SYRINGE (ML) INJECTION PRN
Status: DISCONTINUED | OUTPATIENT
Start: 2023-01-25 | End: 2023-01-26 | Stop reason: HOSPADM

## 2023-01-25 RX ORDER — DIPHENHYDRAMINE HYDROCHLORIDE 50 MG/ML
12.5 INJECTION INTRAMUSCULAR; INTRAVENOUS ONCE
Status: COMPLETED | OUTPATIENT
Start: 2023-01-25 | End: 2023-01-25

## 2023-01-25 RX ORDER — MAGNESIUM SULFATE IN WATER 40 MG/ML
2000 INJECTION, SOLUTION INTRAVENOUS ONCE
Status: COMPLETED | OUTPATIENT
Start: 2023-01-25 | End: 2023-01-25

## 2023-01-25 RX ORDER — ACETAZOLAMIDE 250 MG/1
250 TABLET ORAL ONCE
Status: COMPLETED | OUTPATIENT
Start: 2023-01-25 | End: 2023-01-25

## 2023-01-25 RX ORDER — 0.9 % SODIUM CHLORIDE 0.9 %
1000 INTRAVENOUS SOLUTION INTRAVENOUS ONCE
Status: COMPLETED | OUTPATIENT
Start: 2023-01-25 | End: 2023-01-25

## 2023-01-25 RX ORDER — SODIUM CHLORIDE 0.9 % (FLUSH) 0.9 %
5-40 SYRINGE (ML) INJECTION EVERY 12 HOURS SCHEDULED
Status: DISCONTINUED | OUTPATIENT
Start: 2023-01-25 | End: 2023-01-26 | Stop reason: HOSPADM

## 2023-01-25 RX ORDER — KETOROLAC TROMETHAMINE 15 MG/ML
15 INJECTION, SOLUTION INTRAMUSCULAR; INTRAVENOUS ONCE
Status: COMPLETED | OUTPATIENT
Start: 2023-01-25 | End: 2023-01-25

## 2023-01-25 RX ADMIN — MAGNESIUM SULFATE HEPTAHYDRATE 2000 MG: 40 INJECTION, SOLUTION INTRAVENOUS at 15:12

## 2023-01-25 RX ADMIN — KETOROLAC TROMETHAMINE 15 MG: 15 INJECTION, SOLUTION INTRAMUSCULAR; INTRAVENOUS at 13:45

## 2023-01-25 RX ADMIN — SODIUM CHLORIDE 1000 ML: 9 INJECTION, SOLUTION INTRAVENOUS at 13:46

## 2023-01-25 RX ADMIN — ACETAZOLAMIDE 250 MG: 250 TABLET ORAL at 20:05

## 2023-01-25 RX ADMIN — PROCHLORPERAZINE EDISYLATE 10 MG: 5 INJECTION INTRAMUSCULAR; INTRAVENOUS at 13:45

## 2023-01-25 RX ADMIN — DIPHENHYDRAMINE HYDROCHLORIDE 12.5 MG: 50 INJECTION, SOLUTION INTRAMUSCULAR; INTRAVENOUS at 13:45

## 2023-01-25 ASSESSMENT — ENCOUNTER SYMPTOMS
DIARRHEA: 0
VOMITING: 0
ABDOMINAL PAIN: 0
NAUSEA: 0
WHEEZING: 0
COUGH: 0
SHORTNESS OF BREATH: 0
CHEST TIGHTNESS: 0

## 2023-01-25 ASSESSMENT — PAIN DESCRIPTION - LOCATION: LOCATION: HEAD

## 2023-01-25 ASSESSMENT — PAIN - FUNCTIONAL ASSESSMENT: PAIN_FUNCTIONAL_ASSESSMENT: 0-10

## 2023-01-25 ASSESSMENT — PAIN SCALES - GENERAL: PAINLEVEL_OUTOF10: 7

## 2023-01-25 ASSESSMENT — PAIN DESCRIPTION - DESCRIPTORS: DESCRIPTORS: SQUEEZING;THROBBING

## 2023-01-25 NOTE — ED PROVIDER NOTES
[facility] ED  Emergency Department  Emergency Medicine Resident Turn-Over     Care of Rad Garcia was assumed from Dr. Tsering Dias and is being seen for Headache (Pt states pressure in head. )  . The patient's initial evaluation and plan have been discussed with the prior provider who initially evaluated the patient.      EMERGENCY DEPARTMENT COURSE / MEDICAL DECISION MAKING:       MEDICATIONS GIVEN:  Orders Placed This Encounter   Medications    0.9 % sodium chloride bolus    prochlorperazine (COMPAZINE) injection 10 mg    diphenhydrAMINE (BENADRYL) injection 12.5 mg    ketorolac (TORADOL) injection 15 mg    magnesium sulfate 2000 mg in 50 mL IVPB premix    sodium chloride flush 0.9 % injection 5-40 mL    sodium chloride flush 0.9 % injection 5-40 mL    0.9 % sodium chloride infusion    acetaZOLAMIDE (DIAMOX) tablet 250 mg    acetaZOLAMIDE (DIAMOX) 250 MG tablet     Sig: Take 1 tablet by mouth 2 times daily for 7 days     Dispense:  14 tablet     Refill:  0    acetaZOLAMIDE (DIAMOX) 250 MG tablet     Sig: Take 2 tablets by mouth 2 times daily for 21 days     Dispense:  84 tablet     Refill:  0       LABS / RADIOLOGY:     Labs Reviewed   CULTURE, CSF - Abnormal; Notable for the following components:       Result Value    Direct Exam RARE NEUTROPHILS (*)     All other components within normal limits   BASIC METABOLIC PANEL - Abnormal; Notable for the following components:    Glucose 101 (*)     All other components within normal limits   CBC WITH AUTO DIFFERENTIAL - Abnormal; Notable for the following components:    Hemoglobin 11.7 (*)     RDW 15.3 (*)     All other components within normal limits   MENINGITIS ENCEPHALITIS PANEL CSF, MOLECULAR   HSV PCR   VDRL, CSF   MAGNESIUM   SEDIMENTATION RATE   C-REACTIVE PROTEIN   PROTIME-INR   APTT   CELL COUNT WITH DIFFERENTIAL, CSF   PROTEIN, CSF   GLUCOSE, CSF   PROTIME-INR   MYELIN BASIC PROTEIN, CSF   ALBUMIN, CSF   OLIGOCLONAL BANDING   CYTOLOGY, NON-GYN       CT HEAD WO CONTRAST    Result Date: 1/25/2023  EXAMINATION: CT OF THE HEAD WITHOUT CONTRAST  1/25/2023 10:30 am TECHNIQUE: CT of the head was performed without the administration of intravenous contrast. Automated exposure control, iterative reconstruction, and/or weight based adjustment of the mA/kV was utilized to reduce the radiation dose to as low as reasonably achievable. COMPARISON: 02/13/2022 HISTORY: ORDERING SYSTEM PROVIDED HISTORY: headache x 3 week TECHNOLOGIST PROVIDED HISTORY: headache x 3 week Decision Support Exception - unselect if not a suspected or confirmed emergency medical condition->Emergency Medical Condition (MA) Is the patient pregnant?->No Reason for Exam: pressure to head x 3 weeks FINDINGS: BRAIN/VENTRICLES: There is no acute intracranial hemorrhage, mass effect or midline shift. No abnormal extra-axial fluid collection. The gray-white differentiation is maintained without evidence of an acute infarct. There is no evidence of hydrocephalus. ORBITS: The visualized portion of the orbits demonstrate no acute abnormality. Stable posttraumatic deformity of the left lamina papyracea SINUSES: The visualized paranasal sinuses and mastoid air cells demonstrate no acute abnormality. SOFT TISSUES/SKULL:  No acute abnormality of the visualized skull or soft tissues. No acute intracranial abnormality. RECENT VITALS:     Temp: 98.4 °F (36.9 °C),  Heart Rate: 56, Resp: 12, BP: (!) 157/76, SpO2: 95 %    This patient is a 47 y.o. Female with 3 weeks of headache likely secondary to idiopathic intracranial hypertension. Patient's without evidence of subarachnoid hemorrhage or meningitis on lumbar puncture. Neurology evaluated the patient started on Diamox. Patient to lay flat until 2245. Patient still not having headaches at this time can be discharged. Patient without headache and neurologically intact after lumbar puncture.   He discharged home with strict return follow-up precautions    ED Course as of 01/25/23 2206 Wed Jan 25, 2023   1843 R headache for 3 weeks, LP w/elevated opening pressure likely ICH. Needs to lay flat for 3 hrs start at [address]. Neuro wants on dimox now. [ZE]   2154 Patient reevaluated bedside. Neurologically intact not having a headache will discharge. [ZE]      ED Course User Index  [ZE] Sean Boucher DO       OUTSTANDING TASKS / RECOMMENDATIONS:    Follow-ups patient's clinical status     FINAL IMPRESSION:     1. IIH (idiopathic intracranial hypertension)        DISPOSITION:         DISPOSITION:  [x]  Discharge   []  Transfer -    []  Admission -     []  Against Medical Advice   []  Eloped   FOLLOW-UP: MD Gabbi [address] [facility] [address] [facility] [address]  [phone]    Schedule an appointment as soon as possible for a visit       [facility]  [address] [place] [address]  [phone]  Schedule an appointment as soon as possible for a visit       [address] [facility]  [address] [facility] [address]  [phone]  Schedule an appointment as soon as possible for a visit in 2 weeks      [facility] ED  [employer] [facility]  [phone]    If you develop any focal weakness, fevers, worsening back pain, redness or swelling over the needle site, numbness, or other concerning symptoms.      DISCHARGE MEDICATIONS: New Prescriptions    ACETAZOLAMIDE (DIAMOX) 250 MG TABLET    Take 1 tablet by mouth 2 times daily for 7 days    ACETAZOLAMIDE (DIAMOX) 250 MG TABLET    Take 2 tablets by mouth 2 times daily for 21 days           Susan Main DO  Emergency Medicine Resident  [facility]        Sean Boucher [place]  Resident  01/25/23 2206

## 2023-01-25 NOTE — ED PROVIDER NOTES
[facility] [address] ED     Emergency Department     Faculty Attestation        I performed a history and physical examination of the patient and discussed management with the resident. I reviewed the residents note and agree with the documented findings and plan of care. Any areas of disagreement are noted on the chart. I was personally present for the key portions of any procedures. I have documented in the chart those procedures where I was not present during the key portions. I have reviewed the emergency nurses triage note. I agree with the chief complaint, past medical history, past surgical history, allergies, medications, social and family history as documented unless otherwise noted below. For Physician Assistant/ Nurse Practitioner cases/documentation I have personally evaluated this patient and have completed at least one if not all key elements of the E/M (history, physical exam, and MDM). Additional findings are as noted. Vital Signs: BP: (!) 152/86  Heart Rate: 65  Resp: 18  Temp: 98.4 °F (36.9 °C) SpO2: 100 %  PCP:  Paulina Norris MD    Pertinent Comments:     Patient is a 58-year-old female who has past medical history of hypertension as well as asthma and GERD who for the last 3 weeks has had daily headaches that have not gone away and only waxed and waned. Headache is worse when laying down and worse in the morning when awakening improving somewhat throughout the day when standing. Denies any unilateral weakness associated. She describes it on the right side most of the time but occasionally goes to the left. Occasionally blurry vision as well but none at this time. Denies any neck pain or fever/chills. No vomiting but some nausea. No chest pain or shortness of breath and no abdominal pain associated. No recent illnesses either.      Physical Examination:  Clear to auscultation bilaterally, regular rate and rhythm, and abdomen soft/nontender/nondistended/normal bowel sounds/no pulsatile masses palpated. Neuro examination:  CN II-XII intact, Bilateral Upper and Lower extremities with 5/5 strength both proximally and distally, and intact sensation to light touch. Downgoing Babinski's Bilaterally. Finger to nose intact with no pronator drift. PERRL at 3 mm bilaterally without nystagmus. DTR's are 2+ bilaterally. Absolutely no meningismus whatsoever    Assessment/Plan: We will attempt migraine cocktail without Toradol but obtain CT head given duration of symptoms of 3 weeks and is somewhat worrisome history with headache worse in the mornings. Basic laboratories including inflammatory markers but no tenderness to palpation over temporal artery. Reevaluate after but likely neurology consultation given duration of symptoms      Critical Care  None      (Please note that portions of this note were completed with a voice recognition program. Efforts were made to edit the dictations but occasionally words are mis-transcribed.  Whenever words are used in this note in any gender, they shall be construed as though they were used in the gender appropriate to the circumstances; and whenever words are used in this note in the singular or plural form, they shall be construed as though they were used in the form appropriate to the circumstances.)    MD Glo Almaraz  Attending Emergency Medicine Physician           Lashell Ledbetter MD  01/25/23 Jnenifer Willard MD  01/25/23 [id number]

## 2023-01-25 NOTE — BRIEF OP NOTE
Brief Postoperative Note Lumbar Puncture    Beatriz Edmond  YOB: 1970  [id number]    Pre-operative Diagnosis: HA    Post-operative Diagnosis: Same    Procedure: Fluoro guided Lumbar Puncture    Anesthesia: 1% Lidocaine    Surgeons/Assistants: Anjel Asif PA-C    Complications: None    EBL: Minimal    Specimens: Obtained and sent to lab    Fluoroscopy guided lumbar puncture. 20 gauge spinal needle. L3-L4. 12 ml clear CSF obtained.  OP of 26 cm of H2O and CP of 14 cm of H2O.  Dressing applied. Instructions given.    Electronically signed by IGNACIO Yao on 1/25/2023 at 4:40 PM

## 2023-01-25 NOTE — Clinical Note
Eagle Ba was seen and treated in our emergency department on 1/25/2023. She may return to work on 01/30/2023. If you have any questions or concerns, please don't hesitate to call.       Justin Polanco, DO

## 2023-01-25 NOTE — TELEPHONE ENCOUNTER
Request for Zofran.     Next Visit Date:  Future Appointments   Date Time Provider Fortino Jean Baptiste   4/10/2023  9:30 AM Pablo Boo  [address] Maintenance   Topic Date Due    COVID-19 Vaccine (3 - Booster for Pfizer series) 11/02/2021    Lipids  11/17/2022    Colorectal Cancer Screen  02/08/2023    Shingles vaccine (2 of 2) 02/01/2023 (Originally 9/3/2021)    Depression Screen  06/28/2023    Breast cancer screen  08/24/2023    Diabetes screen  10/25/2025    DTaP/Tdap/Td vaccine (2 - Td or Tdap) 02/05/2026    Cervical cancer screen  09/07/2026    Flu vaccine  Completed    Pneumococcal 0-64 years Vaccine  Completed    Hepatitis C screen  Completed    HIV screen  Completed    Hepatitis A vaccine  Aged Out    Hib vaccine  Aged Out    Meningococcal (ACWY) vaccine  Aged Out       Hemoglobin A1C (%)   Date Value   10/25/2022 5.6   07/09/2021 5.6   10/06/2020 5.7             ( goal A1C is < 7)   No results found for: LABMICR  LDL Cholesterol (mg/dL)   Date Value   11/17/2021 99       (goal LDL is <100)   AST (U/L)   Date Value   07/09/2021 20     ALT (U/L)   Date Value   07/09/2021 21     BUN (mg/dL)   Date Value   02/13/2022 8     BP Readings from Last 3 Encounters:   01/09/23 139/84   10/25/22 138/79   09/19/22 128/89          (goal 120/80)    All Future Testing planned in [facility]  Lab Frequency Next Occurrence         Patient Active Problem List:     Hypertension     Asthma     GERD (gastroesophageal reflux disease)     S/P cardiac cath-Minimal disease 11/23/15 Dr. Mirian Arreaga     Irritable bowel syndrome with constipation     Obesity     Allergic rhinitis     Hiatal hernia     Paresthesias     Left sided numbness     Dysphagia     Family history of colon cancer     DNS (deviated nasal septum)     Hypertrophy of both inferior nasal turbinates     Iron deficiency anemia secondary to inadequate dietary iron intake     Iron deficiency anemia, unspecified     Atypical chest pain

## 2023-01-25 NOTE — PROGRESS NOTES
Patient to IR for lumbar puncture. JR PIERRE and MC/MANDY RTs at bedside. Patient assisted to table in prone position. Site prepped and draped, area numbed with lidocaine. Access obtained. Opening pressure 26.  12ml clear fluid obtained. Specimens collected and to be sent to lab by Saleem Rose. Closing pressure 14. Access removed and band aid placed to site. Patient tolerated well. Report given to RN.

## 2023-01-25 NOTE — ED PROVIDER NOTES
[facility] ED  Emergency Department Encounter  Emergency Medicine Resident     Pt Name:Beatriz Sin  MRN: [id number]  [place] 1970  Date of evaluation: 1/25/23  PCP:  Holley Escalante MD  Note Started: 12:35 PM EST      CHIEF COMPLAINT       Chief Complaint   Patient presents with    Headache     Pt states pressure in head. HISTORY OF PRESENT ILLNESS  (Location/Symptom, Timing/Onset, Context/Setting, Quality, Duration, Modifying Factors, Severity.)      Gretchen Mitchell is a 47 y.o. female who presents with complaint of headache. Onset 3 weeks ago. The headache is located on the right side. She states it is constant. It is worse at night. It is associated with visual blurring. She reports nausea but no emesis. No personal or family history of migraine. Has been taking ibuprofen with limited relief of symptoms, last dose was last night. No fevers, chills, tinnitus, difficulty swallowing, shortness of breath, chest pain, vomiting, diarrhea, dysuria. No focal weakness or paresthesias. PAST MEDICAL / SURGICAL / SOCIAL / FAMILY HISTORY      has a past medical history of Allergic rhinitis, Asthma, Clinical trial participant, COPD (chronic obstructive pulmonary disease) ([facility] [address] Dysphagia, Dysphagia, Family history of colon cancer, GERD (gastroesophageal reflux disease), H/O cardiovascular stress test, Hiatal hernia, Hyperlipidemia, Hypertension, Iron deficiency anemia secondary to inadequate dietary iron intake, Iron deficiency anemia, unspecified, Irritable bowel syndrome with constipation, Obesity, Snores, and Wears glasses. has a past surgical history that includes Tubal ligation (2003); Colonoscopy (2015); Upper gastrointestinal endoscopy; Upper gastrointestinal endoscopy (N/A, 7/18/2017); Cardiac catheterization (01/16/2018); Cardiac catheterization (11/23/2015); Endoscopy, colon, diagnostic (2015);  Endoscopy, colon, diagnostic (02/08/2018); pr colon ca scrn not hi rsk ind (N/A, 2/8/2018); Colonoscopy (02/08/2018); Nose surgery; and Nasal septoplasty w/ turbinoplasty (2020).      Social History     Socioeconomic History   • Marital status:      Spouse name: Not on file   • Number of children: Not on file   • Years of education: Not on file   • Highest education level: Not on file   Occupational History   • Not on file   Tobacco Use   • Smoking status: Never   • Smokeless tobacco: Never   Vaping Use   • Vaping Use: Never used   Substance and Sexual Activity   • Alcohol use: Yes     Alcohol/week: 4.0 standard drinks     Types: 4 Cans of beer per week     Comment: occasionally   • Drug use: No   • Sexual activity: Not Currently     Partners: Male     Comment: tubal ligation    Other Topics Concern   • Not on file   Social History Narrative   • Not on file     Social Determinants of Health     Financial Resource Strain: Low Risk    • Difficulty of Paying Living Expenses: Not hard at all   Food Insecurity: No Food Insecurity   • Worried About Running Out of Food in the Last Year: Never true   • Ran Out of Food in the Last Year: Never true   Transportation Needs: Not on file   Physical Activity: Not on file   Stress: Not on file   Social Connections: Not on file   Intimate Partner Violence: Not on file   Housing Stability: Not on file       Family History   Problem Relation Age of Onset   • Hypertension Mother    • High Cholesterol Mother    • Asthma Mother    • Osteoarthritis Mother    • Other Mother         overweight   • Colon Cancer Father        Allergies:  Patient has no known allergies.    Home Medications:  Prior to Admission medications    Medication Sig Start Date End Date Taking? Authorizing Provider   acetaZOLAMIDE (DIAMOX) 250 MG tablet Take 1 tablet by mouth 2 times daily for 7 days 1/25/23 2/1/23 Yes Paddy Hurt MD   acetaZOLAMIDE (DIAMOX) 250 MG tablet Take 2 tablets by mouth 2 times daily for 21 days 2/2/23 2/23/23 Yes MD dulec Edwards  (SENOKOT) 8.6 MG TABS tablet take 1 tablet by mouth once daily 11/21/22   America Myrick MD   ibuprofen (ADVIL;MOTRIN) 800 MG tablet take 1 tablet by mouth every 8 hours AS NEEDED FOR PAIN 10/27/22   America Myrick MD   omeprazole (PRILOSEC) 40 MG delayed release capsule take 1 capsule by mouth once daily if needed 10/25/22   America Mryick MD   ondansetron (ZOFRAN-ODT) 4 MG disintegrating tablet dissolve 1 tablet ON TONGUE every 12 hours if needed for nausea OR vomiting 10/24/22   America Myrick MD   mometasone-formoterol [facility]) 200-5 MCG/ACT inhaler inhale 2 puffs by mouth and INTO THE LUNGS twice a day 10/21/22   America Myrick MD   lidocaine (LIDODERM) 5 % apply 1 patch TO THE AFFECTED AREA DAILY.  LEAVE ON FOR 12 HOURS AND THEN OFF FOR 12 HOURS. 9/28/22   Susana Grande MD   aspirin (RA ASPIRIN EC) 81 MG EC tablet take 1 tablet by mouth once daily 8/30/22   America Myrick MD   albuterol sulfate HFA (PROAIR HFA) 108 (90 Base) MCG/ACT inhaler inhale 2 puffs by mouth every 6 hours if needed for wheezing 7/12/22   America Myrick MD   albuterol (PROVENTIL) (2.5 MG/3ML) 0.083% nebulizer solution Take 3 mLs by nebulization every 6 hours as needed for Wheezing 7/12/22   America Myrick MD   valsartan-hydroCHLOROthiazide (DIOVAN-HCT) 80-12.5 MG per tablet Once a day 7/12/22   America Myrick MD   carvedilol (COREG) 6.25 MG tablet 1 tablet 2/day 7/12/22   America Myrick MD   atorvastatin (LIPITOR) 20 MG tablet 1 tablet once daily 7/12/22   America Myrick MD   simethicone (MYLICON) 80 MG chewable tablet Take 1 tablet by mouth 4 times daily as needed for Flatulence 2/1/22   America Myrick MD   meloxicam [facility]) 7.5 MG tablet Take 1 tablet by mouth daily 11/17/21   America Myrick MD   fluticasone Maday Nelida) 50 MCG/ACT nasal spray instill 2 sprays into each nostril once daily 11/11/21   America Myrick MD   melatonin (RA MELATONIN) 5 MG TABS tablet take 1 tablet by mouth nightly 7/12/21   [facility] Jacek Maloney MD   Multiple Vitamins-Minerals (WOMENS MULTI VITAMIN & MINERAL PO) Take 1 tablet by mouth daily     Historical Provider, MD         REVIEW OF SYSTEMS       Review of Systems   Constitutional:  Negative for activity change, appetite change, chills, fatigue and fever. Respiratory:  Negative for cough, chest tightness, shortness of breath and wheezing. Cardiovascular:  Negative for chest pain. Gastrointestinal:  Negative for abdominal pain, diarrhea, nausea and vomiting. Genitourinary:  Negative for difficulty urinating, dysuria, flank pain, hematuria and urgency. Skin:  Negative for rash. Neurological:  Positive for headaches. Negative for weakness. PHYSICAL EXAM      INITIAL VITALS:   /74   Pulse 54   Temp 98.4 °F (36.9 °C) (Oral)   Resp 14   Ht 5' (1.524 m)   Wt 210 lb (95.3 kg)   SpO2 99%   BMI 41.01 kg/m²     Physical Exam  Vitals reviewed. Constitutional:       General: She is not in acute distress. Appearance: Normal appearance. She is not ill-appearing. HENT:      Head: Normocephalic and atraumatic. Comments: Temporal pulse intact b/l  Eyes:      Extraocular Movements: Extraocular movements intact. Pupils: Pupils are equal, round, and reactive to light. Comments: L IOP 19mmhg; R IOP 16mmHg   Cardiovascular:      Rate and Rhythm: Normal rate and regular rhythm. Heart sounds: Normal heart sounds. No murmur heard. Pulmonary:      Effort: Pulmonary effort is normal.      Breath sounds: Normal breath sounds. Abdominal:      Palpations: Abdomen is soft. Tenderness: There is no abdominal tenderness. Musculoskeletal:      Cervical back: Normal range of motion and neck supple. No rigidity or tenderness. Skin:     General: Skin is warm and dry. Findings: No rash. Neurological:      General: No focal deficit present. Mental Status: She is alert and oriented to person, place, and time. Cranial Nerves: No cranial nerve deficit. Sensory: No sensory deficit. Motor: No weakness. Coordination: Coordination normal.         DDX/DIAGNOSTIC RESULTS / EMERGENCY DEPARTMENT COURSE / MDM     Medical Decision Making  Amount and/or Complexity of Data Reviewed  Labs: ordered. Radiology: ordered. Risk  Prescription drug management. EKG      All EKG's are interpreted by the Emergency Department Physician who either signs or Co-signs this chart in the absence of a cardiologist.    EMERGENCY DEPARTMENT COURSE:  This patient presents with /86, VS otherwise normal Give duration and quality of symptoms, will obtain CTH, obtain labs, reassess. 5:38PM  This patient's blood work and CT scan are unremarkable. A consult was placed with neurology. After their assessment there was concern for idiopathic intracranial hypertension. Patient went to interventional radiology where a total of 12 mL of fluid was removed and sent for studies. Opening pressure was 26, closing pressure of 14. Results reviewed with neurology, advising initiation of Diamox 250 twice daily for 1 week, and increasing to 500 mg twice daily thereafter. Patient was reassessed, she is currently feeling a little bit better. She needs to lay flat for 3 hours; as long she feels well, plan to discharge home - 3 hours will be complete at 7:45PM.     I have counseled the patient to ensure that she books follow up appointments with Neurology in two weeks, and with ophthalmology as soon as possible. Have reviewed signs and symptoms to return to the emergency department for as well. This patients care will be handed over to the oncoming resident. ED Course as of 01/25/23 1847 Wed Jan 25, 2023 1843 R headache for 3 weeks, LP w/elevated opening pressure likely ICH. Needs to lay flat for 3 hrs start at [address] [place] [address]. Neuro wants on dimox now.  [ZE]      ED Course User Index  [ZE] Lexy Julian,        PROCEDURES:      CONSULTS:  IP CONSULT TO NEUROLOGY  IP CONSULT TO INTERVENTIONAL RADIOLOGY    CRITICAL CARE:  There was significant risk of life threatening deterioration of patient's condition requiring my direct management. Critical care time 0 minutes, excluding any documented procedures. FINAL IMPRESSION      1. IIH (idiopathic intracranial hypertension)          DISPOSITION / PLAN     DISPOSITION Decision To Discharge 01/25/2023 06:19:22 PM      PATIENT REFERRED TO:  MD Gabbi Watters [address] [facility] [address]  [phone]    Schedule an appointment as soon as possible for a visit       [facility] [address] [place] [address]  [phone]  Schedule an appointment as soon as possible for a visit       [address] [facility]  [address] [facility] [address]  [phone]  Schedule an appointment as soon as possible for a visit in 2 weeks      [facility] ED  [address] [place] [address]  [phone]    If you develop any focal weakness, fevers, worsening back pain, redness or swelling over the needle site, numbness, or other concerning symptoms.     DISCHARGE MEDICATIONS:  New Prescriptions    ACETAZOLAMIDE (DIAMOX) 250 MG TABLET    Take 1 tablet by mouth 2 times daily for 7 days    ACETAZOLAMIDE (DIAMOX) 250 MG TABLET    Take 2 tablets by mouth 2 times daily for 21 days       Jazmine Ayala MD  Emergency Medicine Resident    (Please note that portions of thisnote were completed with a voice recognition program.  Efforts were made to edit the dictations but occasionally words are mis-transcribed.)       Jazmine Ayala MD  Resident  01/25/23 2004

## 2023-01-25 NOTE — ED PROVIDER NOTES
FACULTY SIGN-OUT  ADDENDUM     Care of this patient was assumed from previous attending physician. The patient's initial evaluation and plan have been discussed with the prior provider who initially evaluated the patient. Attestation  I was available and discussed any additional care issues that arose and coordinated the management plans with the resident(s) caring for the patient during my duty period. Any areas of disagreement with resident's documentation of care or procedures are noted on the chart. I was personally present for the key portions of any/all procedures, during my duty period. I have documented in the chart those procedures where I was not present during the key portions. ED COURSE      The patient was given the following medications:  Orders Placed This Encounter   Medications    0.9 % sodium chloride bolus    prochlorperazine (COMPAZINE) injection 10 mg    diphenhydrAMINE (BENADRYL) injection 12.5 mg    ketorolac (TORADOL) injection 15 mg    magnesium sulfate 2000 mg in 50 mL IVPB premix       RECENT VITALS:   Temp: 98.4 °F (36.9 °C), Heart Rate: 65, Resp: 18, BP: (!) 152/86    MEDICAL DECISION MAKING        Ramya Yousif is a 47 y.o. female who presents to the Emergency Department with complaints of headache x3 weeks. CT neg. Neuro consulted, plan to get LP by IR for possible benign intracranial htn.       Gustabo Bills MD  Attending Emergency Physician    (Please note that portions of this note were completed with a voice recognition program.  Efforts were made to edit the dictations but occasionally words are mis-transcribed.)          Gustabo Bills MD  01/25/23 [id number]

## 2023-01-25 NOTE — Clinical Note
Gm Dykes accompanied Maritza Fowler to the emergency department on 1/25/2023. They may return to work on 01/26/2023. If you have any questions or concerns, please don't hesitate to call.       Nusrat Patricia MD

## 2023-01-25 NOTE — Clinical Note
Eagle Ba was seen and treated in our emergency department on 1/25/2023. She may return to work on 01/29/2023. If you have any questions or concerns, please don't hesitate to call.       Cheli Barriga MD

## 2023-01-25 NOTE — DISCHARGE INSTRUCTIONS
Please make sure you book follow up appointments with Neurology, and Ophthalmology.       If you develop loss of strength or sensation, difficulty talking or walking, vision changes or worsening headache please return to the emergency department or call 911

## 2023-01-25 NOTE — CONSULTS
[facility]   [address] [facility]    Inpatient Neurology Consult Note            Date:   1/25/2023  Patient name:  Gretel Hoang  Date of admission:  1/25/2023 12:13 PM  MRN:   [id number]  Account:  [de-identified]  YOB: 1970  PCP:    Luna Ruiz MD  Room:   [facility]  Code Status:    Prior    Chief Complaint:     Chief Complaint   Patient presents with    Headache     Pt states pressure in head. History Obtained From:     patient, electronic medical record    History of Present Illness: The patient is a 47 y.o.  female who presents with Headache (Pt states throbbing pressure in head Constant progressive over last 3 weeks with worsening blurry vision). PMH significant for headache disorder starting in early 2019 previously followed Dr. Eduardo Hare started on topamax however lost to follow up and only took topamax for 2 weeks and stopped due to side effects of paresthesias. Other previous History includes obesity, HTN, HLD, COPD, iron def anemia and hiatal hernia. Patient states that she has had progressive headache disorder over the last few years first starting in January to February of 2019. At that time similar in nature throbbing holocranial headache constant not improved with ibuprophen or tylenol. She was referred to neurology by her PCP plan to start topamax however failed therapy due to side effects. Since that time it has become increasingly disabling with worsening associated vision. She works as a nurse aid and endorses that it has become debilitating for the last 3 weeks with pulsatile tinnitus when she lies down at night also worsening the pain. Denies any previous Visual exams or workup for Idiopathic intracranial hypertension (IIH) including no previous lumbar puncture. Patient hypertensive on arrival 152/86, HR 65 afebrile 98.4. Initial labs glucose 101, hgb 11.7, Platelets 821, sed rate 23, CRP 4.8 otherwise WNL. CT head WO unremarkable. Previous MRI from 2019 reviewed 2 small punctate White matter lesions likely from HTN, HLD risk factors vs her migraine history. Neuro imaging and Neuro workup:  CT Head WO 1/25/23  Impression   No acute intracranial abnormality. MRI brain WO 4/23/2019  Impression   1. There are fused scattered nonspecific white matter lesions, likely related   to trace chronic microvascular white matter ischemic disease. 2. Abnormal marrow signal which is nonspecific. This may be related to   underlying anemia given the patient's hemoglobin of 10.8. 3. Probable old left lamina papyracea fracture. VL Carotid scan bilateral 11/3/2016  Findings:        Right                                                    Left    Intimal thickening right common carotid artery. Carotid  Intimal    scan shows minimal heterogeneous plaque formation at the thickening left    bifurcation, origin of the right internal carotid        common carotid    artery. External carotid artery appears normal.          artery. Vertebral artery flow is antegrade . Internal carotid                                                             artery appears                                                             normal.                                                             External carotid                                                             artery appears                                                             normal.                                                             Vertebral artery                                                             flow is antegrade                                                             .      Past Medical History:     Past Medical History:   Diagnosis Date    Allergic rhinitis     Asthma     Clinical trial participant 11/23/2015 11/23/20015    COPD (chronic obstructive pulmonary disease) ([facility] [address]     Dysphagia     Dysphagia     has needed EGD with dilatation in the past    Family history of colon cancer 8/9/2017    Father    GERD (gastroesophageal reflux disease)     H/O cardiovascular stress test 01/15/2018    ABNORMAL    Hiatal hernia     Hyperlipidemia     Hypertension     Iron deficiency anemia secondary to inadequate dietary iron intake 5/13/2020    Iron deficiency anemia, unspecified 5/13/2020    Irritable bowel syndrome with constipation 1/21/2016    Obesity     Snores     states has tested negative for sleep apnea    Wears glasses         Past Surgical History:     Past Surgical History:   Procedure Laterality Date    CARDIAC CATHETERIZATION  01/16/2018    CARDIAC CATHETERIZATION  11/23/2015     Minimal non obstructive CAD    COLONOSCOPY  2015    COLONOSCOPY  02/08/2018    Redundant colon    ENDOSCOPY, COLON, DIAGNOSTIC  2015    with dilatation    ENDOSCOPY, COLON, DIAGNOSTIC  02/08/2018    WNL     NASAL SEPTOPLASTY W/ TURBINOPLASTY  2020        NOSE SURGERY      turbinate     AR COLON CA SCRN NOT HI RSK IND N/A 2/8/2018    COLONOSCOPY performed by Sanjeev Miller MD at [address] [place] [address]    UPPER GASTROINTESTINAL ENDOSCOPY      The endoscopic exam showed mildly tortuous esophagus. Savary dilation performed x 42 and 45 F.     UPPER GASTROINTESTINAL ENDOSCOPY N/A 7/18/2017    EGD DILATION SAVORY performed by Sanjeev Miller MD at [facility]        Medications Prior to Admission:     Prior to Admission medications    Medication Sig Start Date End Date Taking?  Authorizing Provider   senna (SENOKOT) 8.6 MG TABS tablet take 1 tablet by mouth once daily 11/21/22   Xochitl Ortiz, MD   ibuprofen (ADVIL;MOTRIN) 800 MG tablet take 1 tablet by mouth every 8 hours AS NEEDED FOR PAIN 10/27/22   Xcohitl Ortiz, MD   omeprazole (PRILOSEC) 40 MG delayed release capsule take 1 capsule by mouth once daily if needed 10/25/22   Xochitl Daily, MD   ondansetron (ZOFRAN-ODT) 4 MG disintegrating tablet dissolve 1 tablet ON TONGUE every 12 hours if needed for nausea OR vomiting 10/24/22   Joe Pimentel MD   mometasone-formoterol [facility]) 200-5 MCG/ACT inhaler inhale 2 puffs by mouth and INTO THE LUNGS twice a day 10/21/22   Joe Pimentel MD   lidocaine (LIDODERM) 5 % apply 1 patch TO THE AFFECTED AREA DAILY. LEAVE ON FOR 12 HOURS AND THEN OFF FOR 12 HOURS. 9/28/22   Thad Vilchis MD   aspirin (RA ASPIRIN EC) 81 MG EC tablet take 1 tablet by mouth once daily 8/30/22   Joe Pimentel MD   albuterol sulfate HFA (PROAIR HFA) 108 (90 Base) MCG/ACT inhaler inhale 2 puffs by mouth every 6 hours if needed for wheezing 7/12/22   Joe Pimentel MD   albuterol (PROVENTIL) (2.5 MG/3ML) 0.083% nebulizer solution Take 3 mLs by nebulization every 6 hours as needed for Wheezing 7/12/22   Joe Pimentel MD   valsartan-hydroCHLOROthiazide (DIOVAN-HCT) 80-12.5 MG per tablet Once a day 7/12/22   Joe Pimentel MD   carvedilol (COREG) 6.25 MG tablet 1 tablet 2/day 7/12/22   Joe Pimentel MD   atorvastatin (LIPITOR) 20 MG tablet 1 tablet once daily 7/12/22   Joe Pimentel MD   simethicone (MYLICON) 80 MG chewable tablet Take 1 tablet by mouth 4 times daily as needed for Flatulence 2/1/22   Joe Pimentel MD   meloxicam NAGI DANIEL [facility]) 7.5 MG tablet Take 1 tablet by mouth daily 11/17/21   Joe Pimentel MD   fluticasone [facility]) 50 MCG/ACT nasal spray instill 2 sprays into each nostril once daily 11/11/21   Joe Pimentel MD   melatonin (RA MELATONIN) 5 MG TABS tablet take 1 tablet by mouth nightly 7/12/21   Joe Pimentel MD   Multiple Vitamins-Minerals (WOMENS MULTI VITAMIN & MINERAL PO) Take 1 tablet by mouth daily     Historical Provider, MD        Allergies:     Patient has no known allergies. Social History:     Tobacco:    reports that she has never smoked. She has never used smokeless tobacco.  Alcohol:      reports current alcohol use of about 4.0 standard drinks per week.   Drug Use:  reports no history of drug use.    Family History:     Family History   Problem Relation Age of Onset    Hypertension Mother     High Cholesterol Mother     Asthma Mother     Osteoarthritis Mother     Other Mother         overweight    Colon Cancer Father        Review of Systems:     ROS:  Constitutional  Patient sitting up in bed covered by bed sheets with lights off debilitating migraine with light sensitivity    HEENT  Pulsatile tinnitus    Eyes  Negative for photophobia, pain and discharge    Respiratory  Negative for hemoptysis and sputum    Cardiovascular  Negative for orthopnea, claudication and PND    Gastrointestinal  Negative for abdominal pain, diarrhea, blood in stool    Musculoskeletal  Negative for joint pain, negative for myalgia    Neurology Positive for pulsatile tinnitus, Holocranial pounding headache and blurring vision bilaterally    Skin  Negative for rash or itching    Endo/heme/allergies  Negative for polydipsia, environmental allergy    Psychiatric/behavioral  Negative for suicidal ideation. Patient is not anxious        Physical Exam:   BP (!) 152/86   Pulse 65   Temp 98.4 °F (36.9 °C) (Oral)   Resp 18   Ht 5' (1.524 m)   Wt 210 lb (95.3 kg)   SpO2 100%   BMI 41.01 kg/m²   Temp (24hrs), Av.4 °F (36.9 °C), Min:98.4 °F (36.9 °C), Max:98.4 °F (36.9 °C)    No results for input(s): POCGLU in the last 72 hours.   No intake or output data in the 24 hours ending 23 1508      NEUROLOGIC EXAMINATION  GENERAL  Appears comfortable and in no distress   HEENT  NC/ AT   NECK  Supple and no bruits heard   MENTAL STATUS:  Alert, oriented, intact memory, no confusion, normal speech, normal language, no hallucination or delusion   CRANIAL NERVES: II     -      Visual fields intact to confrontation, some loss in peripheral vision superiorly and laterally difficult for patient   III,IV,VI -  EOMs full, no afferent defect, no DANNY, no ptosis  V     -     Normal facial sensation  VII    -     Normal facial symmetry  VIII   - Intact hearing  IX,X -     Symmetrical palate  XI    -     Symmetrical shoulder shrug  XII   -     Midline tongue, no atrophy    MOTOR FUNCTION:  significant for good strength of grade 5/5 in bilateral proximal and distal muscle groups of both upper and lower extremities with normal bulk, normal tone and no involuntary movements, no tremor   SENSORY FUNCTION:  Normal touch, normal pin, normal vibration, normal proprioception   CEREBELLAR FUNCTION:  Intact fine motor control over upper limbs   REFLEX FUNCTION:  Symmetric, no perverted reflex, no Babinski sign   STATION and GAIT  Able to walk without assistance or impairment in gait.         Investigations:      Laboratory Testing:  Recent Results (from the past 24 hour(s))   BMP    Collection Time: 01/25/23  1:28 PM   Result Value Ref Range    Glucose 101 (H) 70 - 99 mg/dL    BUN 7 6 - 20 mg/dL    Creatinine 0.75 0.50 - 0.90 mg/dL    Est, Glom Filt Rate >60 >60 mL/min/1.73m2    Calcium 9.4 8.6 - 10.4 mg/dL    Sodium 137 135 - 144 mmol/L    Potassium 4.3 3.7 - 5.3 mmol/L    Chloride 101 98 - 107 mmol/L    CO2 25 20 - 31 mmol/L    Anion Gap 11 9 - 17 mmol/L   CBC with Auto Differential    Collection Time: 01/25/23  1:28 PM   Result Value Ref Range    WBC 7.4 3.5 - 11.3 k/uL    RBC 4.35 3.95 - 5.11 m/uL    Hemoglobin 11.7 (L) 11.9 - 15.1 g/dL    Hematocrit 37.5 36.3 - 47.1 %    MCV 86.2 82.6 - 102.9 fL    MCH 26.9 25.2 - 33.5 pg    MCHC 31.2 28.4 - 34.8 g/dL    RDW 15.3 (H) 11.8 - 14.4 %    Platelets 092 742 - 948 k/uL    MPV 10.3 8.1 - 13.5 fL    NRBC Automated 0.0 0.0 per 100 WBC    Seg Neutrophils 54 36 - 65 %    Lymphocytes 37 24 - 43 %    Monocytes 7 3 - 12 %    Eosinophils % 2 1 - 4 %    Basophils 0 0 - 2 %    Immature Granulocytes 0 0 %    Segs Absolute 3.88 1.50 - 8.10 k/uL    Absolute Lymph # 2.78 1.10 - 3.70 k/uL    Absolute Mono # 0.54 0.10 - 1.20 k/uL    Absolute Eos # 0.17 0.00 - 0.44 k/uL    Basophils Absolute 0.03 0.00 - 0.20 k/uL    Absolute Immature Granulocyte 0.03 0.00 - 0.30 k/uL    RBC Morphology ANISOCYTOSIS PRESENT    Sedimentation Rate    Collection Time: 01/25/23  1:28 PM   Result Value Ref Range    Sed Rate 23 0 - 30 mm/Hr   C-Reactive Protein    Collection Time: 01/25/23  1:28 PM   Result Value Ref Range    CRP 4.8 0.0 - 5.0 mg/L         Assessment :    Eleni Choi is a 47year old  [place] female who presents with classical Idiopathic intracranial hypertension symptoms including constant progressive throbbing holoacranial headache, worse when lying flat and bending over, pulsatile tinnitus at night and 3 weeks progressive blurry vision. Patient was previously seen by neurology in 2019 for new 3 month history headache similar to today however less debilitating at that time. Trial of topamax was attempted however patient failed to follow up and stopped taking the medication after 2 weeks from side effect of paresthesias. CT head WO unremarkable today plan for IR guided LP due to patients body habitus with opening and closing pressures. If diagnostic for IIH will start diamox and DC home with outpatient neurology and ophthalmology evaluations. However if normal pressures will continue treating migraine and can admit to observation unit for 24 hours with neurology consult for migraine treatment. Idiopathic intracranial Hypertension (IIH)  2. Few small punctate white matter lesions periventricular likely small vessel disease related vs migraine disorder associated    Primary Problem  Other headache syndrome    Active Hospital Problems    Diagnosis Date Noted    Other headache syndrome [G44.89] 01/25/2023     Priority: Medium    Obesity [E66.9] 05/11/2020    Hypertension [I10] 09/05/2014       Plan:     -Pending IR guided LP if diagnostic for IIH drain to normal pressure <20 and start diamox 250mg BID for 1 week then increase to 500mg BID after that.  Can be DC home with close outpatient Neurology and Ophthalmology follow up vs if non-diagnostic and persistent headache admit to observation unit and neurology consult to follow migraine treatment.     -Sed and CRP WNL  -IV migraine cocktail in ER including Compazine 10mg IV, Benadryl 25mg, 1L NaCl, Toradol 15mg and IV mag 2 grams once   -IR guided LP with opening pressure and basic CSF studies including MS workup given previous white matter T2 flair lesions on MRI brain in 2019       Consultations:   IP CONSULT TO NEUROLOGY  IP CONSULT TO INTERVENTIONAL RADIOLOGY      Follow-up further recommendations after discussing the case with attending  The plan was discussed with the patient, patient's family and the medical staff. Patient is admitted as inpatient status because of co-morbidities listed above, severity of signs and symptoms as outlined, requirement for current medical therapies and most importantly because of direct risk to patient if care not provided in a hospital setting.     Nikunj Menon MD  PGY-4 Neurology Resident   1/25/2023  3:08 PM    Copy sent to Dr. Beni Sullivan MD

## 2023-01-25 NOTE — Clinical Note
Evelai Delatorre accompanied Saleem Brandt to the emergency department on 1/25/2023. They may return to work on 01/26/2023. If you have any questions or concerns, please don't hesitate to call.       John Green, DO

## 2023-01-26 LAB
CASE NUMBER:: NORMAL
SURGICAL PATHOLOGY REPORT: NORMAL

## 2023-01-26 RX ORDER — ONDANSETRON 4 MG/1
TABLET, ORALLY DISINTEGRATING ORAL
Qty: 15 TABLET | Refills: 1 | Status: SHIPPED | OUTPATIENT
Start: 2023-01-26

## 2023-01-26 NOTE — PROGRESS NOTES
SPIRITUAL CARE DEPARTMENT - [facility] [address] [facility]  PROGRESS NOTE    Shift date: 1/25/23  Shift day: Wednesday   Shift # 2    Room # 22/91   Name: Ramya Yousif                [employer] day:    Place of [facility]:     Referral: Routine Visit    Admit Date & Time: 1/25/2023 12:13 PM    Assessment:  Ramya Yousif is a 47 y.o. female      Intervention:  Writer introduced self and title as . Patient did not appear to mind  presence and engaged in conversation. Writer offered space for patient  to express feelings, needs, and concerns and provided a ministry presence. Patient appeared calm and coping and had visitor present for support. Outcome:  Patient appeared receptive to  presence while coping with her hospital visit. Plan:  Chaplains will remain available to offer spiritual and emotional support as needed.       Electronically signed by Joaquin Gonzalez on 1/25/2023 at 8:48 PM.  [facility]  [phone]

## 2023-01-27 LAB — VDRL CSF SCREEN: NONREACTIVE

## 2023-01-28 LAB
CULTURE: ABNORMAL
DIRECT EXAM: ABNORMAL
SPECIMEN DESCRIPTION: ABNORMAL

## 2023-01-29 LAB — MYELIN BASIC PROTEIN, CSF: 1.78 NG/ML (ref 0–5.5)

## 2023-01-31 ENCOUNTER — OFFICE VISIT (OUTPATIENT)
Dept: INTERNAL MEDICINE | Age: 53
End: 2023-01-31
Payer: MEDICARE

## 2023-01-31 VITALS
HEART RATE: 78 BPM | WEIGHT: 206.4 LBS | TEMPERATURE: 97.3 F | BODY MASS INDEX: 40.52 KG/M2 | DIASTOLIC BLOOD PRESSURE: 68 MMHG | SYSTOLIC BLOOD PRESSURE: 117 MMHG | HEIGHT: 60 IN | OXYGEN SATURATION: 99 %

## 2023-01-31 DIAGNOSIS — M75.42 IMPINGEMENT SYNDROME OF SHOULDER REGION, LEFT: ICD-10-CM

## 2023-01-31 DIAGNOSIS — N93.9 ABNORMAL UTERINE BLEEDING (AUB): ICD-10-CM

## 2023-01-31 DIAGNOSIS — G93.2 IIH (IDIOPATHIC INTRACRANIAL HYPERTENSION): Primary | ICD-10-CM

## 2023-01-31 DIAGNOSIS — D50.0 IRON DEFICIENCY ANEMIA DUE TO CHRONIC BLOOD LOSS: ICD-10-CM

## 2023-01-31 DIAGNOSIS — Z13.220 LIPID SCREENING: ICD-10-CM

## 2023-01-31 PROCEDURE — G8417 CALC BMI ABV UP PARAM F/U: HCPCS | Performed by: STUDENT IN AN ORGANIZED HEALTH CARE EDUCATION/TRAINING PROGRAM

## 2023-01-31 PROCEDURE — 99213 OFFICE O/P EST LOW 20 MIN: CPT | Performed by: STUDENT IN AN ORGANIZED HEALTH CARE EDUCATION/TRAINING PROGRAM

## 2023-01-31 PROCEDURE — G8427 DOCREV CUR MEDS BY ELIG CLIN: HCPCS | Performed by: STUDENT IN AN ORGANIZED HEALTH CARE EDUCATION/TRAINING PROGRAM

## 2023-01-31 PROCEDURE — 3074F SYST BP LT 130 MM HG: CPT | Performed by: STUDENT IN AN ORGANIZED HEALTH CARE EDUCATION/TRAINING PROGRAM

## 2023-01-31 PROCEDURE — 3078F DIAST BP <80 MM HG: CPT | Performed by: STUDENT IN AN ORGANIZED HEALTH CARE EDUCATION/TRAINING PROGRAM

## 2023-01-31 PROCEDURE — G8482 FLU IMMUNIZE ORDER/ADMIN: HCPCS | Performed by: STUDENT IN AN ORGANIZED HEALTH CARE EDUCATION/TRAINING PROGRAM

## 2023-01-31 PROCEDURE — 3017F COLORECTAL CA SCREEN DOC REV: CPT | Performed by: STUDENT IN AN ORGANIZED HEALTH CARE EDUCATION/TRAINING PROGRAM

## 2023-01-31 PROCEDURE — 1036F TOBACCO NON-USER: CPT | Performed by: STUDENT IN AN ORGANIZED HEALTH CARE EDUCATION/TRAINING PROGRAM

## 2023-01-31 RX ORDER — MELOXICAM 7.5 MG/1
7.5 TABLET ORAL DAILY
Qty: 30 TABLET | Refills: 2 | Status: SHIPPED | OUTPATIENT
Start: 2023-01-31

## 2023-01-31 ASSESSMENT — ENCOUNTER SYMPTOMS
RHINORRHEA: 0
CONSTIPATION: 0
EYE ITCHING: 1
COUGH: 0
ABDOMINAL PAIN: 0
DIARRHEA: 0
EYE DISCHARGE: 1
PHOTOPHOBIA: 0
SHORTNESS OF BREATH: 0

## 2023-01-31 ASSESSMENT — PATIENT HEALTH QUESTIONNAIRE - PHQ9
SUM OF ALL RESPONSES TO PHQ9 QUESTIONS 1 & 2: 1
2. FEELING DOWN, DEPRESSED OR HOPELESS: 1
SUM OF ALL RESPONSES TO PHQ QUESTIONS 1-9: 1
1. LITTLE INTEREST OR PLEASURE IN DOING THINGS: 0
SUM OF ALL RESPONSES TO PHQ QUESTIONS 1-9: 1

## 2023-01-31 NOTE — PROGRESS NOTES
[facility] [address] [facility]  [address] [facility] [address]  Dept: [phone]  Dept Fax: [phone]    Office Progress/Follow Up Note  Date ofpatient's visit: 1/31/2023  Patient's Name:  Deena Anderson YOB: 1970            Patient Care Team:  Fanta Snowden MD as PCP - General (Internal Medicine)  Fanta Snowden MD as PCP - [facility] Provider  ================================================================    REASON FOR VISIT/CHIEF COMPLAINT:  ED Follow-up (Pt states she was ed f/u at [facility] for head pressure and headaches /), Conjunctivitis (Pt states she has leakage and very itches started two days ago ), and Health Maintenance (Lipids panel /)    HISTORY OF PRESENTING ILLNESS:  History was obtained from: patient, electronic medical record. Renzo jama 47 y.o. is here for a    Follow up visit for:    IIH: patient was recently evaluated in the ER for complaint of holoacranial pressure-like sensation, underwent LP and was found to have elevated opening and closing pressures and was diagnosed with idiopathic intracranial hypertension. She was started on Diamox to 50 mg twice daily for 7 days today is day 6, starting tomorrow she will be switched to 500 mg twice daily. She does have a neurology appointment tomorrow. Patient also underwent ophthalmology evaluation yesterday with her ophthalmologist for complaint of left-sided eye lacrimation, as per patient she underwent retinal exam and was told that there is swelling behind the eye but was prescribed just OTC eyedrops; today she complains of persistent lacrimation, sensation of grittiness in the eye. On exam there is no conjunctival injection, no visual disturbance of visual fields, as per patient there is no pain or tenderness suggestive of acute angle closure glaucoma.                  Patient is complaining of 2 days of new onset bilateral pulsatile tinnitus, worsened when she lies down in bed at night, associated with this new left sided lacrimation and bilateral UE paraesthesias, she also complains of feeling off balance but denies vertiginous sx or recent falls; two days ago her  felt that her speech was slurred, and her face seemed a little 'crooked' and she reports 2 episodes in 36 hours which resolved within 20-30 min. She is worried about having a stroke/ TIA. Her sx of tinnitus, paresthesias, lacrimation could be due to diamox but slurring of speech is worrisome and patient was counseled to discuss sx at her Neurology outpatient appointment tomorrow 2/1/23 and to go to the ER for any sudden onset of slurred speech, limb weakness, facial droop, acute sudden painful loss of vision. 2.    AUB: Patient is perimenopausal, has been having sx of hot flashes but denies mising any menstrual periods, has heavy menses with clots and wishes to be evaluated by Ob Gyn and work up for iron deficiency anemia as possible cause of fatigue.     Patient Active Problem List   Diagnosis    Hypertension    Asthma    GERD (gastroesophageal reflux disease)    S/P cardiac cath-Minimal disease 11/23/15 Dr. Wallace Burkitt    Irritable bowel syndrome with constipation    Obesity    Allergic rhinitis    Hiatal hernia    Paresthesias    Left sided numbness    Dysphagia    Family history of colon cancer    DNS (deviated nasal septum)    Hypertrophy of both inferior nasal turbinates    Iron deficiency anemia secondary to inadequate dietary iron intake    Iron deficiency anemia, unspecified    Atypical chest pain    Other headache syndrome       Health Maintenance Due   Topic Date Due    COVID-19 Vaccine (3 - Booster for Pfizer series) 11/02/2021    Lipids  11/17/2022    Colorectal Cancer Screen  02/08/2023       No Known Allergies      Current Outpatient Medications   Medication Sig Dispense Refill    meloxicam (MOBIC) 7.5 MG tablet Take 1 tablet by mouth daily 30 tablet 2    ondansetron (ZOFRAN-ODT) 4 MG disintegrating tablet dissolve 1 tablet ON TONGUE every 12 hours if needed for nausea OR vomiting 15 tablet 1    acetaZOLAMIDE (DIAMOX) 250 MG tablet Take 1 tablet by mouth 2 times daily for 7 days 14 tablet 0    senna (SENOKOT) 8.6 MG TABS tablet take 1 tablet by mouth once daily 90 tablet 1    ibuprofen (ADVIL;MOTRIN) 800 MG tablet take 1 tablet by mouth every 8 hours AS NEEDED FOR PAIN 30 tablet 0    omeprazole (PRILOSEC) 40 MG delayed release capsule take 1 capsule by mouth once daily if needed 30 capsule 5    mometasone-formoterol (DULERA) 200-5 MCG/ACT inhaler inhale 2 puffs by mouth and INTO THE LUNGS twice a day 13 g 2    lidocaine (LIDODERM) 5 % apply 1 patch TO THE AFFECTED AREA DAILY. LEAVE ON FOR 12 HOURS AND THEN OFF FOR 12 HOURS. 30 patch 2    aspirin (RA ASPIRIN EC) 81 MG EC tablet take 1 tablet by mouth once daily 30 tablet 5    albuterol sulfate HFA (PROAIR HFA) 108 (90 Base) MCG/ACT inhaler inhale 2 puffs by mouth every 6 hours if needed for wheezing 1 each 5    albuterol (PROVENTIL) (2.5 MG/3ML) 0.083% nebulizer solution Take 3 mLs by nebulization every 6 hours as needed for Wheezing 120 each 3    valsartan-hydroCHLOROthiazide (DIOVAN-HCT) 80-12.5 MG per tablet Once a day 30 tablet 5    carvedilol (COREG) 6.25 MG tablet 1 tablet 2/day 60 tablet 5    atorvastatin (LIPITOR) 20 MG tablet 1 tablet once daily 30 tablet 5    simethicone (MYLICON) 80 MG chewable tablet Take 1 tablet by mouth 4 times daily as needed for Flatulence 180 tablet 3    fluticasone (FLONASE) 50 MCG/ACT nasal spray instill 2 sprays into each nostril once daily 16 g 3    melatonin (RA MELATONIN) 5 MG TABS tablet take 1 tablet by mouth nightly 30 tablet 3    Multiple Vitamins-Minerals (WOMENS MULTI VITAMIN & MINERAL PO) Take 1 tablet by mouth daily        No current facility-administered medications for this visit.        Social History     Tobacco Use    Smoking status: Never    Smokeless tobacco: Never   Vaping Use    Vaping Use: Never used Substance Use Topics    Alcohol use: Yes     Alcohol/week: 4.0 standard drinks     Types: 4 Cans of beer per week     Comment: occasionally    Drug use: No       Family History   Problem Relation Age of Onset    Hypertension Mother     High Cholesterol Mother     Asthma Mother     Osteoarthritis Mother     Other Mother         overweight    Colon Cancer Father         REVIEW OF SYSTEMS:  Review of Systems   Constitutional:  Positive for activity change. Negative for appetite change, fatigue and fever. HENT:  Positive for tinnitus. Negative for congestion, ear discharge, ear pain, hearing loss, postnasal drip and rhinorrhea. Eyes:  Positive for discharge and itching. Negative for photophobia and visual disturbance. Respiratory:  Negative for cough and shortness of breath. Gastrointestinal:  Negative for abdominal pain, constipation and diarrhea. Genitourinary:  Negative for dysuria and hematuria. Neurological:  Positive for dizziness, speech difficulty and headaches. Negative for facial asymmetry. Psychiatric/Behavioral:  Negative for agitation and behavioral problems. PHYSICAL EXAM:  Vitals:    01/31/23 1402   BP: 117/68   Site: Right Upper Arm   Position: Sitting   Cuff Size: Medium Adult   Pulse: 78   Temp: 97.3 °F (36.3 °C)   TempSrc: Infrared   SpO2: 99%   Weight: 206 lb 6.4 oz (93.6 kg)   Height: 5' (1.524 m)     BP Readings from Last 3 Encounters:   01/31/23 117/68   01/25/23 (!) 157/76   01/09/23 139/84        Physical Exam  Vitals reviewed. Constitutional:       Appearance: She is obese. HENT:      Head: Normocephalic. Eyes:      Pupils: Pupils are equal, round, and reactive to light. Cardiovascular:      Rate and Rhythm: Normal rate. Heart sounds: Normal heart sounds. Pulmonary:      Effort: Pulmonary effort is normal.      Breath sounds: Normal breath sounds. Musculoskeletal:         General: Normal range of motion. Cervical back: Normal range of motion. Comments: No gait unsteadiness or imbalance on exam   Skin:     General: Skin is warm. Neurological:      General: No focal deficit present. Mental Status: She is alert and oriented to person, place, and time. Psychiatric:         Mood and Affect: Mood normal.         Behavior: Behavior normal.         DIAGNOSTIC FINDINGS:  CBC:  Lab Results   Component Value Date/Time    WBC 7.4 01/25/2023 01:28 PM    HGB 11.7 01/25/2023 01:28 PM     01/25/2023 01:28 PM       BMP:    Lab Results   Component Value Date/Time     01/25/2023 01:28 PM    K 4.3 01/25/2023 01:28 PM     01/25/2023 01:28 PM    CO2 25 01/25/2023 01:28 PM    BUN 7 01/25/2023 01:28 PM    CREATININE 0.75 01/25/2023 01:28 PM    GLUCOSE 101 01/25/2023 01:28 PM       HEMOGLOBIN A1C:   Lab Results   Component Value Date/Time    LABA1C 5.6 10/25/2022 01:42 PM       FASTING LIPID PANEL:  Lab Results   Component Value Date    CHOL 171 10/06/2020    HDL 49 11/17/2021    TRIG 96 10/06/2020       ASSESSMENT AND PLAN:  Seth Perry was seen today for ed follow-up, conjunctivitis and health maintenance. Diagnoses and all orders for this visit:    IIH (idiopathic intracranial hypertension)      -   Due to switch to Diamox 500 mg BID starting tomorrow; in the light of possible adverse effects of acetazolamide inclduing tinnitus, paresthesias, recommended to discuss with Neurology during appt tomorrow 2/1/23    Impingement syndrome of shoulder region, left  -     meloxicam (MOBIC) 7.5 MG tablet; Take 1 tablet by mouth daily    Abnormal uterine bleeding (AUB)  -     [address] [facility], [facility], , Gynecology, [place]    Iron deficiency anemia due to chronic blood loss  -     Iron and TIBC; Future  -     Ferritin; Future    Lipid screening  -     Lipid Panel; Future      FOLLOW UP AND INSTRUCTIONS:  Return in about 4 weeks (around 2/28/2023).     Seth Perry received counseling on the following healthy behaviors: nutrition, exercise, and medication adherence    Discussed use, benefit, and side effects of prescribed medications. Barriers to medication compliance addressed. All patient questions answered. Pt voiced understanding. Patient given educational materials - see patient instructions    Steven Stanford MD  PGY3  Internal Medicine  1/31/2023, 3:36 PM    This note is created with the assistance of a speech-recognition program. While intending to generate a document that actually reflects the content of thevisit, the document can still have some mistakes which may not have been identified and corrected by editing.

## 2023-01-31 NOTE — PATIENT INSTRUCTIONS
4 week f/u    Referral Gynecology     Please follow up with Ob Gyn for heavy menses and with Neurology for your intracranial hypertension and your slurred speech.   Get your blood work done and follow up with Dr. Sammy George in 4 weeks

## 2023-01-31 NOTE — PROGRESS NOTES
Attending Physician Statement  I have discussed the care of Janessa Thomas, including pertinent history and exam findings with the resident. I have reviewed the key elements of all parts of the encounter with the resident. . I agree with the assessment, and status of the problem list as documented. The plan and orders should include   Orders Placed This Encounter   Procedures    Lipid Panel    Iron and TIBC    Ferritin    [address] [facility], [facility]    and this was also documented by the resident. The medication list was reviewed with the resident and is up to date. .     Diagnosis Orders   1. IIH (idiopathic intracranial hypertension)        2. Impingement syndrome of shoulder region, left  meloxicam (MOBIC) 7.5 MG tablet      3. Abnormal uterine bleeding (AUB)  [address] [facility], [facility]      4. Iron deficiency anemia due to chronic blood loss  Iron and TIBC    Ferritin      5.  Lipid screening  Lipid Panel           Dat Lindsay MD   Attending Physician, [facility]   Faculty, Internal Medicine Residency Program  [address] [facility]

## 2023-02-01 ENCOUNTER — HOSPITAL ENCOUNTER (OUTPATIENT)
Age: 53
Discharge: HOME | End: 2023-02-01
Payer: COMMERCIAL

## 2023-02-01 ENCOUNTER — OFFICE VISIT (OUTPATIENT)
Dept: NEUROLOGY | Age: 53
End: 2023-02-01
Payer: COMMERCIAL

## 2023-02-01 VITALS
HEART RATE: 78 BPM | OXYGEN SATURATION: 100 % | SYSTOLIC BLOOD PRESSURE: 145 MMHG | DIASTOLIC BLOOD PRESSURE: 87 MMHG | TEMPERATURE: 97.7 F | BODY MASS INDEX: 40.44 KG/M2 | WEIGHT: 206 LBS | HEIGHT: 60 IN

## 2023-02-01 DIAGNOSIS — Z13.220 LIPID SCREENING: ICD-10-CM

## 2023-02-01 DIAGNOSIS — D50.0 IRON DEFICIENCY ANEMIA DUE TO CHRONIC BLOOD LOSS: ICD-10-CM

## 2023-02-01 DIAGNOSIS — H93.13 BILATERAL TINNITUS: Primary | ICD-10-CM

## 2023-02-01 DIAGNOSIS — G93.2 PSEUDOTUMOR CEREBRI: ICD-10-CM

## 2023-02-01 LAB
CHOLEST SERPL-MCNC: 456 MG/DL
CHOLESTEROL/HDL RATIO: 9.7
FERRITIN SERPL-MCNC: 18 NG/ML (ref 13–150)
HDLC SERPL-MCNC: 47 MG/DL
IRON SATURATION: 22 % (ref 20–55)
IRON SERPL-MCNC: 75 UG/DL (ref 37–145)
LDLC SERPL CALC-MCNC: ABNORMAL MG/DL (ref 0–130)
LDLC SERPL DIRECT ASSAY-MCNC: 126 MG/DL
TIBC SERPL-MCNC: 336 UG/DL (ref 250–450)
TRIGL SERPL-MCNC: 521 MG/DL
UNSATURATED IRON BINDING CAPACITY: 261 UG/DL (ref 112–347)

## 2023-02-01 PROCEDURE — 36415 COLL VENOUS BLD VENIPUNCTURE: CPT

## 2023-02-01 PROCEDURE — 83721 ASSAY OF BLOOD LIPOPROTEIN: CPT

## 2023-02-01 PROCEDURE — 83540 ASSAY OF IRON: CPT

## 2023-02-01 PROCEDURE — 83550 IRON BINDING TEST: CPT

## 2023-02-01 PROCEDURE — 3074F SYST BP LT 130 MM HG: CPT | Performed by: STUDENT IN AN ORGANIZED HEALTH CARE EDUCATION/TRAINING PROGRAM

## 2023-02-01 PROCEDURE — 99214 OFFICE O/P EST MOD 30 MIN: CPT | Performed by: STUDENT IN AN ORGANIZED HEALTH CARE EDUCATION/TRAINING PROGRAM

## 2023-02-01 PROCEDURE — 82728 ASSAY OF FERRITIN: CPT

## 2023-02-01 PROCEDURE — 3078F DIAST BP <80 MM HG: CPT | Performed by: STUDENT IN AN ORGANIZED HEALTH CARE EDUCATION/TRAINING PROGRAM

## 2023-02-01 PROCEDURE — 80061 LIPID PANEL: CPT

## 2023-02-01 RX ORDER — ACETAZOLAMIDE 250 MG/1
500 TABLET ORAL 2 TIMES DAILY
Qty: 120 TABLET | Refills: 6 | Status: SHIPPED | OUTPATIENT
Start: 2023-02-01 | End: 2023-03-03

## 2023-02-01 ASSESSMENT — ENCOUNTER SYMPTOMS
VOMITING: 0
ABDOMINAL PAIN: 0
APNEA: 0
SHORTNESS OF BREATH: 0
EYE PAIN: 0
SINUS PAIN: 0
COUGH: 0
CHEST TIGHTNESS: 0
NAUSEA: 0
ABDOMINAL DISTENTION: 0

## 2023-02-01 ASSESSMENT — VISUAL ACUITY: VA_NORMAL: 1

## 2023-02-01 NOTE — PATIENT INSTRUCTIONS
Schedule MRI brain WO and MRV head WO   Follow up with [facility] weight management   3. Increase Diamox to 500mg Twice daily   4. Continue to follow primary care physician closely for medical management   5.    Follow up in 2-3 months

## 2023-02-01 NOTE — PROGRESS NOTES
[address] [place] [address], [facility] [address], [facility], [facility], [address] [place] [address]  P: [phone]  F: [address] NOTE     PATIENT NAME: Eloisa Bush  PATIENT MRN: [id number]  PRIMARY CARE PHYSICIAN: Harpal Walton MD    [facility] follow up 1/2/60:      Eloisa Bush is a 47 y.o. female who presents to clinic today as routine hospital follow up from 1/26/23 for intractable daily headaches. At that time patient was diagnosed with Ideopathic Intracranial Hypertension (IIH) elevated ICP of 26. Patient started on Diamox 250mg daily and discharged home with close outpatient follow up. Patient has done well since then noted to have followed up with ophthalmology Mallika Harding and will obtain those records and bring them with her next visit however she did report that she was noted to have bilateral papilledema further supporting her diagnosis of IIH. Patient also seen by ENT started on flonase spray for allergies and congestion. She does complain of new bilateral ringing in her ears starting in the last 3 days. She has been having some dizzy spells mostly positional  however some also at rest. Patients headaches are improved since starting diamox however does still worsen at night time and bothering her. Lumbar puncture 1/25/23  Opening pressure 26 Cm of H20  Protein-26.1 Albumin-149   Glucose-59  VDRL-non reactive   Myelin basic protein-1.78   Cell count WBC 3, RBC-0, Total volume 8 ml  Meningitis panel negative       History of Present Illness: initial ER visit 1/25/23   The patient is a 47 y.o.  female who presents with Headache (Pt states throbbing pressure in head Constant progressive over last 3 weeks with worsening blurry vision).  PMH significant for headache disorder starting in early 2019 previously followed Dr. May Mendez started on topamax however lost to follow up and only took topamax for 2 weeks and stopped due to side effects of paresthesias. Other previous History includes obesity, HTN, HLD, COPD, iron def anemia and hiatal hernia. Patient states that she has had progressive headache disorder over the last few years first starting in January to February of 2019. At that time similar in nature throbbing holocranial headache constant not improved with ibuprophen or tylenol. She was referred to neurology by her PCP plan to start topamax however failed therapy due to side effects. Since that time it has become increasingly disabling with worsening associated vision. She works as a nurse aid and endorses that it has become debilitating for the last 3 weeks with pulsatile tinnitus when she lies down at night also worsening the pain. Denies any previous Visual exams or workup for Idiopathic intracranial hypertension (IIH) including no previous lumbar puncture. Patient hypertensive on arrival 152/86, HR 65 afebrile 98.4. Initial labs glucose 101, hgb 11.7, Platelets 098, sed rate 23, CRP 4.8 otherwise WNL. CT head WO unremarkable. Previous MRI from 2019 reviewed 2 small punctate White matter lesions likely from HTN, HLD risk factors vs her migraine history. Neuro imaging and Neuro workup:  CT Head WO 1/25/23  Impression   No acute intracranial abnormality. MRI brain WO 4/23/2019  Impression   1. There are fused scattered nonspecific white matter lesions, likely related   to trace chronic microvascular white matter ischemic disease. 2. Abnormal marrow signal which is nonspecific. This may be related to   underlying anemia given the patient's hemoglobin of 10.8. 3. Probable old left lamina papyracea fracture. VL Carotid scan bilateral 11/3/2016  Findings:        Right                                                    Left    Intimal thickening right common carotid artery.  Carotid  Intimal    scan shows minimal heterogeneous plaque formation at the thickening left    bifurcation, origin of the right internal carotid        common carotid    artery. External carotid artery appears normal.          artery. Vertebral artery flow is antegrade . Internal carotid                                                             artery appears                                                             normal.                                                             External carotid                                                             artery appears                                                             normal.                                                             Vertebral artery                                                             flow is antegrade                                                             .      PREVIOUS WORKUP:     Lab Results   Component Value Date    WBC 7.4 01/25/2023    HGB 11.7 (L) 01/25/2023    HCT 37.5 01/25/2023    MCV 86.2 01/25/2023     01/25/2023       Past Medical History:   Diagnosis Date    Allergic rhinitis     Asthma     Clinical trial participant 11/23/2015 11/23/20015    COPD (chronic obstructive pulmonary disease) ([facility] [address]     Dysphagia     Dysphagia     has needed EGD with dilatation in the past    Family history of colon cancer 8/9/2017    Father    GERD (gastroesophageal reflux disease)     H/O cardiovascular stress test 01/15/2018    ABNORMAL    Hiatal hernia     Hyperlipidemia     Hypertension     Iron deficiency anemia secondary to inadequate dietary iron intake 5/13/2020    Iron deficiency anemia, unspecified 5/13/2020    Irritable bowel syndrome with constipation 1/21/2016    Ob[facility]     states has tested negative for sleep apnea    Wears glasses         Past Surgical History:   Procedure Laterality Date    CARDIAC CATHETERIZATION  01/16/2018    CARDIAC CATHETERIZATION  11/23/2015     Minimal non obstructive CAD    COLONOSCOPY  2015    COLONOSCOPY  02/08/2018    Redundant colon    ENDOSCOPY, COLON, DIAGNOSTIC 2015    with dilatation    ENDOSCOPY, COLON, DIAGNOSTIC  02/08/2018    WNL     NASAL SEPTOPLASTY W/ TURBINOPLASTY  2020        NOSE SURGERY      turbinate     KS COLON CA SCRN NOT HI RSK IND N/A 2/8/2018    COLONOSCOPY performed by Calos Lorenzo MD at [address] [facility]  2003    UPPER GASTROINTESTINAL ENDOSCOPY      The endoscopic exam showed mildly tortuous esophagus. Savary dilation performed x 42 and 45 F.     UPPER GASTROINTESTINAL ENDOSCOPY N/A 7/18/2017    EGD DILATION SAVORY performed by Calos Lorenzo MD at [facility]        Social History     Socioeconomic History    Marital status:      Spouse name: Not on file    Number of children: Not on file    Years of education: Not on file    Highest education level: Not on file   Occupational History    Not on file   Tobacco Use    Smoking status: Never    Smokeless tobacco: Never   Vaping Use    Vaping Use: Never used   Substance and Sexual Activity    Alcohol use:  Yes     Alcohol/week: 4.0 standard drinks     Types: 4 Cans of beer per week     Comment: occasionally    Drug use: No    Sexual activity: Not Currently     Partners: Male     Comment: tubal ligation    Other Topics Concern    Not on file   Social History Narrative    Not on file     Social Determinants of Health     Financial Resource Strain: Low Risk     Difficulty of Paying Living Expenses: Not hard at all   Food Insecurity: No Food Insecurity    Worried About Running Out of Food in the Last Year: Never true    Ran Out of Food in the Last Year: Never true   Transportation Needs: Not on file   Physical Activity: Not on file   Stress: Not on file   Social Connections: Not on file   Intimate Partner Violence: Not on file   Housing Stability: Not on file        Current Outpatient Medications   Medication Sig Dispense Refill    meloxicam (MOBIC) 7.5 MG tablet Take 1 tablet by mouth daily 30 tablet 2    ondansetron (ZOFRAN-ODT) 4 MG disintegrating tablet dissolve 1 tablet ON TONGUE every 12 hours if needed for nausea OR vomiting 15 tablet 1    acetaZOLAMIDE (DIAMOX) 250 MG tablet Take 1 tablet by mouth 2 times daily for 7 days 14 tablet 0    senna (SENOKOT) 8.6 MG TABS tablet take 1 tablet by mouth once daily 90 tablet 1    ibuprofen (ADVIL;MOTRIN) 800 MG tablet take 1 tablet by mouth every 8 hours AS NEEDED FOR PAIN 30 tablet 0    omeprazole (PRILOSEC) 40 MG delayed release capsule take 1 capsule by mouth once daily if needed 30 capsule 5    mometasone-formoterol (DULERA) 200-5 MCG/ACT inhaler inhale 2 puffs by mouth and INTO THE LUNGS twice a day 13 g 2    lidocaine (LIDODERM) 5 % apply 1 patch TO THE AFFECTED AREA DAILY. LEAVE ON FOR 12 HOURS AND THEN OFF FOR 12 HOURS. 30 patch 2    aspirin (RA ASPIRIN EC) 81 MG EC tablet take 1 tablet by mouth once daily 30 tablet 5    albuterol sulfate HFA (PROAIR HFA) 108 (90 Base) MCG/ACT inhaler inhale 2 puffs by mouth every 6 hours if needed for wheezing 1 each 5    albuterol (PROVENTIL) (2.5 MG/3ML) 0.083% nebulizer solution Take 3 mLs by nebulization every 6 hours as needed for Wheezing 120 each 3    valsartan-hydroCHLOROthiazide (DIOVAN-HCT) 80-12.5 MG per tablet Once a day 30 tablet 5    carvedilol (COREG) 6.25 MG tablet 1 tablet 2/day 60 tablet 5    atorvastatin (LIPITOR) 20 MG tablet 1 tablet once daily 30 tablet 5    simethicone (MYLICON) 80 MG chewable tablet Take 1 tablet by mouth 4 times daily as needed for Flatulence 180 tablet 3    fluticasone (FLONASE) 50 MCG/ACT nasal spray instill 2 sprays into each nostril once daily 16 g 3    melatonin (RA MELATONIN) 5 MG TABS tablet take 1 tablet by mouth nightly 30 tablet 3    Multiple Vitamins-Minerals (WOMENS MULTI VITAMIN & MINERAL PO) Take 1 tablet by mouth daily        No current facility-administered medications for this visit. No Known Allergies     REVIEW OF SYSTEMS:     Review of Systems   Constitutional:  Negative for activity change, appetite change, chills and fatigue. HENT:  Negative for ear pain and sinus pain. Eyes:  Negative for pain and visual disturbance. Respiratory:  Negative for apnea, cough, chest tightness and shortness of breath. Cardiovascular:  Negative for chest pain, palpitations and leg swelling. Gastrointestinal:  Negative for abdominal distention, abdominal pain, nausea and vomiting. Endocrine: Negative for polydipsia and polyuria. Genitourinary:  Negative for difficulty urinating and dysuria. Musculoskeletal:  Negative for arthralgias and myalgias. Skin:  Negative for rash. Allergic/Immunologic: Negative for environmental allergies. Neurological:  Positive for dizziness, light-headedness, numbness (some paresthesias in hands and feet new with starting diamox) and headaches. Negative for facial asymmetry. Psychiatric/Behavioral:  Negative for agitation and behavioral problems. VITALS  BP (!) 145/87 (Site: Right Upper Arm, Position: Sitting, Cuff Size: Large Adult)   Pulse 78   Temp 97.7 °F (36.5 °C) (Temporal)   Ht 5' (1.524 m)   Wt 206 lb (93.4 kg)   SpO2 100%   BMI 40.23 kg/m²        NEUROLOGICAL EXAMINATION:     Neurological Exam  Mental Status  Awake, alert and oriented to person, place and time. Speech is normal. Language is fluent with no aphasia. Cranial Nerves  CN II: Visual acuity is normal. Visual fields full to confrontation. CN III, IV, VI: Extraocular movements intact bilaterally. Normal lids and orbits bilaterally. Pupils equal round and reactive to light bilaterally. CN V: Facial sensation is normal.  CN VII: Full and symmetric facial movement. CN VIII: Hearing is normal.  CN IX, X: Palate elevates symmetrically. Normal gag reflex. CN XI: Shoulder shrug strength is normal.  CN XII: Tongue midline without atrophy or fasciculations. Motor  Normal muscle bulk throughout. No fasciculations present. Normal muscle tone. No abnormal involuntary movements.  Strength is 5/5 throughout all four extremities.    Sensory  Sensation is intact to light touch, pinprick, vibration and proprioception in all four extremities.    Reflexes                                            Right                      Left  Brachioradialis                    2+                         2+  Biceps                                 2+                         2+  Triceps                                2+                         2+  Finger flex                           2+                         2+  Hamstring                            2+                         2+  Patellar                                2+                         2+  Achilles                                2+                         2+    Coordination    Finger-to-nose, rapid alternating movements and heel-to-shin normal bilaterally without dysmetria.    Gait  Normal casual, toe, heel and tandem gait.     ASSESSMENT / PLAN:   Beatriz is a 52 year old  female who presents with classical Idiopathic intracranial hypertension symptoms including constant progressive throbbing holoacranial headache, worse when lying flat and bending over, pulsatile tinnitus at night and 3 weeks progressive blurry vision. Patient was previously seen by neurology in 2019 for new 3 month history headache similar to today however less debilitating at that time. Trial of topamax was attempted however patient failed to follow up and stopped taking the medication after 2 weeks from side effect of paresthesias. CT head WO unremarkable 1/25/23     Assessment   Idiopathic intracranial Hypertension (IIH)  2.   Few small punctate white matter lesions periventricular likely small vessel disease related vs migraine disorder associated    Plan  - Schedule MRI brain WO and MRV head WO -->rule out any venous sinus thrombus as cause for progressive headaches and elevated ICP and MRI brain to evaluate white matter disease progression for better etiology   - Follow up with [facility] weight  management   - Increase Diamox to 500mg Twice daily   - Continue to follow primary care physician closely for medical management   - Follow up in 2-3 months       Ms. Jeremy De Oliveira received counseling on the following healthy behaviors: medical compliance, smoking cessation, blood pressure control, regular follow up with primary doctor.         Electronically signed by James Hines MD on 2/1/2023 at 2:42 PM

## 2023-02-07 DIAGNOSIS — E78.2 MIXED HYPERLIPIDEMIA: ICD-10-CM

## 2023-02-07 DIAGNOSIS — E78.1 HYPERTRIGLYCERIDEMIA: Primary | ICD-10-CM

## 2023-02-07 RX ORDER — FENOFIBRATE 48 MG/1
48 TABLET, COATED ORAL DAILY
Qty: 30 TABLET | Refills: 0 | Status: SHIPPED | OUTPATIENT
Start: 2023-02-07 | End: 2023-03-06

## 2023-02-07 RX ORDER — ATORVASTATIN CALCIUM 40 MG/1
TABLET, FILM COATED ORAL
Qty: 30 TABLET | Refills: 2 | Status: SHIPPED | OUTPATIENT
Start: 2023-02-07

## 2023-02-13 ENCOUNTER — APPOINTMENT (OUTPATIENT)
Dept: GENERAL RADIOLOGY | Age: 53
End: 2023-02-13
Payer: COMMERCIAL

## 2023-02-13 ENCOUNTER — HOSPITAL ENCOUNTER (EMERGENCY)
Age: 53
Discharge: HOME | End: 2023-02-13
Attending: EMERGENCY MEDICINE
Payer: COMMERCIAL

## 2023-02-13 VITALS
DIASTOLIC BLOOD PRESSURE: 95 MMHG | HEART RATE: 95 BPM | SYSTOLIC BLOOD PRESSURE: 143 MMHG | WEIGHT: 200 LBS | OXYGEN SATURATION: 96 % | BODY MASS INDEX: 39.06 KG/M2 | RESPIRATION RATE: 19 BRPM | TEMPERATURE: 98.1 F

## 2023-02-13 DIAGNOSIS — J44.1 COPD EXACERBATION (HCC): ICD-10-CM

## 2023-02-13 DIAGNOSIS — J18.9 PNEUMONIA DUE TO INFECTIOUS ORGANISM, UNSPECIFIED LATERALITY, UNSPECIFIED PART OF LUNG: Primary | ICD-10-CM

## 2023-02-13 LAB
SARS-COV-2 RDRP RESP QL NAA+PROBE: NOT DETECTED
SPECIMEN DESCRIPTION: NORMAL

## 2023-02-13 PROCEDURE — 99284 EMERGENCY DEPT VISIT MOD MDM: CPT

## 2023-02-13 PROCEDURE — 71046 X-RAY EXAM CHEST 2 VIEWS: CPT

## 2023-02-13 PROCEDURE — 6370000000 HC RX 637 (ALT 250 FOR IP): Performed by: STUDENT IN AN ORGANIZED HEALTH CARE EDUCATION/TRAINING PROGRAM

## 2023-02-13 PROCEDURE — 87635 SARS-COV-2 COVID-19 AMP PRB: CPT

## 2023-02-13 RX ORDER — DOXYCYCLINE HYCLATE 100 MG
100 TABLET ORAL 2 TIMES DAILY
Qty: 14 TABLET | Refills: 0 | Status: SHIPPED | OUTPATIENT
Start: 2023-02-13 | End: 2023-02-20

## 2023-02-13 RX ORDER — BENZONATATE 100 MG/1
200 CAPSULE ORAL ONCE
Status: COMPLETED | OUTPATIENT
Start: 2023-02-13 | End: 2023-02-13

## 2023-02-13 RX ORDER — ACETAMINOPHEN 500 MG
1000 TABLET ORAL ONCE
Status: COMPLETED | OUTPATIENT
Start: 2023-02-13 | End: 2023-02-13

## 2023-02-13 RX ORDER — DOXYCYCLINE HYCLATE 100 MG
100 TABLET ORAL ONCE
Status: COMPLETED | OUTPATIENT
Start: 2023-02-13 | End: 2023-02-13

## 2023-02-13 RX ORDER — GUAIFENESIN 600 MG/1
600 TABLET, EXTENDED RELEASE ORAL ONCE
Status: COMPLETED | OUTPATIENT
Start: 2023-02-13 | End: 2023-02-13

## 2023-02-13 RX ORDER — BENZONATATE 100 MG/1
100 CAPSULE ORAL 3 TIMES DAILY PRN
Qty: 10 CAPSULE | Refills: 0 | Status: SHIPPED | OUTPATIENT
Start: 2023-02-13

## 2023-02-13 RX ADMIN — ACETAMINOPHEN 1000 MG: 500 TABLET ORAL at 10:01

## 2023-02-13 RX ADMIN — BENZONATATE 200 MG: 100 CAPSULE ORAL at 10:01

## 2023-02-13 RX ADMIN — DOXYCYCLINE HYCLATE 100 MG: 100 TABLET ORAL at 11:06

## 2023-02-13 RX ADMIN — GUAIFENESIN 600 MG: 600 TABLET, EXTENDED RELEASE ORAL at 10:01

## 2023-02-13 ASSESSMENT — ENCOUNTER SYMPTOMS
PHOTOPHOBIA: 0
SORE THROAT: 0
SHORTNESS OF BREATH: 0
ANAL BLEEDING: 0
RHINORRHEA: 0
DIARRHEA: 0
ABDOMINAL DISTENTION: 0
NAUSEA: 0
CHEST TIGHTNESS: 0
CONSTIPATION: 0
VOMITING: 0
COUGH: 1

## 2023-02-13 ASSESSMENT — PAIN - FUNCTIONAL ASSESSMENT
PAIN_FUNCTIONAL_ASSESSMENT: NONE - DENIES PAIN
PAIN_FUNCTIONAL_ASSESSMENT: NONE - DENIES PAIN

## 2023-02-13 NOTE — ED TRIAGE NOTES
Patient ambulated to room 02 from triage with c/o of having a productive cough with green mucus. 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 Patient states she thinks she needs an antibiotic just in case she has pneumonia again. Patient has hx of asthma, COPD, CHF, hypertension. Pt respirations are even and unlabored, pt is alert and oriented X 4, speaking in complete sentences, bed is in the lowest position, call light is within reach.

## 2023-02-13 NOTE — ED PROVIDER NOTES
[facility]  ED  Emergency Department Encounter  EmergencyMedicine Resident     Pt Name:Beatriz Sin  MRN: [id number]  [place] 1970  Date of evaluation: 2/13/23  PCP:  Holley Escalante MD    [address] [place] [address]       Chief Complaint   Patient presents with    Cough       HISTORY OF PRESENT ILLNESS  (Location/Symptom, Timing/Onset, Context/Setting, Quality, Duration, Modifying Factors, Severity.)      Gretchen Mitchell is a 47 y.o. female who presents with productive cough with green sputum for the last 4 days. Patient denies body aches, shortness of breath chest pain lower extremity swelling. She denies recent sick contacts. She is concerned she has pneumonia. PAST MEDICAL / SURGICAL / SOCIAL / FAMILY HISTORY      has a past medical history of Allergic rhinitis, Asthma, Clinical trial participant, COPD (chronic obstructive pulmonary disease) ([facility] [address] Dysphagia, Dysphagia, Family history of colon cancer, GERD (gastroesophageal reflux disease), H/O cardiovascular stress test, Hiatal hernia, Hyperlipidemia, Hypertension, Iron deficiency anemia secondary to inadequate dietary iron intake, Iron deficiency anemia, unspecified, Irritable bowel syndrome with constipation, Obesity, Snores, and Wears glasses. has a past surgical history that includes Tubal ligation (2003); Colonoscopy (2015); Upper gastrointestinal endoscopy; Upper gastrointestinal endoscopy (N/A, 7/18/2017); Cardiac catheterization (01/16/2018); Cardiac catheterization (11/23/2015); Endoscopy, colon, diagnostic (2015); Endoscopy, colon, diagnostic (02/08/2018); pr colon ca scrn not hi rsk ind (N/A, 2/8/2018); Colonoscopy (02/08/2018); Nose surgery; and Nasal septoplasty w/ turbinoplasty (2020).       Social History     Socioeconomic History    Marital status:      Spouse name: Not on file    Number of children: Not on file    Years of education: Not on file    Highest education level: Not on file   Occupational History OT IRP Treatment      Primary Rehabilitation Diagnosis: left BKA  Expected discharge date:   Planned Discharge Destination: Sub acute(D/C date to be determined)    SUBJECTIVE: Subjective: pt agreeable to session (12/21/20 1501)  Subjective/Objective Comments: patient with transport at end of session (12/21/20 1501)    OBJECTIVE:  Precautions  Knee Precautions: Knee immobilizer (12/12/20 1030)  Other Precautions: liana PT had discussion 11/27/20 with PA and reports immobilizer can be off for therapy and mobility activities but should be on when pt in bed and resting.  This information relayed to pt by Liana with therapist present to hear.  RN aware as well as OT.   (11/27/20 1001)  Precautions Comments: R ortho shoe (12/01/20 0830)    See below for current functional status overview.  See OT flowsheet for full details regarding the OT therapy provided.    ASSESSMENT:   Treatment today focused on hygiene/grooming at sink, full dressing tasks and functional transfer safety as well as rickshaw to increase strength needed for wheelchair mobility.  Patient is demonstrating good progress supported by increase in ability to complete self cares.    Patient limited at this time by weakness.  Patient will benefit from further skilled OT  for continued training with functional transfers and ADLS to help the patient meet goal of return to home.    OT Identified Barriers to Discharge: Lives alone, 4+12 steps to enter apartment, reduced functional mobility, reduced ability to complete ADLs     This patient participated in all scheduled occupational therapy time with this therapist today.    EDUCATION:   On this date, education was provided to patient regarding  diagnosis considerations pertaining to rehab  The response to education was/were: Verbalizes understanding    PLAN:   Continue skilled OT, including the following Treatment Interventions: ADL retraining;UE strengthening/ROM (12/20/20 0930)   OT Frequency: 7 days/week  (12/20/20 0930), Frequency Comments: 60 minutes/day at least 5x/week (12/20/20 0930)    Treatment Plan for Next Session: Mon - shower and dressing, transfers, standing tolerance, balance  Additional Plan Considerations: .       RECOMMENDATIONS FOR DISCHARGE:  Recommendations for Discharge: OT WI: Sub-acute nursing home    PT/OT Mobility Equipment for Discharge: will require manual light wt wheelchair, cushion, 2WW (12/18/20 1020)  PT/OT ADL Equipment for Discharge: to be assessed (12/18/20 1020)      FUNCTIONAL DATA OVERVIEW LAST 24 HOURS  ADLs   Self Cares/ADL's  Grooming Assistance: Independent (12/21/20 0830)  Oral Hygiene Assistance: Independent (12/21/20 0830)  Bathing Assistance: Minimal Assist (Min) (12/21/20 0830)  Upper Body Dressing Assistance: Set-up;Clean-up (12/21/20 0830)  Lower Body Clothing Assistance: Minimal Assist (Min) (12/21/20 0830)  Footwear Assistance: Minimal Assist (Min) (12/21/20 0830)  Self Cares/ADL's Comments #1: patient reuqires minimal assistance to complete LE dressing and footwear assistance due to weaknses  (12/21/20 0830)    Household mobility  Household Mobility  Sit to Stand: Minimal Assist (Min) (12/21/20 0830)  Stand to Sit: Minimal Assist (Min) (12/21/20 0830)  Stand Pivot Transfers: Minimal Assist (Min) (12/21/20 0830)  Transfer Equipment: gait belt (12/21/20 0830)  Sitting - Static: Independent (12/21/20 1501)  Sitting - Dynamic: Independent (12/21/20 1501)  Standing - Static: Minimal Assist (Min) (12/21/20 0830)  Standing - Dynamic: Minimal Assist (Min) (12/21/20 0830)  Household Mobility Comments #1: patient is able to sit at an independent level  (12/21/20 1501)    Home Management       Tolerance  OT Activity Tolerance  Activity Tolerance: 1:1 Activity to rest (12/21/20 1501)  Activity Tolerance Comments: fair (12/21/20 1501)    Cognition       Interventions  Other Interventions 1: patient completed UE strengthening with cecilia to increase tricep strength  (12/21/20  Not on file   Tobacco Use    Smoking status: Never    Smokeless tobacco: Never   Vaping Use    Vaping Use: Never used   Substance and Sexual Activity    Alcohol use: Yes     Alcohol/week: 4.0 standard drinks     Types: 4 Cans of beer per week     Comment: occasionally    Drug use: No    Sexual activity: Not Currently     Partners: Male     Comment: tubal ligation    Other Topics Concern    Not on file   Social History Narrative    Not on file     Social Determinants of Health     Financial Resource Strain: Low Risk     Difficulty of Paying Living Expenses: Not hard at all   Food Insecurity: No Food Insecurity    Worried About Running Out of Food in the Last Year: Never true    Ran Out of Food in the Last Year: Never true   Transportation Needs: Not on file   Physical Activity: Not on file   Stress: Not on file   Social Connections: Not on file   Intimate Partner Violence: Not on file   Housing Stability: Not on file       Family History   Problem Relation Age of Onset    Hypertension Mother     High Cholesterol Mother     Asthma Mother     Osteoarthritis Mother     Other Mother         overweight    Colon Cancer Father        Allergies:  Patient has no known allergies. Home Medications:  Prior to Admission medications    Medication Sig Start Date End Date Taking?  Authorizing Provider   doxycycline hyclate (VIBRA-TABS) 100 MG tablet Take 1 tablet by mouth 2 times daily for 7 days 2/13/23 2/20/23 Yes Damian Sierra DO   benzonatate (TESSALON PERLES) 100 MG capsule Take 1 capsule by mouth 3 times daily as needed for Cough 2/13/23  Yes Damian Sierra DO   fenofibrate (TRICOR) 48 MG tablet Take 1 tablet by mouth daily 2/7/23   Radha Charles MD   atorvastatin (LIPITOR) 40 MG tablet 1 tablet once daily 2/7/23   Radha Charles MD   acetaZOLAMIDE (DIAMOX) 250 MG tablet Take 2 tablets by mouth 2 times daily 2/1/23 3/3/23  Roberto Thomas MD   meloxicam (MOBIC) 7.5 MG tablet Take 1 tablet by mouth daily 1/31/23   Yennifer Mckay 1501)         MD Shaw   ondansetron (ZOFRAN-ODT) 4 MG disintegrating tablet dissolve 1 tablet ON TONGUE every 12 hours if needed for nausea OR vomiting 1/26/23   Gian Polo MD   senna (SENOKOT) 8.6 MG TABS tablet take 1 tablet by mouth once daily 11/21/22   Gian Polo MD   ibuprofen (ADVIL;MOTRIN) 800 MG tablet take 1 tablet by mouth every 8 hours AS NEEDED FOR PAIN 10/27/22   Gain Polo MD   omeprazole (PRILOSEC) 40 MG delayed release capsule take 1 capsule by mouth once daily if needed 10/25/22   Gian Polo MD   mometasone-formoterol [facility]) 200-5 MCG/ACT inhaler inhale 2 puffs by mouth and INTO THE LUNGS twice a day 10/21/22   Gian Polo MD   lidocaine (LIDODERM) 5 % apply 1 patch TO THE AFFECTED AREA DAILY.  LEAVE ON FOR 12 HOURS AND THEN OFF FOR 12 HOURS. 9/28/22   Mary Alicia MD   aspirin (RA ASPIRIN EC) 81 MG EC tablet take 1 tablet by mouth once daily 8/30/22   Gian Polo MD   albuterol sulfate HFA (PROAIR HFA) 108 (90 Base) MCG/ACT inhaler inhale 2 puffs by mouth every 6 hours if needed for wheezing 7/12/22   Gian Polo MD   albuterol (PROVENTIL) (2.5 MG/3ML) 0.083% nebulizer solution Take 3 mLs by nebulization every 6 hours as needed for Wheezing 7/12/22   Gian Polo MD   valsartan-hydroCHLOROthiazide (DIOVAN-HCT) 80-12.5 MG per tablet Once a day 7/12/22   Gian Polo MD   carvedilol (COREG) 6.25 MG tablet 1 tablet 2/day 7/12/22   Gian Polo MD   simethicone (MYLICON) 80 MG chewable tablet Take 1 tablet by mouth 4 times daily as needed for Flatulence 2/1/22   Gian Polo MD   fluticasone [facility]) 50 MCG/ACT nasal spray instill 2 sprays into each nostril once daily 11/11/21   Gian Polo MD   melatonin (RA MELATONIN) 5 MG TABS tablet take 1 tablet by mouth nightly 7/12/21   Gian Polo MD   Multiple Vitamins-Minerals (WOMENS MULTI VITAMIN & MINERAL PO) Take 1 tablet by mouth daily     Historical Provider, MD       REVIEW OF SYSTEMS    (2-9 systems for level 4, 10 or more for level 5)      Review of Systems   Constitutional:  Negative for chills and fever. HENT:  Negative for rhinorrhea and sore throat. Eyes:  Negative for photophobia. Respiratory:  Positive for cough. Negative for chest tightness and shortness of breath. Cardiovascular:  Negative for chest pain. Gastrointestinal:  Negative for abdominal distention, anal bleeding, constipation, diarrhea, nausea and vomiting. Endocrine: Negative for polyuria. Genitourinary:  Negative for difficulty urinating and flank pain. Musculoskeletal:  Negative for arthralgias. Skin:  Negative for rash. Neurological:  Negative for weakness and headaches. PHYSICAL EXAM   (up to 7 for level 4, 8 or more for level 5)      INITIAL VITALS:   BP (!) 143/95   Pulse 95   Temp 98.1 °F (36.7 °C) (Oral)   Resp 19   Wt 200 lb (90.7 kg)   SpO2 96%   BMI 39.06 kg/m²     Physical Exam  Constitutional:       General: She is not in acute distress. Appearance: She is not ill-appearing. Cardiovascular:      Rate and Rhythm: Normal rate. Pulses: Normal pulses. Heart sounds: Normal heart sounds. Pulmonary:      Effort: Pulmonary effort is normal.      Breath sounds: Normal breath sounds. Abdominal:      Palpations: Abdomen is soft. Tenderness: There is no abdominal tenderness. Musculoskeletal:      Right lower leg: No edema. Left lower leg: No edema.        DIFFERENTIAL  DIAGNOSIS     PLAN (LABS / IMAGING / EKG):  Orders Placed This Encounter   Procedures    COVID-19, Rapid    XR CHEST (2 VW)       MEDICATIONS ORDERED:  Orders Placed This Encounter   Medications    acetaminophen (TYLENOL) tablet 1,000 mg    guaiFENesin (MUCINEX) extended release tablet 600 mg    benzonatate (TESSALON) capsule 200 mg    doxycycline hyclate (VIBRA-TABS) tablet 100 mg     Order Specific Question:   Antimicrobial Indications     Answer:   Pneumonia (CAP)    doxycycline hyclate (VIBRA-TABS) 100 MG tablet     Sig: Take 1 tablet by mouth 2 times daily for 7 days     Dispense:  14 tablet     Refill:  0    benzonatate (TESSALON PERLES) 100 MG capsule     Sig: Take 1 capsule by mouth 3 times daily as needed for Cough     Dispense:  10 capsule     Refill:  0       DIAGNOSTIC RESULTS / EMERGENCY DEPARTMENT COURSE / MDM   LAB RESULTS:  Results for orders placed or performed during the hospital encounter of 02/13/23   COVID-19, Rapid    Specimen: Nasopharyngeal Swab   Result Value Ref Range    Specimen Description . NASOPHARYNGEAL SWAB     SARS-CoV-2, Rapid Not Detected Not Detected       RADIOLOGY:  XR CHEST (2 VW)    Result Date: 2/13/2023  No acute airspace disease identified. EKG Interpretation  None    [facility]  Medical Decision Making  Here with likely early developing pneumonia in the setting of mild COPD exacerbation. Patient given doxycycline. No pneumothorax on chest x-ray. No signs and symptoms of heart failure or coronary artery disease. patient discharged home strict return precautions    Amount and/or Complexity of Data Reviewed  Radiology: ordered. Decision-making details documented in ED Course. Risk  OTC drugs. Prescription drug management. EMERGENCY DEPARTMENT COURSE:  ED Course as of 02/13/23 1116   Mon Feb 13, 2023   1040 Patient with history of COPD productive cough for last 4 days. Now chest pain or shortness of breath. Patient's nontoxic-appearing. Get x-ray and swab for COVID. [ZE]   1102 Patient with mild COPD exacerbation and mucopurulent sputum change. Will give doxycycline for likely early in pneumonia. Patient to be discharged [ZE]      ED Course User Index  [ZE] Sae Lynch DO       PROCEDURES:  Procedures     CONSULTS:  None    CRITICAL CARE:  See MDM    FINAL IMPRESSION      1. Pneumonia due to infectious organism, unspecified laterality, unspecified part of lung    2.  COPD exacerbation ([facility] [address]          DISPOSITION / PLAN     DISPOSITION Decision To Discharge 02/13/2023 11:00:23 AM      PATIENT REFERRED TO:  [facility] ED  [facility]  [phone]    If symptoms worsen    DISCHARGE MEDICATIONS:  New Prescriptions    BENZONATATE (TESSALON PERLES) 100 MG CAPSULE    Take 1 capsule by mouth 3 times daily as needed for Cough    DOXYCYCLINE HYCLATE (VIBRA-TABS) 100 MG TABLET    Take 1 tablet by mouth 2 times daily for 7 days       Barak Hurley DO  Emergency Medicine Resident    (Please note that portions of thisnote were completed with a voice recognition program.  Efforts were made to edit the dictations but occasionally words are mis-transcribed.)        Charisma Quinn DO  Resident  02/13/23 [id number]

## 2023-02-13 NOTE — ED PROVIDER NOTES
[facility]     Emergency Department     Faculty Attestation    I performed a history and physical examination of the patient and discussed management with the resident. I reviewed the residents note and agree with the documented findings and plan of care. Any areas of disagreement are noted on the chart. I was personally present for the key portions of any procedures. I have documented in the chart those procedures where I was not present during the key portions. I have reviewed the emergency nurses triage note. I agree with the chief complaint, past medical history, past surgical history, allergies, medications, social and family history as documented unless otherwise noted below. For Physician Assistant/ Nurse Practitioner cases/documentation I have personally evaluated this patient and have completed at least one if not all key elements of the E/M (history, physical exam, and MDM). Additional findings are as noted. I have personally seen and evaluated the patient. I find the patient's history and physical exam are consistent with the NP/PA documentation. I agree with the care provided, treatment rendered, disposition and follow-up plan. Patient describing a history of COPD and productive sputum associated with cough denies shortness of breath at the present time resting comfortably lungs are clear bilaterally    Chest x-ray shows no evidence of infiltrate  Critical Care     Hernesto Mera M.D.   Attending Emergency  Physician           Whitney Bhatti MD  02/13/23 1010 Susana Lake MD  02/13/23 1132

## 2023-02-13 NOTE — TELEPHONE ENCOUNTER
Last visit: 1/31/23  Last Med refill:   Does patient have enough medication for 72 hours: No:     Next Visit Date:  Future Appointments   Date Time Provider Department Center   2/18/2023  9:15 AM STA MRI RM [facility] Radiolog   2/18/2023 10:00 AM STA MRI RM [facility] Radiolog   4/10/2023  9:30 AM Greyson Reid MD Resp Spec [id number]   5/4/2023  1:30 PM Odilon Olson MD Neuro [facility] Neurology -   5/9/2023 10:20 AM [facility]       Health Maintenance   Topic Date Due    Shingles vaccine (2 of 2) 09/03/2021    COVID-19 Vaccine (3 - Booster for Pfizer series) 11/02/2021    Colorectal Cancer Screen  02/08/2023    Breast cancer screen  08/24/2023    Depression Screen  01/31/2024    Lipids  02/01/2024    Diabetes screen  10/25/2025    DTaP/Tdap/Td vaccine (2 - Td or Tdap) 02/05/2026    Cervical cancer screen  09/07/2026    Flu vaccine  Completed    Pneumococcal 0-64 years Vaccine  Completed    Hepatitis C screen  Completed    HIV screen  Completed    Hepatitis A vaccine  Aged Out    Hib vaccine  Aged Out    Meningococcal (ACWY) vaccine  Aged Out       Hemoglobin A1C (%)   Date Value   10/25/2022 5.6   07/09/2021 5.6   10/06/2020 5.7             ( goal A1C is < 7)   No results found for: LABMICR  LDL Cholesterol (mg/dL)   Date Value   02/01/2023 11/17/2021 99       (goal LDL is <100)   AST (U/L)   Date Value   07/09/2021 20     ALT (U/L)   Date Value   07/09/2021 21     BUN (mg/dL)   Date Value   01/25/2023 7     BP Readings from Last 3 Encounters:   02/01/23 (!) 145/87   01/31/23 117/68   01/25/23 (!) 157/76          (goal 120/80)    All Future Testing planned in [facility]  Lab Frequency Next Occurrence   MRI BRAIN WO CONTRAST Once 02/01/2023   MRV HEAD WO CONTRAST Once 02/01/2023   Hepatic Function Panel Once 02/07/2023               Patient Active Problem List:     Hypertension     Asthma     GERD (gastroesophageal reflux disease)     S/P cardiac cath-Minimal disease 11/23/15   claudine     Irritable bowel syndrome with constipation     Obesity     Allergic rhinitis     Hiatal hernia     Paresthesias     Left sided numbness     Dysphagia     Family history of colon cancer     DNS (deviated nasal septum)     Hypertrophy of both inferior nasal turbinates     Iron deficiency anemia secondary to inadequate dietary iron intake     Iron deficiency anemia, unspecified     Atypical chest pain     Other headache syndrome

## 2023-02-14 RX ORDER — SIMETHICONE 80 MG
TABLET,CHEWABLE ORAL
Qty: 180 TABLET | Refills: 3 | Status: SHIPPED | OUTPATIENT
Start: 2023-02-14

## 2023-02-17 ENCOUNTER — OFFICE VISIT (OUTPATIENT)
Dept: INTERNAL MEDICINE | Age: 53
End: 2023-02-17

## 2023-02-17 VITALS
WEIGHT: 204 LBS | TEMPERATURE: 98.2 F | HEART RATE: 62 BPM | OXYGEN SATURATION: 96 % | BODY MASS INDEX: 40.05 KG/M2 | HEIGHT: 60 IN | SYSTOLIC BLOOD PRESSURE: 139 MMHG | DIASTOLIC BLOOD PRESSURE: 79 MMHG

## 2023-02-17 DIAGNOSIS — Z09 HOSPITAL DISCHARGE FOLLOW-UP: ICD-10-CM

## 2023-02-17 DIAGNOSIS — G93.2 IIH (IDIOPATHIC INTRACRANIAL HYPERTENSION): ICD-10-CM

## 2023-02-17 DIAGNOSIS — J45.51 SEVERE PERSISTENT ASTHMA WITH ACUTE EXACERBATION: Primary | Chronic | ICD-10-CM

## 2023-02-17 DIAGNOSIS — I10 PRIMARY HYPERTENSION: ICD-10-CM

## 2023-02-17 DIAGNOSIS — K21.00 CHRONIC REFLUX ESOPHAGITIS: ICD-10-CM

## 2023-02-17 RX ORDER — OMEPRAZOLE 40 MG/1
CAPSULE, DELAYED RELEASE ORAL
Qty: 30 CAPSULE | Refills: 5 | Status: SHIPPED | OUTPATIENT
Start: 2023-02-17

## 2023-02-17 RX ORDER — ALBUTEROL SULFATE 2.5 MG/3ML
2.5 SOLUTION RESPIRATORY (INHALATION) EVERY 6 HOURS PRN
Qty: 120 EACH | Refills: 3 | Status: SHIPPED | OUTPATIENT
Start: 2023-02-17

## 2023-02-17 RX ORDER — ALBUTEROL SULFATE 90 UG/1
AEROSOL, METERED RESPIRATORY (INHALATION)
Qty: 1 EACH | Refills: 5 | Status: SHIPPED | OUTPATIENT
Start: 2023-02-17

## 2023-02-17 RX ORDER — VALSARTAN AND HYDROCHLOROTHIAZIDE 80; 12.5 MG/1; MG/1
TABLET, FILM COATED ORAL
Qty: 30 TABLET | Refills: 5 | Status: CANCELLED | OUTPATIENT
Start: 2023-02-17

## 2023-02-17 ASSESSMENT — ENCOUNTER SYMPTOMS
COUGH: 1
CHEST TIGHTNESS: 0
WHEEZING: 0
STRIDOR: 0
ABDOMINAL DISTENTION: 0
ANAL BLEEDING: 0
SHORTNESS OF BREATH: 0
GASTROINTESTINAL NEGATIVE: 1
ABDOMINAL PAIN: 0

## 2023-02-17 ASSESSMENT — PATIENT HEALTH QUESTIONNAIRE - PHQ9
SUM OF ALL RESPONSES TO PHQ QUESTIONS 1-9: 0
2. FEELING DOWN, DEPRESSED OR HOPELESS: 0
SUM OF ALL RESPONSES TO PHQ QUESTIONS 1-9: 0
1. LITTLE INTEREST OR PLEASURE IN DOING THINGS: 0
DEPRESSION UNABLE TO ASSESS: PT REFUSES
SUM OF ALL RESPONSES TO PHQ QUESTIONS 1-9: 0
SUM OF ALL RESPONSES TO PHQ9 QUESTIONS 1 & 2: 0
SUM OF ALL RESPONSES TO PHQ QUESTIONS 1-9: 0

## 2023-02-17 NOTE — PROGRESS NOTES
Post-Discharge Transitional Care Follow Up      Sheila Palmer   YOB: 1970    Date of Office Visit:  2/17/2023  Date of Hospital Admission: 2/13/23  Date of Hospital Discharge: 2/13/23  Readmission Risk Score (high >=14%. Medium >=10%):No data recorded    Care management risk score Rising risk (score 2-5) and Complex Care (Scores >=6): No Risk Score On File     Non face to face  following discharge, date last encounter closed (first attempt may have been earlier): *No documented post hospital discharge outreach found in the last 14 days     Call initiated 2 business days of discharge: *No response recorded in the last 14 days     Severe persistent asthma with acute exacerbation  -     mometasone-formoterol (DULERA) 200-5 MCG/ACT inhaler; inhale 2 puffs by mouth and INTO THE LUNGS twice a day, Disp-13 g, R-2Normal  -     albuterol sulfate HFA (PROAIR HFA) 108 (90 Base) MCG/ACT inhaler; inhale 2 puffs by mouth every 6 hours if needed for wheezing, Disp-1 each, R-5Normal  -     albuterol (PROVENTIL) (2.5 MG/3ML) 0.083% nebulizer solution; Take 3 mLs by nebulization every 6 hours as needed for Wheezing, Disp-120 each, R-3Normal  -Stable severe persistent asthma recently exacerbated by bronchitis. We will continue Dulera, as needed albuterol inhaler and nebulizer solution.  -Refills have been provided and patient to follow-up with the pulmonary. Primary hypertension  -     Basic Metabolic Panel; Future  -We will continue the current Coreg 6.25 mg twice daily along with valsartan hydrochlorothiazide. .  We will titrate the dosage of antihypertensives based upon BMP given patient is also on Diamox for idiopathic intracranial hypertension. IIH (idiopathic intracranial hypertension)  -     [facility] - Mercedes Bowman MD, Ophthalmology, [place]  - Educated the patient on retinopathy screening given intracranial hypertension.   Patient sounds understanding  -Indicates she has been following with her own ophthalmology.  Chronic reflux esophagitis  -     omeprazole (PRILOSEC) 40 MG delayed release capsule; take 1 capsule by mouth once daily if needed, Disp-30 capsule, R-5Normal  Hospital discharge follow-up  -     IL DISCHARGE MEDS RECONCILED W/ CURRENT OUTPATIENT MED LIST  -     mometasone-formoterol (DULERA) 200-5 MCG/ACT inhaler; inhale 2 puffs by mouth and INTO THE LUNGS twice a day, Disp-13 g, R-2Normal    Medical Decision Making: moderate complexity  Return in 3 months (on 5/17/2023).    On this date 2/17/2023 I have spent 30 minutes reviewing previous notes, test results and face to face with the patient discussing the diagnosis and importance of compliance with the treatment plan as well as documenting on the day of the visit.       Subjective:   HPI    Inpatient course: Discharge summary reviewed- see chart.    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  She was evaluated in ED in [place] for suspected asthma exacerbation with suspected pneumonia community-acquired.  Patient was treated with doxycycline for 5 days.  She came in today to the office as a post ED follow-up.    Her symptoms of greenish productive cough has subsided since last 5 days.  She continues to have persistent cough which she has been tolerating well.  She has severe persistent asthma has been using her inhalers as prescribed.  Denies any overusage of her inhalers.,  Denies any chest pain, shortness of breath, wheezing, hemoptysis.She indicates she has been recovering well from her recent ED visit.    Her GERD symptoms have been controlled and has been using omeprazole, requesting refills for antihypertensives.  Her blood pressures have been controlled in today office visit at 139/79.  She denies any other complaints  She has underlying history of chronic idiopathic hypertension  was seen by neurology in [facility] currently on Kwaxuma might find it milligrams twice daily, repeat BMP has not been done. She indicates she has been following with her own ophthalmology. Patient Active Problem List   Diagnosis    Hypertension    Asthma    GERD (gastroesophageal reflux disease)    S/P cardiac cath-Minimal disease 11/23/15 Dr. Shonda Berman    Irritable bowel syndrome with constipation    Obesity    Allergic rhinitis    Hiatal hernia    Paresthesias    Left sided numbness    Dysphagia    Family history of colon cancer    DNS (deviated nasal septum)    Hypertrophy of both inferior nasal turbinates    Iron deficiency anemia secondary to inadequate dietary iron intake    Iron deficiency anemia, unspecified    Atypical chest pain    Other headache syndrome       Medications listed as ordered at the time of discharge from hospital     Medication List            Accurate as of February 17, 2023  3:40 PM. If you have any questions, ask your nurse or doctor.                 CONTINUE taking these medications      acetaZOLAMIDE 250 MG tablet  Commonly known as: DIAMOX  Take 2 tablets by mouth 2 times daily     * albuterol sulfate  (90 Base) MCG/ACT inhaler  Commonly known as: ProAir HFA  inhale 2 puffs by mouth every 6 hours if needed for wheezing     * albuterol (2.5 MG/3ML) 0.083% nebulizer solution  Commonly known as: PROVENTIL  Take 3 mLs by nebulization every 6 hours as needed for Wheezing     aspirin 81 MG EC tablet  Commonly known as: RA Aspirin EC  take 1 tablet by mouth once daily     atorvastatin 40 MG tablet  Commonly known as: LIPITOR  1 tablet once daily     benzonatate 100 MG capsule  Commonly known as: Tessalon Perles  Take 1 capsule by mouth 3 times daily as needed for Cough     carvedilol 6.25 MG tablet  Commonly known as: COREG  1 tablet 2/day     doxycycline hyclate 100 MG tablet  Commonly known as: VIBRA-TABS  Take 1 tablet by mouth 2 times daily for 7 days     fenofibrate 48 MG tablet  Commonly known as: Tricor  Take 1 tablet by mouth daily     fluticasone 50 MCG/ACT nasal spray  Commonly known as: FLONASE  instill 2 sprays into each nostril once daily     ibuprofen 800 MG tablet  Commonly known as: ADVIL;MOTRIN  take 1 tablet by mouth every 8 hours AS NEEDED FOR PAIN     lidocaine 5 %  Commonly known as: LIDODERM  apply 1 patch TO THE AFFECTED AREA DAILY. LEAVE ON FOR 12 HOURS AND THEN OFF FOR 12 HOURS.     melatonin 5 MG Tabs tablet  Commonly known as: RA Melatonin  take 1 tablet by mouth nightly     meloxicam 7.5 MG tablet  Commonly known as: Mobic  Take 1 tablet by mouth daily     mometasone-formoterol 200-5 MCG/ACT inhaler  Commonly known as: Dulera  inhale 2 puffs by mouth and INTO THE LUNGS twice a day     omeprazole 40 MG delayed release capsule  Commonly known as: PRILOSEC  take 1 capsule by mouth once daily if needed     ondansetron 4 MG disintegrating tablet  Commonly known as: ZOFRAN-ODT  dissolve 1 tablet ON TONGUE every 12 hours if needed for nausea OR vomiting     senna 8.6 MG Tabs tablet  Commonly known as: SENOKOT  take 1 tablet by mouth once daily     simethicone 80 MG chewable tablet  Commonly known as: MYLICON  CHEW AND SWALLOW 1 tablet by mouth four times a day if needed for FLATULENCE     valsartan-hydroCHLOROthiazide 80-12.5 MG per tablet  Commonly known as: DIOVAN-HCT  Once a day     WOMENS MULTI VITAMIN & MINERAL PO           * This list has 2 medication(s) that are the same as other medications prescribed for you. Read the directions carefully, and ask your doctor or other care provider to review them with you. Where to Get Your Medications        These medications were sent to [facility], [address].  Gurvinder Pattersonkelli [phone] Garrick Shaffer [phone]  [address] [place] [address]., [address] [place] [id number]      Phone: [phone]   albuterol (2.5 MG/3ML) 0.083% nebulizer solution  albuterol sulfate  (90 Base) MCG/ACT inhaler  mometasone-formoterol 200-5 MCG/ACT inhaler  omeprazole 40 MG delayed release capsule          Medications marked \"taking\" at this time  Outpatient Medications Marked as Taking for the 2/17/23 encounter (Office Visit) with Valerio Man MD   Medication Sig Dispense Refill    mometasone-formoterol (Severiano Bang) 200-5 MCG/ACT inhaler inhale 2 puffs by mouth and INTO THE LUNGS twice a day 13 g 2    albuterol sulfate HFA (PROAIR HFA) 108 (90 Base) MCG/ACT inhaler inhale 2 puffs by mouth every 6 hours if needed for wheezing 1 each 5    albuterol (PROVENTIL) (2.5 MG/3ML) 0.083% nebulizer solution Take 3 mLs by nebulization every 6 hours as needed for Wheezing 120 each 3    omeprazole (PRILOSEC) 40 MG delayed release capsule take 1 capsule by mouth once daily if needed 30 capsule 5    simethicone (MYLICON) 80 MG chewable tablet CHEW AND SWALLOW 1 tablet by mouth four times a day if needed for FLATULENCE 180 tablet 3    doxycycline hyclate (VIBRA-TABS) 100 MG tablet Take 1 tablet by mouth 2 times daily for 7 days 14 tablet 0    benzonatate (TESSALON PERLES) 100 MG capsule Take 1 capsule by mouth 3 times daily as needed for Cough 10 capsule 0    fenofibrate (TRICOR) 48 MG tablet Take 1 tablet by mouth daily 30 tablet 0    atorvastatin (LIPITOR) 40 MG tablet 1 tablet once daily 30 tablet 2    acetaZOLAMIDE (DIAMOX) 250 MG tablet Take 2 tablets by mouth 2 times daily 120 tablet 6    meloxicam (MOBIC) 7.5 MG tablet Take 1 tablet by mouth daily 30 tablet 2    ondansetron (ZOFRAN-ODT) 4 MG disintegrating tablet dissolve 1 tablet ON TONGUE every 12 hours if needed for nausea OR vomiting 15 tablet 1    senna (SENOKOT) 8.6 MG TABS tablet take 1 tablet by mouth once daily 90 tablet 1    ibuprofen (ADVIL;MOTRIN) 800 MG tablet take 1 tablet by mouth every 8 hours AS NEEDED FOR PAIN 30 tablet 0    lidocaine (LIDODERM) 5 % apply 1 patch TO THE AFFECTED AREA DAILY. LEAVE ON FOR 12 HOURS AND THEN OFF FOR 12 HOURS.  30 patch 2 aspirin (RA ASPIRIN EC) 81 MG EC tablet take 1 tablet by mouth once daily 30 tablet 5    valsartan-hydroCHLOROthiazide (DIOVAN-HCT) 80-12.5 MG per tablet Once a day 30 tablet 5    carvedilol (COREG) 6.25 MG tablet 1 tablet 2/day 60 tablet 5    fluticasone (FLONASE) 50 MCG/ACT nasal spray instill 2 sprays into each nostril once daily 16 g 3    melatonin (RA MELATONIN) 5 MG TABS tablet take 1 tablet by mouth nightly 30 tablet 3    Multiple Vitamins-Minerals (WOMENS MULTI VITAMIN & MINERAL PO) Take 1 tablet by mouth daily           Medications patient taking as of now reconciled against medications ordered at time of hospital discharge: Yes    Review of Systems   Constitutional:  Negative for activity change, appetite change, chills and fever. Respiratory:  Positive for cough. Negative for chest tightness, shortness of breath, wheezing and stridor. Greenish productive cough decreased in quantity   Cardiovascular: Negative. Negative for chest pain, palpitations and leg swelling. Gastrointestinal: Negative. Negative for abdominal distention, abdominal pain and anal bleeding. Neurological:  Negative for dizziness, weakness, light-headedness and headaches. Objective:    /79   Pulse 62   Temp 98.2 °F (36.8 °C)   Ht 5' (1.524 m)   Wt 204 lb (92.5 kg)   SpO2 96%   BMI 39.84 kg/m²     Physical Exam  Constitutional:       Appearance: Normal appearance. She is obese. Cardiovascular:      Rate and Rhythm: Normal rate and regular rhythm. Pulses: Normal pulses. Heart sounds: Normal heart sounds. Pulmonary:      Effort: Pulmonary effort is normal. No tachypnea, bradypnea, accessory muscle usage or respiratory distress. Breath sounds: Normal breath sounds. No stridor or decreased air movement. No wheezing, rhonchi or rales. Neurological:      Mental Status: She is alert and oriented to person, place, and time. Mental status is at baseline.    Psychiatric:         Mood and Affect: Mood normal.         Behavior: Behavior normal.       An electronic signature was used to authenticate this note. Sangeetha Goetz MD  Internal Medicine Resident, PGY- [address], [place]  2/17/2023, 3:40 PM

## 2023-02-17 NOTE — PROGRESS NOTES
Attending Physician Statement  I have discussed the care of Rita Neri, including pertinent history and exam findings,  with the resident. I have reviewed the key elements of all parts of the encounter with the resident. I agree with the assessment, plan and orders as documented by the resident.   (GE Modifier)

## 2023-02-18 ENCOUNTER — HOSPITAL ENCOUNTER (OUTPATIENT)
Dept: MRI IMAGING | Age: 53
End: 2023-02-18
Payer: COMMERCIAL

## 2023-02-18 DIAGNOSIS — H93.13 BILATERAL TINNITUS: ICD-10-CM

## 2023-02-18 DIAGNOSIS — G93.2 PSEUDOTUMOR CEREBRI: ICD-10-CM

## 2023-02-18 PROCEDURE — 70544 MR ANGIOGRAPHY HEAD W/O DYE: CPT

## 2023-02-18 PROCEDURE — 70551 MRI BRAIN STEM W/O DYE: CPT

## 2023-02-20 ENCOUNTER — HOSPITAL ENCOUNTER (OUTPATIENT)
Age: 53
Discharge: HOME | End: 2023-02-20
Payer: MEDICAID

## 2023-02-20 DIAGNOSIS — I10 PRIMARY HYPERTENSION: ICD-10-CM

## 2023-02-20 LAB
ANION GAP SERPL CALCULATED.3IONS-SCNC: 8 MMOL/L (ref 9–17)
BUN SERPL-MCNC: 11 MG/DL (ref 6–20)
CALCIUM SERPL-MCNC: 9.5 MG/DL (ref 8.6–10.4)
CHLORIDE SERPL-SCNC: 104 MMOL/L (ref 98–107)
CO2 SERPL-SCNC: 24 MMOL/L (ref 20–31)
CREAT SERPL-MCNC: 0.94 MG/DL (ref 0.5–0.9)
GFR SERPL CREATININE-BSD FRML MDRD: >60 ML/MIN/1.73M2
GLUCOSE SERPL-MCNC: 106 MG/DL (ref 70–99)
POTASSIUM SERPL-SCNC: 3.8 MMOL/L (ref 3.7–5.3)
SODIUM SERPL-SCNC: 136 MMOL/L (ref 135–144)

## 2023-02-20 PROCEDURE — 80048 BASIC METABOLIC PNL TOTAL CA: CPT

## 2023-02-22 ENCOUNTER — TELEPHONE (OUTPATIENT)
Dept: NEUROLOGY | Age: 53
End: 2023-02-22

## 2023-02-22 NOTE — TELEPHONE ENCOUNTER
Patient called regarding her results of her MRI, MRV.    MRI Brain:  FINDINGS:   INTRACRANIAL STRUCTURES/VENTRICLES: There is no acute infarct. The   ventricles and cisternal spaces are normal in size, shape, and configuration   for the age of the patient. There are a few punctate areas of high-signal in   the periventricular white matter and centrum semiovale that are likely   related to chronic small vessel ischemic disease. There is no midline shift   or mass effect. There are no areas of restricted diffusion. Incidental note   is made of a partially empty sella. ORBITS: The visualized portion of the orbits demonstrate no acute abnormality. SINUSES:  The visualized paranasal sinuses and mastoid air cells are for the   most part clear. There appears to be herniation of fat through a defect in   the ethmoid wall on the left the orbit. The medial rectus is intact. BONES/SOFT TISSUES: The bone marrow signal intensity appears normal. The soft   tissues demonstrate no acute abnormality. MRV OF THE INTRACRANIAL CIRCULATION: The superior sagittal sinus is patent. The transverse sinuses are patent. The sigmoid sinuses and jugular bulbs are   patent. The straight sinus and internal cerebral veins are patent. There is   no evidence for dural venous sinus thrombosis. Impression   Mild chronic small vessel ischemic changes. Partially empty sella. No acute   brain parenchymal abnormality. No evidence for dural venous sinus thrombosis. RECOMMENDATIONS:      MRV Head    FINDINGS:   INTRACRANIAL STRUCTURES/VENTRICLES: There is no acute infarct. The   ventricles and cisternal spaces are normal in size, shape, and configuration   for the age of the patient. There are a few punctate areas of high-signal in   the periventricular white matter and centrum semiovale that are likely   related to chronic small vessel ischemic disease. There is no midline shift   or mass effect. There are no areas of restricted diffusion. Incidental note   is made of a partially empty sella. ORBITS: The visualized portion of the orbits demonstrate no acute abnormality. SINUSES:  The visualized paranasal sinuses and mastoid air cells are for the   most part clear. There appears to be herniation of fat through a defect in   the ethmoid wall on the left the orbit. The medial rectus is intact. BONES/SOFT TISSUES: The bone marrow signal intensity appears normal. The soft   tissues demonstrate no acute abnormality. MRV OF THE INTRACRANIAL CIRCULATION: The superior sagittal sinus is patent. The transverse sinuses are patent. The sigmoid sinuses and jugular bulbs are   patent. The straight sinus and internal cerebral veins are patent. There is   no evidence for dural venous sinus thrombosis. Impression   Mild chronic small vessel ischemic changes. Partially empty sella. No acute   brain parenchymal abnormality. No evidence for dural venous sinus thrombosis.

## 2023-02-23 NOTE — TELEPHONE ENCOUNTER
Attempt to call patient at listed number [phone] at 9:05 AM 2/23/2023 however she did not answer and went to voicemail. Imaging reviewed and agree is unremarkable and no further change to her current assessment of IIH and plan to continue diamox 500mg BID and [facility] with [facility].  Thank you    Patience Wills MD  PGY-4 Neurology Resident  [place] . [facility], [address]

## 2023-03-05 ENCOUNTER — APPOINTMENT (OUTPATIENT)
Dept: GENERAL RADIOLOGY | Age: 53
End: 2023-03-05
Payer: COMMERCIAL

## 2023-03-05 ENCOUNTER — HOSPITAL ENCOUNTER (EMERGENCY)
Age: 53
Discharge: HOME | End: 2023-03-05
Attending: EMERGENCY MEDICINE
Payer: COMMERCIAL

## 2023-03-05 VITALS
BODY MASS INDEX: 39.27 KG/M2 | HEART RATE: 77 BPM | SYSTOLIC BLOOD PRESSURE: 126 MMHG | RESPIRATION RATE: 18 BRPM | HEIGHT: 60 IN | TEMPERATURE: 97.3 F | OXYGEN SATURATION: 99 % | DIASTOLIC BLOOD PRESSURE: 71 MMHG | WEIGHT: 200 LBS

## 2023-03-05 DIAGNOSIS — U07.1 COVID-19: Primary | ICD-10-CM

## 2023-03-05 LAB
FLUAV AG SPEC QL: NEGATIVE
FLUBV AG SPEC QL: NEGATIVE
SARS-COV-2 RDRP RESP QL NAA+PROBE: DETECTED
SPECIMEN DESCRIPTION: ABNORMAL

## 2023-03-05 PROCEDURE — 71045 X-RAY EXAM CHEST 1 VIEW: CPT

## 2023-03-05 PROCEDURE — 99285 EMERGENCY DEPT VISIT HI MDM: CPT

## 2023-03-05 PROCEDURE — 87804 INFLUENZA ASSAY W/OPTIC: CPT

## 2023-03-05 PROCEDURE — 93005 ELECTROCARDIOGRAM TRACING: CPT | Performed by: EMERGENCY MEDICINE

## 2023-03-05 PROCEDURE — 6370000000 HC RX 637 (ALT 250 FOR IP): Performed by: STUDENT IN AN ORGANIZED HEALTH CARE EDUCATION/TRAINING PROGRAM

## 2023-03-05 PROCEDURE — 87635 SARS-COV-2 COVID-19 AMP PRB: CPT

## 2023-03-05 RX ORDER — ONDANSETRON 4 MG/1
4 TABLET, ORALLY DISINTEGRATING ORAL ONCE
Status: COMPLETED | OUTPATIENT
Start: 2023-03-05 | End: 2023-03-05

## 2023-03-05 RX ORDER — BENZONATATE 100 MG/1
100 CAPSULE ORAL 3 TIMES DAILY PRN
Qty: 30 CAPSULE | Refills: 0 | Status: SHIPPED | OUTPATIENT
Start: 2023-03-05 | End: 2023-03-15

## 2023-03-05 RX ORDER — BENZONATATE 100 MG/1
100 CAPSULE ORAL ONCE
Status: COMPLETED | OUTPATIENT
Start: 2023-03-05 | End: 2023-03-05

## 2023-03-05 RX ADMIN — BENZONATATE 100 MG: 100 CAPSULE ORAL at 20:41

## 2023-03-05 RX ADMIN — ONDANSETRON 4 MG: 4 TABLET, ORALLY DISINTEGRATING ORAL at 19:35

## 2023-03-05 ASSESSMENT — ENCOUNTER SYMPTOMS
SINUS PRESSURE: 0
COLOR CHANGE: 0
SORE THROAT: 0
NAUSEA: 0
EYE REDNESS: 0
COUGH: 1
CONSTIPATION: 0
VOMITING: 0
DIARRHEA: 0
RHINORRHEA: 0
EYE PAIN: 0
PHOTOPHOBIA: 0
CHEST TIGHTNESS: 0
SHORTNESS OF BREATH: 0
ABDOMINAL PAIN: 0

## 2023-03-05 ASSESSMENT — PAIN SCALES - GENERAL: PAINLEVEL_OUTOF10: 8

## 2023-03-05 ASSESSMENT — PAIN - FUNCTIONAL ASSESSMENT
PAIN_FUNCTIONAL_ASSESSMENT: 0-10
PAIN_FUNCTIONAL_ASSESSMENT: NONE - DENIES PAIN

## 2023-03-05 ASSESSMENT — PAIN DESCRIPTION - LOCATION: LOCATION: CHEST;HEAD

## 2023-03-05 ASSESSMENT — PAIN DESCRIPTION - DESCRIPTORS: DESCRIPTORS: SHARP

## 2023-03-05 ASSESSMENT — PAIN DESCRIPTION - FREQUENCY: FREQUENCY: INTERMITTENT

## 2023-03-05 NOTE — ED PROVIDER NOTES
[address] [facility]  ED  Emergency Department Encounter  Emergency Medicine Resident     Pt Name:Beatriz Osuna  MRN: [id number]  Nuhagfangelica 1970  Date of evaluation: 3/5/23  PCP:  Ayla Bhatia MD  Note Started: 6:53 PM EST      CHIEF COMPLAINT       Chief Complaint   Patient presents with    Cough    Nausea    Shortness of Breath    Chest Pain       HISTORY OF PRESENT ILLNESS  (Location/Symptom, Timing/Onset, Context/Setting, Quality, Duration, Modifying Factors, Severity.)      Grace Cotto is a 47 y.o. female who presents with cough,  runy nose, fevers chills and myalgias. Fever at home 100F. Took Tylenol for headache. Started on Friday, productive clear sputum. Vaccinated twice for covid. Complaining of nausea but denying any vomiting, no abdominal pain no diarrhea. States that she does not have any chest pain or shortness of breath. Patient is more concerned about her having COVID with her having COPD and asthma. She is interested in antiviral that her  got while admitted here in the ER. PAST MEDICAL / SURGICAL / SOCIAL / FAMILY HISTORY      has a past medical history of Allergic rhinitis, Asthma, Clinical trial participant, COPD (chronic obstructive pulmonary disease) ([facility] [address] Dysphagia, Dysphagia, Family history of colon cancer, GERD (gastroesophageal reflux disease), H/O cardiovascular stress test, Hiatal hernia, Hyperlipidemia, Hypertension, Iron deficiency anemia secondary to inadequate dietary iron intake, Iron deficiency anemia, unspecified, Irritable bowel syndrome with constipation, Obesity, Snores, and Wears glasses. has a past surgical history that includes Tubal ligation (2003); Colonoscopy (2015); Upper gastrointestinal endoscopy; Upper gastrointestinal endoscopy (N/A, 7/18/2017); Cardiac catheterization (01/16/2018); Cardiac catheterization (11/23/2015); Endoscopy, colon, diagnostic (2015);  Endoscopy, colon, diagnostic (02/08/2018); pr colon ca scrn not hi rsk ind (N/A, 2/8/2018); Colonoscopy (02/08/2018); Nose surgery; and Nasal septoplasty w/ turbinoplasty (2020). Social History     Socioeconomic History    Marital status:      Spouse name: Not on file    Number of children: Not on file    Years of education: Not on file    Highest education level: Not on file   Occupational History    Not on file   Tobacco Use    Smoking status: Never    Smokeless tobacco: Never   Vaping Use    Vaping Use: Never used   Substance and Sexual Activity    Alcohol use: Yes     Alcohol/week: 4.0 standard drinks     Types: 4 Cans of beer per week     Comment: occasionally    Drug use: No    Sexual activity: Not Currently     Partners: Male     Comment: tubal ligation    Other Topics Concern    Not on file   Social History Narrative    Not on file     Social Determinants of Health     Financial Resource Strain: Low Risk     Difficulty of Paying Living Expenses: Not hard at all   Food Insecurity: No Food Insecurity    Worried About Running Out of Food in the Last Year: Never true    Ran Out of Food in the Last Year: Never true   Transportation Needs: Not on file   Physical Activity: Not on file   Stress: Not on file   Social Connections: Not on file   Intimate Partner Violence: Not on file   Housing Stability: Not on file       Family History   Problem Relation Age of Onset    Hypertension Mother     High Cholesterol Mother     Asthma Mother     Osteoarthritis Mother     Other Mother         overweight    Colon Cancer Father        Allergies:  Patient has no known allergies. Home Medications:  Prior to Admission medications    Medication Sig Start Date End Date Taking? Authorizing Provider   nirmatrelvir/ritonavir 300/100 (PAXLOVID) 20 x 150 MG & 10 x 100MG TBPK Take 3 tablets (two 150 mg nirmatrelvir and one 100 mg ritonavir tablets) by mouth every 12 hours for 5 days.  3/5/23 3/10/23 Yes Garland Pina MD   benzonatate (TESSALON) 100 MG capsule Take 1 capsule by mouth 3 times daily as needed for Cough 3/5/23 3/15/23 Yes Robbie Madera MD   mometasone-formoterol [facility]) 200-5 MCG/ACT inhaler inhale 2 puffs by mouth and INTO THE LUNGS twice a day 2/17/23   Vassie Holstein, MD   albuterol sulfate HFA (PROAIR HFA) 108 (90 Base) MCG/ACT inhaler inhale 2 puffs by mouth every 6 hours if needed for wheezing 2/17/23   Vassie Holstein, MD   albuterol (PROVENTIL) (2.5 MG/3ML) 0.083% nebulizer solution Take 3 mLs by nebulization every 6 hours as needed for Wheezing 2/17/23   Vassie Holstein, MD   omeprazole (PRILOSEC) 40 MG delayed release capsule take 1 capsule by mouth once daily if needed 2/17/23   Vassie Holstein, MD   simethicone (MYLICON) 80 MG chewable tablet CHEW AND SWALLOW 1 tablet by mouth four times a day if needed for FLATULENCE 2/14/23   Vassie Holstein, MD   fenofibrate (TRICOR) 48 MG tablet Take 1 tablet by mouth daily 2/7/23   Sukumar Jimenes MD   atorvastatin (LIPITOR) 40 MG tablet 1 tablet once daily 2/7/23   Sukumar Jimenes MD   acetaZOLAMIDE (DIAMOX) 250 MG tablet Take 2 tablets by mouth 2 times daily 2/1/23 3/3/23  Yeison Colin MD   meloxicam (MOBIC) 7.5 MG tablet Take 1 tablet by mouth daily 1/31/23   Daquan Su MD   ondansetron (ZOFRAN-ODT) 4 MG disintegrating tablet dissolve 1 tablet ON TONGUE every 12 hours if needed for nausea OR vomiting 1/26/23   Vassie Holstein, MD   senna (SENOKOT) 8.6 MG TABS tablet take 1 tablet by mouth once daily 11/21/22   Vassie Holstein, MD   ibuprofen (ADVIL;MOTRIN) 800 MG tablet take 1 tablet by mouth every 8 hours AS NEEDED FOR PAIN 10/27/22   Vassie Holstein, MD   lidocaine (LIDODERM) 5 % apply 1 patch TO THE AFFECTED AREA DAILY.  LEAVE ON FOR 12 HOURS AND THEN OFF FOR 12 HOURS. 9/28/22   Thelma Reid MD   aspirin (RA ASPIRIN EC) 81 MG EC tablet take 1 tablet by mouth once daily 8/30/22   Vassie Holstein, MD   valsartan-hydroCHLOROthiazide (DIOVAN-HCT) 80-12.5 MG per tablet Once a day 7/12/22   Vassie Holstein, MD   carvedilol (COREG) 6.25 MG tablet 1 tablet 2/day 7/12/22   Jesus Perales MD   fluticasone Johnna Crome) 50 MCG/ACT nasal spray instill 2 sprays into each nostril once daily 11/11/21   Jesus Perales MD   melatonin (RA MELATONIN) 5 MG TABS tablet take 1 tablet by mouth nightly 7/12/21   Jesus Perales MD   Multiple Vitamins-Minerals (WOMENS MULTI VITAMIN & MINERAL PO) Take 1 tablet by mouth daily     Historical Provider, MD         REVIEW OF SYSTEMS       Review of Systems   Constitutional:  Positive for chills and fatigue. Negative for activity change, diaphoresis and fever. HENT:  Negative for congestion, rhinorrhea, sinus pressure and sore throat. Eyes:  Negative for photophobia, pain and redness. Respiratory:  Positive for cough. Negative for chest tightness and shortness of breath. Cardiovascular:  Negative for chest pain and leg swelling. Gastrointestinal:  Negative for abdominal pain, constipation, diarrhea, nausea and vomiting. Genitourinary:  Negative for dysuria, flank pain, frequency and urgency. Musculoskeletal:  Negative for arthralgias, myalgias and neck stiffness. Skin:  Negative for color change, pallor and rash. Neurological:  Positive for headaches. Negative for dizziness, syncope, weakness, light-headedness and numbness. PHYSICAL EXAM      INITIAL VITALS:   /71   Pulse 77   Temp 97.3 °F (36.3 °C) (Oral)   Resp 18   Ht 5' (1.524 m)   Wt 200 lb (90.7 kg)   SpO2 99%   BMI 39.06 kg/m²     Physical Exam  Constitutional:       Appearance: She is obese. She is not ill-appearing. HENT:      Mouth/Throat:      Mouth: Mucous membranes are moist.      Pharynx: Oropharynx is clear. No oropharyngeal exudate. Eyes:      Extraocular Movements: Extraocular movements intact. Pupils: Pupils are equal, round, and reactive to light. Cardiovascular:      Rate and Rhythm: Normal rate and regular rhythm.    Pulmonary:      Effort: Pulmonary effort is normal.      Breath sounds: Normal breath sounds. Abdominal:      General: Bowel sounds are normal.      Palpations: Abdomen is soft. Musculoskeletal:      Cervical back: Normal range of motion and neck supple. Right lower leg: No edema. Left lower leg: No edema. Skin:     General: Skin is warm and dry. Neurological:      General: No focal deficit present. Mental Status: She is alert and oriented to person, place, and time. DDX/DIAGNOSTIC RESULTS / EMERGENCY DEPARTMENT COURSE / MDM     Medical Decision Making  26-year-old female complaining of viral URI symptoms with positive sick contacts for COVID at home. Rhinorrhea, productive cough of clear sputum, fevers, headaches nausea. No concern for ACS does not have any chest pain or shortness of breath. She has no new oxygen requirement. Lungs are clear bilaterally. Concerns for COVID or flu, CXR,  Will test for both, give Zofran sublingual yvonne with pharmacy for antivirals    Amount and/or Complexity of Data Reviewed  Labs: ordered. Decision-making details documented in ED Course. Radiology: ordered. Risk  Prescription drug management. EMERGENCY DEPARTMENT COURSE:      ED Course as of 03/05/23 2154   Esthela Mireles Mar 05, 2023   2007 SARS-CoV-2, Rapid(!): DETECTED [QC]   2154 Discharge patient home with a prescription for Tessalon and Paxlovid. Turn precautions provided. Patient verbalized understanding. Vital signs were all within normal limits during her stay. She is observed to ambulate without any gait abnormalities. [QC]      ED Course User Index  [QC] Renate Mayberry MD       PROCEDURES:  None    CONSULTS:  None    CRITICAL CARE:  There was significant risk of life threatening deterioration of patient's condition requiring my direct management. Critical care time 0 minutes, excluding any documented procedures.     FINAL IMPRESSION      1. COVID-19          DISPOSITION / PLAN     DISPOSITION Decision To Discharge 03/05/2023 08:07:54 PM      PATIENT REFERRED TO:  MD Gabbi Guzman [address] [facility] [address]  [phone]    Schedule an appointment as soon as possible for a visit in 1 week      [facility] ED  [address] [facility] [address]  [phone]    If symptoms worsen    DISCHARGE MEDICATIONS:  Discharge Medication List as of 3/5/2023  8:10 PM        START taking these medications    Details   nirmatrelvir/ritonavir 300/100 (PAXLOVID) 20 x 150 MG & 10 x 100MG TBPK Take 3 tablets (two 150 mg nirmatrelvir and one 100 mg ritonavir tablets) by mouth every 12 hours for 5 days. , Disp-30 tablet, R-0Print             Viola Barros MD  Emergency Medicine Resident    (Please note that portions of thisnote were completed with a voice recognition program.  Efforts were made to edit the dictations but occasionally words are mis-transcribed.)        Viola Barros MD  Resident  03/05/23 5520

## 2023-03-05 NOTE — ED NOTES
Patient ambulatory to and from restroom with steady gait     Diane LivingstonJefferson Lansdale Hospital  03/05/23 9820

## 2023-03-05 NOTE — ED NOTES
Patient with hx asthma and COPD presents to ED for evaluation of fevers, chills, cough, shortness of breath, headache, body aches worsening since Friday. Patient's  recently tested positive for [address]. Patient reports increased inhaler use. Patient is alert and oriented x4, answering questions appropriately. Respirations even and unlabored. Patient speaking in full, long sentences with no distress. Patient changed into gown. EKG completed while patient still in triage. Call light within reach. Will continue to monitor.      Maria Isabel Guajardo RN  03/05/23 1685

## 2023-03-05 NOTE — ED TRIAGE NOTES
Patient presented to the ED today with complaints of cough, chest pain, nausea and chills. Patient states that  tested positive for COVID last night. Patient states symptoms presented for her yesterday.

## 2023-03-06 DIAGNOSIS — M25.552 LEFT HIP PAIN: ICD-10-CM

## 2023-03-06 DIAGNOSIS — E78.1 HYPERTRIGLYCERIDEMIA: ICD-10-CM

## 2023-03-06 LAB
EKG ATRIAL RATE: 63 BPM
EKG P AXIS: 64 DEGREES
EKG P-R INTERVAL: 162 MS
EKG Q-T INTERVAL: 396 MS
EKG QRS DURATION: 74 MS
EKG QTC CALCULATION (BAZETT): 405 MS
EKG R AXIS: 33 DEGREES
EKG T AXIS: 48 DEGREES
EKG VENTRICULAR RATE: 63 BPM

## 2023-03-06 RX ORDER — FENOFIBRATE 48 MG/1
TABLET, COATED ORAL
Qty: 30 TABLET | Refills: 5 | Status: SHIPPED | OUTPATIENT
Start: 2023-03-06

## 2023-03-06 NOTE — DISCHARGE INSTRUCTIONS
You tested positive for COVID-19. You are prescribed Paxlovid which she can  at your local pharmacy. Return to the ER if you develop shortness of breath, nausea/vomiting, chest pain, become pale or sweaty, dizziness. Isolate yourself from others for the next 10 days as this is a highly contagious virus.

## 2023-03-06 NOTE — TELEPHONE ENCOUNTER
Request for ibuprofen.   Next hunter on 5/9/23    Next Visit Date:  Future Appointments   Date Time Provider Fortino Jean Baptiste   4/10/2023  9:30 AM Chris Grijalva MD Resp Spec [facility]   5/4/2023  1:30 PM Yeison Colin MD Neuro [facility] Neurology -   5/9/2023 10:20 AM Vassie Holstein, MD [facility] IM [address] Maintenance   Topic Date Due    Shingles vaccine (2 of 2) 09/03/2021    COVID-19 Vaccine (3 - Booster for Pfizer series) 11/02/2021    Colorectal Cancer Screen  02/08/2023    Breast cancer screen  08/24/2023    Lipids  02/01/2024    Depression Screen  02/17/2024    Diabetes screen  10/25/2025    DTaP/Tdap/Td vaccine (2 - Td or Tdap) 02/05/2026    Cervical cancer screen  09/07/2026    Flu vaccine  Completed    Pneumococcal 0-64 years Vaccine  Completed    Hepatitis C screen  Completed    HIV screen  Completed    Hepatitis A vaccine  Aged Out    Hib vaccine  Aged Out    Meningococcal (ACWY) vaccine  Aged Out       Hemoglobin A1C (%)   Date Value   10/25/2022 5.6   07/09/2021 5.6   10/06/2020 5.7             ( goal A1C is < 7)   No results found for: LABMICR  LDL Cholesterol (mg/dL)   Date Value   02/01/2023            (goal LDL is <100)   AST (U/L)   Date Value   07/09/2021 20     ALT (U/L)   Date Value   07/09/2021 21     BUN (mg/dL)   Date Value   02/20/2023 11     BP Readings from Last 3 Encounters:   03/05/23 126/71   02/17/23 139/79   02/13/23 (!) 143/95          (goal 120/80)    All Future Testing planned in [facility]  Lab Frequency Next Occurrence   Hepatic Function Panel Once 02/07/2023         Patient Active Problem List:     Hypertension     Asthma     GERD (gastroesophageal reflux disease)     S/P cardiac cath-Minimal disease 11/23/15 Dr. Jeanne Medina     Irritable bowel syndrome with constipation     Obesity     Allergic rhinitis     Hiatal hernia     Paresthesias     Left sided numbness     Dysphagia     Family history of colon cancer     DNS (deviated nasal septum)     Hypertrophy of both inferior nasal turbinates     Iron deficiency anemia secondary to inadequate dietary iron intake     Iron deficiency anemia, unspecified     Atypical chest pain     Other headache syndrome

## 2023-03-06 NOTE — TELEPHONE ENCOUNTER
Last visit: 2/17/23  Last Med refill:   Does patient have enough medication for 72 hours: No:     Next Visit Date:  Future Appointments   Date Time Provider Fortino Englishi   4/10/2023  9:30 AM Armand Bryant MD Resp Spec [facility]   5/4/2023  1:30 PM Yue Sherman MD Neuro [facility] Neurology -   5/9/2023 10:20 AM Sundar Bailey MD [facility] IM [address] Maintenance   Topic Date Due    Shingles vaccine (2 of 2) 09/03/2021    COVID-19 Vaccine (3 - Booster for Pfizer series) 11/02/2021    Colorectal Cancer Screen  02/08/2023    Breast cancer screen  08/24/2023    Lipids  02/01/2024    Depression Screen  02/17/2024    Diabetes screen  10/25/2025    DTaP/Tdap/Td vaccine (2 - Td or Tdap) 02/05/2026    Cervical cancer screen  09/07/2026    Flu vaccine  Completed    Pneumococcal 0-64 years Vaccine  Completed    Hepatitis C screen  Completed    HIV screen  Completed    Hepatitis A vaccine  Aged Out    Hib vaccine  Aged Out    Meningococcal (ACWY) vaccine  Aged Out       Hemoglobin A1C (%)   Date Value   10/25/2022 5.6   07/09/2021 5.6   10/06/2020 5.7             ( goal A1C is < 7)   No results found for: LABMICR  LDL Cholesterol (mg/dL)   Date Value   02/01/2023 11/17/2021 99       (goal LDL is <100)   AST (U/L)   Date Value   07/09/2021 20     ALT (U/L)   Date Value   07/09/2021 21     BUN (mg/dL)   Date Value   02/20/2023 11     BP Readings from Last 3 Encounters:   03/05/23 126/71   02/17/23 139/79   02/13/23 (!) 143/95          (goal 120/80)    All Future Testing planned in [facility]  Lab Frequency Next Occurrence   Hepatic Function Panel Once 02/07/2023               Patient Active Problem List:     Hypertension     Asthma     GERD (gastroesophageal reflux disease)     S/P cardiac cath-Minimal disease 11/23/15 Dr. Charlie Thorpe     Irritable bowel syndrome with constipation     Obesity     Allergic rhinitis     Hiatal hernia     Paresthesias     Left sided numbness     Dysphagia     Family history of colon cancer     DNS (deviated nasal septum)     Hypertrophy of both inferior nasal turbinates     Iron deficiency anemia secondary to inadequate dietary iron intake     Iron deficiency anemia, unspecified     Atypical chest pain     Other headache syndrome

## 2023-03-06 NOTE — ED PROVIDER NOTES
[address] ED     Emergency Department     Faculty Attestation        I performed a history and physical examination of the patient and discussed management with the resident. I reviewed the residents note and agree with the documented findings and plan of care. Any areas of disagreement are noted on the chart. I was personally present for the key portions of any procedures. I have documented in the chart those procedures where I was not present during the key portions. I have reviewed the emergency nurses triage note. I agree with the chief complaint, past medical history, past surgical history, allergies, medications, social and family history as documented unless otherwise noted below. For Physician Assistant/ Nurse Practitioner cases/documentation I have personally evaluated this patient and have completed at least one if not all key elements of the E/M (history, physical exam, and MDM). Additional findings are as noted. Vital Signs: BP: 126/71  Heart Rate: 77  Resp: 18  Temp: 97.3 °F (36.3 °C) SpO2: 99 %  PCP:  Beni Sullivan MD    Pertinent Comments:             EKG Interpretation    Interpreted by emergency department physician    Rhythm: normal sinus   Rate: normal at 63 bpm  Axis: normal  Conduction: normal  ST Segments: no acute change  T Waves: no acute change  Q Waves: no acute change    Clinical Impression:  nonspecific EKG. Critical Care  None      (Please note that portions of this note were completed with a voice recognition program. Efforts were made to edit the dictations but occasionally words are mis-transcribed.  Whenever words are used in this note in any gender, they shall be construed as though they were used in the gender appropriate to the circumstances; and whenever words are used in this note in the singular or plural form, they shall be construed as though they were used in the form appropriate to the circumstances.)    MD Geraldine Ford  Attending Emergency Medicine Physician           Maurilio Umana MD  03/05/23 Daniel Parada

## 2023-03-06 NOTE — ED NOTES
Report given to Liz Farah RN. All questions answered at this time.      Deshawn Robledo RN  03/05/23 0873

## 2023-03-07 RX ORDER — IBUPROFEN 800 MG/1
TABLET ORAL
Qty: 30 TABLET | Refills: 0 | Status: SHIPPED | OUTPATIENT
Start: 2023-03-07

## 2023-03-16 ENCOUNTER — TELEPHONE (OUTPATIENT)
Dept: NEUROLOGY | Age: 53
End: 2023-03-16

## 2023-03-16 DIAGNOSIS — G93.2 IIH (IDIOPATHIC INTRACRANIAL HYPERTENSION): Primary | ICD-10-CM

## 2023-03-16 NOTE — TELEPHONE ENCOUNTER
Patient called in with complaints of bells ringing in her ear, pressure right side and back of head, no pain but sx's present for 2 weeks. Patient stated that she is taking her Diamox.

## 2023-03-21 RX ORDER — TOPIRAMATE 25 MG/1
TABLET ORAL
Qty: 42 TABLET | Refills: 0 | Status: SHIPPED | OUTPATIENT
Start: 2023-03-21 | End: 2023-04-18

## 2023-03-21 NOTE — TELEPHONE ENCOUNTER
Called patient 3/21/2023 around 9:15AM and discussed her care. Patient does continue to have mild side effects with the diamox including ringing in both ears. Discussed that this is most likely related to the medication and can decrease her diamox to 250mg BID and continue to monitor. She does continue to also have headaches related with her IIH and given she is not tolerating diamox well we will also add topamax to augment her therapy with 25mg nightly for 2 weeks then increase to 50mg nightly after that. Patient has follow up with me in may and will continue to follow her from there. She denied any further questions and concerns and was agreeable to this plan. I did also review her MRI brain and MRV head WO results no concerns for venous sinus thrombus and stable partially empty sella and microvascular disease.      Andrew Craven MD  PGY-4 Neurology Resident  [address] [facility], [address]

## 2023-03-23 ENCOUNTER — TELEPHONE (OUTPATIENT)
Dept: INTERNAL MEDICINE | Age: 53
End: 2023-03-23

## 2023-04-18 RX ORDER — LIDOCAINE 50 MG/G
PATCH TOPICAL
Qty: 30 PATCH | Refills: 2 | OUTPATIENT
Start: 2023-04-18

## 2023-04-18 RX ORDER — ONDANSETRON 4 MG/1
TABLET, ORALLY DISINTEGRATING ORAL
Qty: 15 TABLET | Refills: 1 | OUTPATIENT
Start: 2023-04-18

## 2023-04-21 DIAGNOSIS — I10 ESSENTIAL HYPERTENSION: ICD-10-CM

## 2023-04-22 RX ORDER — CARVEDILOL 6.25 MG/1
TABLET ORAL
Qty: 60 TABLET | Refills: 1 | Status: SHIPPED | OUTPATIENT
Start: 2023-04-22 | End: 2023-05-09 | Stop reason: SDUPTHER

## 2023-04-24 NOTE — TELEPHONE ENCOUNTER
Medication refill for Lidocane    Last visit: 2/17/23  Last Med refill: 9/28/22  Does patient have enough medication for 72 hours: No:     Next Visit Date:  Future Appointments   Date Time Provider Fortino Jean Baptiste   5/1/2023  9:30 AM Domingo Lara DO [facility] OB/Gyn [facility]   5/4/2023  1:30 PM Jay Patricio MD Neuro [facility] Neurology -   5/9/2023 10:20 [facility]   6/30/2023  3:45 PM Zayda Martinez  [address] Maintenance   Topic Date Due    Shingles vaccine (2 of 2) 09/03/2021    COVID-19 Vaccine (3 - Booster for Pfizer series) 11/02/2021    Colorectal Cancer Screen  02/08/2023    Breast cancer screen  08/24/2023    Lipids  02/01/2024    Depression Screen  02/17/2024    Diabetes screen  10/25/2025    DTaP/Tdap/Td vaccine (2 - Td or Tdap) 02/05/2026    Cervical cancer screen  09/07/2026    Flu vaccine  Completed    Pneumococcal 0-64 years Vaccine  Completed    Hepatitis C screen  Completed    HIV screen  Completed    Hepatitis A vaccine  Aged Out    Hib vaccine  Aged Out    Meningococcal (ACWY) vaccine  Aged Out    A1C test (Diabetic or Prediabetic)  Discontinued       Hemoglobin A1C (%)   Date Value   10/25/2022 5.6   07/09/2021 5.6   10/06/2020 5.7             ( goal A1C is < 7)   No results found for: LABMICR  LDL Cholesterol (mg/dL)   Date Value   02/01/2023 11/17/2021 99       (goal LDL is <100)   AST (U/L)   Date Value   07/09/2021 20     ALT (U/L)   Date Value   07/09/2021 21     BUN (mg/dL)   Date Value   02/20/2023 11     BP Readings from Last 3 Encounters:   03/05/23 126/71   02/17/23 139/79   02/13/23 (!) 143/95          (goal 120/80)    All Future Testing planned in [facility]  Lab Frequency Next Occurrence   Hepatic Function Panel Once 02/07/2023               Patient Active Problem List:     Hypertension     Asthma     GERD (gastroesophageal reflux disease)     S/P cardiac cath-Minimal disease 11/23/15 Dr. Marlys Valentin     Irritable bowel syndrome with

## 2023-04-26 RX ORDER — LIDOCAINE 50 MG/G
PATCH TOPICAL
Qty: 30 PATCH | Refills: 2 | Status: SHIPPED | OUTPATIENT
Start: 2023-04-26

## 2023-05-01 ENCOUNTER — OFFICE VISIT (OUTPATIENT)
Dept: OBGYN | Age: 53
End: 2023-05-01
Payer: MEDICAID

## 2023-05-01 ENCOUNTER — HOSPITAL ENCOUNTER (OUTPATIENT)
Age: 53
Setting detail: SPECIMEN
Discharge: HOME | End: 2023-05-01

## 2023-05-01 VITALS
SYSTOLIC BLOOD PRESSURE: 133 MMHG | HEART RATE: 67 BPM | WEIGHT: 203 LBS | DIASTOLIC BLOOD PRESSURE: 67 MMHG | HEIGHT: 60 IN | BODY MASS INDEX: 39.85 KG/M2

## 2023-05-01 DIAGNOSIS — Z12.31 OTHER SCREENING MAMMOGRAM: ICD-10-CM

## 2023-05-01 DIAGNOSIS — N95.1 PERIMENOPAUSAL: ICD-10-CM

## 2023-05-01 DIAGNOSIS — N93.9 ABNORMAL UTERINE BLEEDING (AUB): ICD-10-CM

## 2023-05-01 DIAGNOSIS — N93.9 ABNORMAL UTERINE BLEEDING (AUB): Primary | ICD-10-CM

## 2023-05-01 DIAGNOSIS — D25.9 UTERINE LEIOMYOMA, UNSPECIFIED LOCATION: ICD-10-CM

## 2023-05-01 LAB
FOLLICLE STIMULATING HORMONE: 26.1 MIU/ML (ref 1.7–21.5)
TSH SERPL-ACNC: 3.22 UIU/ML (ref 0.3–5)

## 2023-05-01 PROCEDURE — 3078F DIAST BP <80 MM HG: CPT | Performed by: STUDENT IN AN ORGANIZED HEALTH CARE EDUCATION/TRAINING PROGRAM

## 2023-05-01 PROCEDURE — 36415 COLL VENOUS BLD VENIPUNCTURE: CPT | Performed by: STUDENT IN AN ORGANIZED HEALTH CARE EDUCATION/TRAINING PROGRAM

## 2023-05-01 PROCEDURE — 3075F SYST BP GE 130 - 139MM HG: CPT | Performed by: STUDENT IN AN ORGANIZED HEALTH CARE EDUCATION/TRAINING PROGRAM

## 2023-05-01 PROCEDURE — 99213 OFFICE O/P EST LOW 20 MIN: CPT | Performed by: STUDENT IN AN ORGANIZED HEALTH CARE EDUCATION/TRAINING PROGRAM

## 2023-05-01 RX ORDER — ONDANSETRON 4 MG/1
TABLET, ORALLY DISINTEGRATING ORAL
Qty: 15 TABLET | Refills: 1 | OUTPATIENT
Start: 2023-05-01

## 2023-05-01 NOTE — PROGRESS NOTES
OB/GYN Problem Visit    Dipika Espana                         Primary Care Physician: Eliseo Lizarraga MD    CC:   Chief Complaint   Patient presents with    Menorrhagia     Heavy menstrual bleeding and blood clots         HPI: Dipika Espana is a 47 y.o. female [id number] Patient's last menstrual period was 2023. The patient was seen and examined. She is here for abnormal bleeding. She notes this is a longstanding issue. Patient attributed for up to 10 days. Patient to go through an overnight or depends every 6 times a day. Patient has quarter size clots. Patient notes she gets fatigued irritable and has trouble with work and social activities when she is on her cycle because of going to the bathroom so often. REVIEW OF SYSTEMS:  Constitutional: negative fever, negative chills.  +fatigue  HEENT: negative visual disturbances, negative headaches  Respiratory: negative dyspnea, negative cough  Cardiovascular: negative chest pain,  negative palpitations  Gastrointestinal: negative abdominal pain, negative RUQ pain, negative N/V, negative diarrhea, negative constipation  Genitourinary: negative dysuria, negative vaginal discharge  Dermatological: negative rash  Hematologic: negative bruising  Immunologic/Lymphatic: negative recent illness, negative recent sick contact  Musculoskeletal: negative back pain, negative myalgias, negative arthralgias  Neurological:  negative dizziness, negative weakness  Behavior/Psych: negative depression, negative anxiety    OBSTETRICAL HISTORY:  OB History    Para Term  AB Living   3 2 2   1 2   SAB IAB Ectopic Molar Multiple Live Births   1         2      # Outcome Date GA Lbr Carter/2nd Weight Sex Delivery Anes PTL Lv   3 Term 02 40w0d  7 lb 2 oz (3.232 kg) M Vag-Spont EPI N FRITZ   2 Term 95 40w0d  7 lb 5 oz (3.317 kg) M Vag-Spont EPI  FRITZ   1 SAB 1992 12w0d       ND       PAST MEDICAL HISTORY:      Diagnosis Date    Allergic rhinitis

## 2023-05-03 NOTE — TELEPHONE ENCOUNTER
Last visit: 2/17/23  Last Med refill:   Does patient have enough medication for 72 hours: No:     Next Visit Date:  Future Appointments   Date Time Provider Fortino Ita   5/9/2023 10:20 AM Marine Zaragoza MD [address] IM TOLPP   6/7/2023  7:00 AM STA MAMMO RM 1 STAZ MAMMO STA Radiolog   6/7/2023  7:30 AM STA ULTRASOUND RM 3 STAZ US STA Radiolog   6/30/2023  3:45 PM Merline Mercy, MD Resp Spec [facility]   7/20/2023  1:00 PM Eduardo Mota MD Neuro [facility] Neurology -       Health Maintenance   Topic Date Due    Shingles vaccine (2 of 2) 09/03/2021    COVID-19 Vaccine (3 - Booster for Pfizer series) 11/02/2021    Colorectal Cancer Screen  02/08/2023    Breast cancer screen  08/24/2023    Lipids  02/01/2024    Depression Screen  02/17/2024    Diabetes screen  10/25/2025    DTaP/Tdap/Td vaccine (2 - Td or Tdap) 02/05/2026    Cervical cancer screen  09/07/2026    Flu vaccine  Completed    Pneumococcal 0-64 years Vaccine  Completed    Hepatitis C screen  Completed    HIV screen  Completed    Hepatitis A vaccine  Aged Out    Hib vaccine  Aged Out    Meningococcal (ACWY) vaccine  Aged Out    A1C test (Diabetic or Prediabetic)  Discontinued       Hemoglobin A1C (%)   Date Value   10/25/2022 5.6   07/09/2021 5.6   10/06/2020 5.7             ( goal A1C is < 7)   No results found for: LABMICR  LDL Cholesterol (mg/dL)   Date Value   02/01/2023 11/17/2021 99       (goal LDL is <100)   AST (U/L)   Date Value   07/09/2021 20     ALT (U/L)   Date Value   07/09/2021 21     BUN (mg/dL)   Date Value   02/20/2023 11     BP Readings from Last 3 Encounters:   05/01/23 133/67   03/05/23 126/71   02/17/23 139/79          (goal 120/80)    All Future Testing planned in [facility]  Lab Frequency Next Occurrence   Hepatic Function Panel Once 02/07/2023   US NON OB TRANSVAGINAL Once 05/01/2023   US PELVIS COMPLETE Once 05/01/2023   TRAVIS DIGITAL SCREEN W OR WO CAD BILATERAL Once 11/01/2023               Patient Active Problem List:

## 2023-05-05 RX ORDER — ONDANSETRON 4 MG/1
TABLET, ORALLY DISINTEGRATING ORAL
Qty: 15 TABLET | Refills: 1 | Status: SHIPPED | OUTPATIENT
Start: 2023-05-05

## 2023-05-07 DIAGNOSIS — E78.2 MIXED HYPERLIPIDEMIA: ICD-10-CM

## 2023-05-08 DIAGNOSIS — Z09 HOSPITAL DISCHARGE FOLLOW-UP: ICD-10-CM

## 2023-05-08 DIAGNOSIS — J45.51 SEVERE PERSISTENT ASTHMA WITH ACUTE EXACERBATION: Chronic | ICD-10-CM

## 2023-05-08 NOTE — TELEPHONE ENCOUNTER
Medication refill for [facility]    Last visit: 2/17/23  Last Med refill: 2/17/23  Does patient have enough medication for 72 hours: No:     Next Visit Date:  Future Appointments   Date Time Provider Fortino Jean Baptiste   5/9/2023 10:20 AM Maritza Sommer MD [address]  [facility]   6/7/2023  7:00 AM STA MAMMO RM 1 STAZ MAMMO STA Radiolog   6/7/2023  7:30 AM STA ULTRASOUND RM 3 STAZ US STA Radiolog   6/30/2023  3:45 PM Olaf Padilla MD Resp Spec [facility]   7/20/2023  1:00 PM Rigo Aldana MD Neuro [facility] Neurology -       Health Maintenance   Topic Date Due    Shingles vaccine (2 of 2) 09/03/2021    COVID-19 Vaccine (3 - Booster for Pfizer series) 11/02/2021    Colorectal Cancer Screen  02/08/2023    Breast cancer screen  08/24/2023    Lipids  02/01/2024    Depression Screen  02/17/2024    Diabetes screen  10/25/2025    DTaP/Tdap/Td vaccine (2 - Td or Tdap) 02/05/2026    Cervical cancer screen  09/07/2026    Flu vaccine  Completed    Pneumococcal 0-64 years Vaccine  Completed    Hepatitis C screen  Completed    HIV screen  Completed    Hepatitis A vaccine  Aged Out    Hib vaccine  Aged Out    Meningococcal (ACWY) vaccine  Aged Out    A1C test (Diabetic or Prediabetic)  Discontinued       Hemoglobin A1C (%)   Date Value   10/25/2022 5.6   07/09/2021 5.6   10/06/2020 5.7             ( goal A1C is < 7)   No results found for: LABMICR  LDL Cholesterol (mg/dL)   Date Value   02/01/2023 11/17/2021 99       (goal LDL is <100)   AST (U/L)   Date Value   07/09/2021 20     ALT (U/L)   Date Value   07/09/2021 21     BUN (mg/dL)   Date Value   02/20/2023 11     BP Readings from Last 3 Encounters:   05/01/23 133/67   03/05/23 126/71   02/17/23 139/79          (goal 120/80)    All Future Testing planned in [facility]  Lab Frequency Next Occurrence   Hepatic Function Panel Once 02/07/2023   US NON OB TRANSVAGINAL Once 05/01/2023   US PELVIS COMPLETE Once 05/01/2023   TRAVIS DIGITAL SCREEN W OR WO CAD BILATERAL Once 11/01/2023

## 2023-05-08 NOTE — TELEPHONE ENCOUNTER
Medication refill for Lipitor    Last visit: 2/17/23  Last Med refill: 2/7/23  Does patient have enough medication for 72 hours: Yes    Next Visit Date:  Future Appointments   Date Time Provider Fortino Jean Baptiste   5/9/2023 10:20 AM Mario Hsu MD [address]  [facility]   6/7/2023  7:00 AM STA MAMMO RM 1 STAZ MAMMO STA Radiolog   6/7/2023  7:30 AM STA ULTRASOUND RM 3 STAZ US STA Radiolog   6/30/2023  3:45 PM Waldo Marquez MD Resp Spec [facility]   7/20/2023  1:00 PM Jeanette Dorantes MD Neuro [facility] Neurology -       Health Maintenance   Topic Date Due    Shingles vaccine (2 of 2) 09/03/2021    COVID-19 Vaccine (3 - Booster for Pfizer series) 11/02/2021    Colorectal Cancer Screen  02/08/2023    Breast cancer screen  08/24/2023    Lipids  02/01/2024    Depression Screen  02/17/2024    Diabetes screen  10/25/2025    DTaP/Tdap/Td vaccine (2 - Td or Tdap) 02/05/2026    Cervical cancer screen  09/07/2026    Flu vaccine  Completed    Pneumococcal 0-64 years Vaccine  Completed    Hepatitis C screen  Completed    HIV screen  Completed    Hepatitis A vaccine  Aged Out    Hib vaccine  Aged Out    Meningococcal (ACWY) vaccine  Aged Out    A1C test (Diabetic or Prediabetic)  Discontinued       Hemoglobin A1C (%)   Date Value   10/25/2022 5.6   07/09/2021 5.6   10/06/2020 5.7             ( goal A1C is < 7)   No results found for: LABMICR  LDL Cholesterol (mg/dL)   Date Value   02/01/2023 11/17/2021 99       (goal LDL is <100)   AST (U/L)   Date Value   07/09/2021 20     ALT (U/L)   Date Value   07/09/2021 21     BUN (mg/dL)   Date Value   02/20/2023 11     BP Readings from Last 3 Encounters:   05/01/23 133/67   03/05/23 126/71   02/17/23 139/79          (goal 120/80)    All Future Testing planned in [facility]  Lab Frequency Next Occurrence   Hepatic Function Panel Once 02/07/2023   US NON OB TRANSVAGINAL Once 05/01/2023   US PELVIS COMPLETE Once 05/01/2023   TRAVIS DIGITAL SCREEN W OR WO CAD BILATERAL Once 11/01/2023

## 2023-05-09 ENCOUNTER — HOSPITAL ENCOUNTER (OUTPATIENT)
Age: 53
Discharge: HOME | End: 2023-05-09
Payer: MEDICAID

## 2023-05-09 ENCOUNTER — OFFICE VISIT (OUTPATIENT)
Dept: INTERNAL MEDICINE | Age: 53
End: 2023-05-09
Payer: MEDICAID

## 2023-05-09 VITALS
SYSTOLIC BLOOD PRESSURE: 129 MMHG | TEMPERATURE: 98.2 F | BODY MASS INDEX: 39.85 KG/M2 | WEIGHT: 203 LBS | OXYGEN SATURATION: 99 % | DIASTOLIC BLOOD PRESSURE: 85 MMHG | HEART RATE: 63 BPM | HEIGHT: 60 IN

## 2023-05-09 DIAGNOSIS — R73.9 HYPERGLYCEMIA: ICD-10-CM

## 2023-05-09 DIAGNOSIS — I10 ESSENTIAL HYPERTENSION: Primary | ICD-10-CM

## 2023-05-09 DIAGNOSIS — R11.0 CHRONIC NAUSEA: ICD-10-CM

## 2023-05-09 DIAGNOSIS — E78.2 MIXED HYPERLIPIDEMIA: ICD-10-CM

## 2023-05-09 DIAGNOSIS — I10 ESSENTIAL HYPERTENSION: ICD-10-CM

## 2023-05-09 DIAGNOSIS — J45.50 SEVERE PERSISTENT ASTHMA WITHOUT COMPLICATION: ICD-10-CM

## 2023-05-09 DIAGNOSIS — G93.2 IIH (IDIOPATHIC INTRACRANIAL HYPERTENSION): ICD-10-CM

## 2023-05-09 DIAGNOSIS — Z12.11 COLON CANCER SCREENING: ICD-10-CM

## 2023-05-09 DIAGNOSIS — K21.00 CHRONIC REFLUX ESOPHAGITIS: ICD-10-CM

## 2023-05-09 LAB
ALBUMIN SERPL-MCNC: 4.2 G/DL (ref 3.5–5.2)
ALBUMIN/GLOBULIN RATIO: 1.4 (ref 1–2.5)
ALP SERPL-CCNC: 64 U/L (ref 35–104)
ALT SERPL-CCNC: 17 U/L (ref 5–33)
ANION GAP SERPL CALCULATED.3IONS-SCNC: 12 MMOL/L (ref 9–17)
AST SERPL-CCNC: 21 U/L
BILIRUB SERPL-MCNC: 0.4 MG/DL (ref 0.3–1.2)
BUN SERPL-MCNC: 11 MG/DL (ref 6–20)
CALCIUM SERPL-MCNC: 9.7 MG/DL (ref 8.6–10.4)
CHLORIDE SERPL-SCNC: 101 MMOL/L (ref 98–107)
CHOLEST SERPL-MCNC: 159 MG/DL
CHOLESTEROL/HDL RATIO: 2.8
CO2 SERPL-SCNC: 25 MMOL/L (ref 20–31)
CREAT SERPL-MCNC: 0.88 MG/DL (ref 0.5–0.9)
EST. AVERAGE GLUCOSE BLD GHB EST-MCNC: 114 MG/DL
GFR SERPL CREATININE-BSD FRML MDRD: >60 ML/MIN/1.73M2
GLUCOSE SERPL-MCNC: 97 MG/DL (ref 70–99)
HBA1C MFR BLD: 5.6 % (ref 4–6)
HCT VFR BLD AUTO: 37.8 % (ref 36.3–47.1)
HDLC SERPL-MCNC: 56 MG/DL
HGB BLD-MCNC: 11.6 G/DL (ref 11.9–15.1)
LDLC SERPL CALC-MCNC: 83 MG/DL (ref 0–130)
MCH RBC QN AUTO: 26.2 PG (ref 25.2–33.5)
MCHC RBC AUTO-ENTMCNC: 30.7 G/DL (ref 28.4–34.8)
MCV RBC AUTO: 85.3 FL (ref 82.6–102.9)
NRBC AUTOMATED: 0 PER 100 WBC
PDW BLD-RTO: 16 % (ref 11.8–14.4)
PLATELET # BLD AUTO: 327 K/UL (ref 138–453)
PMV BLD AUTO: 10.7 FL (ref 8.1–13.5)
POTASSIUM SERPL-SCNC: 4.2 MMOL/L (ref 3.7–5.3)
PROT SERPL-MCNC: 7.1 G/DL (ref 6.4–8.3)
RBC # BLD: 4.43 M/UL (ref 3.95–5.11)
SODIUM SERPL-SCNC: 138 MMOL/L (ref 135–144)
TRIGL SERPL-MCNC: 100 MG/DL
WBC # BLD AUTO: 7 K/UL (ref 3.5–11.3)

## 2023-05-09 PROCEDURE — 36415 COLL VENOUS BLD VENIPUNCTURE: CPT

## 2023-05-09 PROCEDURE — 83036 HEMOGLOBIN GLYCOSYLATED A1C: CPT

## 2023-05-09 PROCEDURE — 85027 COMPLETE CBC AUTOMATED: CPT

## 2023-05-09 PROCEDURE — 99214 OFFICE O/P EST MOD 30 MIN: CPT | Performed by: INTERNAL MEDICINE

## 2023-05-09 PROCEDURE — 3079F DIAST BP 80-89 MM HG: CPT | Performed by: INTERNAL MEDICINE

## 2023-05-09 PROCEDURE — 80061 LIPID PANEL: CPT

## 2023-05-09 PROCEDURE — 3074F SYST BP LT 130 MM HG: CPT | Performed by: INTERNAL MEDICINE

## 2023-05-09 PROCEDURE — 80053 COMPREHEN METABOLIC PANEL: CPT

## 2023-05-09 RX ORDER — PREDNISONE 20 MG/1
20 TABLET ORAL 2 TIMES DAILY
Qty: 10 TABLET | Refills: 0 | Status: SHIPPED | OUTPATIENT
Start: 2023-05-09 | End: 2023-05-14

## 2023-05-09 RX ORDER — VALSARTAN AND HYDROCHLOROTHIAZIDE 80; 12.5 MG/1; MG/1
TABLET, FILM COATED ORAL
Qty: 30 TABLET | Refills: 5 | Status: SHIPPED | OUTPATIENT
Start: 2023-05-09

## 2023-05-09 RX ORDER — OLOPATADINE HYDROCHLORIDE 1 MG/ML
SOLUTION/ DROPS OPHTHALMIC
COMMUNITY
Start: 2023-01-30

## 2023-05-09 RX ORDER — LORATADINE 10 MG/1
10 TABLET ORAL DAILY
COMMUNITY
Start: 2023-01-20

## 2023-05-09 RX ORDER — ATORVASTATIN CALCIUM 40 MG/1
TABLET, FILM COATED ORAL
Qty: 30 TABLET | Refills: 2 | Status: SHIPPED | OUTPATIENT
Start: 2023-05-09

## 2023-05-09 RX ORDER — CARVEDILOL 6.25 MG/1
TABLET ORAL
Qty: 60 TABLET | Refills: 3 | Status: SHIPPED | OUTPATIENT
Start: 2023-05-09

## 2023-05-09 ASSESSMENT — ENCOUNTER SYMPTOMS
CONSTIPATION: 0
SHORTNESS OF BREATH: 0
COUGH: 0
NAUSEA: 1
BLOOD IN STOOL: 0
ABDOMINAL PAIN: 0
PHOTOPHOBIA: 0
VOMITING: 0
WHEEZING: 0

## 2023-05-09 NOTE — PROGRESS NOTES
[facility]    Progress Note    Date of patient's visit: 5/9/2023    Patient's Name:  Bob Gleason    YOB: 1970            Patient Care Team:  Taylor Sams MD as PCP - General (Internal Medicine)  Taylor Sams MD as PCP - Empaneled Provider    REASON FOR VISIT: Routine outpatient follow     Chief Complaint   Patient presents with    Asthma    Weight Gain     Wants to talk about her weight    Hypertension         HISTORY OF PRESENT ILLNESS:    History was obtained from the patient. Bob Gleason is a 47 y.o. is here for she was admitted for asthma exacerbation earlier this year. Following that she had to the ER 1 time for COVID infection and was treated with Paxlovid  but did not need admission. She is requesting a prescription for prednisone to get on hand in case she has a asthma exacerbation especially in the spring when she has known triggers. She is not a smoker. She is compliant with her inhalers  She has chronic nausea and takes ondansetron daily. She has no history of GERD and she no certain offending foods that cause nausea. She has had dysphagia and dilatation of her esophagus several times in the past last week 2017. She continues to have nausea but no other red flags. She is due for a colonoscopy and would like to go back and see GI for both problems. She has history of hyperlipidemia. Last time in February her lipids were very high and not sure she was fasting. She is on statin. She has not seen the dietitian. She does want to lose weight. We will have her repeat her labs. Because of obesity she was also diagnosed with idiopathic intracranial hypertension for severe headaches earlier this year. She is currently on Diamox after MRI and LP were done. Opening pressure was high  She continues to follow-up with neurology.   She denies any visual deficits and states she has seen an ophthalmologist  She is following up with GYN

## 2023-05-10 ENCOUNTER — TELEPHONE (OUTPATIENT)
Dept: SURGERY | Age: 53
End: 2023-05-10

## 2023-05-10 NOTE — TELEPHONE ENCOUNTER
[address] [facility] Surgery  Screening colonoscopy questionnaire  Reg Denny    Pt Name: Dennie Brawn  MRN: [id number]  YOB: 1970  Primary Care Physician: Maxwell Leonardo MD      Have you ever had a colonoscopy? YES  When was your last colonoscopy? 2017  Were any polyps removed or any other significant findings? NO    Have you recently had a stool test to check for colon cancer? No  Was it positive? No    Do you have any family history of colon cancer? No  If yes, which family member had colon cancer? N/A  Were they diagnosed younger or older than the age of 61? N/A    Do you have a history of Crohn's disease or Ulcerative Colitis? No    Do you have a history of constipation? No    Do you have a history of bloody or black stools? No    Have you ever had surgery done inside your abdomen? No  What surgery? N/A      Schedulin/10/23 - Spoke to patient, colonoscopy scheduled at [facility], patient confirmed and information mailed to patient. Surgery date/time: 23 at 10:30AM  Arrival time: 9AM  Prep appt: PHONE CALL at [facility]. GOLYTELY INSTRUCTIONS MAILED TO PATIENT.

## 2023-05-18 RX ORDER — SIMETHICONE 80 MG
TABLET,CHEWABLE ORAL
Qty: 180 TABLET | Refills: 0 | Status: SHIPPED | OUTPATIENT
Start: 2023-05-18

## 2023-05-18 RX ORDER — SENNA PLUS 8.6 MG/1
TABLET ORAL
Qty: 90 TABLET | Refills: 1 | Status: SHIPPED | OUTPATIENT
Start: 2023-05-18

## 2023-05-18 NOTE — TELEPHONE ENCOUNTER
Patient requesting a refill on the Simethicone 80 mg. Patient is on the waitlist for an appt in November.       Health Maintenance   Topic Date Due    Shingles vaccine (2 of 2) 09/03/2021    COVID-19 Vaccine (3 - Booster for Pfizer series) 11/02/2021    Colorectal Cancer Screen  02/08/2023    Breast cancer screen  08/24/2023    Depression Screen  02/17/2024    Lipids  05/09/2024    DTaP/Tdap/Td vaccine (2 - Td or Tdap) 02/05/2026    Cervical cancer screen  09/07/2026    Flu vaccine  Completed    Pneumococcal 0-64 years Vaccine  Completed    Hepatitis C screen  Completed    HIV screen  Completed    Hepatitis A vaccine  Aged Out    Hib vaccine  Aged Out    Meningococcal (ACWY) vaccine  Aged Out    A1C test (Diabetic or Prediabetic)  Discontinued    Diabetes screen  Discontinued             (applicable per patient's age: Cancer Screenings, Depression Screening, Fall Risk Screening, Immunizations)    Hemoglobin A1C (%)   Date Value   05/09/2023 5.6   10/25/2022 5.6   07/09/2021 5.6     LDL Cholesterol (mg/dL)   Date Value   05/09/2023 83     AST (U/L)   Date Value   05/09/2023 21     ALT (U/L)   Date Value   05/09/2023 17     BUN (mg/dL)   Date Value   05/09/2023 11      (goal A1C is < 7)   (goal LDL is <100) need 30-50% reduction from baseline     BP Readings from Last 3 Encounters:   05/09/23 129/85   05/01/23 133/67   03/05/23 126/71    (goal /80)      All Future Testing planned in [facility]:  Lab Frequency Next Occurrence   Hepatic Function Panel Once 02/07/2023   US NON OB TRANSVAGINAL Once 05/01/2023   US PELVIS COMPLETE Once 05/01/2023   TRAVIS DIGITAL SCREEN W OR WO CAD BILATERAL Once 11/01/2023       Next Visit Date:  Future Appointments   Date Time Provider Fortino Jean Baptiste   6/7/2023  7:00 AM STA MAMMO RM 1 STAZ MAMMO STA Radiolog   6/7/2023  7:30 AM STA ULTRASOUND RM 3 STAZ US STA Radiolog   6/30/2023  3:45 PM Laurie Maldonado MD Resp Spec [facility]   7/20/2023  1:00 PM Christopher Lion MD Neuro [facility]

## 2023-05-18 NOTE — TELEPHONE ENCOUNTER
disease 11/23/15 Dr. Xavi Cunningham     Irritable bowel syndrome with constipation     Obesity     Allergic rhinitis     Hiatal hernia     Paresthesias     Left sided numbness     Dysphagia     Family history of colon cancer     DNS (deviated nasal septum)     Hypertrophy of both inferior nasal turbinates     Iron deficiency anemia secondary to inadequate dietary iron intake     Iron deficiency anemia, unspecified     Atypical chest pain     Other headache syndrome

## 2023-06-05 DIAGNOSIS — M25.552 LEFT HIP PAIN: ICD-10-CM

## 2023-06-06 RX ORDER — IBUPROFEN 800 MG/1
800 TABLET ORAL EVERY 8 HOURS PRN
Qty: 30 TABLET | Refills: 2 | Status: SHIPPED | OUTPATIENT
Start: 2023-06-06

## 2023-06-07 ENCOUNTER — HOSPITAL ENCOUNTER (OUTPATIENT)
Dept: ULTRASOUND IMAGING | Age: 53
Discharge: HOME | End: 2023-06-09
Payer: MEDICAID

## 2023-06-07 ENCOUNTER — HOSPITAL ENCOUNTER (OUTPATIENT)
Dept: MAMMOGRAPHY | Age: 53
Discharge: HOME | End: 2023-06-09
Payer: MEDICAID

## 2023-06-07 DIAGNOSIS — D25.9 UTERINE LEIOMYOMA, UNSPECIFIED LOCATION: ICD-10-CM

## 2023-06-07 DIAGNOSIS — N93.9 ABNORMAL UTERINE BLEEDING (AUB): ICD-10-CM

## 2023-06-07 DIAGNOSIS — Z12.31 OTHER SCREENING MAMMOGRAM: ICD-10-CM

## 2023-06-07 PROCEDURE — 77063 BREAST TOMOSYNTHESIS BI: CPT

## 2023-06-07 PROCEDURE — 76830 TRANSVAGINAL US NON-OB: CPT

## 2023-06-07 PROCEDURE — 76856 US EXAM PELVIC COMPLETE: CPT

## 2023-06-26 ENCOUNTER — OFFICE VISIT (OUTPATIENT)
Dept: OBGYN | Age: 53
End: 2023-06-26
Payer: MEDICAID

## 2023-06-26 VITALS
SYSTOLIC BLOOD PRESSURE: 145 MMHG | DIASTOLIC BLOOD PRESSURE: 86 MMHG | BODY MASS INDEX: 38.47 KG/M2 | HEART RATE: 51 BPM | WEIGHT: 197 LBS

## 2023-06-26 DIAGNOSIS — N93.9 ABNORMAL UTERINE BLEEDING (AUB): Primary | ICD-10-CM

## 2023-06-26 PROCEDURE — 3074F SYST BP LT 130 MM HG: CPT | Performed by: STUDENT IN AN ORGANIZED HEALTH CARE EDUCATION/TRAINING PROGRAM

## 2023-06-26 PROCEDURE — 3078F DIAST BP <80 MM HG: CPT | Performed by: STUDENT IN AN ORGANIZED HEALTH CARE EDUCATION/TRAINING PROGRAM

## 2023-06-26 PROCEDURE — 99213 OFFICE O/P EST LOW 20 MIN: CPT | Performed by: STUDENT IN AN ORGANIZED HEALTH CARE EDUCATION/TRAINING PROGRAM

## 2023-06-29 ENCOUNTER — TELEPHONE (OUTPATIENT)
Dept: INTERNAL MEDICINE | Age: 53
End: 2023-06-29

## 2023-07-17 DIAGNOSIS — Z98.890 S/P CARDIAC CATH: ICD-10-CM

## 2023-07-17 NOTE — TELEPHONE ENCOUNTER
numbness     Dysphagia     Family history of colon cancer     DNS (deviated nasal septum)     Hypertrophy of both inferior nasal turbinates     Iron deficiency anemia secondary to inadequate dietary iron intake     Iron deficiency anemia, unspecified     Atypical chest pain     Other headache syndrome

## 2023-07-18 RX ORDER — ASPIRIN 81 MG/1
TABLET ORAL
Qty: 30 TABLET | Refills: 5 | Status: SHIPPED | OUTPATIENT
Start: 2023-07-18

## 2023-07-21 NOTE — TELEPHONE ENCOUNTER
nasal septum)     Hypertrophy of both inferior nasal turbinates     Iron deficiency anemia secondary to inadequate dietary iron intake     Iron deficiency anemia, unspecified     Atypical chest pain     Other headache syndrome

## 2023-07-24 RX ORDER — ONDANSETRON 4 MG/1
TABLET, ORALLY DISINTEGRATING ORAL
Qty: 15 TABLET | Refills: 1 | Status: SHIPPED | OUTPATIENT
Start: 2023-07-24

## 2023-07-27 ENCOUNTER — TELEPHONE (OUTPATIENT)
Dept: INTERNAL MEDICINE | Age: 53
End: 2023-07-27

## 2023-08-01 DIAGNOSIS — E78.2 MIXED HYPERLIPIDEMIA: ICD-10-CM

## 2023-08-01 DIAGNOSIS — J45.51 SEVERE PERSISTENT ASTHMA WITH ACUTE EXACERBATION: Chronic | ICD-10-CM

## 2023-08-01 DIAGNOSIS — Z09 HOSPITAL DISCHARGE FOLLOW-UP: ICD-10-CM

## 2023-08-03 RX ORDER — ATORVASTATIN CALCIUM 40 MG/1
TABLET, FILM COATED ORAL
Qty: 30 TABLET | Refills: 2 | Status: SHIPPED | OUTPATIENT
Start: 2023-08-03

## 2023-08-03 NOTE — TELEPHONE ENCOUNTER
Pharmacy requesting refills on Atorvastatin 40mg tablets daily and Mometasone-formoterol (Dulera) 200-5mcg/ACT Inhaler. Please review and e-scribe to pharmacy listed in chart if appropriate. Thank you.       Next Visit Date: LVM to schedule F/u  Last Visit Date: 05/09/2023    Future Appointments   Date Time Provider [address]   8/11/2023  2:30 PM Michael Boyd MD Resp Spec [facility]   10/5/2023  2:00 PM Lb Taylor MD Neuro [facility] Neurology -       Health Maintenance   Topic Date Due    Shingles vaccine (2 of 2) 09/03/2021    COVID-19 Vaccine (3 - Booster for Pfizer series) 11/02/2021    Colorectal Cancer Screen  02/08/2023    Flu vaccine (1) 08/01/2023    Depression Screen  02/17/2024    Lipids  05/09/2024    Breast cancer screen  06/07/2025    DTaP/Tdap/Td vaccine (2 - Td or Tdap) 02/05/2026    Cervical cancer screen  09/07/2026    Pneumococcal 0-64 years Vaccine  Completed    Hepatitis C screen  Completed    HIV screen  Completed    Hepatitis A vaccine  Aged Out    Hib vaccine  Aged Out    Meningococcal (ACWY) vaccine  Aged Out    A1C test (Diabetic or Prediabetic)  Discontinued    Diabetes screen  Discontinued       Hemoglobin A1C (%)   Date Value   05/09/2023 5.6   10/25/2022 5.6   07/09/2021 5.6             ( goal A1C is < 7)   No components found for: LABMICR  LDL Cholesterol (mg/dL)   Date Value   05/09/2023 83       (goal LDL is <100)   AST (U/L)   Date Value   05/09/2023 21     ALT (U/L)   Date Value   05/09/2023 17     BUN (mg/dL)   Date Value   05/09/2023 11     BP Readings from Last 3 Encounters:   06/26/23 (!) 145/86   05/09/23 129/85   05/01/23 133/67          (goal 120/80)    All Future Testing planned in [facility]  Lab Frequency Next Occurrence   Hepatic Function Panel Once 02/07/2023         Patient Active Problem List:     Hypertension     Asthma     GERD (gastroesophageal reflux disease)     S/P cardiac cath-Minimal disease 11/23/15 Dr. Kleber Maciel     Irritable bowel syndrome with constipation

## 2023-08-04 PROBLEM — Z12.11 COLON CANCER SCREENING: Status: ACTIVE | Noted: 2023-08-04

## 2023-08-28 DIAGNOSIS — E78.1 HYPERTRIGLYCERIDEMIA: ICD-10-CM

## 2023-08-29 RX ORDER — FENOFIBRATE 48 MG/1
TABLET, COATED ORAL
Qty: 30 TABLET | Refills: 5 | Status: SHIPPED | OUTPATIENT
Start: 2023-08-29

## 2023-08-29 NOTE — TELEPHONE ENCOUNTER
Last visit: 5/9/23  Last Med refill: 3/6/23  Does patient have enough medication for 72 hours: No:     Next Visit Date:  Future Appointments   Date Time Provider [address]   10/5/2023  2:00 PM Liana Blake MD Neuro [facility] Neurology -       Health Maintenance   Topic Date Due    Shingles vaccine (2 of 2) 09/03/2021    COVID-19 Vaccine (3 - Booster for Pfizer series) 11/02/2021    Colorectal Cancer Screen  02/08/2023    Flu vaccine (1) 08/01/2023    Depression Screen  02/17/2024    Lipids  05/09/2024    Breast cancer screen  06/07/2025    DTaP/Tdap/Td vaccine (2 - Td or Tdap) 02/05/2026    Cervical cancer screen  09/07/2026    Pneumococcal 0-64 years Vaccine  Completed    Hepatitis C screen  Completed    HIV screen  Completed    Hepatitis A vaccine  Aged Out    Hib vaccine  Aged Out    Meningococcal (ACWY) vaccine  Aged Out    A1C test (Diabetic or Prediabetic)  Discontinued    Diabetes screen  Discontinued       Hemoglobin A1C (%)   Date Value   05/09/2023 5.6   10/25/2022 5.6   07/09/2021 5.6             ( goal A1C is < 7)   No components found for: LABMICR  LDL Cholesterol (mg/dL)   Date Value   05/09/2023 83   02/01/2023            (goal LDL is <100)   AST (U/L)   Date Value   05/09/2023 21     ALT (U/L)   Date Value   05/09/2023 17     BUN (mg/dL)   Date Value   05/09/2023 11     BP Readings from Last 3 Encounters:   06/26/23 (!) 145/86   05/09/23 129/85   05/01/23 133/67          (goal 120/80)    All Future Testing planned in [facility]  Lab Frequency Next Occurrence   Hepatic Function Panel Once 02/07/2023               Patient Active Problem List:     Hypertension     Asthma     GERD (gastroesophageal reflux disease)     S/P cardiac cath-Minimal disease 11/23/15 Dr. Angelita Rodriguez     Irritable bowel syndrome with constipation     Obesity     Allergic rhinitis     Hiatal hernia     Paresthesias     Left sided numbness     Dysphagia     Family history of colon cancer in father     DNS (deviated nasal septum)

## 2023-09-03 PROBLEM — Z12.11 COLON CANCER SCREENING: Status: RESOLVED | Noted: 2023-08-04 | Resolved: 2023-09-03

## 2023-09-18 DIAGNOSIS — I10 ESSENTIAL HYPERTENSION: ICD-10-CM

## 2023-09-18 RX ORDER — LIDOCAINE 50 MG/G
PATCH TOPICAL
Qty: 30 PATCH | Refills: 2 | Status: SHIPPED | OUTPATIENT
Start: 2023-09-18

## 2023-09-18 NOTE — TELEPHONE ENCOUNTER
Pharmacy requesting refills for Carvedilol. Please review and e-scribe to pharmacy listed in chart if appropriate. Thank you.       Next Visit Date: Wesley, On wait list  Last Visit Date: 5/9/23    Future Appointments   Date Time Provider [address]   10/5/2023  2:00 PM Bethany Weaver MD Neuro [facility] Neurology -       Health Maintenance   Topic Date Due    Hepatitis B vaccine (1 of 3 - 3-dose series) Never done    Pneumococcal 0-64 years Vaccine (2 - PCV) 01/21/2017    Shingles vaccine (2 of 2) 09/03/2021    COVID-19 Vaccine (3 - Pfizer series) 11/02/2021    Colorectal Cancer Screen  02/08/2023    Flu vaccine (1) 08/01/2023    Depression Screen  02/17/2024    Lipids  05/09/2024    Breast cancer screen  06/07/2025    DTaP/Tdap/Td vaccine (2 - Td or Tdap) 02/05/2026    Cervical cancer screen  09/07/2026    Hepatitis C screen  Completed    HIV screen  Completed    Hepatitis A vaccine  Aged Out    Hib vaccine  Aged Out    Meningococcal (ACWY) vaccine  Aged Out    A1C test (Diabetic or Prediabetic)  Discontinued    Diabetes screen  Discontinued       Hemoglobin A1C (%)   Date Value   05/09/2023 5.6   10/25/2022 5.6   07/09/2021 5.6             ( goal A1C is < 7)   No components found for: \"LABMICR\"  LDL Cholesterol (mg/dL)   Date Value   05/09/2023 83       (goal LDL is <100)   AST (U/L)   Date Value   05/09/2023 21     ALT (U/L)   Date Value   05/09/2023 17     BUN (mg/dL)   Date Value   05/09/2023 11     BP Readings from Last 3 Encounters:   06/26/23 (!) 145/86   05/09/23 129/85   05/01/23 133/67          (goal 120/80)    All Future Testing planned in [facility]  Lab Frequency Next Occurrence   Hepatic Function Panel Once 02/07/2023         Patient Active Problem List:     Hypertension     Asthma     GERD (gastroesophageal reflux disease)     S/P cardiac cath-Minimal disease 11/23/15 Dr. Floyd Partida     Irritable bowel syndrome with constipation     Obesity     Allergic rhinitis     Hiatal hernia

## 2023-09-19 RX ORDER — CARVEDILOL 6.25 MG/1
TABLET ORAL
Qty: 60 TABLET | Refills: 3 | Status: SHIPPED | OUTPATIENT
Start: 2023-09-19

## 2023-10-05 ENCOUNTER — OFFICE VISIT (OUTPATIENT)
Dept: NEUROLOGY | Age: 53
End: 2023-10-05
Payer: MEDICARE

## 2023-10-05 VITALS
SYSTOLIC BLOOD PRESSURE: 137 MMHG | HEIGHT: 60 IN | DIASTOLIC BLOOD PRESSURE: 84 MMHG | BODY MASS INDEX: 38.68 KG/M2 | HEART RATE: 60 BPM | OXYGEN SATURATION: 98 % | WEIGHT: 197 LBS

## 2023-10-05 DIAGNOSIS — T50.905A DRUG SIDE EFFECTS, INITIAL ENCOUNTER: ICD-10-CM

## 2023-10-05 DIAGNOSIS — G93.2 IIH (IDIOPATHIC INTRACRANIAL HYPERTENSION): Primary | ICD-10-CM

## 2023-10-05 DIAGNOSIS — R20.2 PARESTHESIAS: ICD-10-CM

## 2023-10-05 DIAGNOSIS — I10 PRIMARY HYPERTENSION: ICD-10-CM

## 2023-10-05 PROCEDURE — G8417 CALC BMI ABV UP PARAM F/U: HCPCS

## 2023-10-05 PROCEDURE — 3079F DIAST BP 80-89 MM HG: CPT

## 2023-10-05 PROCEDURE — 3017F COLORECTAL CA SCREEN DOC REV: CPT

## 2023-10-05 PROCEDURE — 99215 OFFICE O/P EST HI 40 MIN: CPT

## 2023-10-05 PROCEDURE — 3075F SYST BP GE 130 - 139MM HG: CPT

## 2023-10-05 PROCEDURE — G8484 FLU IMMUNIZE NO ADMIN: HCPCS

## 2023-10-05 PROCEDURE — 1036F TOBACCO NON-USER: CPT

## 2023-10-05 PROCEDURE — G8427 DOCREV CUR MEDS BY ELIG CLIN: HCPCS

## 2023-10-05 ASSESSMENT — ENCOUNTER SYMPTOMS
EYE PAIN: 0
PHOTOPHOBIA: 0
EYE REDNESS: 0
VOMITING: 0
NAUSEA: 0

## 2023-10-05 NOTE — TELEPHONE ENCOUNTER
Request for   Requested Prescriptions     Pending Prescriptions Disp Refills    ondansetron (ZOFRAN-ODT) 4 MG disintegrating tablet [Pharmacy Med Name: ONDANSETRON ODT 4 MG TABLET] 15 tablet 1     Sig: dissolve 1 tablet ON TONGUE every 12 hours if needed for nausea OR vomiting     Please review and e-scribe to pharmacy listed in chart if appropriate. Thank you.         Last Visit Date: 5/9/2023    Future Appointments   Date Time Provider [address]   10/5/2023  2:00 PM Bethany Weaver MD Neuro [facility] Neurology -       Health Maintenance   Topic Date Due    Hepatitis B vaccine (1 of 3 - 3-dose series) Never done    Pneumococcal 0-64 years Vaccine (2 - PCV) 01/21/2017    Shingles vaccine (2 of 2) 09/03/2021    COVID-19 Vaccine (3 - Pfizer series) 11/02/2021    Colorectal Cancer Screen  02/08/2023    Flu vaccine (1) 08/01/2023    Depression Screen  02/17/2024    Lipids  05/09/2024    Breast cancer screen  06/07/2025    DTaP/Tdap/Td vaccine (2 - Td or Tdap) 02/05/2026    Cervical cancer screen  09/07/2026    Hepatitis C screen  Completed    HIV screen  Completed    Hepatitis A vaccine  Aged Out    Hib vaccine  Aged Out    Meningococcal (ACWY) vaccine  Aged Out    A1C test (Diabetic or Prediabetic)  Discontinued    Diabetes screen  Discontinued       Hemoglobin A1C (%)   Date Value   05/09/2023 5.6   10/25/2022 5.6   07/09/2021 5.6             ( goal A1C is < 7)   No components found for: \"LABMICR\"  LDL Cholesterol (mg/dL)   Date Value   05/09/2023 83       (goal LDL is <100)   AST (U/L)   Date Value   05/09/2023 21     ALT (U/L)   Date Value   05/09/2023 17     BUN (mg/dL)   Date Value   05/09/2023 11     BP Readings from Last 3 Encounters:   06/26/23 (!) 145/86   05/09/23 129/85   05/01/23 133/67          (goal 120/80)    All Future Testing planned in [facility]  Lab Frequency Next Occurrence   Hepatic Function Panel Once 02/07/2023         Patient Active Problem List:     Hypertension     Asthma     GERD

## 2023-10-05 NOTE — PROGRESS NOTES
Ari SCatrachito Hendricks  [facility] [id number] [facility]  Dept: [phone]  Dept Fax: [phone]    NEUROLOGY FOLLOW UP NOTE                                              PATIENT NAME: Lakesha Gale   PATIENT MRN: [id number]  FOLLOW UP TODAY: 10/5/2023        INITIAL & INTERVAL HISTORY:     Lakesha Gale is a 47 y.o. female here for follow up. Patient states that she has been headache free for the last 4 months. Has followed up with ophthalmology earlier this week, interval resolution of papilledema. She no longer has any visual symptoms. Tinnitus has improved and is only intermittent and brought on by loud music but is no longer constant. No focal neurological deficits. Only side effect she has noted has been metallic taste with Topamax. Otherwise is tolerant of all medications. Neurologic exam nonfocal.    HPI:   This a 50-year-old -American female who presented with headaches. Past medical history significant for headache disorder since 2019. Other significant history of obesity, hypertension, hyperlipidemia, COPD, iron deficiency anemia and hiatal hernia. Patient has had progressive headache disorder for the last few years. At the time similar nature with throbbing holoacranial headache that was constant and not improved with ibuprofen or Tylenol. She was referred to neurology by her PCP and started on Topamax however failed therapy due to side effects. She has had increasingly disabling headaches associated with vision change. She works as a nurse aide and endorses that she became debilitated with pulsatile tinnitus when she lies down at night and also worsening pain. Denies any previous visual exams for IIH. Saw Dr. Iza Al in the clinic. Underwent lumbar puncture which showed opening pressure of 26 cmH2O. She was diagnosed with IIH and started on Diamox to 250 mg BID which was increased to 500 mg BID.

## 2023-10-07 DIAGNOSIS — K21.00 CHRONIC REFLUX ESOPHAGITIS: ICD-10-CM

## 2023-10-08 RX ORDER — ONDANSETRON 4 MG/1
TABLET, ORALLY DISINTEGRATING ORAL
Qty: 15 TABLET | Refills: 1 | Status: SHIPPED | OUTPATIENT
Start: 2023-10-08

## 2023-10-09 NOTE — TELEPHONE ENCOUNTER
Last visit: 5/9/23  Last Med refill:   Does patient have enough medication for 72 hours: No:     Next Visit Date:  Future Appointments   Date Time Provider [address]   4/18/2024  1:30 PM Sterling Pablo MD Neuro [facility] Neurology -       Health Maintenance   Topic Date Due    Hepatitis B vaccine (1 of 3 - 3-dose series) Never done    Pneumococcal 0-64 years Vaccine (2 - PCV) 01/21/2017    Shingles vaccine (2 of 2) 09/03/2021    COVID-19 Vaccine (3 - Pfizer series) 11/02/2021    Colorectal Cancer Screen  02/08/2023    Flu vaccine (1) 08/01/2023    Annual Wellness Visit (AWV)  10/05/2023    Depression Screen  02/17/2024    Lipids  05/09/2024    Breast cancer screen  06/07/2025    DTaP/Tdap/Td vaccine (2 - Td or Tdap) 02/05/2026    Cervical cancer screen  09/07/2026    Hepatitis C screen  Completed    HIV screen  Completed    Hepatitis A vaccine  Aged Out    Hib vaccine  Aged Out    Meningococcal (ACWY) vaccine  Aged Out    A1C test (Diabetic or Prediabetic)  Discontinued    Diabetes screen  Discontinued       Hemoglobin A1C (%)   Date Value   05/09/2023 5.6   10/25/2022 5.6   07/09/2021 5.6             ( goal A1C is < 7)   No components found for: \"LABMICR\"  LDL Cholesterol (mg/dL)   Date Value   05/09/2023 83   02/01/2023            (goal LDL is <100)   AST (U/L)   Date Value   05/09/2023 21     ALT (U/L)   Date Value   05/09/2023 17     BUN (mg/dL)   Date Value   05/09/2023 11     BP Readings from Last 3 Encounters:   10/05/23 137/84   06/26/23 (!) 145/86   05/09/23 129/85          (goal 120/80)    All Future Testing planned in [facility]  Lab Frequency Next Occurrence   Hepatic Function Panel Once 02/07/2023               Patient Active Problem List:     Hypertension     Asthma     GERD (gastroesophageal reflux disease)     S/P cardiac cath-Minimal disease 11/23/15 Dr. Leana Mack     Irritable bowel syndrome with constipation     Obesity     Allergic rhinitis     Hiatal hernia     Paresthesias     Left sided

## 2023-10-10 RX ORDER — OMEPRAZOLE 40 MG/1
CAPSULE, DELAYED RELEASE ORAL
Qty: 30 CAPSULE | Refills: 5 | Status: SHIPPED | OUTPATIENT
Start: 2023-10-10

## 2023-10-13 ENCOUNTER — TELEPHONE (OUTPATIENT)
Dept: INTERNAL MEDICINE | Age: 53
End: 2023-10-13

## 2023-10-17 ENCOUNTER — HOSPITAL ENCOUNTER (EMERGENCY)
Age: 53
Discharge: HOME | End: 2023-10-17
Attending: EMERGENCY MEDICINE
Payer: MEDICAID

## 2023-10-17 ENCOUNTER — APPOINTMENT (OUTPATIENT)
Dept: GENERAL RADIOLOGY | Age: 53
End: 2023-10-17
Payer: MEDICAID

## 2023-10-17 VITALS
HEIGHT: 62 IN | SYSTOLIC BLOOD PRESSURE: 156 MMHG | HEART RATE: 52 BPM | DIASTOLIC BLOOD PRESSURE: 81 MMHG | RESPIRATION RATE: 18 BRPM | TEMPERATURE: 97.1 F | BODY MASS INDEX: 36.8 KG/M2 | WEIGHT: 200 LBS | OXYGEN SATURATION: 100 %

## 2023-10-17 DIAGNOSIS — J44.1 COPD EXACERBATION (HCC): Primary | ICD-10-CM

## 2023-10-17 LAB
S PYO AG THROAT QL: NEGATIVE
SARS-COV-2 RDRP RESP QL NAA+PROBE: NOT DETECTED
SPECIMEN DESCRIPTION: NORMAL
SPECIMEN SOURCE: NORMAL

## 2023-10-17 PROCEDURE — 87880 STREP A ASSAY W/OPTIC: CPT

## 2023-10-17 PROCEDURE — 99284 EMERGENCY DEPT VISIT MOD MDM: CPT

## 2023-10-17 PROCEDURE — 87635 SARS-COV-2 COVID-19 AMP PRB: CPT

## 2023-10-17 PROCEDURE — 71045 X-RAY EXAM CHEST 1 VIEW: CPT

## 2023-10-17 PROCEDURE — 6370000000 HC RX 637 (ALT 250 FOR IP): Performed by: STUDENT IN AN ORGANIZED HEALTH CARE EDUCATION/TRAINING PROGRAM

## 2023-10-17 RX ORDER — BENZONATATE 100 MG/1
100 CAPSULE ORAL ONCE
Status: COMPLETED | OUTPATIENT
Start: 2023-10-17 | End: 2023-10-17

## 2023-10-17 RX ORDER — AZITHROMYCIN 250 MG/1
500 TABLET, FILM COATED ORAL DAILY
Qty: 20 TABLET | Refills: 0 | Status: SHIPPED | OUTPATIENT
Start: 2023-10-17 | End: 2023-10-27

## 2023-10-17 RX ORDER — AZITHROMYCIN 250 MG/1
500 TABLET, FILM COATED ORAL ONCE
Status: COMPLETED | OUTPATIENT
Start: 2023-10-17 | End: 2023-10-17

## 2023-10-17 RX ORDER — BENZONATATE 100 MG/1
100 CAPSULE ORAL 3 TIMES DAILY PRN
Qty: 30 CAPSULE | Refills: 0 | Status: SHIPPED | OUTPATIENT
Start: 2023-10-17 | End: 2023-10-27

## 2023-10-17 RX ORDER — BENZOCAINE AND MENTHOL, UNSPECIFIED FORM 15; 2.3 MG/1; MG/1
1 LOZENGE ORAL 3 TIMES DAILY
Qty: 30 LOZENGE | Refills: 0 | Status: SHIPPED | OUTPATIENT
Start: 2023-10-17

## 2023-10-17 RX ORDER — PREDNISONE 20 MG/1
60 TABLET ORAL ONCE
Status: COMPLETED | OUTPATIENT
Start: 2023-10-17 | End: 2023-10-17

## 2023-10-17 RX ORDER — GUAIFENESIN 600 MG/1
600 TABLET, EXTENDED RELEASE ORAL 2 TIMES DAILY
Qty: 30 TABLET | Refills: 0 | Status: SHIPPED | OUTPATIENT
Start: 2023-10-17 | End: 2023-11-01

## 2023-10-17 RX ADMIN — BENZOCAINE AND MENTHOL 1 LOZENGE: 15; 3.6 LOZENGE ORAL at 02:48

## 2023-10-17 RX ADMIN — BENZONATATE 100 MG: 100 CAPSULE ORAL at 02:47

## 2023-10-17 RX ADMIN — PREDNISONE 60 MG: 20 TABLET ORAL at 04:35

## 2023-10-17 RX ADMIN — AZITHROMYCIN 500 MG: 250 TABLET, FILM COATED ORAL at 04:34

## 2023-10-17 ASSESSMENT — ENCOUNTER SYMPTOMS
SHORTNESS OF BREATH: 0
SINUS PRESSURE: 0
TROUBLE SWALLOWING: 0
ABDOMINAL PAIN: 0
RHINORRHEA: 0
NAUSEA: 0
VOICE CHANGE: 1
COUGH: 1
FACIAL SWELLING: 0
SORE THROAT: 1
SINUS PAIN: 0
DIARRHEA: 0
VOMITING: 0

## 2023-10-17 ASSESSMENT — PAIN DESCRIPTION - FREQUENCY: FREQUENCY: CONTINUOUS

## 2023-10-17 ASSESSMENT — PAIN DESCRIPTION - LOCATION: LOCATION: THROAT

## 2023-10-17 ASSESSMENT — PAIN - FUNCTIONAL ASSESSMENT: PAIN_FUNCTIONAL_ASSESSMENT: 0-10

## 2023-10-17 ASSESSMENT — PAIN DESCRIPTION - PAIN TYPE: TYPE: ACUTE PAIN

## 2023-10-17 ASSESSMENT — PAIN SCALES - GENERAL: PAINLEVEL_OUTOF10: 10

## 2023-10-17 NOTE — ED PROVIDER NOTES
[address] [place] [address] ED  Emergency Department Encounter  Emergency Medicine Resident     Pt Name:Beatriz Pickett  MRN: [id number]  [facility] 1970  Date of evaluation: 10/17/23  PCP:  Kristy Trujillo MD  Note Started: 2:25 AM EDT    CHIEF COMPLAINT       Chief Complaint   Patient presents with    Pharyngitis    Cough     Pt ambulated to room 5 came in from home with complaints of extremely sore throat and cough that is productive thick green substance       HISTORY OF PRESENT ILLNESS  (Location/Symptom, Timing/Onset, Context/Setting, Quality, Duration, Modifying Factors, Severity.)      Juan Carlos Turcios is a 47 y.o. female who presents with sore throat and productive cough for the past 4 days. Patient states she has had sore throat and a cough productive of a thick greenish mucus. She states she has a history of asthma and is using her inhalers and nebulizers without symptom improvement. She states she had a hoarse voice for the past several days due to sore throat. She denies fevers, chills, sinus pain or pressure, postnasal drip, ear pain, inability to swallow or tolerate secretions, pain with swallowing, chest pain, shortness breath, abdominal pain, nausea, vomiting, diarrhea, sick contacts. PAST MEDICAL / SURGICAL / SOCIAL / FAMILY HISTORY      has a past medical history of Allergic rhinitis, Asthma, Clinical trial participant, COPD (chronic obstructive pulmonary disease) ([address]), Dysphagia, Dysphagia, Family history of colon cancer, GERD (gastroesophageal reflux disease), H/O cardiovascular stress test, Hiatal hernia, Hyperlipidemia, Hypertension, Iron deficiency anemia secondary to inadequate dietary iron intake, Iron deficiency anemia, unspecified, Irritable bowel syndrome with constipation, Obesity, Snores, and Wears glasses. has a past surgical history that includes Tubal ligation (2003); Colonoscopy (2015); Upper gastrointestinal endoscopy;  Upper gastrointestinal endoscopy (N/A,

## 2023-10-17 NOTE — ED TRIAGE NOTES
Pt comes from home via car to rm 5 with complaints of severe sore throat and productive cough with green sputum

## 2023-10-17 NOTE — DISCHARGE INSTRUCTIONS
Please call your primary care provider in the next few days for follow up. Take your medication as indicated and prescribed. For pain use acetaminophen (Tylenol) or ibuprofen (Motrin / Advil), unless prescribed medications that have acetaminophen or ibuprofen (or similar medications) in it. You can take over the counter acetaminophen tablets (1 - 2 tablets of the 500-mg strength every 6 hours) or ibuprofen tablets (2 tablets every 4 hours). You can use over the counter allergy medication (Allegra, Claritan, Zyrtec, etc) to help with the symptoms. Drink plenty of water. Avoid drinking alcohol or drinks that have caffeine. Placing a humidifier in your room at night may be beneficial for helping with nasal congestion. PLEASE RETURN TO THE EMERGENCY DEPARTMENT IMMEDIATELY for worsening symptoms, persistent fever, nausea and/or vomiting, or if you develop any concerning symptoms such as: high fever not relieved by acetaminophen (Tylenol) and/or ibuprofen (Motrin / Advil), chills, shortness of breath, chest pain, feeling of your heart fluttering or racing, loss of consciousness, numbness, weakness or tingling in the arms or legs or change in color of the extremities, changes in mental status, persistent headache, blurry vision, loss of bladder / bowel control, unable to follow up with your physician, or other any other care or concern.

## 2023-10-20 ENCOUNTER — OFFICE VISIT (OUTPATIENT)
Dept: INTERNAL MEDICINE | Age: 53
End: 2023-10-20
Payer: MEDICAID

## 2023-10-20 VITALS
WEIGHT: 234 LBS | HEART RATE: 73 BPM | BODY MASS INDEX: 43.06 KG/M2 | SYSTOLIC BLOOD PRESSURE: 144 MMHG | DIASTOLIC BLOOD PRESSURE: 80 MMHG | TEMPERATURE: 97.3 F | OXYGEN SATURATION: 98 % | HEIGHT: 62 IN

## 2023-10-20 DIAGNOSIS — I10 ESSENTIAL HYPERTENSION: Primary | ICD-10-CM

## 2023-10-20 DIAGNOSIS — J45.50 SEVERE PERSISTENT ASTHMA WITHOUT COMPLICATION: ICD-10-CM

## 2023-10-20 DIAGNOSIS — M70.62 GREATER TROCHANTERIC BURSITIS OF LEFT HIP: ICD-10-CM

## 2023-10-20 DIAGNOSIS — J06.9 VIRAL UPPER RESPIRATORY TRACT INFECTION: ICD-10-CM

## 2023-10-20 DIAGNOSIS — E78.2 MIXED HYPERLIPIDEMIA: ICD-10-CM

## 2023-10-20 PROCEDURE — 3017F COLORECTAL CA SCREEN DOC REV: CPT | Performed by: INTERNAL MEDICINE

## 2023-10-20 PROCEDURE — G8417 CALC BMI ABV UP PARAM F/U: HCPCS | Performed by: INTERNAL MEDICINE

## 2023-10-20 PROCEDURE — 3078F DIAST BP <80 MM HG: CPT | Performed by: INTERNAL MEDICINE

## 2023-10-20 PROCEDURE — G8484 FLU IMMUNIZE NO ADMIN: HCPCS | Performed by: INTERNAL MEDICINE

## 2023-10-20 PROCEDURE — G8427 DOCREV CUR MEDS BY ELIG CLIN: HCPCS | Performed by: INTERNAL MEDICINE

## 2023-10-20 PROCEDURE — 99214 OFFICE O/P EST MOD 30 MIN: CPT | Performed by: INTERNAL MEDICINE

## 2023-10-20 PROCEDURE — 1036F TOBACCO NON-USER: CPT | Performed by: INTERNAL MEDICINE

## 2023-10-20 PROCEDURE — 3074F SYST BP LT 130 MM HG: CPT | Performed by: INTERNAL MEDICINE

## 2023-10-20 RX ORDER — MENTHOL 100 MG/G
CREAM TOPICAL
Qty: 100 G | Refills: 1 | Status: SHIPPED | OUTPATIENT
Start: 2023-10-20

## 2023-10-20 RX ORDER — ALBUTEROL SULFATE 90 UG/1
AEROSOL, METERED RESPIRATORY (INHALATION)
Qty: 1 EACH | Refills: 5 | Status: SHIPPED | OUTPATIENT
Start: 2023-10-20

## 2023-10-20 RX ORDER — VALSARTAN AND HYDROCHLOROTHIAZIDE 80; 12.5 MG/1; MG/1
TABLET, FILM COATED ORAL
Qty: 30 TABLET | Refills: 5 | Status: SHIPPED | OUTPATIENT
Start: 2023-10-20

## 2023-10-20 RX ORDER — ATORVASTATIN CALCIUM 40 MG/1
40 TABLET, FILM COATED ORAL DAILY
Qty: 30 TABLET | Refills: 2 | Status: SHIPPED | OUTPATIENT
Start: 2023-10-20

## 2023-10-20 RX ORDER — GUAIFENESIN AND CODEINE PHOSPHATE 100; 10 MG/5ML; MG/5ML
5 SOLUTION ORAL 2 TIMES DAILY PRN
Qty: 50 ML | Refills: 0 | Status: SHIPPED | OUTPATIENT
Start: 2023-10-20 | End: 2023-10-25

## 2023-10-20 RX ORDER — ALBUTEROL SULFATE 2.5 MG/3ML
2.5 SOLUTION RESPIRATORY (INHALATION) EVERY 6 HOURS PRN
Qty: 120 EACH | Refills: 3 | Status: SHIPPED | OUTPATIENT
Start: 2023-10-20

## 2023-10-20 SDOH — ECONOMIC STABILITY: FOOD INSECURITY: WITHIN THE PAST 12 MONTHS, THE FOOD YOU BOUGHT JUST DIDN'T LAST AND YOU DIDN'T HAVE MONEY TO GET MORE.: NEVER TRUE

## 2023-10-20 SDOH — ECONOMIC STABILITY: FOOD INSECURITY: WITHIN THE PAST 12 MONTHS, YOU WORRIED THAT YOUR FOOD WOULD RUN OUT BEFORE YOU GOT MONEY TO BUY MORE.: NEVER TRUE

## 2023-10-20 SDOH — ECONOMIC STABILITY: HOUSING INSECURITY
IN THE LAST 12 MONTHS, WAS THERE A TIME WHEN YOU DID NOT HAVE A STEADY PLACE TO SLEEP OR SLEPT IN A SHELTER (INCLUDING NOW)?: NO

## 2023-10-20 SDOH — ECONOMIC STABILITY: INCOME INSECURITY: HOW HARD IS IT FOR YOU TO PAY FOR THE VERY BASICS LIKE FOOD, HOUSING, MEDICAL CARE, AND HEATING?: NOT HARD AT ALL

## 2023-10-20 ASSESSMENT — ENCOUNTER SYMPTOMS
CHEST TIGHTNESS: 0
WHEEZING: 0
EYE DISCHARGE: 0
EYE REDNESS: 0
COUGH: 1
RHINORRHEA: 1
SHORTNESS OF BREATH: 0
BACK PAIN: 0
PHOTOPHOBIA: 0
SORE THROAT: 1
SINUS PAIN: 0

## 2023-10-20 ASSESSMENT — PATIENT HEALTH QUESTIONNAIRE - PHQ9
SUM OF ALL RESPONSES TO PHQ QUESTIONS 1-9: 0
2. FEELING DOWN, DEPRESSED OR HOPELESS: 0
SUM OF ALL RESPONSES TO PHQ QUESTIONS 1-9: 0
SUM OF ALL RESPONSES TO PHQ QUESTIONS 1-9: 0
1. LITTLE INTEREST OR PLEASURE IN DOING THINGS: 0
SUM OF ALL RESPONSES TO PHQ QUESTIONS 1-9: 0
SUM OF ALL RESPONSES TO PHQ9 QUESTIONS 1 & 2: 0

## 2023-11-03 DIAGNOSIS — M25.552 LEFT HIP PAIN: ICD-10-CM

## 2023-11-06 NOTE — TELEPHONE ENCOUNTER
Request for   Requested Prescriptions     Pending Prescriptions Disp Refills    ibuprofen (ADVIL;MOTRIN) 800 MG tablet [Pharmacy Med Name: IBUPROFEN 800 MG TABLET] 30 tablet 2     Sig: take 1 tablet by mouth every 8 hours AS NEEDED FOR PAIN    .      Please review and e-scribe to pharmacy listed in chart if appropriate. Thank you.      Last Visit Date: 10/20/2023  Next Visit Date: 11/7/2023

## 2023-11-13 NOTE — TELEPHONE ENCOUNTER
Request for   Requested Prescriptions     Pending Prescriptions Disp Refills    senna (HAYDER-JEREMIE) 8.6 MG tablet [Pharmacy Med Name: HAYDER-JEREMIE 8.6 MG TABLET] 90 tablet 1     Sig: take 1 tablet by mouth once daily    . Please review and e-scribe to pharmacy listed in chart if appropriate. Thank you.       Last Visit Date: 10/20/2023  Next Visit Date: Visit date not found

## 2023-11-14 RX ORDER — SENNOSIDES A AND B 8.6 MG/1
TABLET, FILM COATED ORAL
Qty: 90 TABLET | Refills: 1 | Status: SHIPPED | OUTPATIENT
Start: 2023-11-14

## 2023-11-16 RX ORDER — SIMETHICONE 80 MG
TABLET,CHEWABLE ORAL
Qty: 180 TABLET | Refills: 0 | Status: SHIPPED | OUTPATIENT
Start: 2023-11-16

## 2023-11-21 RX ORDER — IBUPROFEN 800 MG/1
800 TABLET ORAL EVERY 8 HOURS PRN
Qty: 30 TABLET | Refills: 2 | Status: SHIPPED | OUTPATIENT
Start: 2023-11-21

## 2023-12-26 NOTE — TELEPHONE ENCOUNTER
A Refill Has Been Requested for Celina Arora    Medication Requested  Requested Prescriptions     Pending Prescriptions Disp Refills    ondansetron (ZOFRAN-ODT) 4 MG disintegrating tablet [Pharmacy Med Name: ONDANSETRON ODT 4 MG TABLET] 15 tablet 1     Sig: dissolve 1 tablet ON TONGUE every 12 hours if needed for nausea OR vomiting       Last Visit Date (If Applicable)  78/41/4456    Next Visit Date (If Applicable)  Visit date not found

## 2023-12-31 RX ORDER — ONDANSETRON 4 MG/1
TABLET, ORALLY DISINTEGRATING ORAL
Qty: 15 TABLET | Refills: 1 | Status: SHIPPED | OUTPATIENT
Start: 2023-12-31

## 2024-01-07 ENCOUNTER — HOSPITAL ENCOUNTER (EMERGENCY)
Age: 54
Discharge: HOME | End: 2024-01-07
Attending: EMERGENCY MEDICINE
Payer: MEDICAID

## 2024-01-07 ENCOUNTER — APPOINTMENT (OUTPATIENT)
Dept: GENERAL RADIOLOGY | Age: 54
End: 2024-01-07
Payer: MEDICAID

## 2024-01-07 VITALS
SYSTOLIC BLOOD PRESSURE: 114 MMHG | WEIGHT: 200 LBS | TEMPERATURE: 98.2 F | OXYGEN SATURATION: 95 % | HEIGHT: 60 IN | BODY MASS INDEX: 39.27 KG/M2 | RESPIRATION RATE: 16 BRPM | DIASTOLIC BLOOD PRESSURE: 76 MMHG | HEART RATE: 89 BPM

## 2024-01-07 DIAGNOSIS — R10.84 GENERALIZED ABDOMINAL PAIN: ICD-10-CM

## 2024-01-07 DIAGNOSIS — R11.2 NAUSEA AND VOMITING, UNSPECIFIED VOMITING TYPE: Primary | ICD-10-CM

## 2024-01-07 LAB
ALBUMIN SERPL-MCNC: 4 G/DL (ref 3.5–5.2)
ALBUMIN/GLOB SERPL: 1.3 {RATIO} (ref 1–2.5)
ALP SERPL-CCNC: 60 U/L (ref 35–104)
ALT SERPL-CCNC: 19 U/L (ref 5–33)
ANION GAP SERPL CALCULATED.3IONS-SCNC: 14 MMOL/L (ref 9–17)
AST SERPL-CCNC: 18 U/L
BACTERIA URNS QL MICRO: NORMAL
BASOPHILS # BLD: <0.03 K/UL (ref 0–0.2)
BASOPHILS NFR BLD: 0 % (ref 0–2)
BILIRUB SERPL-MCNC: 0.5 MG/DL (ref 0.3–1.2)
BILIRUB UR QL STRIP: NEGATIVE
BUN SERPL-MCNC: 11 MG/DL (ref 6–20)
CALCIUM SERPL-MCNC: 9 MG/DL (ref 8.6–10.4)
CASTS #/AREA URNS LPF: NORMAL /LPF (ref 0–8)
CHLORIDE SERPL-SCNC: 101 MMOL/L (ref 98–107)
CLARITY UR: CLEAR
CO2 SERPL-SCNC: 21 MMOL/L (ref 20–31)
COLOR UR: ABNORMAL
CREAT SERPL-MCNC: 0.8 MG/DL (ref 0.5–0.9)
EOSINOPHIL # BLD: 0.03 K/UL (ref 0–0.44)
EOSINOPHILS RELATIVE PERCENT: 1 % (ref 1–4)
EPI CELLS #/AREA URNS HPF: NORMAL /HPF (ref 0–5)
ERYTHROCYTE [DISTWIDTH] IN BLOOD BY AUTOMATED COUNT: 17.2 % (ref 11.8–14.4)
FLUAV AG SPEC QL: NEGATIVE
FLUBV AG SPEC QL: NEGATIVE
GFR SERPL CREATININE-BSD FRML MDRD: >60 ML/MIN/1.73M2
GLUCOSE SERPL-MCNC: 105 MG/DL (ref 70–99)
GLUCOSE UR STRIP-MCNC: NEGATIVE MG/DL
HCT VFR BLD AUTO: 41 % (ref 36.3–47.1)
HGB BLD-MCNC: 12.7 G/DL (ref 11.9–15.1)
HGB UR QL STRIP.AUTO: NEGATIVE
IMM GRANULOCYTES # BLD AUTO: <0.03 K/UL (ref 0–0.3)
IMM GRANULOCYTES NFR BLD: 0 %
KETONES UR STRIP-MCNC: ABNORMAL MG/DL
LEUKOCYTE ESTERASE UR QL STRIP: ABNORMAL
LIPASE SERPL-CCNC: 32 U/L (ref 13–60)
LYMPHOCYTES NFR BLD: 0.75 K/UL (ref 1.1–3.7)
LYMPHOCYTES RELATIVE PERCENT: 14 % (ref 24–43)
MCH RBC QN AUTO: 25.7 PG (ref 25.2–33.5)
MCHC RBC AUTO-ENTMCNC: 31 G/DL (ref 28.4–34.8)
MCV RBC AUTO: 83 FL (ref 82.6–102.9)
MONOCYTES NFR BLD: 0.2 K/UL (ref 0.1–1.2)
MONOCYTES NFR BLD: 4 % (ref 3–12)
NEUTROPHILS NFR BLD: 81 % (ref 36–65)
NEUTS SEG NFR BLD: 4.33 K/UL (ref 1.5–8.1)
NITRITE UR QL STRIP: NEGATIVE
NRBC BLD-RTO: 0 PER 100 WBC
PH UR STRIP: 5.5 [PH] (ref 5–8)
PLATELET # BLD AUTO: 336 K/UL (ref 138–453)
PMV BLD AUTO: 10.2 FL (ref 8.1–13.5)
POTASSIUM SERPL-SCNC: 3.8 MMOL/L (ref 3.7–5.3)
PROT SERPL-MCNC: 7.1 G/DL (ref 6.4–8.3)
PROT UR STRIP-MCNC: ABNORMAL MG/DL
RBC # BLD AUTO: 4.94 M/UL (ref 3.95–5.11)
RBC # BLD: ABNORMAL 10*6/UL
RBC #/AREA URNS HPF: NORMAL /HPF (ref 0–4)
SARS-COV-2 RDRP RESP QL NAA+PROBE: NOT DETECTED
SODIUM SERPL-SCNC: 136 MMOL/L (ref 135–144)
SP GR UR STRIP: 1.03 (ref 1–1.03)
SPECIMEN DESCRIPTION: NORMAL
TROPONIN I SERPL HS-MCNC: 6 NG/L (ref 0–14)
TROPONIN I SERPL HS-MCNC: <6 NG/L (ref 0–14)
UROBILINOGEN UR STRIP-ACNC: NORMAL EU/DL (ref 0–1)
WBC #/AREA URNS HPF: NORMAL /HPF (ref 0–5)
WBC OTHER # BLD: 5.3 K/UL (ref 3.5–11.3)

## 2024-01-07 PROCEDURE — 96375 TX/PRO/DX INJ NEW DRUG ADDON: CPT | Performed by: EMERGENCY MEDICINE

## 2024-01-07 PROCEDURE — 85025 COMPLETE CBC W/AUTO DIFF WBC: CPT

## 2024-01-07 PROCEDURE — 93005 ELECTROCARDIOGRAM TRACING: CPT

## 2024-01-07 PROCEDURE — 2580000003 HC RX 258

## 2024-01-07 PROCEDURE — 84484 ASSAY OF TROPONIN QUANT: CPT

## 2024-01-07 PROCEDURE — 6360000002 HC RX W HCPCS

## 2024-01-07 PROCEDURE — 83690 ASSAY OF LIPASE: CPT

## 2024-01-07 PROCEDURE — 99285 EMERGENCY DEPT VISIT HI MDM: CPT | Performed by: EMERGENCY MEDICINE

## 2024-01-07 PROCEDURE — 87804 INFLUENZA ASSAY W/OPTIC: CPT

## 2024-01-07 PROCEDURE — 96374 THER/PROPH/DIAG INJ IV PUSH: CPT | Performed by: EMERGENCY MEDICINE

## 2024-01-07 PROCEDURE — 87635 SARS-COV-2 COVID-19 AMP PRB: CPT

## 2024-01-07 PROCEDURE — 96361 HYDRATE IV INFUSION ADD-ON: CPT | Performed by: EMERGENCY MEDICINE

## 2024-01-07 PROCEDURE — 81001 URINALYSIS AUTO W/SCOPE: CPT

## 2024-01-07 PROCEDURE — 71046 X-RAY EXAM CHEST 2 VIEWS: CPT

## 2024-01-07 PROCEDURE — 6370000000 HC RX 637 (ALT 250 FOR IP)

## 2024-01-07 PROCEDURE — 80053 COMPREHEN METABOLIC PANEL: CPT

## 2024-01-07 PROCEDURE — 2500000003 HC RX 250 WO HCPCS

## 2024-01-07 RX ORDER — ONDANSETRON 2 MG/ML
4 INJECTION INTRAMUSCULAR; INTRAVENOUS ONCE
Status: COMPLETED | OUTPATIENT
Start: 2024-01-07 | End: 2024-01-07

## 2024-01-07 RX ORDER — DICYCLOMINE HYDROCHLORIDE 10 MG/ML
20 INJECTION INTRAMUSCULAR ONCE
Status: DISCONTINUED | OUTPATIENT
Start: 2024-01-07 | End: 2024-01-07

## 2024-01-07 RX ORDER — DICYCLOMINE HYDROCHLORIDE 10 MG/1
20 CAPSULE ORAL ONCE
Status: COMPLETED | OUTPATIENT
Start: 2024-01-07 | End: 2024-01-07

## 2024-01-07 RX ORDER — ACETAMINOPHEN 500 MG
1000 TABLET ORAL ONCE
Status: COMPLETED | OUTPATIENT
Start: 2024-01-07 | End: 2024-01-07

## 2024-01-07 RX ORDER — FAMOTIDINE 10 MG/ML
20 INJECTION, SOLUTION INTRAVENOUS
Status: COMPLETED | OUTPATIENT
Start: 2024-01-07 | End: 2024-01-07

## 2024-01-07 RX ORDER — SODIUM CHLORIDE, SODIUM LACTATE, POTASSIUM CHLORIDE, AND CALCIUM CHLORIDE .6; .31; .03; .02 G/100ML; G/100ML; G/100ML; G/100ML
1000 INJECTION, SOLUTION INTRAVENOUS ONCE
Status: COMPLETED | OUTPATIENT
Start: 2024-01-07 | End: 2024-01-07

## 2024-01-07 RX ORDER — MAGNESIUM HYDROXIDE/ALUMINUM HYDROXICE/SIMETHICONE 120; 1200; 1200 MG/30ML; MG/30ML; MG/30ML
30 SUSPENSION ORAL ONCE
Status: COMPLETED | OUTPATIENT
Start: 2024-01-07 | End: 2024-01-07

## 2024-01-07 RX ADMIN — ONDANSETRON 4 MG: 2 INJECTION INTRAMUSCULAR; INTRAVENOUS at 19:55

## 2024-01-07 RX ADMIN — ALUMINUM HYDROXIDE, MAGNESIUM HYDROXIDE, AND SIMETHICONE 30 ML: 200; 200; 20 SUSPENSION ORAL at 19:53

## 2024-01-07 RX ADMIN — FAMOTIDINE 20 MG: 10 INJECTION, SOLUTION INTRAVENOUS at 19:55

## 2024-01-07 RX ADMIN — ACETAMINOPHEN 1000 MG: 500 TABLET ORAL at 19:54

## 2024-01-07 RX ADMIN — SODIUM CHLORIDE, POTASSIUM CHLORIDE, SODIUM LACTATE AND CALCIUM CHLORIDE 1000 ML: 600; 310; 30; 20 INJECTION, SOLUTION INTRAVENOUS at 20:00

## 2024-01-07 RX ADMIN — DICYCLOMINE HYDROCHLORIDE 20 MG: 10 CAPSULE ORAL at 20:51

## 2024-01-07 ASSESSMENT — HEART SCORE: ECG: 0

## 2024-01-07 ASSESSMENT — PAIN - FUNCTIONAL ASSESSMENT: PAIN_FUNCTIONAL_ASSESSMENT: 0-10

## 2024-01-07 ASSESSMENT — PAIN SCALES - GENERAL: PAINLEVEL_OUTOF10: 9

## 2024-01-07 ASSESSMENT — PAIN DESCRIPTION - LOCATION: LOCATION: ABDOMEN

## 2024-01-08 LAB
EKG ATRIAL RATE: 84 BPM
EKG P AXIS: 75 DEGREES
EKG P-R INTERVAL: 164 MS
EKG Q-T INTERVAL: 356 MS
EKG QRS DURATION: 70 MS
EKG QTC CALCULATION (BAZETT): 420 MS
EKG R AXIS: 25 DEGREES
EKG T AXIS: 31 DEGREES
EKG VENTRICULAR RATE: 84 BPM

## 2024-01-08 PROCEDURE — 93010 ELECTROCARDIOGRAM REPORT: CPT | Performed by: INTERNAL MEDICINE

## 2024-01-08 NOTE — ED PROVIDER NOTES
[facility] ED  Emergency Department  Emergency Medicine Resident Turn-Over     Note Started: 8:29 PM EST    Care of Beatriz Edmond was assumed from Dr. Rayo and is being seen for Hypertension and Abdominal Pain  .  The patient's initial evaluation and plan have been discussed with the prior provider who initially evaluated the patient.     EMERGENCY DEPARTMENT COURSE / MEDICAL DECISION MAKING:       MEDICATIONS GIVEN:  Orders Placed This Encounter   Medications    lactated ringers bolus bolus 1,000 mL    ondansetron (ZOFRAN) injection 4 mg    famotidine (PEPCID) injection 20 mg    DISCONTD: dicyclomine (BENTYL) injection 20 mg    acetaminophen (TYLENOL) tablet 1,000 mg    aluminum & magnesium hydroxide-simethicone (MAALOX) 200-200-20 MG/5ML suspension 30 mL    dicyclomine (BENTYL) capsule 20 mg       LABS / RADIOLOGY:     Labs Reviewed   CBC WITH AUTO DIFFERENTIAL - Abnormal; Notable for the following components:       Result Value    RDW 17.2 (*)     Neutrophils % 81 (*)     Lymphocytes % 14 (*)     Lymphocytes Absolute 0.75 (*)     All other components within normal limits   COMPREHENSIVE METABOLIC PANEL W/ REFLEX TO MG FOR LOW K - Abnormal; Notable for the following components:    Glucose 105 (*)     All other components within normal limits   URINALYSIS WITH REFLEX TO CULTURE - Abnormal; Notable for the following components:    Color, UA Dark Yellow (*)     Ketones, Urine TRACE (*)     Specific Gravity, UA 1.031 (*)     Protein, UA TRACE (*)     Leukocyte Esterase, Urine TRACE (*)     All other components within normal limits   COVID-19, RAPID   RAPID INFLUENZA A/B ANTIGENS   LIPASE   TROPONIN   TROPONIN   MICROSCOPIC URINALYSIS       XR CHEST (2 VW)    Result Date: 1/7/2024  EXAMINATION: TWO XRAY VIEWS OF THE CHEST 1/7/2024 7:45 pm COMPARISON: 10/17/2023 HISTORY: ORDERING SYSTEM PROVIDED HISTORY: L sided chest pain, n/v TECHNOLOGIST PROVIDED HISTORY: L sided chest pain, n/v FINDINGS: 
 Note Started: 8:36 PM WARREN         [facility]     Emergency Department     Faculty Attestation    I performed a history and physical examination of the patient and discussed management with the resident. I reviewed the resident’s note and agree with the documented findings and plan of care. Any areas of disagreement are noted on the chart. I was personally present for the key portions of any procedures. I have documented in the chart those procedures where I was not present during the key portions. I have reviewed the emergency nurses triage note. I agree with the chief complaint, past medical history, past surgical history, allergies, medications, social and family history as documented unless otherwise noted below.        For Physician Assistant/ Nurse Practitioner cases/documentation I have personally evaluated this patient and have completed at least one if not all key elements of the E/M (history, physical exam, and MDM). Additional findings are as noted.  I have personally seen and evaluated the patient.  I find the patient's history and physical exam are consistent with the NP/PA documentation.  I agree with the care provided, treatment rendered, disposition and follow-up plan.    53-year-old female presenting with nausea, vomiting and chest pain.  Vomiting started before the chest pain.  Has been sick throughout the day today.  Multiple sick contacts, works as a nursing assistant.  Denies any cough or fever today.    Exam:  General : Laying on the bed, awake, alert, and in no acute distress  CV : normal rate and regular rhythm  Lungs : Breathing comfortably on room air with no tachypnea, hypoxia, or increased work of breathing  Abdomen : soft, non-tender, non-distended    DDx: Gastroenteritis, viral infection, atypical ACS    Plan:  Chest pain workup including CBC, electrolytes, troponin  Antiemetics, Pepcid, Maalox, IV fluids  Abdominal pain workup including CMP, lipase, UA, COVID, 
cocktail.  Signed out pending second Trope.    ED Course as of 01/08/24 1208   Sun Jan 07, 2024 2007 Chest x-ray clear [AR]   2017 Initial trop similar to prior [AR]   2024 Patient noted nausea, vomiting and acid reflux improved.  Notes abdominal pain mostly improved.  Still has some slight abdominal pain or chest pain.  Added on Bentyl. [AR]      ED Course User Index  [AR] Pippa Rayo MD       PROCEDURES:  None    CONSULTS:  None      FINAL IMPRESSION      1. Nausea and vomiting, unspecified vomiting type    2. Generalized abdominal pain          DISPOSITION / PLAN     DISPOSITION Decision To Discharge 01/07/2024 09:38:47 PM      PATIENT REFERRED TO:  Cherie Ambrose MD  [address] [place] [address]  [phone]    Schedule an appointment as soon as possible for a visit       [facility] ED  [address] [place] [address]  [phone]  Go to         DISCHARGE MEDICATIONS:  Discharge Medication List as of 1/7/2024  9:41 PM          Pippa Rayo MD  Emergency Medicine Resident    (Please note that portions of thisnote were completed with a voice recognition program.  Efforts were made to edit the dictations but occasionally words are mis-transcribed.)

## 2024-01-08 NOTE — DISCHARGE INSTRUCTIONS
Take your medication as indicated.  For pain use ibuprofen (Motrin / Advil) or acetaminophen (Tylenol), unless prescribed medications that have acetaminophen in it.  You can take over the counter acetaminophen tablets (1 - 2 tablets of the 500-mg strength every 6 hours) or ibuprofen tablets (2 tablets every 4 hours).    PLEASE RETURN TO THE EMERGENCY DEPARTMENT IMMEDIATELY for worsening symptoms of increasing pain, shortness of breath, feeling of your heart fluttering or racing, swelling to your feet, unable to lay flat, or if you develop any concerning symptoms such as: high fever not relieved by acetaminophen (Tylenol) and/or ibuprofen (Motrin / Advil), chills, persistent nausea and/or vomiting, loss of consciousness, numbness, weakness or tingling in the arms or legs or change in color of the extremities, changes in mental status, persistent headache, blurry vision, loss of bladder / bowel control, unable to follow up with your physician, or other any other care or concern.

## 2024-01-08 NOTE — ED NOTES
Patient presents to ED via triage with complaints of abdominal pain and emesis that started this morning. Patient states she was at work when it occurred. Patient also reports left sided chest pain that started after the vomiting. Patient states her blood pressure was also high with systolic in the 130s. Patient denies dizziness, sensitivity to light, headache, or blurred vision. Patient states she took a zofran at home. Patient has hx of hypertension and states she took her medications today. Patient is A&O x4 and connected to full cardiac monitor. IV placed, EKG done, call light within reach.

## 2024-01-09 ENCOUNTER — OFFICE VISIT (OUTPATIENT)
Dept: INTERNAL MEDICINE | Age: 54
End: 2024-01-09
Payer: MEDICAID

## 2024-01-09 VITALS
HEART RATE: 70 BPM | SYSTOLIC BLOOD PRESSURE: 110 MMHG | OXYGEN SATURATION: 97 % | DIASTOLIC BLOOD PRESSURE: 72 MMHG | TEMPERATURE: 98.4 F | BODY MASS INDEX: 39.58 KG/M2 | WEIGHT: 201.6 LBS | HEIGHT: 60 IN

## 2024-01-09 DIAGNOSIS — F32.1 CURRENT MODERATE EPISODE OF MAJOR DEPRESSIVE DISORDER, UNSPECIFIED WHETHER RECURRENT (HCC): ICD-10-CM

## 2024-01-09 DIAGNOSIS — R10.13 EPIGASTRIC PAIN: Primary | ICD-10-CM

## 2024-01-09 DIAGNOSIS — K22.2 STRICTURE ESOPHAGUS: ICD-10-CM

## 2024-01-09 PROCEDURE — G8484 FLU IMMUNIZE NO ADMIN: HCPCS

## 2024-01-09 PROCEDURE — 99213 OFFICE O/P EST LOW 20 MIN: CPT

## 2024-01-09 PROCEDURE — G8417 CALC BMI ABV UP PARAM F/U: HCPCS

## 2024-01-09 PROCEDURE — 1036F TOBACCO NON-USER: CPT

## 2024-01-09 PROCEDURE — G8427 DOCREV CUR MEDS BY ELIG CLIN: HCPCS

## 2024-01-09 PROCEDURE — 3074F SYST BP LT 130 MM HG: CPT

## 2024-01-09 PROCEDURE — 3078F DIAST BP <80 MM HG: CPT

## 2024-01-09 PROCEDURE — 3017F COLORECTAL CA SCREEN DOC REV: CPT

## 2024-01-09 RX ORDER — FLUOXETINE HYDROCHLORIDE 20 MG/1
20 CAPSULE ORAL DAILY
Qty: 30 CAPSULE | Refills: 0 | Status: SHIPPED | OUTPATIENT
Start: 2024-01-09

## 2024-01-09 RX ORDER — MENTHOL 100 MG/G
CREAM TOPICAL
Qty: 100 G | Refills: 1 | Status: CANCELLED | OUTPATIENT
Start: 2024-01-09

## 2024-01-09 ASSESSMENT — PATIENT HEALTH QUESTIONNAIRE - PHQ9
2. FEELING DOWN, DEPRESSED OR HOPELESS: 2
SUM OF ALL RESPONSES TO PHQ QUESTIONS 1-9: 18
9. THOUGHTS THAT YOU WOULD BE BETTER OFF DEAD, OR OF HURTING YOURSELF: 0
SUM OF ALL RESPONSES TO PHQ QUESTIONS 1-9: 18
8. MOVING OR SPEAKING SO SLOWLY THAT OTHER PEOPLE COULD HAVE NOTICED. OR THE OPPOSITE, BEING SO FIGETY OR RESTLESS THAT YOU HAVE BEEN MOVING AROUND A LOT MORE THAN USUAL: 0
SUM OF ALL RESPONSES TO PHQ QUESTIONS 1-9: 18
7. TROUBLE CONCENTRATING ON THINGS, SUCH AS READING THE NEWSPAPER OR WATCHING TELEVISION: 3
SUM OF ALL RESPONSES TO PHQ QUESTIONS 1-9: 18
SUM OF ALL RESPONSES TO PHQ9 QUESTIONS 1 & 2: 3
3. TROUBLE FALLING OR STAYING ASLEEP: 3
6. FEELING BAD ABOUT YOURSELF - OR THAT YOU ARE A FAILURE OR HAVE LET YOURSELF OR YOUR FAMILY DOWN: 3
1. LITTLE INTEREST OR PLEASURE IN DOING THINGS: 1
4. FEELING TIRED OR HAVING LITTLE ENERGY: 3
5. POOR APPETITE OR OVEREATING: 3
10. IF YOU CHECKED OFF ANY PROBLEMS, HOW DIFFICULT HAVE THESE PROBLEMS MADE IT FOR YOU TO DO YOUR WORK, TAKE CARE OF THINGS AT HOME, OR GET ALONG WITH OTHER PEOPLE: 1

## 2024-01-09 ASSESSMENT — ENCOUNTER SYMPTOMS
ABDOMINAL DISTENTION: 0
COUGH: 0
VOMITING: 0
CONSTIPATION: 1
SHORTNESS OF BREATH: 0
ABDOMINAL PAIN: 1
WHEEZING: 0
DIARRHEA: 0
CHEST TIGHTNESS: 0
NAUSEA: 0

## 2024-01-09 NOTE — PROGRESS NOTES
[facility]  [facility]  [address] [place] [address]  Dept: [phone]  Dept Fax: [phone]    Office Progress/Follow Up Note  Date ofpatient's visit: 1/9/2024  Patient's Name:  Beatriz Edmond YOB: 1970            Patient Care Team:  Cherie Ambrose MD as PCP - General (Internal Medicine)  Cherie Ambrose MD as PCP - Empaneled Provider  ================================================================    REASON FOR VISIT/CHIEF COMPLAINT:  Follow-up (Seen at [facility] ER 1/7/24 for abdominal pain) and Depression (Depression screeing 18)      HISTORY OF PRESENTING ILLNESS:  History was obtained from: patient, electronic medical record. Beatriz Edmond is a 53 y.o. female is here for a ED follow up.    Patient was in the ED on 1/7/2024 with epigastric pain.  Stated that it started all of a sudden and that she it was also associated with emesis, denied any hematemesis.  In the ED they gave her a GI cocktail and lab work including troponin, EKG, CBC, CMP, lipase, and UA all unremarkable.  She was discharged with Bentyl and her epigastric pain has improved.  Today she is stating that its about a 5/10 still in the epigastric area.  She states that it is associated with eating and tends to happen right after she eats food, not with any food in particular but with most foods.  She does use ibuprofen however has only been using it as needed and states that she only uses it about 2 times a week at the most.  She has not had any nausea since she left the ED.  She also states that she is chronically constipated and uses Senokot every other day which does keep her regular and her bowels moving every other day.  She denied any diarrhea or any melena or any arlen red blood per rectum.  She does still have a gallbladder.   Additionally patient was screened for depression and was showing moderate on the score.  She does state that with her current weight which she

## 2024-01-09 NOTE — PROGRESS NOTES
Attending Physician Statement  I have discussed the care of Beatriz Edmond, including pertinent history and exam findings with the resident. I have reviewed the key elements of all parts of the encounter with the resident. I agree with the assessment, and status of the problem list as documented. and this was also documented by the resident.The medication list was reviewed with the resident and is up to date.    Diagnosis Orders   1. Epigastric pain  US GALLBLADDER RUQ    Ethan Jaquez MD, Gastroenterology, [facility]      2. Stricture esophagus  Ethan Jaquez MD, Gastroenterology, [facility]      3. Current moderate episode of major depressive disorder, unspecified whether recurrent (HCC)  FLUoxetine (PROZAC) 20 MG capsule           Darcy Squires MD   Attending Physician, [facility]   Faculty, Internal Medicine Residency Program  [facility]

## 2024-01-10 ENCOUNTER — TELEPHONE (OUTPATIENT)
Age: 54
End: 2024-01-10

## 2024-01-12 ENCOUNTER — HOSPITAL ENCOUNTER (OUTPATIENT)
Dept: ULTRASOUND IMAGING | Age: 54
End: 2024-01-12
Payer: MEDICAID

## 2024-01-12 DIAGNOSIS — R10.13 EPIGASTRIC PAIN: ICD-10-CM

## 2024-01-12 PROCEDURE — 76705 ECHO EXAM OF ABDOMEN: CPT

## 2024-01-16 DIAGNOSIS — K21.00 CHRONIC REFLUX ESOPHAGITIS: ICD-10-CM

## 2024-01-16 DIAGNOSIS — M70.62 GREATER TROCHANTERIC BURSITIS OF LEFT HIP: ICD-10-CM

## 2024-01-16 DIAGNOSIS — J45.50 SEVERE PERSISTENT ASTHMA WITHOUT COMPLICATION: ICD-10-CM

## 2024-01-16 RX ORDER — OMEPRAZOLE 40 MG/1
CAPSULE, DELAYED RELEASE ORAL
Qty: 30 CAPSULE | Refills: 3 | Status: SHIPPED | OUTPATIENT
Start: 2024-01-16

## 2024-01-16 RX ORDER — MENTHOL 100 MG/G
CREAM TOPICAL
Qty: 100 G | Refills: 1 | Status: SHIPPED | OUTPATIENT
Start: 2024-01-16

## 2024-01-16 RX ORDER — SIMETHICONE 80 MG
TABLET,CHEWABLE ORAL
Qty: 180 TABLET | Refills: 0 | Status: SHIPPED | OUTPATIENT
Start: 2024-01-16

## 2024-01-16 RX ORDER — ALBUTEROL SULFATE 90 UG/1
AEROSOL, METERED RESPIRATORY (INHALATION)
Qty: 1 EACH | Refills: 5 | Status: SHIPPED | OUTPATIENT
Start: 2024-01-16

## 2024-01-16 NOTE — TELEPHONE ENCOUNTER
Request for   Requested Prescriptions      No prescriptions requested or ordered in this encounter    .      Please review and e-scribe to pharmacy listed in chart if appropriate. Thank you.      Last Visit Date: 1/9/2024  Next Visit Date: 4/2/2024    Future Appointments   Date Time Provider Department Center   4/2/2024  9:00 AM Cherie Ambrose MD [facility]   4/18/2024  1:30 PM Mike James MD Neuro [facility] Neurology -       Health Maintenance   Topic Date Due    Hepatitis B vaccine (1 of 3 - 3-dose series) Never done    Pneumococcal 0-64 years Vaccine (2 - PCV) 01/21/2017    Colorectal Cancer Screen  02/08/2023    Flu vaccine (1) 08/01/2023    COVID-19 Vaccine (3 - 2023-24 season) 09/01/2023    Shingles vaccine (2 of 2) 01/08/2025 (Originally 9/3/2021)    Lipids  05/09/2024    Depression Monitoring  01/09/2025    Breast cancer screen  06/07/2025    DTaP/Tdap/Td vaccine (2 - Td or Tdap) 02/05/2026    Diabetes screen  05/09/2026    Cervical cancer screen  09/07/2026    Hepatitis C screen  Completed    HIV screen  Completed    Hepatitis A vaccine  Aged Out    Hib vaccine  Aged Out    Polio vaccine  Aged Out    Meningococcal (ACWY) vaccine  Aged Out    A1C test (Diabetic or Prediabetic)  Discontinued    Depression Screen  Discontinued       Hemoglobin A1C (%)   Date Value   05/09/2023 5.6   10/25/2022 5.6   07/09/2021 5.6             ( goal A1C is < 7)   No components found for: \"LABMICR\"  LDL Cholesterol (mg/dL)   Date Value   05/09/2023 83       (goal LDL is <100)   AST (U/L)   Date Value   01/07/2024 18     ALT (U/L)   Date Value   01/07/2024 19     BUN (mg/dL)   Date Value   01/07/2024 11     BP Readings from Last 3 Encounters:   01/09/24 110/72   01/07/24 114/76   10/20/23 (!) 144/80          (goal 120/80)    All Future Testing planned in [facility]  Lab Frequency Next Occurrence   Hepatic Function Panel Once 02/07/2023         Patient Active Problem List:     Hypertension     Asthma     GERD (gastroesophageal

## 2024-01-27 DIAGNOSIS — I10 ESSENTIAL HYPERTENSION: ICD-10-CM

## 2024-01-27 DIAGNOSIS — Z98.890 S/P CARDIAC CATH: ICD-10-CM

## 2024-01-29 NOTE — TELEPHONE ENCOUNTER
A Refill Has Been Requested for Beatriz MESERET Edmond    Medication Requested  Requested Prescriptions     Pending Prescriptions Disp Refills    aspirin (ASPIRIN LOW DOSE) 81 MG EC tablet [Pharmacy Med Name: ASPIRIN EC 81 MG TABLET] 30 tablet 5     Sig: take 1 tablet by mouth once daily    carvedilol (COREG) 6.25 MG tablet [Pharmacy Med Name: CARVEDILOL 6.25 MG TABLET] 60 tablet 3     Sig: take 1 tablet by mouth twice a day       Last Visit Date (If Applicable)  10/20/2023    Next Visit Date (If Applicable)  4/2/2024

## 2024-01-30 RX ORDER — ASPIRIN 81 MG/1
TABLET ORAL
Qty: 30 TABLET | Refills: 5 | Status: SHIPPED | OUTPATIENT
Start: 2024-01-30

## 2024-01-30 RX ORDER — CARVEDILOL 6.25 MG/1
TABLET ORAL
Qty: 60 TABLET | Refills: 3 | Status: SHIPPED | OUTPATIENT
Start: 2024-01-30

## 2024-02-01 DIAGNOSIS — F32.1 CURRENT MODERATE EPISODE OF MAJOR DEPRESSIVE DISORDER, UNSPECIFIED WHETHER RECURRENT (HCC): ICD-10-CM

## 2024-02-01 NOTE — TELEPHONE ENCOUNTER
Beatriz Edmond is calling to request a refill on the following medication(s):    Medication Request:  Requested Prescriptions     Pending Prescriptions Disp Refills    FLUoxetine (PROZAC) 20 MG capsule [Pharmacy Med Name: FLUOXETINE HCL 20 MG CAPSULE] 30 capsule 0     Sig: take 1 capsule by mouth once daily       Last Visit Date (If Applicable):  1/9/2024    Next Visit Date:    4/2/2024

## 2024-02-05 RX ORDER — FLUOXETINE HYDROCHLORIDE 20 MG/1
20 CAPSULE ORAL DAILY
Qty: 30 CAPSULE | Refills: 1 | Status: SHIPPED | OUTPATIENT
Start: 2024-02-05

## 2024-02-08 DIAGNOSIS — E78.2 MIXED HYPERLIPIDEMIA: ICD-10-CM

## 2024-02-08 RX ORDER — ATORVASTATIN CALCIUM 40 MG/1
40 TABLET, FILM COATED ORAL DAILY
Qty: 30 TABLET | Refills: 2 | Status: SHIPPED | OUTPATIENT
Start: 2024-02-08

## 2024-02-08 NOTE — TELEPHONE ENCOUNTER
A Refill Has Been Requested for Beatriz Edmond    Medication Requested  Requested Prescriptions     Pending Prescriptions Disp Refills    atorvastatin (LIPITOR) 40 MG tablet [Pharmacy Med Name: ATORVASTATIN 40 MG TABLET] 30 tablet 2     Sig: take 1 tablet by mouth once daily       Last Visit Date (If Applicable)  10/20/2023    Next Visit Date (If Applicable)  4/2/2024

## 2024-02-29 DIAGNOSIS — J45.50 SEVERE PERSISTENT ASTHMA WITHOUT COMPLICATION: ICD-10-CM

## 2024-02-29 NOTE — TELEPHONE ENCOUNTER
..Request for   Requested Prescriptions     Pending Prescriptions Disp Refills    mometasone-formoterol (DULERA) 200-5 MCG/ACT inhaler [Pharmacy Med Name: DULERA 200 MCG-5 MCG INHALER] 13 g 2     Sig: inhale 2 puffs by mouth and INTO THE LUNGS twice a day    .      Please review and e-scribe to pharmacy listed in chart if appropriate. Thank you.      Last Visit Date: 1/9/2024  Next Visit Date: 4/2/2024    Future Appointments   Date Time Provider Department Center   4/2/2024  9:00 AM Cherie Ambrose MD [facility]   4/18/2024  1:30 PM Mike James MD Neuro [facility] Neurology -       Health Maintenance   Topic Date Due    Hepatitis B vaccine (1 of 3 - 3-dose series) Never done    Pneumococcal 0-64 years Vaccine (2 - PCV) 01/21/2017    Colorectal Cancer Screen  02/08/2023    Flu vaccine (1) 08/01/2023    COVID-19 Vaccine (3 - 2023-24 season) 09/01/2023    Shingles vaccine (2 of 2) 01/08/2025 (Originally 9/3/2021)    Lipids  05/09/2024    Depression Monitoring  01/09/2025    Breast cancer screen  06/07/2025    DTaP/Tdap/Td vaccine (2 - Td or Tdap) 02/05/2026    Diabetes screen  05/09/2026    Cervical cancer screen  09/07/2026    Hepatitis C screen  Completed    HIV screen  Completed    Hepatitis A vaccine  Aged Out    Hib vaccine  Aged Out    Polio vaccine  Aged Out    Meningococcal (ACWY) vaccine  Aged Out    A1C test (Diabetic or Prediabetic)  Discontinued    Depression Screen  Discontinued       Hemoglobin A1C (%)   Date Value   05/09/2023 5.6   10/25/2022 5.6   07/09/2021 5.6             ( goal A1C is < 7)   No components found for: \"LABMICR\"  LDL Cholesterol (mg/dL)   Date Value   05/09/2023 83       (goal LDL is <100)   AST (U/L)   Date Value   01/07/2024 18     ALT (U/L)   Date Value   01/07/2024 19     BUN (mg/dL)   Date Value   01/07/2024 11     BP Readings from Last 3 Encounters:   01/09/24 110/72   01/07/24 114/76   10/20/23 (!) 144/80          (goal 120/80)    [facility]

## 2024-03-04 NOTE — TELEPHONE ENCOUNTER
Beatriz Edmond is calling to request a refill on the following medication(s):    Medication Request:  Requested Prescriptions     Pending Prescriptions Disp Refills    ondansetron (ZOFRAN-ODT) 4 MG disintegrating tablet [Pharmacy Med Name: ONDANSETRON ODT 4 MG TABLET] 15 tablet 1     Sig: dissolve 1 tablet ON TONGUE every 12 hours if needed for nausea OR vomiting       Last Visit Date (If Applicable):  10/20/2023    Next Visit Date:    4/2/2024

## 2024-03-05 RX ORDER — ONDANSETRON 4 MG/1
TABLET, ORALLY DISINTEGRATING ORAL
Qty: 15 TABLET | Refills: 1 | Status: SHIPPED | OUTPATIENT
Start: 2024-03-05

## 2024-03-14 NOTE — ED TRIAGE NOTES
Patient comes in with complaints of right sided head pressure. Patient states pressure has been intermittent for x3 weeks. Patient states pressure radiates to the right side of her face. Patient also states that pressure causes bilateral vision blurriness. Patient denies hitting head of any injury to head. Pt denies taking anticoagulants. Pt states she does have a history of hypertension.
Sumi Marte  Urology  [address] [place] [address]  Phone: [phone]  Fax: [phone]  Follow Up Time: 1-3 Days

## 2024-03-28 ENCOUNTER — APPOINTMENT (OUTPATIENT)
Dept: GENERAL RADIOLOGY | Age: 54
End: 2024-03-28
Payer: MEDICAID

## 2024-03-28 ENCOUNTER — HOSPITAL ENCOUNTER (EMERGENCY)
Age: 54
Discharge: HOME | End: 2024-03-28
Attending: EMERGENCY MEDICINE
Payer: MEDICAID

## 2024-03-28 VITALS
SYSTOLIC BLOOD PRESSURE: 159 MMHG | DIASTOLIC BLOOD PRESSURE: 103 MMHG | HEART RATE: 85 BPM | TEMPERATURE: 99 F | OXYGEN SATURATION: 99 % | WEIGHT: 218.48 LBS | RESPIRATION RATE: 20 BRPM | BODY MASS INDEX: 42.67 KG/M2

## 2024-03-28 DIAGNOSIS — J10.1 INFLUENZA A: Primary | ICD-10-CM

## 2024-03-28 LAB
ANION GAP SERPL CALCULATED.3IONS-SCNC: 10 MMOL/L (ref 9–16)
BASOPHILS # BLD: <0.03 K/UL (ref 0–0.2)
BASOPHILS NFR BLD: 0 % (ref 0–2)
BUN SERPL-MCNC: 7 MG/DL (ref 6–20)
CALCIUM SERPL-MCNC: 8.9 MG/DL (ref 8.6–10.4)
CHLORIDE SERPL-SCNC: 105 MMOL/L (ref 98–107)
CO2 SERPL-SCNC: 25 MMOL/L (ref 20–31)
CREAT SERPL-MCNC: 0.7 MG/DL (ref 0.5–0.9)
EOSINOPHIL # BLD: <0.03 K/UL (ref 0–0.44)
EOSINOPHILS RELATIVE PERCENT: 0 % (ref 1–4)
ERYTHROCYTE [DISTWIDTH] IN BLOOD BY AUTOMATED COUNT: 16.5 % (ref 11.8–14.4)
FLUAV AG SPEC QL: POSITIVE
FLUBV AG SPEC QL: NEGATIVE
GFR SERPL CREATININE-BSD FRML MDRD: >90 ML/MIN/1.73M2
GLUCOSE SERPL-MCNC: 101 MG/DL (ref 74–99)
HCT VFR BLD AUTO: 36.3 % (ref 36.3–47.1)
HGB BLD-MCNC: 11.3 G/DL (ref 11.9–15.1)
IMM GRANULOCYTES # BLD AUTO: 0.03 K/UL (ref 0–0.3)
IMM GRANULOCYTES NFR BLD: 0 %
LYMPHOCYTES NFR BLD: 1.68 K/UL (ref 1.1–3.7)
LYMPHOCYTES RELATIVE PERCENT: 24 % (ref 24–43)
MCH RBC QN AUTO: 25.6 PG (ref 25.2–33.5)
MCHC RBC AUTO-ENTMCNC: 31.1 G/DL (ref 28.4–34.8)
MCV RBC AUTO: 82.1 FL (ref 82.6–102.9)
MONOCYTES NFR BLD: 0.51 K/UL (ref 0.1–1.2)
MONOCYTES NFR BLD: 7 % (ref 3–12)
NEUTROPHILS NFR BLD: 69 % (ref 36–65)
NEUTS SEG NFR BLD: 4.66 K/UL (ref 1.5–8.1)
NRBC BLD-RTO: 0 PER 100 WBC
PLATELET # BLD AUTO: 340 K/UL (ref 138–453)
PMV BLD AUTO: 10.4 FL (ref 8.1–13.5)
POTASSIUM SERPL-SCNC: 3.9 MMOL/L (ref 3.7–5.3)
RBC # BLD AUTO: 4.42 M/UL (ref 3.95–5.11)
RBC # BLD: ABNORMAL 10*6/UL
SARS-COV-2 RDRP RESP QL NAA+PROBE: NOT DETECTED
SODIUM SERPL-SCNC: 140 MMOL/L (ref 136–145)
SPECIMEN DESCRIPTION: NORMAL
TROPONIN I SERPL HS-MCNC: 9 NG/L (ref 0–14)
TROPONIN I SERPL HS-MCNC: <6 NG/L (ref 0–14)
WBC OTHER # BLD: 6.9 K/UL (ref 3.5–11.3)

## 2024-03-28 PROCEDURE — 71046 X-RAY EXAM CHEST 2 VIEWS: CPT

## 2024-03-28 PROCEDURE — 94761 N-INVAS EAR/PLS OXIMETRY MLT: CPT

## 2024-03-28 PROCEDURE — 94640 AIRWAY INHALATION TREATMENT: CPT

## 2024-03-28 PROCEDURE — 6370000000 HC RX 637 (ALT 250 FOR IP)

## 2024-03-28 PROCEDURE — 6360000002 HC RX W HCPCS

## 2024-03-28 PROCEDURE — 80048 BASIC METABOLIC PNL TOTAL CA: CPT

## 2024-03-28 PROCEDURE — 87635 SARS-COV-2 COVID-19 AMP PRB: CPT

## 2024-03-28 PROCEDURE — 93005 ELECTROCARDIOGRAM TRACING: CPT

## 2024-03-28 PROCEDURE — 87804 INFLUENZA ASSAY W/OPTIC: CPT

## 2024-03-28 PROCEDURE — 85025 COMPLETE CBC W/AUTO DIFF WBC: CPT

## 2024-03-28 PROCEDURE — 84484 ASSAY OF TROPONIN QUANT: CPT

## 2024-03-28 PROCEDURE — 99285 EMERGENCY DEPT VISIT HI MDM: CPT

## 2024-03-28 RX ORDER — IPRATROPIUM BROMIDE AND ALBUTEROL SULFATE 2.5; .5 MG/3ML; MG/3ML
1 SOLUTION RESPIRATORY (INHALATION) EVERY 4 HOURS PRN
Status: DISCONTINUED | OUTPATIENT
Start: 2024-03-28 | End: 2024-03-28 | Stop reason: HOSPADM

## 2024-03-28 RX ORDER — PREDNISONE 50 MG/1
50 TABLET ORAL DAILY
Qty: 5 TABLET | Refills: 0 | Status: SHIPPED | OUTPATIENT
Start: 2024-03-28 | End: 2024-04-02

## 2024-03-28 RX ORDER — DEXAMETHASONE SODIUM PHOSPHATE 10 MG/ML
10 INJECTION, SOLUTION INTRAMUSCULAR; INTRAVENOUS ONCE
Status: COMPLETED | OUTPATIENT
Start: 2024-03-28 | End: 2024-03-28

## 2024-03-28 RX ORDER — GUAIFENESIN/DEXTROMETHORPHAN 100-10MG/5
5 SYRUP ORAL 3 TIMES DAILY PRN
Qty: 75 ML | Refills: 0 | Status: SHIPPED | OUTPATIENT
Start: 2024-03-28 | End: 2024-04-02

## 2024-03-28 RX ADMIN — IPRATROPIUM BROMIDE AND ALBUTEROL SULFATE 1 DOSE: 2.5; .5 SOLUTION RESPIRATORY (INHALATION) at 09:34

## 2024-03-28 RX ADMIN — DEXAMETHASONE SODIUM PHOSPHATE 10 MG: 10 INJECTION, SOLUTION INTRAMUSCULAR; INTRAVENOUS at 09:19

## 2024-03-28 ASSESSMENT — ENCOUNTER SYMPTOMS
VOMITING: 0
SHORTNESS OF BREATH: 1
COUGH: 1
CHEST TIGHTNESS: 1
NAUSEA: 1
DIARRHEA: 0
ABDOMINAL PAIN: 0
SORE THROAT: 0

## 2024-03-28 ASSESSMENT — PAIN DESCRIPTION - LOCATION: LOCATION: HEAD

## 2024-03-28 ASSESSMENT — PAIN SCALES - GENERAL: PAINLEVEL_OUTOF10: 5

## 2024-03-28 ASSESSMENT — PAIN - FUNCTIONAL ASSESSMENT: PAIN_FUNCTIONAL_ASSESSMENT: 0-10

## 2024-03-28 ASSESSMENT — LIFESTYLE VARIABLES
HOW MANY STANDARD DRINKS CONTAINING ALCOHOL DO YOU HAVE ON A TYPICAL DAY: PATIENT DOES NOT DRINK
HOW OFTEN DO YOU HAVE A DRINK CONTAINING ALCOHOL: NEVER

## 2024-03-28 ASSESSMENT — PAIN DESCRIPTION - FREQUENCY: FREQUENCY: CONTINUOUS

## 2024-03-28 NOTE — ED PROVIDER NOTES
[facility]     Emergency Department     Faculty Attestation    I performed a history and physical examination of the patient and discussed management with the resident. I have reviewed and agree with the resident’s findings including all diagnostic interpretations, and treatment plans as written at the time of my review. Any areas of disagreement are noted on the chart. I was personally present for the key portions of any procedures. I have documented in the chart those procedures where I was not present during the key portions. For Physician Assistant/ Nurse Practitioner cases/documentation I have personally evaluated this patient and have completed at least one if not all key elements of the E/M (history, physical exam, and MDM). Additional findings are as noted.    PtName: Beatriz Edmond  MRN: [id number]  Birthdate 1970  Date of evaluation: 3/28/24  Note Started: 8:59 AM EDT    Primary Care Physician: Cherie Ambrose MD        History: This is a 53 y.o. female who presents to the Emergency Department with complaint of cough shortness of breath.  This been ongoing for last 3 days.  Patient does complain of productive yellowish sputum.  Patient also complains of fever last night.  Denies any nausea vomiting.  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    Physical:   weight is 99.1 kg (218 lb 7.6 oz). Her oral temperature is 99 °F (37.2 °C). Her blood pressure is 159/103 (abnormal) and her pulse is 85. Her respiration is 20 and oxygen saturation is 99%.  Lungs patient has diminished breath sounds throughout, heart regular rate and rhythm, abdomen is soft nontender    Impression: Shortness of breath, cough    Plan: Chest x-ray, EKG, CBC, BMP, troponin, albuterol, steroid        EKG Interpretation    Interpreted by me    Rhythm: normal sinus   Rate: normal, 87  Axis: normal  Ectopy: none  Conduction: normal  ST Segments: no acute change  T Waves:

## 2024-03-28 NOTE — ED TRIAGE NOTES
Pt presents to the ER with three day history of cough, shortness of breath,chest tightness and headache. Pt states she has been febrile as well. 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 Pt states she has green sputum when she coughs. Pt reports having a fever of 101 last night and took motrin. Pt states she has COPD and asthma and she feels that could be exacerbated .     Pt also reports sore throat and nasal congestion also.

## 2024-03-28 NOTE — ED NOTES
Labeled blood specimens sent to lab via tube system.    [] Lavender   [] on ice   [] Blue   [x] Green/yellow  [] Green/black [] on ice  [] Pink  [] Red  [] Yellow  [] on ice  [] Blood Cultures  [] x1 [] x2

## 2024-03-28 NOTE — ED PROVIDER NOTES
[facility] ED  Emergency Department Encounter  Emergency Medicine Resident     Pt Name:Beatriz Edmond  MRN: [id number]  Birthdate 1970  Date of evaluation: 3/28/24  PCP:  Cherie Ambrose MD  Note Started: 8:52 AM EDT      CHIEF COMPLAINT       Chief Complaint   Patient presents with    Cough    Shortness of Breath       HISTORY OF PRESENT ILLNESS  (Location/Symptom, Timing/Onset, Context/Setting, Quality, Duration, Modifying Factors, Severity.)      Beatriz Edmond is a 53 y.o. female with history of asthma, COPD who presents with chest tightness, shortness of breath, and productive cough for the past 3 days.  Patient states she has been using her albuterol inhaler at home but states that she feels like she needs a breathing treatment.  She states she did have a fever of 101 last night.  The sputum is green in color.  She does feel nauseous but denies any episodes of vomiting.  No diarrhea.  She denies any cardiac history.  No history of DVT or PE.  Not on blood thinners.  Denies any lower extremity swelling or calf tenderness bilaterally.    PAST MEDICAL / SURGICAL / SOCIAL / FAMILY HISTORY      has a past medical history of Allergic rhinitis, Asthma, Clinical trial participant, COPD (chronic obstructive pulmonary disease) (HCC), Dysphagia, Dysphagia, Family history of colon cancer, GERD (gastroesophageal reflux disease), H/O cardiovascular stress test, Hiatal hernia, Hyperlipidemia, Hypertension, Iron deficiency anemia secondary to inadequate dietary iron intake, Iron deficiency anemia, unspecified, Irritable bowel syndrome with constipation, Obesity, Snores, and Wears glasses.     has a past surgical history that includes Tubal ligation (2003); Colonoscopy (2015); Upper gastrointestinal endoscopy; Upper gastrointestinal endoscopy (N/A, 7/18/2017); Cardiac catheterization (01/16/2018); Cardiac catheterization (11/23/2015); Endoscopy, colon, diagnostic (2015); Endoscopy, colon, diagnostic  normal.  Breath sounds diminished bilaterally but clear to auscultation.  No respiratory distress.  No lower extremity swelling or calf tenderness bilaterally.  Will order EKG, chest x-ray, and basic labs.  Will swab for COVID and influenza.  RT eval as patient is pretty tight.  Will also plan for Decadron.  Anticipate discharge.    Amount and/or Complexity of Data Reviewed  External Data Reviewed: labs, ECG and notes.  Labs: ordered. Decision-making details documented in ED Course.  Radiology: ordered. Decision-making details documented in ED Course.  ECG/medicine tests: ordered and independent interpretation performed. Decision-making details documented in ED Course.    Risk  OTC drugs.  Prescription drug management.    Critical Care  Total time providing critical care: 0 minutes        EKG    EKG Interpretation    Interpreted by me    Rhythm: normal sinus   Rate: normal  Axis: normal  Ectopy: none  Conduction: normal  ST Segments: no acute change  T Waves: no acute change  Q Waves: none    Clinical Impression: no acute changes and normal EKG    All EKG's are interpreted by the Emergency Department Physician who either signs or Co-signs this chart in the absence of a cardiologist.    EMERGENCY DEPARTMENT COURSE:  ED Course as of 03/28/24 1118   Thu Mar 28, 2024   0934 WBC: 6.9 [KR]   0935 Hemoglobin Quant(!): 11.3  At baseline [KR]   0935 SARS-CoV-2, Rapid: Not Detected [KR]   0935 Flu A Antigen(!): POSITIVE [KR]   0947 Troponin, High Sensitivity: <6 [KR]   1029 Troponin, High Sensitivity: 9 [KR]   1109 IMPRESSION:  No acute process.   [KR]   1116 Reevaluated patient.  Patient has better aeration after breathing treatment by RT.  Updated her of her lab and imaging results.  Patient has enough Tylenol and ibuprofen at home.  She also has enough of her albuterol inhaler.  Strict return precautions provided to patient.  She verbalized understanding and is agreeable with this time with cough medicine and prednisone.

## 2024-03-28 NOTE — DISCHARGE INSTRUCTIONS
You were seen in the emergency department for productive cough and shortness of breath.  Your vital signs were stable.  No fever noted.  You are saturating well on room air.  EKG was normal.  Chest x-ray showed no signs of pneumonia.  You did test positive for influenza A.    Influenza A is a virus that will take its course.  Symptoms usually improve by 7 to 10 days.  Be sure to stay well-hydrated.  You may take Tylenol or ibuprofen as needed for fevers or bodyaches.  I have sent a short course of steroids to the pharmacy.  Please take these as prescribed and complete the entire course.  These will help with inflammation.  I have also sent Robitussin to the pharmacy as well that will help with the cough.    Please follow-up with your primary care provider in 1 week given recent ER visit.  Please return to the emergency department if your symptoms worsen or if you have any other concerns.

## 2024-03-29 LAB
EKG ATRIAL RATE: 87 BPM
EKG P AXIS: 78 DEGREES
EKG P-R INTERVAL: 176 MS
EKG Q-T INTERVAL: 350 MS
EKG QRS DURATION: 70 MS
EKG QTC CALCULATION (BAZETT): 421 MS
EKG R AXIS: 38 DEGREES
EKG T AXIS: 40 DEGREES
EKG VENTRICULAR RATE: 87 BPM

## 2024-03-29 PROCEDURE — 93010 ELECTROCARDIOGRAM REPORT: CPT | Performed by: INTERNAL MEDICINE

## 2024-04-02 ENCOUNTER — OFFICE VISIT (OUTPATIENT)
Dept: INTERNAL MEDICINE | Age: 54
End: 2024-04-02
Payer: MEDICAID

## 2024-04-02 VITALS
OXYGEN SATURATION: 98 % | DIASTOLIC BLOOD PRESSURE: 80 MMHG | WEIGHT: 203 LBS | TEMPERATURE: 97.7 F | HEART RATE: 86 BPM | BODY MASS INDEX: 39.85 KG/M2 | HEIGHT: 60 IN | SYSTOLIC BLOOD PRESSURE: 135 MMHG

## 2024-04-02 DIAGNOSIS — J34.3 NASAL TURBINATE HYPERTROPHY: ICD-10-CM

## 2024-04-02 DIAGNOSIS — F32.1 CURRENT MODERATE EPISODE OF MAJOR DEPRESSIVE DISORDER, UNSPECIFIED WHETHER RECURRENT (HCC): ICD-10-CM

## 2024-04-02 DIAGNOSIS — J45.51 SEVERE PERSISTENT ASTHMA WITH ACUTE EXACERBATION: ICD-10-CM

## 2024-04-02 DIAGNOSIS — I10 ESSENTIAL HYPERTENSION: Primary | ICD-10-CM

## 2024-04-02 DIAGNOSIS — K21.00 CHRONIC REFLUX ESOPHAGITIS: ICD-10-CM

## 2024-04-02 DIAGNOSIS — E78.2 MIXED HYPERLIPIDEMIA: ICD-10-CM

## 2024-04-02 PROCEDURE — 3017F COLORECTAL CA SCREEN DOC REV: CPT | Performed by: INTERNAL MEDICINE

## 2024-04-02 PROCEDURE — G8427 DOCREV CUR MEDS BY ELIG CLIN: HCPCS | Performed by: INTERNAL MEDICINE

## 2024-04-02 PROCEDURE — 99214 OFFICE O/P EST MOD 30 MIN: CPT | Performed by: INTERNAL MEDICINE

## 2024-04-02 PROCEDURE — 1036F TOBACCO NON-USER: CPT | Performed by: INTERNAL MEDICINE

## 2024-04-02 PROCEDURE — 99213 OFFICE O/P EST LOW 20 MIN: CPT | Performed by: INTERNAL MEDICINE

## 2024-04-02 PROCEDURE — G8417 CALC BMI ABV UP PARAM F/U: HCPCS | Performed by: INTERNAL MEDICINE

## 2024-04-02 PROCEDURE — 3075F SYST BP GE 130 - 139MM HG: CPT | Performed by: INTERNAL MEDICINE

## 2024-04-02 PROCEDURE — 3079F DIAST BP 80-89 MM HG: CPT | Performed by: INTERNAL MEDICINE

## 2024-04-02 RX ORDER — ALBUTEROL SULFATE 2.5 MG/3ML
2.5 SOLUTION RESPIRATORY (INHALATION) EVERY 6 HOURS PRN
Qty: 120 EACH | Refills: 3 | Status: SHIPPED | OUTPATIENT
Start: 2024-04-02

## 2024-04-02 RX ORDER — ALBUTEROL SULFATE 90 UG/1
AEROSOL, METERED RESPIRATORY (INHALATION)
Qty: 1 EACH | Refills: 5 | Status: SHIPPED | OUTPATIENT
Start: 2024-04-02

## 2024-04-02 RX ORDER — CHOLECALCIFEROL (VITAMIN D3) 125 MCG
CAPSULE ORAL
Qty: 30 TABLET | Refills: 3 | Status: SHIPPED | OUTPATIENT
Start: 2024-04-02

## 2024-04-02 RX ORDER — LORATADINE 10 MG/1
10 TABLET ORAL DAILY
Qty: 30 TABLET | Refills: 5 | Status: SHIPPED | OUTPATIENT
Start: 2024-04-02

## 2024-04-02 RX ORDER — PREDNISONE 20 MG/1
20 TABLET ORAL 2 TIMES DAILY
Qty: 10 TABLET | Refills: 0 | Status: SHIPPED | OUTPATIENT
Start: 2024-04-02 | End: 2024-04-07

## 2024-04-02 RX ORDER — CARVEDILOL 6.25 MG/1
6.25 TABLET ORAL 2 TIMES DAILY
Qty: 60 TABLET | Refills: 3 | Status: SHIPPED | OUTPATIENT
Start: 2024-04-02

## 2024-04-02 RX ORDER — FLUOXETINE HYDROCHLORIDE 20 MG/1
20 CAPSULE ORAL DAILY
Qty: 30 CAPSULE | Refills: 1 | Status: SHIPPED | OUTPATIENT
Start: 2024-04-02

## 2024-04-02 RX ORDER — OMEPRAZOLE 40 MG/1
CAPSULE, DELAYED RELEASE ORAL
Qty: 30 CAPSULE | Refills: 3 | Status: SHIPPED | OUTPATIENT
Start: 2024-04-02

## 2024-04-02 RX ORDER — ATORVASTATIN CALCIUM 40 MG/1
40 TABLET, FILM COATED ORAL DAILY
Qty: 30 TABLET | Refills: 2 | Status: SHIPPED | OUTPATIENT
Start: 2024-04-02

## 2024-04-02 RX ORDER — VALSARTAN AND HYDROCHLOROTHIAZIDE 80; 12.5 MG/1; MG/1
TABLET, FILM COATED ORAL
Qty: 30 TABLET | Refills: 5 | Status: SHIPPED | OUTPATIENT
Start: 2024-04-02

## 2024-04-02 RX ORDER — FLUTICASONE PROPIONATE 50 MCG
2 SPRAY, SUSPENSION (ML) NASAL DAILY
Qty: 16 G | Refills: 3 | Status: SHIPPED | OUTPATIENT
Start: 2024-04-02

## 2024-04-02 RX ORDER — MELOXICAM 7.5 MG/1
7.5 TABLET ORAL DAILY
Qty: 30 TABLET | Refills: 2 | Status: CANCELLED | OUTPATIENT
Start: 2024-04-02

## 2024-04-02 RX ORDER — SIMETHICONE 80 MG
TABLET,CHEWABLE ORAL
Qty: 180 TABLET | Refills: 0 | Status: SHIPPED | OUTPATIENT
Start: 2024-04-02

## 2024-04-02 RX ORDER — LIDOCAINE 50 MG/G
PATCH TOPICAL
Qty: 30 PATCH | Refills: 2 | Status: SHIPPED | OUTPATIENT
Start: 2024-04-02

## 2024-04-02 RX ORDER — ONDANSETRON 4 MG/1
TABLET, ORALLY DISINTEGRATING ORAL
Qty: 15 TABLET | Refills: 1 | Status: SHIPPED | OUTPATIENT
Start: 2024-04-02

## 2024-04-02 RX ORDER — SENNOSIDES A AND B 8.6 MG/1
TABLET, FILM COATED ORAL
Qty: 90 TABLET | Refills: 1 | Status: SHIPPED | OUTPATIENT
Start: 2024-04-02

## 2024-04-02 RX ORDER — AZITHROMYCIN 250 MG/1
TABLET, FILM COATED ORAL
Qty: 6 TABLET | Refills: 0 | Status: SHIPPED | OUTPATIENT
Start: 2024-04-02 | End: 2024-04-12

## 2024-04-02 ASSESSMENT — ENCOUNTER SYMPTOMS
WHEEZING: 1
ABDOMINAL PAIN: 0
CONSTIPATION: 0
COUGH: 1
SHORTNESS OF BREATH: 0
SORE THROAT: 1

## 2024-04-02 NOTE — PROGRESS NOTES
[facility]    Progress Note    Date of patient's visit: 4/2/2024    Patient's Name:  Beatriz Edmond    YOB: 1970            Patient Care Team:  Cherie Ambrose MD as PCP - General (Internal Medicine)  Cherie Ambrose MD as PCP - Empaneled Provider    REASON FOR VISIT: Routine outpatient follow     Chief Complaint   Patient presents with    Hypertension     Follow up     Cough     Went to ER last Thursday. Having over 10 days. Worse at night and keeps her up. Green mucus    Pharyngitis     Pt says hurts a lot. Pt says feels \"raw\"    Medication Refill     Meds pended         HISTORY OF PRESENT ILLNESS:    History was obtained from the patient. Beatriz Edmond is a 53 y.o. is here for follow-up after ER visit.  She was diagnosed with influenza A.  She was sent home with the antitussive medication.  She continues to have a lot of cough with wheezing specially worse at night.  No fever or chills.  She is using her nebulizer often.  She needs a new nebulizer machine.  She has not seen pulmonologist in a year.  She did not get her flu vaccine last year.        Past Medical History:   Diagnosis Date    Allergic rhinitis     Asthma     Clinical trial participant 11/23/2015 11/23/20015    COPD (chronic obstructive pulmonary disease) (HCC)     Dysphagia     Dysphagia     has needed EGD with dilatation in the past    Family history of colon cancer 8/9/2017    Father    GERD (gastroesophageal reflux disease)     H/O cardiovascular stress test 01/15/2018    ABNORMAL    Hiatal hernia     Hyperlipidemia     Hypertension     Iron deficiency anemia secondary to inadequate dietary iron intake 5/13/2020    Iron deficiency anemia, unspecified 5/13/2020    Irritable bowel syndrome with constipation 1/21/2016    Obesity     Snores     states has tested negative for sleep apnea    Wears glasses        Past Surgical History:   Procedure Laterality Date    CARDIAC

## 2024-04-02 NOTE — PROGRESS NOTES
Beatriz Edmond was evaluated today and a DME order was entered for a nebulizer compressor in order to administer Albuterol due to the diagnosis of asthma.  The need for this equipment and treatment was discussed with the patient and she understands and is in agreement.

## 2024-04-08 ENCOUNTER — APPOINTMENT (OUTPATIENT)
Dept: GENERAL RADIOLOGY | Age: 54
End: 2024-04-08
Payer: MEDICAID

## 2024-04-08 ENCOUNTER — HOSPITAL ENCOUNTER (EMERGENCY)
Age: 54
Discharge: HOME | End: 2024-04-08
Attending: EMERGENCY MEDICINE
Payer: MEDICAID

## 2024-04-08 VITALS
RESPIRATION RATE: 18 BRPM | OXYGEN SATURATION: 98 % | DIASTOLIC BLOOD PRESSURE: 85 MMHG | SYSTOLIC BLOOD PRESSURE: 159 MMHG | TEMPERATURE: 97.5 F | HEART RATE: 98 BPM

## 2024-04-08 DIAGNOSIS — J45.20 MILD INTERMITTENT ASTHMATIC BRONCHITIS WITHOUT COMPLICATION: Primary | ICD-10-CM

## 2024-04-08 LAB
ANION GAP SERPL CALCULATED.3IONS-SCNC: 9 MMOL/L (ref 9–16)
BASOPHILS # BLD: <0.03 K/UL (ref 0–0.2)
BASOPHILS NFR BLD: 0 % (ref 0–2)
BUN SERPL-MCNC: 9 MG/DL (ref 6–20)
CALCIUM SERPL-MCNC: 9 MG/DL (ref 8.6–10.4)
CHLORIDE SERPL-SCNC: 103 MMOL/L (ref 98–107)
CO2 SERPL-SCNC: 25 MMOL/L (ref 20–31)
CREAT SERPL-MCNC: 0.8 MG/DL (ref 0.5–0.9)
EOSINOPHIL # BLD: <0.03 K/UL (ref 0–0.44)
EOSINOPHILS RELATIVE PERCENT: 0 % (ref 1–4)
ERYTHROCYTE [DISTWIDTH] IN BLOOD BY AUTOMATED COUNT: 16.2 % (ref 11.8–14.4)
GFR SERPL CREATININE-BSD FRML MDRD: 89 ML/MIN/1.73M2
GLUCOSE SERPL-MCNC: 141 MG/DL (ref 74–99)
HCT VFR BLD AUTO: 36.9 % (ref 36.3–47.1)
HGB BLD-MCNC: 11.5 G/DL (ref 11.9–15.1)
IMM GRANULOCYTES # BLD AUTO: 0.08 K/UL (ref 0–0.3)
IMM GRANULOCYTES NFR BLD: 1 %
LYMPHOCYTES NFR BLD: 1.79 K/UL (ref 1.1–3.7)
LYMPHOCYTES RELATIVE PERCENT: 17 % (ref 24–43)
MCH RBC QN AUTO: 25.6 PG (ref 25.2–33.5)
MCHC RBC AUTO-ENTMCNC: 31.2 G/DL (ref 28.4–34.8)
MCV RBC AUTO: 82.2 FL (ref 82.6–102.9)
MONOCYTES NFR BLD: 0.17 K/UL (ref 0.1–1.2)
MONOCYTES NFR BLD: 2 % (ref 3–12)
NEUTROPHILS NFR BLD: 80 % (ref 36–65)
NEUTS SEG NFR BLD: 8.39 K/UL (ref 1.5–8.1)
NRBC BLD-RTO: 0 PER 100 WBC
PLATELET # BLD AUTO: ABNORMAL K/UL (ref 138–453)
PLATELET, FLUORESCENCE: 435 K/UL (ref 138–453)
PLATELETS.RETICULATED NFR BLD AUTO: 4.7 % (ref 1.1–10.3)
POTASSIUM SERPL-SCNC: 4.4 MMOL/L (ref 3.7–5.3)
RBC # BLD AUTO: 4.49 M/UL (ref 3.95–5.11)
RBC # BLD: ABNORMAL 10*6/UL
SODIUM SERPL-SCNC: 137 MMOL/L (ref 136–145)
WBC OTHER # BLD: 10.5 K/UL (ref 3.5–11.3)

## 2024-04-08 PROCEDURE — 6370000000 HC RX 637 (ALT 250 FOR IP): Performed by: STUDENT IN AN ORGANIZED HEALTH CARE EDUCATION/TRAINING PROGRAM

## 2024-04-08 PROCEDURE — 85025 COMPLETE CBC W/AUTO DIFF WBC: CPT

## 2024-04-08 PROCEDURE — 99284 EMERGENCY DEPT VISIT MOD MDM: CPT

## 2024-04-08 PROCEDURE — 71045 X-RAY EXAM CHEST 1 VIEW: CPT

## 2024-04-08 PROCEDURE — 85055 RETICULATED PLATELET ASSAY: CPT

## 2024-04-08 PROCEDURE — 80048 BASIC METABOLIC PNL TOTAL CA: CPT

## 2024-04-08 RX ORDER — DOXYCYCLINE HYCLATE 100 MG
100 TABLET ORAL 2 TIMES DAILY
Qty: 14 TABLET | Refills: 0 | Status: SHIPPED | OUTPATIENT
Start: 2024-04-08 | End: 2024-04-15

## 2024-04-08 RX ORDER — GUAIFENESIN 600 MG/1
600 TABLET, EXTENDED RELEASE ORAL ONCE
Status: COMPLETED | OUTPATIENT
Start: 2024-04-08 | End: 2024-04-08

## 2024-04-08 RX ORDER — GUAIFENESIN 600 MG/1
600 TABLET, EXTENDED RELEASE ORAL 2 TIMES DAILY
Qty: 24 TABLET | Refills: 0 | Status: SHIPPED | OUTPATIENT
Start: 2024-04-08 | End: 2024-04-20

## 2024-04-08 RX ORDER — DOXYCYCLINE HYCLATE 100 MG
100 TABLET ORAL ONCE
Status: COMPLETED | OUTPATIENT
Start: 2024-04-08 | End: 2024-04-08

## 2024-04-08 RX ADMIN — DOXYCYCLINE HYCLATE 100 MG: 100 TABLET, COATED ORAL at 09:49

## 2024-04-08 RX ADMIN — BENZOCAINE AND MENTHOL 1 LOZENGE: 15; 3.6 LOZENGE ORAL at 08:38

## 2024-04-08 RX ADMIN — GUAIFENESIN 600 MG: 600 TABLET, EXTENDED RELEASE ORAL at 09:48

## 2024-04-08 ASSESSMENT — ENCOUNTER SYMPTOMS
DIARRHEA: 0
COUGH: 1
NAUSEA: 0
SORE THROAT: 1
ABDOMINAL PAIN: 0
SHORTNESS OF BREATH: 1
SINUS PAIN: 0
CHEST TIGHTNESS: 0
BACK PAIN: 0
VOMITING: 0
WHEEZING: 0
SINUS PRESSURE: 0

## 2024-04-08 NOTE — DISCHARGE INSTRUCTIONS
Seen in the emergency department for asthmatic bronchitis.  Recently finished prescription for azithromycin.  Given prescription today for doxycycline and Mucinex.  Please take these as prescribed to completion.  Please return the emergency department if you develop worsening symptoms, shortness of breath, wheezes, difficulty maintaining her airway, or any other concerning symptoms.  I recommend that you touch base with your primary care first thing tomorrow morning regarding potential lengthening of your steroid prescription with prednisone.  Please complete your prednisone as it has been prescribed.

## 2024-04-08 NOTE — ED PROVIDER NOTES
[facility]  Emergency Department  Faculty Attestation     I performed a history and physical examination of the patient and discussed management with the resident. I reviewed the resident’s note and agree with the documented findings and plan of care. Any areas of disagreement are noted on the chart. I was personally present for the key portions of any procedures. I have documented in the chart those procedures where I was not present during the key portions. I have reviewed the emergency nurses triage note. I agree with the chief complaint, past medical history, past surgical history, allergies, medications, social and family history as documented unless otherwise noted below.    For Physician Assistant/ Nurse Practitioner cases/documentation I have personally evaluated this patient and have completed at least one if not all key elements of the E/M (history, physical exam, and MDM). Additional findings are as noted.    Preliminary note started at 9:41 AM EDT    Primary Care Physician:  Cherie Ambrose MD    Screenings:  [unfilled]    CHIEF COMPLAINT       Chief Complaint   Patient presents with    Cough       RECENT VITALS:   BP (!) 159/85   Pulse 98   Temp 97.5 °F (36.4 °C) (Oral)   Resp 18   LMP 05/01/2023   SpO2 98%     LABS:  Labs Reviewed   BASIC METABOLIC PANEL - Abnormal; Notable for the following components:       Result Value    Glucose 141 (*)     All other components within normal limits   CBC WITH AUTO DIFFERENTIAL - Abnormal; Notable for the following components:    Hemoglobin 11.5 (*)     MCV 82.2 (*)     RDW 16.2 (*)     Neutrophils % 80 (*)     Lymphocytes % 17 (*)     Monocytes % 2 (*)     Eosinophils % 0 (*)     Immature Granulocytes % 1 (*)     Neutrophils Absolute 8.39 (*)     All other components within normal limits       Radiology  XR CHEST PORTABLE   Final Result   No radiographic evidence of acute cardiopulmonary pathology.       
  ED Course User Index  [GC] Matthew Suarez DO       PROCEDURES:  N/a    CONSULTS:  None    CRITICAL CARE:  There was significant risk of life threatening deterioration of patient's condition requiring my direct management. Critical care time 00 minutes, excluding any documented procedures.    FINAL IMPRESSION      1. Mild intermittent asthmatic bronchitis without complication          DISPOSITION / PLAN     DISPOSITION Decision To Discharge 04/08/2024 09:40:29 AM      PATIENT REFERRED TO:  Cehrie Ambrose MD  [facility] [address]  [phone]    Call in 1 day  for ED follow-up      DISCHARGE MEDICATIONS:  Discharge Medication List as of 4/8/2024  9:43 AM        START taking these medications    Details   doxycycline hyclate (VIBRA-TABS) 100 MG tablet Take 1 tablet by mouth 2 times daily for 7 days, Disp-14 tablet, R-0Print      guaiFENesin (MUCINEX) 600 MG extended release tablet Take 1 tablet by mouth 2 times daily for 12 days, Disp-24 tablet, R-0Print             Matthew Suarez DO  Emergency Medicine Resident    (Please note that portions of this note were completed with a voice recognition program.  Efforts were made to edit the dictations but occasionally words are mis-transcribed.)

## 2024-04-08 NOTE — ED NOTES
Pt. Arrives to ED via private auto for c/o productive cough\.  Pt was diagnosed with influenza A two weeks ago and pt states that she still has a lot of phlegm when she coughs. Pt has a history of asthma and has been using albuterol 2x a day PRN.

## 2024-04-17 ENCOUNTER — TELEPHONE (OUTPATIENT)
Dept: OBGYN | Age: 54
End: 2024-04-17

## 2024-04-17 NOTE — TELEPHONE ENCOUNTER
Patient is requesting  her name be added to the wait list for an annual OV and can be reached at [phone]. Okay to leave a message.

## 2024-05-28 NOTE — TELEPHONE ENCOUNTER
Last visit: 05/09/23   Last Med refill: 03/23  Does patient have enough medication for 72 hours: No:     Next Visit Date:  Future Appointments   Date Time Provider Fortino Jean Baptiste   6/7/2023  7:00 AM STA MAMMO RM 1 STAZ MAMMO STA Radiolog   6/7/2023  7:30 AM STA ULTRASOUND RM 3 STAZ US STA Radiolog   6/29/2023  8:40 AM Adelso Henry MD [facility] IM [facility]   6/30/2023  3:45 PM Parag Sequeira MD Resp Spec [facility]   7/20/2023  1:00 PM Berto Eaton MD Neuro [facility] Neurology -       Health Maintenance   Topic Date Due    Shingles vaccine (2 of 2) 09/03/2021    COVID-19 Vaccine (3 - Booster for Pfizer series) 11/02/2021    Colorectal Cancer Screen  02/08/2023    Breast cancer screen  08/24/2023    Depression Screen  02/17/2024    Lipids  05/09/2024    DTaP/Tdap/Td vaccine (2 - Td or Tdap) 02/05/2026    Cervical cancer screen  09/07/2026    Flu vaccine  Completed    Pneumococcal 0-64 years Vaccine  Completed    Hepatitis C screen  Completed    HIV screen  Completed    Hepatitis A vaccine  Aged Out    Hib vaccine  Aged Out    Meningococcal (ACWY) vaccine  Aged Out    A1C test (Diabetic or Prediabetic)  Discontinued    Diabetes screen  Discontinued       Hemoglobin A1C (%)   Date Value   05/09/2023 5.6   10/25/2022 5.6   07/09/2021 5.6             ( goal A1C is < 7)   No results found for: LABMICR  LDL Cholesterol (mg/dL)   Date Value   05/09/2023 83   02/01/2023            (goal LDL is <100)   AST (U/L)   Date Value   05/09/2023 21     ALT (U/L)   Date Value   05/09/2023 17     BUN (mg/dL)   Date Value   05/09/2023 11     BP Readings from Last 3 Encounters:   05/09/23 129/85   05/01/23 133/67   03/05/23 126/71          (goal 120/80)    All Future Testing planned in [facility]  Lab Frequency Next Occurrence   Hepatic Function Panel Once 02/07/2023   US NON OB TRANSVAGINAL Once 05/01/2023   US PELVIS COMPLETE Once 05/01/2023   TRAVIS DIGITAL SCREEN W OR WO CAD BILATERAL Once 11/01/2023               Patient Active Problem List:
No

## 2024-06-04 DIAGNOSIS — F32.1 CURRENT MODERATE EPISODE OF MAJOR DEPRESSIVE DISORDER, UNSPECIFIED WHETHER RECURRENT (HCC): ICD-10-CM

## 2024-06-04 NOTE — TELEPHONE ENCOUNTER
..Request for   Requested Prescriptions     Pending Prescriptions Disp Refills    FLUoxetine (PROZAC) 20 MG capsule [Pharmacy Med Name: FLUOXETINE HCL 20 MG CAPSULE] 30 capsule 1     Sig: take 1 capsule by mouth once daily    .      Please review and e-scribe to pharmacy listed in chart if appropriate. Thank you.      Last Visit Date: 4/2/2024  Next Visit Date: Visit date not found    Future Appointments   Date Time Provider Department Center   7/29/2024 10:00 AM Greyson Reid MD Resp Spec [facility]   8/12/2024  3:30 PM Monica Wade MD Neuro [facility] Neurology -       Health Maintenance   Topic Date Due    Hepatitis B vaccine (1 of 3 - 3-dose series) Never done    Pneumococcal 0-64 years Vaccine (2 of 2 - PCV) 01/21/2017    Colorectal Cancer Screen  02/08/2023    COVID-19 Vaccine (3 - 2023-24 season) 09/01/2023    Lipids  05/09/2024    Shingles vaccine (2 of 2) 01/08/2025 (Originally 9/3/2021)    Flu vaccine (Season Ended) 08/01/2024    Depression Monitoring  01/09/2025    Breast cancer screen  06/07/2025    DTaP/Tdap/Td vaccine (2 - Td or Tdap) 02/05/2026    Diabetes screen  05/09/2026    Cervical cancer screen  09/07/2026    Hepatitis C screen  Completed    HIV screen  Completed    Hepatitis A vaccine  Aged Out    Hib vaccine  Aged Out    Polio vaccine  Aged Out    Meningococcal (ACWY) vaccine  Aged Out    A1C test (Diabetic or Prediabetic)  Discontinued    Depression Screen  Discontinued       Hemoglobin A1C (%)   Date Value   05/09/2023 5.6   10/25/2022 5.6   07/09/2021 5.6             ( goal A1C is < 7)   No components found for: \"LABMICR\"  No components found for: \"LDLCHOLESTEROL\", \"LDLCALC\"    (goal LDL is <100)   AST (U/L)   Date Value   01/07/2024 18     ALT (U/L)   Date Value   01/07/2024 19     BUN (mg/dL)   Date Value   04/08/2024 9     BP Readings from Last 3 Encounters:   04/08/24 (!) 159/85   04/02/24 135/80   03/28/24 (!) 159/103          (goal 120/80)    All Future Testing planned in

## 2024-06-05 RX ORDER — FLUOXETINE HYDROCHLORIDE 20 MG/1
20 CAPSULE ORAL DAILY
Qty: 30 CAPSULE | Refills: 1 | Status: SHIPPED | OUTPATIENT
Start: 2024-06-05

## 2024-06-12 DIAGNOSIS — J45.50 SEVERE PERSISTENT ASTHMA WITHOUT COMPLICATION: ICD-10-CM

## 2024-06-12 NOTE — TELEPHONE ENCOUNTER
Beatriz Edmond is calling to request a refill on the following medication(s):    Medication Request:  Requested Prescriptions     Pending Prescriptions Disp Refills    mometasone-formoterol (DULERA) 200-5 MCG/ACT inhaler [Pharmacy Med Name: DULERA 200 MCG-5 MCG INHALER] 13 g 2     Sig: inhale 2 puffs by mouth and INTO THE LUNGS twice a day       Last Visit Date (If Applicable):  4/2/2024    Next Visit Date:    Visit date not found

## 2024-06-14 DIAGNOSIS — M75.42 IMPINGEMENT SYNDROME OF SHOULDER REGION, LEFT: ICD-10-CM

## 2024-06-14 NOTE — TELEPHONE ENCOUNTER
Beatriz Edmond is calling to request a refill on the following medication(s):    Medication Request:  Requested Prescriptions     Pending Prescriptions Disp Refills    meloxicam (MOBIC) 7.5 MG tablet [Pharmacy Med Name: MELOXICAM 7.5 MG TABLET] 30 tablet 2     Sig: take 1 tablet by mouth once daily       Last Visit Date (If Applicable):  4/2/24    Next Visit Date:    Visit date not found

## 2024-06-15 RX ORDER — MELOXICAM 7.5 MG/1
7.5 TABLET ORAL DAILY
Qty: 30 TABLET | Refills: 0 | Status: SHIPPED | OUTPATIENT
Start: 2024-06-15

## 2024-06-28 NOTE — TELEPHONE ENCOUNTER
Beatriz Edmond is calling to request a refill on the following medication(s):    Medication Request:  Requested Prescriptions     Pending Prescriptions Disp Refills    ondansetron (ZOFRAN-ODT) 4 MG disintegrating tablet [Pharmacy Med Name: ONDANSETRON ODT 4 MG TABLET] 15 tablet 1     Sig: dissolve 1 tablet ON TONGUE every 12 hours if needed for nausea OR vomiting       Last Visit Date (If Applicable):  4/2/2024    Next Visit Date:    Visit date not found   
74

## 2024-06-29 RX ORDER — ONDANSETRON 4 MG/1
TABLET, ORALLY DISINTEGRATING ORAL
Qty: 15 TABLET | Refills: 1 | Status: SHIPPED | OUTPATIENT
Start: 2024-06-29

## 2024-07-29 ENCOUNTER — APPOINTMENT (OUTPATIENT)
Dept: GENERAL RADIOLOGY | Age: 54
End: 2024-07-29
Payer: MEDICAID

## 2024-07-29 ENCOUNTER — HOSPITAL ENCOUNTER (EMERGENCY)
Age: 54
Discharge: HOME | End: 2024-07-29
Attending: EMERGENCY MEDICINE
Payer: MEDICAID

## 2024-07-29 VITALS
SYSTOLIC BLOOD PRESSURE: 138 MMHG | WEIGHT: 214.29 LBS | RESPIRATION RATE: 12 BRPM | OXYGEN SATURATION: 97 % | DIASTOLIC BLOOD PRESSURE: 77 MMHG | HEART RATE: 72 BPM | BODY MASS INDEX: 41.85 KG/M2 | TEMPERATURE: 97 F

## 2024-07-29 DIAGNOSIS — R07.9 CHEST PAIN, UNSPECIFIED TYPE: Primary | ICD-10-CM

## 2024-07-29 LAB
ANION GAP SERPL CALCULATED.3IONS-SCNC: 9 MMOL/L (ref 9–16)
BASOPHILS # BLD: <0.03 K/UL (ref 0–0.2)
BASOPHILS NFR BLD: 0 % (ref 0–2)
BUN SERPL-MCNC: 15 MG/DL (ref 6–20)
CALCIUM SERPL-MCNC: 9.2 MG/DL (ref 8.6–10.4)
CHLORIDE SERPL-SCNC: 104 MMOL/L (ref 98–107)
CO2 SERPL-SCNC: 26 MMOL/L (ref 20–31)
CREAT SERPL-MCNC: 0.9 MG/DL (ref 0.5–0.9)
EKG ATRIAL RATE: 63 BPM
EKG P AXIS: 80 DEGREES
EKG P-R INTERVAL: 170 MS
EKG Q-T INTERVAL: 396 MS
EKG QRS DURATION: 72 MS
EKG QTC CALCULATION (BAZETT): 405 MS
EKG R AXIS: 21 DEGREES
EKG T AXIS: 55 DEGREES
EKG VENTRICULAR RATE: 63 BPM
EOSINOPHIL # BLD: 0.14 K/UL (ref 0–0.44)
EOSINOPHILS RELATIVE PERCENT: 3 % (ref 1–4)
ERYTHROCYTE [DISTWIDTH] IN BLOOD BY AUTOMATED COUNT: 16.6 % (ref 11.8–14.4)
GFR, ESTIMATED: 78 ML/MIN/1.73M2
GLUCOSE SERPL-MCNC: 113 MG/DL (ref 74–99)
HCT VFR BLD AUTO: 35 % (ref 36.3–47.1)
HGB BLD-MCNC: 10.8 G/DL (ref 11.9–15.1)
IMM GRANULOCYTES # BLD AUTO: <0.03 K/UL (ref 0–0.3)
IMM GRANULOCYTES NFR BLD: 0 %
LYMPHOCYTES NFR BLD: 2.09 K/UL (ref 1.1–3.7)
LYMPHOCYTES RELATIVE PERCENT: 39 % (ref 24–43)
MCH RBC QN AUTO: 25.4 PG (ref 25.2–33.5)
MCHC RBC AUTO-ENTMCNC: 30.9 G/DL (ref 28.4–34.8)
MCV RBC AUTO: 82.2 FL (ref 82.6–102.9)
MONOCYTES NFR BLD: 0.35 K/UL (ref 0.1–1.2)
MONOCYTES NFR BLD: 7 % (ref 3–12)
NEUTROPHILS NFR BLD: 51 % (ref 36–65)
NEUTS SEG NFR BLD: 2.69 K/UL (ref 1.5–8.1)
NRBC BLD-RTO: 0 PER 100 WBC
PLATELET # BLD AUTO: 296 K/UL (ref 138–453)
PMV BLD AUTO: 10.8 FL (ref 8.1–13.5)
POTASSIUM SERPL-SCNC: 4 MMOL/L (ref 3.7–5.3)
RBC # BLD AUTO: 4.26 M/UL (ref 3.95–5.11)
RBC # BLD: ABNORMAL 10*6/UL
SODIUM SERPL-SCNC: 139 MMOL/L (ref 136–145)
TROPONIN I SERPL HS-MCNC: <6 NG/L (ref 0–14)
WBC OTHER # BLD: 5.3 K/UL (ref 3.5–11.3)

## 2024-07-29 PROCEDURE — 6370000000 HC RX 637 (ALT 250 FOR IP)

## 2024-07-29 PROCEDURE — 71045 X-RAY EXAM CHEST 1 VIEW: CPT

## 2024-07-29 PROCEDURE — 99284 EMERGENCY DEPT VISIT MOD MDM: CPT | Performed by: EMERGENCY MEDICINE

## 2024-07-29 PROCEDURE — 93005 ELECTROCARDIOGRAM TRACING: CPT | Performed by: EMERGENCY MEDICINE

## 2024-07-29 PROCEDURE — 80048 BASIC METABOLIC PNL TOTAL CA: CPT

## 2024-07-29 PROCEDURE — 85025 COMPLETE CBC W/AUTO DIFF WBC: CPT

## 2024-07-29 PROCEDURE — 84484 ASSAY OF TROPONIN QUANT: CPT

## 2024-07-29 RX ORDER — CALCIUM CARBONATE 500 MG/1
1 TABLET, CHEWABLE ORAL DAILY
Qty: 30 TABLET | Refills: 0 | Status: SHIPPED | OUTPATIENT
Start: 2024-07-29 | End: 2024-08-28

## 2024-07-29 RX ORDER — MAGNESIUM HYDROXIDE/ALUMINUM HYDROXICE/SIMETHICONE 120; 1200; 1200 MG/30ML; MG/30ML; MG/30ML
30 SUSPENSION ORAL
Status: COMPLETED | OUTPATIENT
Start: 2024-07-29 | End: 2024-07-29

## 2024-07-29 RX ADMIN — ALUMINUM HYDROXIDE, MAGNESIUM HYDROXIDE, AND SIMETHICONE 30 ML: 200; 200; 20 SUSPENSION ORAL at 09:30

## 2024-07-29 ASSESSMENT — ENCOUNTER SYMPTOMS
VOMITING: 0
NAUSEA: 0
SHORTNESS OF BREATH: 1
COUGH: 0

## 2024-07-29 ASSESSMENT — HEART SCORE: ECG: NORMAL

## 2024-07-29 NOTE — ED PROVIDER NOTES
bilaterally, heart regular rate and rhythm, abdomen is soft nontender, lower extremity trace edema negative for any cords    Impression: Chest pain    Plan: Chest x-ray, EKG, CBC, BMP, troponin      Heart Score:    Heart Score for chest pain patients  History: Slightly Suspicious  ECG: Normal  Patient Age: > 45 and < 65 years  *Risk factors for Atherosclerotic disease: Hypercholesterolemia, Hypertension  Risk Factors: 1 or 2 risk factors  Troponin: < 1X normal limit  Heart Score Total: 2    Score 0 - 3 = 2.5%  MACE over next 6 wks = Discharge home  Score 4 - 6 = 20.3%  MACE over next 6 wks = Obs admit  Score 7 - 10 = 72.7%  MACE over next 6 wks = Early invasive Rx       EKG Interpretation    Interpreted by me    Rhythm: normal sinus   Rate: normal, 63  Axis: normal  Ectopy: none  Conduction: normal  ST Segments: no acute change  T Waves: no acute change  Q Waves: none    Clinical Impression: no acute changes and normal EKG  Compared EKG of March 28, 2024, no significant change was found      Medical Decision Making  Problems Addressed:  Chest pain, unspecified type: acute illness or injury    Amount and/or Complexity of Data Reviewed  External Data Reviewed: ECG.  Labs: ordered. Decision-making details documented in ED Course.  Radiology: ordered.  ECG/medicine tests: ordered and independent interpretation performed. Decision-making details documented in ED Course.    Risk  OTC drugs.  Risk Details: Heart score negative            (Please note that portions of this note were completed with a voice recognition program.  Efforts were made to edit the dictations but occasionally words are mis-transcribed.)    Matthew Valadez MD, FACEP  Attending Emergency Medicine Physician           Matthew Valadez MD  07/29/24 7022    
schedule a follow-up visit.      DISCHARGE MEDICATIONS:  New Prescriptions    CALCIUM CARBONATE (ANTACID) 500 MG CHEWABLE TABLET    Take 1 tablet by mouth daily       Juliet Meyer DO  Emergency Medicine Resident    (Please note that portions of this note were completed with a voice recognition program.  Efforts were made to edit the dictations but occasionally words are mis-transcribed.)

## 2024-07-29 NOTE — ED NOTES
Pt to room 19.  She reports left sided chest pain that radiates to the right chest.  She reports SOB, but states she has asthma.  She reports diaphoresis, but denies nausea/vomiting.  She is alert, oriented, speaking in full sentences. Cardiac workup in progress.

## 2024-07-29 NOTE — DISCHARGE INSTRUCTIONS
You were seen and evaluated in the [facility] emergency department today for chest pain.    Your EKG, chest x-ray, and troponin were normal.  Your labs did show mild anemia which is chronic for you.  You were given Maalox for treatment which did help your symptoms slightly.  I have sent a prescription of this to the [facility] pharmacy for you.    Please call your primary care physician today and tell them you were seen in the emergency department for chest pain.  Please schedule a follow-up appointment with them as soon as possible.    Please return to the emergency department with new or worsening symptoms, including but not limited to worsening chest pain, shortness of breath, diaphoresis, nausea and vomiting.    Thank you for choosing [facility] Emergency Department for your care today.

## 2024-08-09 DIAGNOSIS — F32.1 CURRENT MODERATE EPISODE OF MAJOR DEPRESSIVE DISORDER, UNSPECIFIED WHETHER RECURRENT (HCC): ICD-10-CM

## 2024-08-09 NOTE — TELEPHONE ENCOUNTER
Beatriz Edmond is calling to request a refill on the following medication(s):    Medication Request:  Requested Prescriptions     Pending Prescriptions Disp Refills    FLUoxetine (PROZAC) 20 MG capsule [Pharmacy Med Name: FLUOXETINE HCL 20 MG CAPSULE] 30 capsule 1     Sig: take 1 capsule by mouth once daily       Last Visit Date (If Applicable):  4/2/2024    Next Visit Date:    8/19/2024

## 2024-08-10 RX ORDER — FLUOXETINE HYDROCHLORIDE 20 MG/1
20 CAPSULE ORAL DAILY
Qty: 30 CAPSULE | Refills: 1 | Status: SHIPPED | OUTPATIENT
Start: 2024-08-10

## 2024-08-12 ENCOUNTER — OFFICE VISIT (OUTPATIENT)
Dept: NEUROLOGY | Age: 54
End: 2024-08-12
Payer: MEDICAID

## 2024-08-12 VITALS
HEIGHT: 60 IN | BODY MASS INDEX: 40.88 KG/M2 | HEART RATE: 81 BPM | WEIGHT: 208.2 LBS | DIASTOLIC BLOOD PRESSURE: 75 MMHG | SYSTOLIC BLOOD PRESSURE: 110 MMHG

## 2024-08-12 DIAGNOSIS — G93.2 IIH (IDIOPATHIC INTRACRANIAL HYPERTENSION): Primary | ICD-10-CM

## 2024-08-12 DIAGNOSIS — G93.9 WHITE MATTER LESION OF CENTRAL NERVOUS SYSTEM: ICD-10-CM

## 2024-08-12 PROCEDURE — 3074F SYST BP LT 130 MM HG: CPT

## 2024-08-12 PROCEDURE — 3078F DIAST BP <80 MM HG: CPT

## 2024-08-12 PROCEDURE — 3017F COLORECTAL CA SCREEN DOC REV: CPT

## 2024-08-12 PROCEDURE — 99214 OFFICE O/P EST MOD 30 MIN: CPT

## 2024-08-12 PROCEDURE — G8427 DOCREV CUR MEDS BY ELIG CLIN: HCPCS

## 2024-08-12 PROCEDURE — 1036F TOBACCO NON-USER: CPT

## 2024-08-12 PROCEDURE — G8417 CALC BMI ABV UP PARAM F/U: HCPCS

## 2024-08-12 NOTE — PROGRESS NOTES
MG tablet take 1 tablet by mouth once daily 90 tablet 1    simethicone (MYLICON) 80 MG chewable tablet CHEW AND SWALLOW 1 tablet by mouth four times a day if needed for FLATULENCE 180 tablet 0    loratadine (CLARITIN) 10 MG tablet Take 1 tablet by mouth daily      olopatadine (PATANOL) 0.1 % ophthalmic solution instill 1 drop into both eyes twice a day      valsartan-hydroCHLOROthiazide (DIOVAN-HCT) 80-12.5 MG per tablet Once a day 30 tablet 5    topiramate (TOPAMAX) 50 MG tablet Take 1 tablet by mouth at bedtime 30 tablet 6    albuterol sulfate HFA (PROAIR HFA) 108 (90 Base) MCG/ACT inhaler inhale 2 puffs by mouth every 6 hours if needed for wheezing 1 each 5    albuterol (PROVENTIL) (2.5 MG/3ML) 0.083% nebulizer solution Take 3 mLs by nebulization every 6 hours as needed for Wheezing 120 each 3    omeprazole (PRILOSEC) 40 MG delayed release capsule take 1 capsule by mouth once daily if needed 30 capsule 5    acetaZOLAMIDE (DIAMOX) 250 MG tablet Take 2 tablets by mouth 2 times daily 120 tablet 6    meloxicam (MOBIC) 7.5 MG tablet Take 1 tablet by mouth daily (Patient not taking: Reported on 5/9/2023) 30 tablet 2    fluticasone (FLONASE) 50 MCG/ACT nasal spray instill 2 sprays into each nostril once daily 16 g 3    melatonin (RA MELATONIN) 5 MG TABS tablet take 1 tablet by mouth nightly 30 tablet 3    Multiple Vitamins-Minerals (WOMENS MULTI VITAMIN & MINERAL PO) Take 1 tablet by mouth daily        No current facility-administered medications for this visit.        PREVIOUS WORKUP:          LABS & TESTS:      Lab Results   Component Value Date    WBC 7.0 05/09/2023    HGB 11.6 (L) 05/09/2023    HCT 37.8 05/09/2023    MCV 85.3 05/09/2023     05/09/2023       REVIEW OF SYSTEMS:     Review of Systems   Constitutional:  Negative for chills, fatigue and fever.   HENT:  Negative for hearing loss.    Eyes:  Negative for photophobia, pain, redness and visual disturbance.   Gastrointestinal:  Negative for nausea and

## 2024-08-16 ENCOUNTER — HOSPITAL ENCOUNTER (OUTPATIENT)
Dept: MAMMOGRAPHY | Age: 54
End: 2024-08-16
Payer: MEDICAID

## 2024-08-16 VITALS — HEIGHT: 60 IN | WEIGHT: 208 LBS | BODY MASS INDEX: 40.84 KG/M2

## 2024-08-16 DIAGNOSIS — Z12.31 VISIT FOR SCREENING MAMMOGRAM: ICD-10-CM

## 2024-08-16 PROCEDURE — 77063 BREAST TOMOSYNTHESIS BI: CPT

## 2024-08-19 ENCOUNTER — OFFICE VISIT (OUTPATIENT)
Dept: INTERNAL MEDICINE | Age: 54
End: 2024-08-19
Payer: MEDICAID

## 2024-08-19 VITALS
HEART RATE: 72 BPM | SYSTOLIC BLOOD PRESSURE: 120 MMHG | WEIGHT: 213.4 LBS | DIASTOLIC BLOOD PRESSURE: 78 MMHG | HEIGHT: 60 IN | TEMPERATURE: 97.2 F | OXYGEN SATURATION: 99 % | BODY MASS INDEX: 41.9 KG/M2

## 2024-08-19 DIAGNOSIS — J34.3 NASAL TURBINATE HYPERTROPHY: ICD-10-CM

## 2024-08-19 DIAGNOSIS — Z12.11 COLON CANCER SCREENING: ICD-10-CM

## 2024-08-19 DIAGNOSIS — K21.00 CHRONIC REFLUX ESOPHAGITIS: ICD-10-CM

## 2024-08-19 DIAGNOSIS — J45.50 SEVERE PERSISTENT ASTHMA WITHOUT COMPLICATION: ICD-10-CM

## 2024-08-19 DIAGNOSIS — M25.552 LEFT HIP PAIN: ICD-10-CM

## 2024-08-19 DIAGNOSIS — G93.2 IDIOPATHIC INTRACRANIAL HYPERTENSION: ICD-10-CM

## 2024-08-19 DIAGNOSIS — J20.9 ACUTE BRONCHITIS, UNSPECIFIED ORGANISM: ICD-10-CM

## 2024-08-19 DIAGNOSIS — E78.2 MIXED HYPERLIPIDEMIA: ICD-10-CM

## 2024-08-19 DIAGNOSIS — I10 ESSENTIAL HYPERTENSION: Primary | ICD-10-CM

## 2024-08-19 DIAGNOSIS — R73.9 HYPERGLYCEMIA: ICD-10-CM

## 2024-08-19 DIAGNOSIS — D50.9 MICROCYTIC ANEMIA: ICD-10-CM

## 2024-08-19 PROCEDURE — G8427 DOCREV CUR MEDS BY ELIG CLIN: HCPCS | Performed by: INTERNAL MEDICINE

## 2024-08-19 PROCEDURE — 1036F TOBACCO NON-USER: CPT | Performed by: INTERNAL MEDICINE

## 2024-08-19 PROCEDURE — 3074F SYST BP LT 130 MM HG: CPT | Performed by: INTERNAL MEDICINE

## 2024-08-19 PROCEDURE — G8417 CALC BMI ABV UP PARAM F/U: HCPCS | Performed by: INTERNAL MEDICINE

## 2024-08-19 PROCEDURE — 99214 OFFICE O/P EST MOD 30 MIN: CPT | Performed by: INTERNAL MEDICINE

## 2024-08-19 PROCEDURE — 3078F DIAST BP <80 MM HG: CPT | Performed by: INTERNAL MEDICINE

## 2024-08-19 PROCEDURE — 99213 OFFICE O/P EST LOW 20 MIN: CPT

## 2024-08-19 PROCEDURE — 3017F COLORECTAL CA SCREEN DOC REV: CPT | Performed by: INTERNAL MEDICINE

## 2024-08-19 RX ORDER — MELOXICAM 7.5 MG/1
7.5 TABLET ORAL DAILY
Qty: 30 TABLET | Refills: 2 | Status: CANCELLED | OUTPATIENT
Start: 2024-08-19

## 2024-08-19 RX ORDER — SIMETHICONE 80 MG
TABLET,CHEWABLE ORAL
Qty: 180 TABLET | Refills: 0 | Status: SHIPPED | OUTPATIENT
Start: 2024-08-19

## 2024-08-19 RX ORDER — VALSARTAN AND HYDROCHLOROTHIAZIDE 80; 12.5 MG/1; MG/1
TABLET, FILM COATED ORAL
Qty: 30 TABLET | Refills: 5 | Status: SHIPPED | OUTPATIENT
Start: 2024-08-19

## 2024-08-19 RX ORDER — ALBUTEROL SULFATE 90 UG/1
AEROSOL, METERED RESPIRATORY (INHALATION)
Qty: 1 EACH | Refills: 5 | Status: SHIPPED | OUTPATIENT
Start: 2024-08-19

## 2024-08-19 RX ORDER — FLUTICASONE PROPIONATE 50 MCG
2 SPRAY, SUSPENSION (ML) NASAL DAILY
Qty: 16 G | Refills: 5 | Status: SHIPPED | OUTPATIENT
Start: 2024-08-19

## 2024-08-19 RX ORDER — ONDANSETRON 4 MG/1
TABLET, ORALLY DISINTEGRATING ORAL
Qty: 15 TABLET | Refills: 1 | Status: SHIPPED | OUTPATIENT
Start: 2024-08-19

## 2024-08-19 RX ORDER — LORATADINE 10 MG/1
10 TABLET ORAL DAILY
Qty: 30 TABLET | Refills: 5 | Status: SHIPPED | OUTPATIENT
Start: 2024-08-19

## 2024-08-19 RX ORDER — ASPIRIN 81 MG/1
TABLET ORAL
Qty: 30 TABLET | Refills: 5 | Status: SHIPPED | OUTPATIENT
Start: 2024-08-19

## 2024-08-19 RX ORDER — GUAIFENESIN 600 MG/1
600 TABLET, EXTENDED RELEASE ORAL 2 TIMES DAILY
Qty: 30 TABLET | Refills: 0 | Status: SHIPPED | OUTPATIENT
Start: 2024-08-19 | End: 2024-09-03

## 2024-08-19 RX ORDER — OMEPRAZOLE 40 MG/1
CAPSULE, DELAYED RELEASE ORAL
Qty: 30 CAPSULE | Refills: 5 | Status: SHIPPED | OUTPATIENT
Start: 2024-08-19

## 2024-08-19 RX ORDER — FENOFIBRATE 48 MG/1
48 TABLET, COATED ORAL DAILY
Qty: 30 TABLET | Refills: 5 | Status: SHIPPED | OUTPATIENT
Start: 2024-08-19

## 2024-08-19 RX ORDER — AZITHROMYCIN 250 MG/1
TABLET, FILM COATED ORAL
Qty: 6 TABLET | Refills: 0 | Status: SHIPPED | OUTPATIENT
Start: 2024-08-19 | End: 2024-08-29

## 2024-08-19 RX ORDER — ATORVASTATIN CALCIUM 40 MG/1
40 TABLET, FILM COATED ORAL DAILY
Qty: 30 TABLET | Refills: 5 | Status: SHIPPED | OUTPATIENT
Start: 2024-08-19

## 2024-08-19 RX ORDER — CARVEDILOL 6.25 MG/1
6.25 TABLET ORAL 2 TIMES DAILY
Qty: 60 TABLET | Refills: 5 | Status: SHIPPED | OUTPATIENT
Start: 2024-08-19

## 2024-08-19 ASSESSMENT — ENCOUNTER SYMPTOMS
DIARRHEA: 0
BLOOD IN STOOL: 0
ABDOMINAL PAIN: 1
SHORTNESS OF BREATH: 0
COUGH: 1
CHEST TIGHTNESS: 0
CONSTIPATION: 0
WHEEZING: 0

## 2024-08-19 ASSESSMENT — PATIENT HEALTH QUESTIONNAIRE - PHQ9
7. TROUBLE CONCENTRATING ON THINGS, SUCH AS READING THE NEWSPAPER OR WATCHING TELEVISION: SEVERAL DAYS
8. MOVING OR SPEAKING SO SLOWLY THAT OTHER PEOPLE COULD HAVE NOTICED. OR THE OPPOSITE, BEING SO FIGETY OR RESTLESS THAT YOU HAVE BEEN MOVING AROUND A LOT MORE THAN USUAL: MORE THAN HALF THE DAYS
5. POOR APPETITE OR OVEREATING: NOT AT ALL
2. FEELING DOWN, DEPRESSED OR HOPELESS: SEVERAL DAYS
3. TROUBLE FALLING OR STAYING ASLEEP: NEARLY EVERY DAY
SUM OF ALL RESPONSES TO PHQ QUESTIONS 1-9: 11
6. FEELING BAD ABOUT YOURSELF - OR THAT YOU ARE A FAILURE OR HAVE LET YOURSELF OR YOUR FAMILY DOWN: SEVERAL DAYS
9. THOUGHTS THAT YOU WOULD BE BETTER OFF DEAD, OR OF HURTING YOURSELF: NOT AT ALL
SUM OF ALL RESPONSES TO PHQ QUESTIONS 1-9: 11
SUM OF ALL RESPONSES TO PHQ9 QUESTIONS 1 & 2: 2
1. LITTLE INTEREST OR PLEASURE IN DOING THINGS: SEVERAL DAYS
10. IF YOU CHECKED OFF ANY PROBLEMS, HOW DIFFICULT HAVE THESE PROBLEMS MADE IT FOR YOU TO DO YOUR WORK, TAKE CARE OF THINGS AT HOME, OR GET ALONG WITH OTHER PEOPLE: SOMEWHAT DIFFICULT
SUM OF ALL RESPONSES TO PHQ QUESTIONS 1-9: 11
SUM OF ALL RESPONSES TO PHQ QUESTIONS 1-9: 11
4. FEELING TIRED OR HAVING LITTLE ENERGY: MORE THAN HALF THE DAYS

## 2024-08-19 NOTE — PROGRESS NOTES
[facility]    Progress Note    Date of patient's visit: 8/19/2024    Patient's Name:  Beatriz Edmond    YOB: 1970            Patient Care Team:  Cherie Ambrose MD as PCP - General (Internal Medicine)  Cherie Ambrose MD as PCP - Empaneled Provider  Greyson Reid MD as Consulting Physician (Pulmonary Disease)    REASON FOR VISIT: Routine outpatient follow     Chief Complaint   Patient presents with    ED Follow-up     [facility]/24 for chest pain and sob    Cough     Pt says she has terrible cough. Coughing up green phelgm. Occurring since Wednesday of last week. Wanting antibiotic and Mucinex. Feels like when she had pneumonia    Breast Pain     Pt c/o pain under left breast. Pt says it is aching and feels crampy. Going on for a month.     Hip Pain     Pt c/o left hip pain. Pt says using biofreeze daily.          HISTORY OF PRESENT ILLNESS:    History was obtained from the patient. Beatriz Edmond is a 53 y.o. is here for follow-up after ER visit.  She has recurrent ER visits for epigastric pain.  She was seen here in January as well and referred for an ultrasound of her liver for gallbladder pain and referred to GI but she did not make that appointment.  She denies GERD.  She has IBS-like symptoms.  She is supposed to go for a colonoscopy and EGD but so far has not kept those appointments.  She does take omeprazole.  She agrees to go see GI.  She has had cardiac workup in the past including stress test and a couple of angiograms which have been negative.  She is denying exertional chest pain.    She is complaining today of cough with greenish phlegm for the last week.  She denies difficulty breathing or worsening of her asthma.  No fever or chills.  She had a flu earlier this year.  She also had COVID last year.  She uses albuterol and steroid inhaler as recommended.  She has not followed up with pulmonologist in a year.    She is also

## 2024-08-20 ENCOUNTER — HOSPITAL ENCOUNTER (OUTPATIENT)
Age: 54
Discharge: HOME | End: 2024-08-20
Payer: MEDICAID

## 2024-08-20 ENCOUNTER — HOSPITAL ENCOUNTER (OUTPATIENT)
Dept: GENERAL RADIOLOGY | Age: 54
Discharge: HOME | End: 2024-08-22
Payer: MEDICAID

## 2024-08-20 ENCOUNTER — HOSPITAL ENCOUNTER (OUTPATIENT)
Age: 54
Discharge: HOME | End: 2024-08-22
Payer: MEDICAID

## 2024-08-20 DIAGNOSIS — R73.9 HYPERGLYCEMIA: ICD-10-CM

## 2024-08-20 DIAGNOSIS — M25.552 LEFT HIP PAIN: ICD-10-CM

## 2024-08-20 DIAGNOSIS — E78.2 MIXED HYPERLIPIDEMIA: ICD-10-CM

## 2024-08-20 LAB
CHOLEST SERPL-MCNC: 188 MG/DL (ref 0–199)
CHOLESTEROL/HDL RATIO: 3
EST. AVERAGE GLUCOSE BLD GHB EST-MCNC: 126 MG/DL
HBA1C MFR BLD: 6 % (ref 4–6)
HDLC SERPL-MCNC: 55 MG/DL
LDLC SERPL CALC-MCNC: 106 MG/DL (ref 0–100)
TRIGL SERPL-MCNC: 134 MG/DL
VLDLC SERPL CALC-MCNC: 27 MG/DL

## 2024-08-20 PROCEDURE — 80061 LIPID PANEL: CPT

## 2024-08-20 PROCEDURE — 83036 HEMOGLOBIN GLYCOSYLATED A1C: CPT

## 2024-08-20 PROCEDURE — 73502 X-RAY EXAM HIP UNI 2-3 VIEWS: CPT

## 2024-08-20 PROCEDURE — 36415 COLL VENOUS BLD VENIPUNCTURE: CPT

## 2024-08-20 RX ORDER — GUAIFENESIN/DEXTROMETHORPHAN 100-10MG/5
5 SYRUP ORAL 3 TIMES DAILY PRN
Qty: 120 ML | Refills: 0 | OUTPATIENT
Start: 2024-08-20 | End: 2024-08-30

## 2024-08-22 ENCOUNTER — TELEPHONE (OUTPATIENT)
Dept: INTERNAL MEDICINE | Age: 54
End: 2024-08-22

## 2024-08-22 NOTE — TELEPHONE ENCOUNTER
Patient is calling stating cough from last week still not any better patient is requesting cough medicine.  Please advise

## 2024-08-23 ENCOUNTER — TELEPHONE (OUTPATIENT)
Dept: INTERNAL MEDICINE | Age: 54
End: 2024-08-23

## 2024-08-26 NOTE — TELEPHONE ENCOUNTER
PC to pt to review results, no answer. Left HIPAA compliant message identifying self and nature of call, requested call back to writer or office, phone numbers given.    Addendum 1411: spoke with pt, advised continued dietary changes. Discussed foods that are high in cholesterol as well as advised watching carbohydrate intake. Pt verbalized understanding.

## 2024-08-26 NOTE — TELEPHONE ENCOUNTER
Mammogram ok. Labs show slightly elevated lipids from before. Continue Lipitor. A1C shows prediabetes. Continue diet changes.

## 2024-08-27 RX ORDER — SIMETHICONE 80 MG
TABLET,CHEWABLE ORAL
Qty: 180 TABLET | Refills: 3 | Status: SHIPPED | OUTPATIENT
Start: 2024-08-27

## 2024-08-27 NOTE — TELEPHONE ENCOUNTER
Last visit: 08/19/24   Last Med refill:   Does patient have enough medication for 72 hours: No:     Next Visit Date:  Future Appointments   Date Time Provider Department Center   10/2/2024  1:30 PM Jennifer Jean Baptiste MD [place] [facility]   10/21/2024 10:15 AM Greyson Reid MD Resp Spec [facility]   2/17/2025  1:00 PM Monica Wade MD Neuro [facility] Neurology -       Health Maintenance   Topic Date Due    Hepatitis B vaccine (1 of 3 - 19+ 3-dose series) Never done    Pneumococcal 0-64 years Vaccine (2 of 2 - PCV) 01/21/2017    Colorectal Cancer Screen  02/08/2023    COVID-19 Vaccine (3 - 2023-24 season) 09/01/2023    Flu vaccine (1) 08/01/2024    Shingles vaccine (2 of 2) 01/08/2025 (Originally 9/3/2021)    Depression Monitoring  08/19/2025    A1C test (Diabetic or Prediabetic)  08/20/2025    Lipids  08/20/2025    DTaP/Tdap/Td vaccine (2 - Td or Tdap) 02/05/2026    Breast cancer screen  08/16/2026    Cervical cancer screen  09/07/2026    Hepatitis C screen  Completed    HIV screen  Completed    Hepatitis A vaccine  Aged Out    Hib vaccine  Aged Out    Polio vaccine  Aged Out    Meningococcal (ACWY) vaccine  Aged Out    Depression Screen  Discontinued    Diabetes screen  Discontinued       Hemoglobin A1C (%)   Date Value   08/20/2024 6.0   05/09/2023 5.6   10/25/2022 5.6             ( goal A1C is < 7)   No components found for: \"LABMICR\"  No components found for: \"LDLCHOLESTEROL\", \"LDLCALC\"    (goal LDL is <100)   AST (U/L)   Date Value   01/07/2024 18     ALT (U/L)   Date Value   01/07/2024 19     BUN (mg/dL)   Date Value   07/29/2024 15     BP Readings from Last 3 Encounters:   08/19/24 120/78   08/12/24 110/75   07/29/24 138/77          (goal 120/80)    All Future Testing planned in [facility]  Lab Frequency Next Occurrence               Patient Active Problem List:     Hypertension     Asthma     GERD (gastroesophageal reflux disease)     S/P cardiac cath-Minimal disease 11/23/15 Dr. chow     Irritable

## 2024-09-04 ENCOUNTER — OFFICE VISIT (OUTPATIENT)
Dept: INTERNAL MEDICINE | Age: 54
End: 2024-09-04

## 2024-09-04 VITALS
OXYGEN SATURATION: 96 % | DIASTOLIC BLOOD PRESSURE: 88 MMHG | WEIGHT: 213 LBS | HEIGHT: 60 IN | BODY MASS INDEX: 41.82 KG/M2 | HEART RATE: 88 BPM | SYSTOLIC BLOOD PRESSURE: 130 MMHG | TEMPERATURE: 97.9 F

## 2024-09-04 DIAGNOSIS — J45.50 SEVERE PERSISTENT ASTHMA WITHOUT COMPLICATION: ICD-10-CM

## 2024-09-04 DIAGNOSIS — K21.00 CHRONIC REFLUX ESOPHAGITIS: ICD-10-CM

## 2024-09-04 DIAGNOSIS — J02.9 THROAT SORENESS: ICD-10-CM

## 2024-09-04 DIAGNOSIS — R05.3 CHRONIC COUGH: Primary | ICD-10-CM

## 2024-09-04 RX ORDER — OMEPRAZOLE 40 MG/1
CAPSULE, DELAYED RELEASE ORAL
Qty: 30 CAPSULE | Refills: 5 | Status: SHIPPED | OUTPATIENT
Start: 2024-09-04

## 2024-09-04 RX ORDER — MENTHOL/CETYLPYRD CL 4.5 MG
1 LOZENGE MUCOUS MEMBRANE 2 TIMES DAILY
Qty: 25 LOZENGE | Refills: 0 | Status: SHIPPED | OUTPATIENT
Start: 2024-09-04

## 2024-09-04 RX ORDER — ONDANSETRON 4 MG/1
TABLET, ORALLY DISINTEGRATING ORAL
Qty: 15 TABLET | Refills: 1 | Status: SHIPPED | OUTPATIENT
Start: 2024-09-04

## 2024-09-04 RX ORDER — GUAIFENESIN/DEXTROMETHORPHAN 100-10MG/5
5 SYRUP ORAL 3 TIMES DAILY PRN
Qty: 120 ML | Refills: 0 | Status: SHIPPED | OUTPATIENT
Start: 2024-09-04 | End: 2024-09-14

## 2024-09-04 RX ORDER — SIMETHICONE 80 MG
TABLET,CHEWABLE ORAL
Qty: 180 TABLET | Refills: 3 | Status: SHIPPED | OUTPATIENT
Start: 2024-09-04

## 2024-09-04 RX ORDER — LORATADINE 10 MG/1
10 TABLET ORAL DAILY
Qty: 30 TABLET | Refills: 5 | Status: SHIPPED | OUTPATIENT
Start: 2024-09-04

## 2024-09-04 RX ORDER — ALBUTEROL SULFATE 90 UG/1
AEROSOL, METERED RESPIRATORY (INHALATION)
Qty: 1 EACH | Refills: 5 | Status: SHIPPED | OUTPATIENT
Start: 2024-09-04

## 2024-09-04 ASSESSMENT — PATIENT HEALTH QUESTIONNAIRE - PHQ9
1. LITTLE INTEREST OR PLEASURE IN DOING THINGS: SEVERAL DAYS
SUM OF ALL RESPONSES TO PHQ QUESTIONS 1-9: 11
9. THOUGHTS THAT YOU WOULD BE BETTER OFF DEAD, OR OF HURTING YOURSELF: NOT AT ALL
3. TROUBLE FALLING OR STAYING ASLEEP: NEARLY EVERY DAY
7. TROUBLE CONCENTRATING ON THINGS, SUCH AS READING THE NEWSPAPER OR WATCHING TELEVISION: SEVERAL DAYS
8. MOVING OR SPEAKING SO SLOWLY THAT OTHER PEOPLE COULD HAVE NOTICED. OR THE OPPOSITE, BEING SO FIGETY OR RESTLESS THAT YOU HAVE BEEN MOVING AROUND A LOT MORE THAN USUAL: SEVERAL DAYS
SUM OF ALL RESPONSES TO PHQ QUESTIONS 1-9: 11
2. FEELING DOWN, DEPRESSED OR HOPELESS: SEVERAL DAYS
SUM OF ALL RESPONSES TO PHQ9 QUESTIONS 1 & 2: 2
4. FEELING TIRED OR HAVING LITTLE ENERGY: MORE THAN HALF THE DAYS
6. FEELING BAD ABOUT YOURSELF - OR THAT YOU ARE A FAILURE OR HAVE LET YOURSELF OR YOUR FAMILY DOWN: MORE THAN HALF THE DAYS
10. IF YOU CHECKED OFF ANY PROBLEMS, HOW DIFFICULT HAVE THESE PROBLEMS MADE IT FOR YOU TO DO YOUR WORK, TAKE CARE OF THINGS AT HOME, OR GET ALONG WITH OTHER PEOPLE: NOT DIFFICULT AT ALL
5. POOR APPETITE OR OVEREATING: NOT AT ALL

## 2024-09-04 ASSESSMENT — ENCOUNTER SYMPTOMS
ANAL BLEEDING: 0
SINUS PAIN: 0
STRIDOR: 0
SHORTNESS OF BREATH: 0
CHEST TIGHTNESS: 0
ABDOMINAL DISTENTION: 0
TROUBLE SWALLOWING: 0
CHOKING: 0
APNEA: 0
SORE THROAT: 1
COUGH: 1
VOICE CHANGE: 0
WHEEZING: 0

## 2024-09-04 NOTE — PROGRESS NOTES
[facility]  [facility]  [address] [place] [address]  Dept: [phone]  Dept Fax: [phone]    Office Progress/Follow Up Note  Date ofpatient's visit: 9/4/2024  Patient's Name:  Beatriz Edmond YOB: 1970            Patient Care Team:  Cherie Ambrose MD as PCP - General (Internal Medicine)  Cherie Ambrose MD as PCP - Empaneled Provider  Greyson Reid MD as Consulting Physician (Pulmonary Disease)  ================================================================    REASON FOR VISIT/CHIEF COMPLAINT:  Cough (X 5 month) and Hypertension (Pt took medication today)    HISTORY OF PRESENTING ILLNESS:  History was obtained from: patient, electronic medical record. Beatriz Edmondis a 53 y.o. with past medical history of hypertension, severe persistent asthma, allergic rhinitis, depression, GERD, hyperlipidemia is here for acute visit for cough.  Patient has a chronic cough ongoing for 5 months and she has a history of severe persistent asthma and has been using Dulera and albuterol daily, patient does not smoke, states during the daytime she feels fine but cough gets worse in the nighttime when she has been having GERD symptoms, has been using Prilosec with no relief, takes Prilosec in the morning 1 hour before breakfast.  Previously patient was taking Claritin daily but however she has not been taking frequently now for unknown reason, states she is having some nasal congestion but no fever, change in appetite, no sputum production, feels her throat is sore due to cough and GERD, denies any pain while swallowing or any difficulty with swallowing  Has an appointment with gastroenterologist and pulmonologist next month, she had upper endoscopy done 6 years ago which showed mild esophagitis and esophageal stricture  Patient has gained some weight as well she states she is trying to lose weight and has been walking and trying to do some exercise      Patient

## 2024-09-04 NOTE — PROGRESS NOTES
Attending Physician Statement  I have discussed the care of Beatriz Edmond including pertinent history and exam findings,  with the resident. I have reviewed the key elements of all parts of the encounter with the resident.  I agree with the assessment, plan and orders as documented by the resident.  (GE Modifier)    MD TASHA Monaco  Attending Physician, [facility]   Faculty, Internal Medicine Residency Program  [facility] Physician [facility]  9/4/2024, 3:42 PM

## 2024-10-02 ENCOUNTER — OFFICE VISIT (OUTPATIENT)
Dept: GASTROENTEROLOGY | Age: 54
End: 2024-10-02
Payer: MEDICAID

## 2024-10-02 VITALS
BODY MASS INDEX: 41.99 KG/M2 | DIASTOLIC BLOOD PRESSURE: 81 MMHG | WEIGHT: 215 LBS | SYSTOLIC BLOOD PRESSURE: 123 MMHG | TEMPERATURE: 97.9 F

## 2024-10-02 DIAGNOSIS — Z80.0 ENCOUNTER FOR COLONOSCOPY IN PATIENT WITH FAMILY HISTORY OF COLON CANCER: ICD-10-CM

## 2024-10-02 DIAGNOSIS — Z12.11 ENCOUNTER FOR COLONOSCOPY IN PATIENT WITH FAMILY HISTORY OF COLON CANCER: ICD-10-CM

## 2024-10-02 DIAGNOSIS — K21.00 GASTROESOPHAGEAL REFLUX DISEASE WITH ESOPHAGITIS WITHOUT HEMORRHAGE: Primary | ICD-10-CM

## 2024-10-02 DIAGNOSIS — Q43.8 REDUNDANT COLON: ICD-10-CM

## 2024-10-02 DIAGNOSIS — R10.12 ABDOMINAL PAIN, LEFT UPPER QUADRANT: ICD-10-CM

## 2024-10-02 DIAGNOSIS — R13.19 ESOPHAGEAL DYSPHAGIA: ICD-10-CM

## 2024-10-02 PROCEDURE — G8484 FLU IMMUNIZE NO ADMIN: HCPCS | Performed by: INTERNAL MEDICINE

## 2024-10-02 PROCEDURE — 1036F TOBACCO NON-USER: CPT | Performed by: INTERNAL MEDICINE

## 2024-10-02 PROCEDURE — G8417 CALC BMI ABV UP PARAM F/U: HCPCS | Performed by: INTERNAL MEDICINE

## 2024-10-02 PROCEDURE — G8427 DOCREV CUR MEDS BY ELIG CLIN: HCPCS | Performed by: INTERNAL MEDICINE

## 2024-10-02 PROCEDURE — 3074F SYST BP LT 130 MM HG: CPT | Performed by: INTERNAL MEDICINE

## 2024-10-02 PROCEDURE — 99204 OFFICE O/P NEW MOD 45 MIN: CPT | Performed by: INTERNAL MEDICINE

## 2024-10-02 PROCEDURE — 3079F DIAST BP 80-89 MM HG: CPT | Performed by: INTERNAL MEDICINE

## 2024-10-02 PROCEDURE — 3017F COLORECTAL CA SCREEN DOC REV: CPT | Performed by: INTERNAL MEDICINE

## 2024-10-02 ASSESSMENT — ENCOUNTER SYMPTOMS
CHOKING: 0
RECTAL PAIN: 0
TROUBLE SWALLOWING: 0
VOMITING: 0
ABDOMINAL PAIN: 1
SHORTNESS OF BREATH: 0
COUGH: 0
ANAL BLEEDING: 0
WHEEZING: 0
BLOOD IN STOOL: 0
NAUSEA: 1
DIARRHEA: 0
VOICE CHANGE: 0
ABDOMINAL DISTENTION: 0
SORE THROAT: 0
CONSTIPATION: 1

## 2024-10-02 NOTE — PROGRESS NOTES
modifications. She was stressed about the maintenance  of appropriate weight and effect of obesity contributing to reflux symptoms. Routine exercise was streesed.     Avoidance of Caffeine, nicotine and chocolate were explained. Pt was asked to avoid spices grease and fried food. Advices were also given about avoidance of any kind of fast foods, soda pops and high energy drinks.    Pt was advised to place two small block under the head end of the bed which may help with night time reflux. Was advised not to eat any thin at least 2-3 hrs before going to bed and walk especially after dinner    Pt has verbalized understanding and agreement to this plan.    Pt was discussed in detail about the possible side effects of proton pump inhibiter therapy.    She was explained about the possibility of calcium and magnesium malabsorption and was advised to start taking calcium supplements with Vit D. Some over the counter regimens were explained to patient. Some dietary advices were also given.    She has verbalized understanding and agreement to this.    Pt was advised in detail about some life style and dietary modifications. She was advised about avoidance of caffeine, nicotine and chocolate. Pt was also told to stay away from any kind of fast foods, soda pops. She was also advised to avoid lots of spices, grease and fried food etc.     Instructions were also given about trying to arrange the timing, quality and quantity of food.    Instructions were given about using ample amount of fiber including dietary and supplemental fiber either metamucil, bennafiber or citrucell etc.  Pt was advised about drinking ample amount of water without any colors or chemicals. Stress was given about regular exercise.    Pt has verbalized understanding and agreement to these modifications.      Pt was given advices and instructions about weight loss. She was advised about the significance of exercise at least three times a week .   Dietary

## 2024-10-04 NOTE — TELEPHONE ENCOUNTER
Procedure scheduled/Dr ELLEN Jean Baptiste  Procedure: colon egd   Dx: GERD / redundant colon  Date: 3/3/25   Time: 10 a.m.  Hospital: [facility]   Bowel Prep instructions given: miralax dulco  In office/via phone: office   Clearance needed: no

## 2024-10-09 RX ORDER — BISACODYL 5 MG/1
TABLET, DELAYED RELEASE ORAL
Qty: 4 TABLET | Refills: 0 | Status: SHIPPED | OUTPATIENT
Start: 2024-10-09

## 2024-10-09 RX ORDER — POLYETHYLENE GLYCOL 3350 17 G/17G
POWDER, FOR SOLUTION ORAL
Qty: 238 G | Refills: 0 | Status: SHIPPED | OUTPATIENT
Start: 2024-10-09

## 2024-11-05 DIAGNOSIS — M70.62 GREATER TROCHANTERIC BURSITIS OF LEFT HIP: ICD-10-CM

## 2024-11-05 DIAGNOSIS — E78.2 MIXED HYPERLIPIDEMIA: ICD-10-CM

## 2024-11-05 DIAGNOSIS — J45.51 SEVERE PERSISTENT ASTHMA WITH ACUTE EXACERBATION: ICD-10-CM

## 2024-11-05 DIAGNOSIS — M25.552 LEFT HIP PAIN: ICD-10-CM

## 2024-11-05 DIAGNOSIS — J34.3 NASAL TURBINATE HYPERTROPHY: ICD-10-CM

## 2024-11-05 DIAGNOSIS — M75.42 IMPINGEMENT SYNDROME OF SHOULDER REGION, LEFT: ICD-10-CM

## 2024-11-05 DIAGNOSIS — K21.00 CHRONIC REFLUX ESOPHAGITIS: ICD-10-CM

## 2024-11-05 DIAGNOSIS — J45.50 SEVERE PERSISTENT ASTHMA WITHOUT COMPLICATION: ICD-10-CM

## 2024-11-05 DIAGNOSIS — I10 ESSENTIAL HYPERTENSION: ICD-10-CM

## 2024-11-05 NOTE — TELEPHONE ENCOUNTER
Beatriz Edmond is calling to request a refill on the following medication(s):Pt is switching pharmacies    Medication Request:  Requested Prescriptions     Pending Prescriptions Disp Refills    atorvastatin (LIPITOR) 40 MG tablet 30 tablet 5     Sig: Take 1 tablet by mouth daily       Last Visit Date (If Applicable):  8/19/2024    Next Visit Date:    12/17/2024

## 2024-11-05 NOTE — TELEPHONE ENCOUNTER
Beatriz Edmond is calling to request a refill on the following medication(s): Pt is switching pharmacies    Medication Request:  Requested Prescriptions     Pending Prescriptions Disp Refills    valsartan-hydroCHLOROthiazide (DIOVAN-HCT) 80-12.5 MG per tablet 30 tablet 5     Sig: Once a day    carvedilol (COREG) 6.25 MG tablet 60 tablet 5     Sig: Take 1 tablet by mouth 2 times daily       Last Visit Date (If Applicable):  8/19/2024    Next Visit Date:    12/17/2024

## 2024-11-05 NOTE — TELEPHONE ENCOUNTER
Beatriz Edmond is calling to request a refill on the following medication(s): Pt is switching pharmacies    Medication Request:  Requested Prescriptions     Pending Prescriptions Disp Refills    lidocaine (LIDODERM) 5 % 30 patch 2     Si hours on, 12 hours off.       Last Visit Date (If Applicable):  2024    Next Visit Date:    2024

## 2024-11-05 NOTE — TELEPHONE ENCOUNTER
Beatriz Edmond is calling to request a refill on the following medication(s): Pt is switching pharmacies    Medication Request:  Requested Prescriptions     Pending Prescriptions Disp Refills    mometasone-formoterol (DULERA) 200-5 MCG/ACT inhaler 13 g 5     Sig: inhale 2 puffs by mouth and INTO THE LUNGS twice a day    omeprazole (PRILOSEC) 40 MG delayed release capsule 30 capsule 5     Si tablet daily on empty stomach    ibuprofen (ADVIL;MOTRIN) 800 MG tablet 30 tablet 2     Sig: Take 1 tablet by mouth every 8 hours as needed for Pain    senna (HAYDER-JEREMIE) 8.6 MG tablet 90 tablet 1     Sig: take 1 tablet by mouth once daily    ondansetron (ZOFRAN-ODT) 4 MG disintegrating tablet 15 tablet 1     Sig: dissolve 1 tablet ON TONGUE every 12 hours if needed for nausea OR vomiting    Menthol, Topical Analgesic, (BIOFREEZE) 10 % CREA 100 g 1     Sig: Use 4/day as needed    meloxicam (MOBIC) 7.5 MG tablet 30 tablet 0     Sig: Take 1 tablet by mouth daily    melatonin (RA MELATONIN) 5 MG TABS tablet 30 tablet 5     Sig: take 1 tablet by mouth nightly    loratadine (CLARITIN) 10 MG tablet 30 tablet 5     Sig: Take 1 tablet by mouth daily    fluticasone (FLONASE) 50 MCG/ACT nasal spray 16 g 5     Si sprays by Each Nostril route daily    fenofibrate (TRICOR) 48 MG tablet 30 tablet 5     Sig: Take 1 tablet by mouth daily    albuterol sulfate HFA (PROAIR HFA) 108 (90 Base) MCG/ACT inhaler 1 each 5     Sig: inhale 2 puffs by mouth every 6 hours if needed for wheezing    albuterol (PROVENTIL) (2.5 MG/3ML) 0.083% nebulizer solution 120 each 3     Sig: Take 3 mLs by nebulization every 6 hours as needed for Wheezing       Last Visit Date (If Applicable):  2024    Next Visit Date:    2024

## 2024-11-06 RX ORDER — FENOFIBRATE 48 MG/1
48 TABLET, COATED ORAL DAILY
Qty: 90 TABLET | Refills: 0 | Status: SHIPPED | OUTPATIENT
Start: 2024-11-06

## 2024-11-06 RX ORDER — IBUPROFEN 800 MG/1
800 TABLET, FILM COATED ORAL EVERY 8 HOURS PRN
Qty: 30 TABLET | Refills: 2 | Status: SHIPPED | OUTPATIENT
Start: 2024-11-06

## 2024-11-06 RX ORDER — ALBUTEROL SULFATE 90 UG/1
INHALANT RESPIRATORY (INHALATION)
Qty: 1 EACH | Refills: 5 | Status: SHIPPED | OUTPATIENT
Start: 2024-11-06

## 2024-11-06 RX ORDER — CARVEDILOL 6.25 MG/1
6.25 TABLET ORAL 2 TIMES DAILY
Qty: 60 TABLET | Refills: 5 | Status: SHIPPED | OUTPATIENT
Start: 2024-11-06

## 2024-11-06 RX ORDER — LORATADINE 10 MG/1
10 TABLET ORAL DAILY
Qty: 90 TABLET | Refills: 0 | Status: SHIPPED | OUTPATIENT
Start: 2024-11-06

## 2024-11-06 RX ORDER — MENTHOL 100 MG/G
CREAM TOPICAL
Qty: 100 G | Refills: 1 | Status: SHIPPED | OUTPATIENT
Start: 2024-11-06

## 2024-11-06 RX ORDER — MELOXICAM 7.5 MG/1
7.5 TABLET ORAL DAILY
Qty: 90 TABLET | Refills: 0 | Status: SHIPPED | OUTPATIENT
Start: 2024-11-06

## 2024-11-06 RX ORDER — LIDOCAINE 50 MG/G
PATCH TOPICAL
Qty: 30 PATCH | Refills: 2 | Status: SHIPPED | OUTPATIENT
Start: 2024-11-06

## 2024-11-06 RX ORDER — FLUTICASONE PROPIONATE 50 MCG
2 SPRAY, SUSPENSION (ML) NASAL DAILY
Qty: 16 G | Refills: 5 | Status: SHIPPED | OUTPATIENT
Start: 2024-11-06

## 2024-11-06 RX ORDER — ATORVASTATIN CALCIUM 40 MG/1
40 TABLET, FILM COATED ORAL DAILY
Qty: 30 TABLET | Refills: 5 | Status: SHIPPED | OUTPATIENT
Start: 2024-11-06

## 2024-11-06 RX ORDER — ALBUTEROL SULFATE 0.83 MG/ML
2.5 SOLUTION RESPIRATORY (INHALATION) EVERY 6 HOURS PRN
Qty: 120 EACH | Refills: 3 | Status: SHIPPED | OUTPATIENT
Start: 2024-11-06

## 2024-11-06 RX ORDER — SENNOSIDES A AND B 8.6 MG/1
TABLET, FILM COATED ORAL
Qty: 90 TABLET | Refills: 0 | Status: SHIPPED | OUTPATIENT
Start: 2024-11-06

## 2024-11-06 RX ORDER — VALSARTAN AND HYDROCHLOROTHIAZIDE 80; 12.5 MG/1; MG/1
TABLET, FILM COATED ORAL
Qty: 30 TABLET | Refills: 5 | Status: SHIPPED | OUTPATIENT
Start: 2024-11-06

## 2024-11-06 RX ORDER — ONDANSETRON 4 MG/1
TABLET, ORALLY DISINTEGRATING ORAL
Qty: 15 TABLET | Refills: 1 | Status: SHIPPED | OUTPATIENT
Start: 2024-11-06

## 2024-11-06 RX ORDER — OMEPRAZOLE 40 MG/1
CAPSULE, DELAYED RELEASE ORAL
Qty: 90 CAPSULE | Refills: 0 | Status: SHIPPED | OUTPATIENT
Start: 2024-11-06

## 2024-12-09 ENCOUNTER — APPOINTMENT (OUTPATIENT)
Dept: GENERAL RADIOLOGY | Age: 54
End: 2024-12-09
Payer: MEDICAID

## 2024-12-09 ENCOUNTER — HOSPITAL ENCOUNTER (EMERGENCY)
Age: 54
Discharge: HOME | End: 2024-12-09
Attending: EMERGENCY MEDICINE
Payer: MEDICAID

## 2024-12-09 VITALS
TEMPERATURE: 98.4 F | OXYGEN SATURATION: 97 % | HEART RATE: 90 BPM | RESPIRATION RATE: 22 BRPM | WEIGHT: 225.53 LBS | DIASTOLIC BLOOD PRESSURE: 85 MMHG | SYSTOLIC BLOOD PRESSURE: 154 MMHG | BODY MASS INDEX: 44.05 KG/M2

## 2024-12-09 DIAGNOSIS — J06.9 VIRAL UPPER RESPIRATORY TRACT INFECTION: Primary | ICD-10-CM

## 2024-12-09 LAB
FLUAV AG SPEC QL: NEGATIVE
FLUBV AG SPEC QL: NEGATIVE
SARS-COV-2 RDRP RESP QL NAA+PROBE: NOT DETECTED
SPECIMEN DESCRIPTION: NORMAL

## 2024-12-09 PROCEDURE — 71045 X-RAY EXAM CHEST 1 VIEW: CPT

## 2024-12-09 PROCEDURE — 99284 EMERGENCY DEPT VISIT MOD MDM: CPT | Performed by: EMERGENCY MEDICINE

## 2024-12-09 PROCEDURE — 6370000000 HC RX 637 (ALT 250 FOR IP)

## 2024-12-09 PROCEDURE — 87635 SARS-COV-2 COVID-19 AMP PRB: CPT

## 2024-12-09 PROCEDURE — 87804 INFLUENZA ASSAY W/OPTIC: CPT

## 2024-12-09 RX ORDER — GUAIFENESIN 600 MG/1
600 TABLET, EXTENDED RELEASE ORAL 2 TIMES DAILY
Qty: 30 TABLET | Refills: 0 | Status: SHIPPED | OUTPATIENT
Start: 2024-12-09 | End: 2024-12-24

## 2024-12-09 RX ORDER — ACETAMINOPHEN 325 MG/1
650 TABLET ORAL ONCE
Status: COMPLETED | OUTPATIENT
Start: 2024-12-09 | End: 2024-12-09

## 2024-12-09 RX ORDER — BENZONATATE 100 MG/1
100 CAPSULE ORAL 3 TIMES DAILY PRN
Status: DISCONTINUED | OUTPATIENT
Start: 2024-12-09 | End: 2024-12-09 | Stop reason: HOSPADM

## 2024-12-09 RX ORDER — BENZONATATE 100 MG/1
100-200 CAPSULE ORAL 3 TIMES DAILY PRN
Qty: 60 CAPSULE | Refills: 0 | Status: SHIPPED | OUTPATIENT
Start: 2024-12-09 | End: 2024-12-16

## 2024-12-09 RX ORDER — GUAIFENESIN 600 MG/1
600 TABLET, EXTENDED RELEASE ORAL 2 TIMES DAILY
Status: DISCONTINUED | OUTPATIENT
Start: 2024-12-09 | End: 2024-12-09 | Stop reason: HOSPADM

## 2024-12-09 RX ORDER — ACETAMINOPHEN 500 MG
500 TABLET ORAL 4 TIMES DAILY PRN
Qty: 360 TABLET | Refills: 1 | Status: SHIPPED | OUTPATIENT
Start: 2024-12-09

## 2024-12-09 RX ADMIN — BENZONATATE 100 MG: 100 CAPSULE ORAL at 10:36

## 2024-12-09 RX ADMIN — GUAIFENESIN 600 MG: 600 TABLET, EXTENDED RELEASE ORAL at 10:36

## 2024-12-09 RX ADMIN — ACETAMINOPHEN 650 MG: 325 TABLET ORAL at 10:36

## 2024-12-09 ASSESSMENT — PAIN DESCRIPTION - LOCATION: LOCATION: CHEST

## 2024-12-09 ASSESSMENT — ENCOUNTER SYMPTOMS
CHEST TIGHTNESS: 0
SHORTNESS OF BREATH: 1
SINUS PAIN: 0
SINUS PRESSURE: 0
EYES NEGATIVE: 1
WHEEZING: 0
STRIDOR: 0
COUGH: 1
SORE THROAT: 1
RHINORRHEA: 1

## 2024-12-09 ASSESSMENT — PAIN DESCRIPTION - DESCRIPTORS: DESCRIPTORS: ACHING

## 2024-12-09 ASSESSMENT — PAIN SCALES - GENERAL: PAINLEVEL_OUTOF10: 7

## 2024-12-09 ASSESSMENT — PAIN - FUNCTIONAL ASSESSMENT: PAIN_FUNCTIONAL_ASSESSMENT: 0-10

## 2024-12-09 ASSESSMENT — PAIN DESCRIPTION - ORIENTATION: ORIENTATION: MID

## 2024-12-09 NOTE — ED NOTES
Pt arrives alert and oriented x4 and ambulatory from triage  Pt complains of a productive cough since Friday   Pt states her phlem is a dark green color, and this feels like when she had pneumonia previously   Pt states her chest hurts when she coughs, however denies SOB, or dizziness   Pt reports hx of asthma  RR even and unlabored.   NAD noted.   Will continue with plan of care.

## 2024-12-09 NOTE — ED PROVIDER NOTES
[facility] EMERGENCY DEPARTMENT  Emergency Department Encounter  Emergency Medicine Resident     Pt Name:Beatriz Edmond  MRN: [id number]  Birthdate 1970  Date of evaluation: 12/9/24  PCP:  Cherie Ambrose MD  Note Started: 10:35 AM EST      CHIEF COMPLAINT       Chief Complaint   Patient presents with    Cough       HISTORY OF PRESENT ILLNESS  (Location/Symptom, Timing/Onset, Context/Setting, Quality, Duration, Modifying Factors, Severity.)      Beatriz Edmond is a 54 y.o. female history of asthma who presents with cough and body aches. Since 3 days, patient has been experiencing productive cough, green in color, associated with chills, body aches & myalgias, headache and nasal congestion. Per patient, her grandson was sick with similar symptoms  when he visited her recently. Denies fever, eye irritation, ear discomfort/pain or any other issues.    Patient has been using inhaler as well as OTC cough syrup, but reports no improvement.    PAST MEDICAL / SURGICAL / SOCIAL / FAMILY HISTORY      has a past medical history of Allergic rhinitis, Asthma, Clinical trial participant, COPD (chronic obstructive pulmonary disease) (HCC), Dysphagia, Dysphagia, Family history of colon cancer, GERD (gastroesophageal reflux disease), H/O cardiovascular stress test, Hiatal hernia, Hyperlipidemia, Hypertension, Iron deficiency anemia secondary to inadequate dietary iron intake, Iron deficiency anemia, unspecified, Irritable bowel syndrome with constipation, Obesity, Snores, and Wears glasses.       has a past surgical history that includes Tubal ligation (2003); Colonoscopy (2015); Upper gastrointestinal endoscopy; Upper gastrointestinal endoscopy (N/A, 7/18/2017); Cardiac catheterization (01/16/2018); Cardiac catheterization (11/23/2015); Endoscopy, colon, diagnostic (2015); Endoscopy, colon, diagnostic (02/08/2018); pr colon ca scrn not hi rsk ind (N/A, 2/8/2018); Colonoscopy (02/08/2018); Nose surgery; and Nasal

## 2024-12-09 NOTE — ED PROVIDER NOTES
[facility] EMERGENCY DEPARTMENT     Emergency Department     Faculty Attestation    I performed a history and physical examination of the patient and discussed management with the resident. I reviewed the resident’s note and agree with the documented findings and plan of care. Any areas of disagreement are noted on the chart. I was personally present for the key portions of any procedures. I have documented in the chart those procedures where I was not present during the key portions. I have reviewed the emergency nurses triage note. I agree with the chief complaint, past medical history, past surgical history, allergies, medications, social and family history as documented unless otherwise noted below. For Physician Assistant/ Nurse Practitioner cases/documentation I have personally evaluated this patient and have completed at least one if not all key elements of the E/M (history, physical exam, and MDM). Additional findings are as noted.    10:31 AM EST    Patient with history of asthma presents with cough, congestion, body aches that she has had since Friday.  She says that both her grandbaby and son has been ill.  She denies any chest pain.  She says she feels a little short of breath after coughing.  She denies any abdominal pain, vomiting or diarrhea.  Patient says she has been using her asthma treatments at home.  On exam, patient is resting comfortably in the bed.  She is not in respiratory distress.  Lungs are clear to auscultation bilaterally.  Abdomen is soft and nontender.  Will get chest x-ray as well as test for COVID and flu.  Will symptomatically treat patient      Jackeline Mortensen MD  Attending Emergency  Physician

## 2025-01-07 DIAGNOSIS — K21.00 CHRONIC REFLUX ESOPHAGITIS: ICD-10-CM

## 2025-01-07 NOTE — TELEPHONE ENCOUNTER
..Request for   Requested Prescriptions     Pending Prescriptions Disp Refills    ondansetron (ZOFRAN-ODT) 4 MG disintegrating tablet 15 tablet 1     Sig: dissolve 1 tablet ON TONGUE every 12 hours if needed for nausea OR vomiting    simethicone (MYLICON) 80 MG chewable tablet 180 tablet 3     Sig: CHEW AND SWALLOW 1 tablet by mouth four times a day if needed for FLATULENCE    .      Please review and e-scribe to pharmacy listed in chart if appropriate. Thank you.      Last Visit Date: 9/4/2024  Next Visit Date: Visit date not found    Future Appointments   Date Time Provider Department Center   2/10/2025 10:45 AM Greyson Reid MD Resp Spec [id number]   2/17/2025  1:00 PM Monica Wade MD Neuro [facility] Neurology -       Health Maintenance   Topic Date Due    Hepatitis B vaccine (1 of 3 - 19+ 3-dose series) Never done    Pneumococcal 0-64 years Vaccine (2 of 2 - PCV) 01/21/2017    Colorectal Cancer Screen  02/08/2023    Flu vaccine (1) 08/01/2024    COVID-19 Vaccine (3 - 2023-24 season) 09/01/2024    Shingles vaccine (2 of 2) 01/08/2025 (Originally 9/3/2021)    A1C test (Diabetic or Prediabetic)  08/20/2025    Lipids  08/20/2025    Depression Monitoring  09/04/2025    DTaP/Tdap/Td vaccine (2 - Td or Tdap) 02/05/2026    Breast cancer screen  08/16/2026    Cervical cancer screen  09/07/2026    Hepatitis C screen  Completed    HIV screen  Completed    Hepatitis A vaccine  Aged Out    Hib vaccine  Aged Out    Polio vaccine  Aged Out    Meningococcal (ACWY) vaccine  Aged Out    Depression Screen  Discontinued    Diabetes screen  Discontinued       Hemoglobin A1C (%)   Date Value   08/20/2024 6.0   05/09/2023 5.6   10/25/2022 5.6             ( goal A1C is < 7)   No components found for: \"LABMICR\"  No components found for: \"LDLCHOLESTEROL\", \"LDLCALC\"    (goal LDL is <100)   AST (U/L)   Date Value   01/07/2024 18     ALT (U/L)   Date Value   01/07/2024 19     BUN (mg/dL)   Date Value   07/29/2024 15     BP

## 2025-01-08 RX ORDER — ONDANSETRON 4 MG/1
TABLET, ORALLY DISINTEGRATING ORAL
Qty: 15 TABLET | Refills: 1 | Status: SHIPPED | OUTPATIENT
Start: 2025-01-08

## 2025-01-08 RX ORDER — SIMETHICONE 80 MG
TABLET,CHEWABLE ORAL
Qty: 180 TABLET | Refills: 3 | Status: SHIPPED | OUTPATIENT
Start: 2025-01-08

## 2025-01-29 ENCOUNTER — OFFICE VISIT (OUTPATIENT)
Dept: INTERNAL MEDICINE | Age: 55
End: 2025-01-29
Payer: MEDICAID

## 2025-01-29 VITALS
DIASTOLIC BLOOD PRESSURE: 82 MMHG | WEIGHT: 219.2 LBS | HEART RATE: 70 BPM | SYSTOLIC BLOOD PRESSURE: 130 MMHG | OXYGEN SATURATION: 96 % | TEMPERATURE: 97.2 F | BODY MASS INDEX: 43.04 KG/M2 | HEIGHT: 60 IN

## 2025-01-29 DIAGNOSIS — K21.00 CHRONIC REFLUX ESOPHAGITIS: ICD-10-CM

## 2025-01-29 DIAGNOSIS — J45.50 SEVERE PERSISTENT ASTHMA WITHOUT COMPLICATION: Primary | ICD-10-CM

## 2025-01-29 DIAGNOSIS — R73.02 IGT (IMPAIRED GLUCOSE TOLERANCE): ICD-10-CM

## 2025-01-29 DIAGNOSIS — I10 ESSENTIAL HYPERTENSION: ICD-10-CM

## 2025-01-29 DIAGNOSIS — E78.2 MIXED HYPERLIPIDEMIA: ICD-10-CM

## 2025-01-29 DIAGNOSIS — J34.3 NASAL TURBINATE HYPERTROPHY: ICD-10-CM

## 2025-01-29 PROCEDURE — 3079F DIAST BP 80-89 MM HG: CPT | Performed by: INTERNAL MEDICINE

## 2025-01-29 PROCEDURE — G8417 CALC BMI ABV UP PARAM F/U: HCPCS | Performed by: INTERNAL MEDICINE

## 2025-01-29 PROCEDURE — G8427 DOCREV CUR MEDS BY ELIG CLIN: HCPCS | Performed by: INTERNAL MEDICINE

## 2025-01-29 PROCEDURE — 99214 OFFICE O/P EST MOD 30 MIN: CPT | Performed by: INTERNAL MEDICINE

## 2025-01-29 PROCEDURE — 3075F SYST BP GE 130 - 139MM HG: CPT | Performed by: INTERNAL MEDICINE

## 2025-01-29 PROCEDURE — 3017F COLORECTAL CA SCREEN DOC REV: CPT | Performed by: INTERNAL MEDICINE

## 2025-01-29 PROCEDURE — 1036F TOBACCO NON-USER: CPT | Performed by: INTERNAL MEDICINE

## 2025-01-29 RX ORDER — FENOFIBRATE 48 MG/1
48 TABLET, COATED ORAL DAILY
Qty: 90 TABLET | Refills: 1 | Status: SHIPPED | OUTPATIENT
Start: 2025-01-29

## 2025-01-29 RX ORDER — LORATADINE 10 MG/1
10 TABLET ORAL DAILY
Qty: 90 TABLET | Refills: 1 | Status: SHIPPED | OUTPATIENT
Start: 2025-01-29

## 2025-01-29 RX ORDER — OMEPRAZOLE 40 MG/1
CAPSULE, DELAYED RELEASE ORAL
Qty: 90 CAPSULE | Refills: 1 | Status: SHIPPED | OUTPATIENT
Start: 2025-01-29

## 2025-01-29 RX ORDER — HYDROCHLOROTHIAZIDE 25 MG/1
25 TABLET ORAL EVERY MORNING
Qty: 90 TABLET | Refills: 1 | Status: SHIPPED | OUTPATIENT
Start: 2025-01-29

## 2025-01-29 RX ORDER — MELOXICAM 7.5 MG/1
7.5 TABLET ORAL DAILY
Qty: 90 TABLET | Refills: 1 | Status: CANCELLED | OUTPATIENT
Start: 2025-01-29

## 2025-01-29 RX ORDER — CARVEDILOL 6.25 MG/1
6.25 TABLET ORAL 2 TIMES DAILY
Qty: 60 TABLET | Refills: 5 | Status: SHIPPED | OUTPATIENT
Start: 2025-01-29

## 2025-01-29 RX ORDER — PEN NEEDLE, DIABETIC 31 GX5/16"
NEEDLE, DISPOSABLE MISCELLANEOUS
COMMUNITY
Start: 2025-01-02

## 2025-01-29 RX ORDER — SENNOSIDES A AND B 8.6 MG/1
TABLET, FILM COATED ORAL
Qty: 90 TABLET | Refills: 1 | Status: SHIPPED | OUTPATIENT
Start: 2025-01-29

## 2025-01-29 RX ORDER — VALSARTAN AND HYDROCHLOROTHIAZIDE 80; 12.5 MG/1; MG/1
TABLET, FILM COATED ORAL
Qty: 90 TABLET | Refills: 1 | Status: CANCELLED | OUTPATIENT
Start: 2025-01-29

## 2025-01-29 RX ORDER — VALSARTAN 80 MG/1
80 TABLET ORAL DAILY
Qty: 90 TABLET | Refills: 1 | Status: SHIPPED | OUTPATIENT
Start: 2025-01-29

## 2025-01-29 SDOH — ECONOMIC STABILITY: FOOD INSECURITY: WITHIN THE PAST 12 MONTHS, YOU WORRIED THAT YOUR FOOD WOULD RUN OUT BEFORE YOU GOT MONEY TO BUY MORE.: NEVER TRUE

## 2025-01-29 SDOH — ECONOMIC STABILITY: FOOD INSECURITY: WITHIN THE PAST 12 MONTHS, THE FOOD YOU BOUGHT JUST DIDN'T LAST AND YOU DIDN'T HAVE MONEY TO GET MORE.: NEVER TRUE

## 2025-01-29 ASSESSMENT — PATIENT HEALTH QUESTIONNAIRE - PHQ9
SUM OF ALL RESPONSES TO PHQ9 QUESTIONS 1 & 2: 0
10. IF YOU CHECKED OFF ANY PROBLEMS, HOW DIFFICULT HAVE THESE PROBLEMS MADE IT FOR YOU TO DO YOUR WORK, TAKE CARE OF THINGS AT HOME, OR GET ALONG WITH OTHER PEOPLE: NOT DIFFICULT AT ALL
4. FEELING TIRED OR HAVING LITTLE ENERGY: NOT AT ALL
5. POOR APPETITE OR OVEREATING: NOT AT ALL
6. FEELING BAD ABOUT YOURSELF - OR THAT YOU ARE A FAILURE OR HAVE LET YOURSELF OR YOUR FAMILY DOWN: NOT AT ALL
1. LITTLE INTEREST OR PLEASURE IN DOING THINGS: NOT AT ALL
2. FEELING DOWN, DEPRESSED OR HOPELESS: NOT AT ALL
9. THOUGHTS THAT YOU WOULD BE BETTER OFF DEAD, OR OF HURTING YOURSELF: NOT AT ALL
7. TROUBLE CONCENTRATING ON THINGS, SUCH AS READING THE NEWSPAPER OR WATCHING TELEVISION: NOT AT ALL
3. TROUBLE FALLING OR STAYING ASLEEP: NOT AT ALL
SUM OF ALL RESPONSES TO PHQ QUESTIONS 1-9: 0
8. MOVING OR SPEAKING SO SLOWLY THAT OTHER PEOPLE COULD HAVE NOTICED. OR THE OPPOSITE, BEING SO FIGETY OR RESTLESS THAT YOU HAVE BEEN MOVING AROUND A LOT MORE THAN USUAL: NOT AT ALL
SUM OF ALL RESPONSES TO PHQ QUESTIONS 1-9: 0

## 2025-01-29 ASSESSMENT — ENCOUNTER SYMPTOMS
SHORTNESS OF BREATH: 0
BLOOD IN STOOL: 0
WHEEZING: 0
COUGH: 1
ABDOMINAL PAIN: 0

## 2025-01-29 NOTE — PROGRESS NOTES
of colon cancer 8/9/2017    Father    GERD (gastroesophageal reflux disease)     H/O cardiovascular stress test 01/15/2018    ABNORMAL    Hiatal hernia     Hyperlipidemia     Hypertension     Iron deficiency anemia secondary to inadequate dietary iron intake 5/13/2020    Iron deficiency anemia, unspecified 5/13/2020    Irritable bowel syndrome with constipation 1/21/2016    Obesity     Snores     states has tested negative for sleep apnea    Wears glasses        Past Surgical History:   Procedure Laterality Date    CARDIAC CATHETERIZATION  01/16/2018    CARDIAC CATHETERIZATION  11/23/2015     Minimal non obstructive CAD    COLONOSCOPY  2015    COLONOSCOPY  02/08/2018    Redundant colon    ENDOSCOPY, COLON, DIAGNOSTIC  2015    with dilatation    ENDOSCOPY, COLON, DIAGNOSTIC  02/08/2018    WNL     NASAL SEPTOPLASTY W/ TURBINOPLASTY  2020        NOSE SURGERY      turbinate     CA COLON CA SCRN NOT HI RSK IND N/A 2/8/2018    COLONOSCOPY performed by Walker Castillo MD at [facility] OR    TUBAL LIGATION  2003    UPPER GASTROINTESTINAL ENDOSCOPY      The endoscopic exam showed mildly tortuous esophagus. Savary dilation performed x 42 and 45 F.     UPPER GASTROINTESTINAL ENDOSCOPY N/A 7/18/2017    EGD DILATION SAVORY performed by Walker Castillo MD at [facility] End[facility]         ALLERGIES    No Known Allergies    MEDICATIONS:      Current Outpatient Medications on File Prior to Visit   Medication Sig Dispense Refill    naloxone 4 MG/0.1ML LIQD nasal spray USE AS DIRECTED FOR OPIOID REVERSAL      Spacer/Aero-Holding Chambers (PURE COMFORT SPACER CHAMBER) JOHNNY AS DIRECTED      ondansetron (ZOFRAN-ODT) 4 MG disintegrating tablet dissolve 1 tablet ON TONGUE every 12 hours if needed for nausea OR vomiting 15 tablet 1    simethicone (MYLICON) 80 MG chewable tablet CHEW AND SWALLOW 1 tablet by mouth four times a day if needed for FLATULENCE 180 tablet 3    acetaminophen (TYLENOL) 500 MG tablet Take 1 tablet by mouth 4 times daily

## 2025-02-10 ENCOUNTER — HOSPITAL ENCOUNTER (OUTPATIENT)
Age: 55
Discharge: HOME | End: 2025-02-10
Payer: MEDICAID

## 2025-02-10 ENCOUNTER — OFFICE VISIT (OUTPATIENT)
Dept: PULMONOLOGY | Age: 55
End: 2025-02-10
Payer: MEDICAID

## 2025-02-10 VITALS
BODY MASS INDEX: 42.6 KG/M2 | DIASTOLIC BLOOD PRESSURE: 87 MMHG | OXYGEN SATURATION: 97 % | RESPIRATION RATE: 12 BRPM | HEART RATE: 67 BPM | SYSTOLIC BLOOD PRESSURE: 139 MMHG | WEIGHT: 217 LBS | HEIGHT: 60 IN

## 2025-02-10 DIAGNOSIS — J34.3 NASAL TURBINATE HYPERTROPHY: ICD-10-CM

## 2025-02-10 DIAGNOSIS — J45.40 MODERATE PERSISTENT ASTHMA WITHOUT COMPLICATION: Primary | Chronic | ICD-10-CM

## 2025-02-10 DIAGNOSIS — I10 ESSENTIAL HYPERTENSION: ICD-10-CM

## 2025-02-10 DIAGNOSIS — J45.50 SEVERE PERSISTENT ASTHMA WITHOUT COMPLICATION: ICD-10-CM

## 2025-02-10 DIAGNOSIS — J30.89 NON-SEASONAL ALLERGIC RHINITIS, UNSPECIFIED TRIGGER: ICD-10-CM

## 2025-02-10 DIAGNOSIS — E78.2 MIXED HYPERLIPIDEMIA: ICD-10-CM

## 2025-02-10 DIAGNOSIS — J34.3 HYPERTROPHY OF BOTH INFERIOR NASAL TURBINATES: ICD-10-CM

## 2025-02-10 DIAGNOSIS — R73.02 IGT (IMPAIRED GLUCOSE TOLERANCE): ICD-10-CM

## 2025-02-10 LAB
ALBUMIN SERPL-MCNC: 4.1 G/DL (ref 3.5–5.2)
ALBUMIN/GLOB SERPL: 1.3 {RATIO} (ref 1–2.5)
ALP SERPL-CCNC: 68 U/L (ref 35–104)
ALT SERPL-CCNC: 17 U/L (ref 10–35)
ANION GAP SERPL CALCULATED.3IONS-SCNC: 10 MMOL/L (ref 9–16)
AST SERPL-CCNC: 18 U/L (ref 10–35)
BASOPHILS # BLD: <0.03 K/UL (ref 0–0.2)
BASOPHILS NFR BLD: 0 % (ref 0–2)
BILIRUB DIRECT SERPL-MCNC: 0.1 MG/DL (ref 0–0.2)
BILIRUB INDIRECT SERPL-MCNC: 0.3 MG/DL (ref 0–1)
BILIRUB SERPL-MCNC: 0.4 MG/DL (ref 0–1.2)
BUN SERPL-MCNC: 14 MG/DL (ref 6–20)
CALCIUM SERPL-MCNC: 9.8 MG/DL (ref 8.6–10.4)
CHLORIDE SERPL-SCNC: 104 MMOL/L (ref 98–107)
CHOLEST SERPL-MCNC: 153 MG/DL (ref 0–199)
CHOLESTEROL/HDL RATIO: 2.9
CO2 SERPL-SCNC: 26 MMOL/L (ref 20–31)
CREAT SERPL-MCNC: 1 MG/DL (ref 0.6–0.9)
EOSINOPHIL # BLD: 0.1 K/UL (ref 0–0.44)
EOSINOPHILS RELATIVE PERCENT: 1 % (ref 1–4)
ERYTHROCYTE [DISTWIDTH] IN BLOOD BY AUTOMATED COUNT: 17 % (ref 11.8–14.4)
EST. AVERAGE GLUCOSE BLD GHB EST-MCNC: 120 MG/DL
GFR, ESTIMATED: 67 ML/MIN/1.73M2
GLOBULIN SER CALC-MCNC: 3.1 G/DL
GLUCOSE SERPL-MCNC: 112 MG/DL (ref 74–99)
HBA1C MFR BLD: 5.8 % (ref 4–6)
HCT VFR BLD AUTO: 37 % (ref 36.3–47.1)
HDLC SERPL-MCNC: 53 MG/DL
HGB BLD-MCNC: 11.5 G/DL (ref 11.9–15.1)
IGE SERPL-ACNC: 518 IU/ML (ref 0–100)
IMM GRANULOCYTES # BLD AUTO: <0.03 K/UL (ref 0–0.3)
IMM GRANULOCYTES NFR BLD: 0 %
LDLC SERPL CALC-MCNC: 79 MG/DL (ref 0–100)
LYMPHOCYTES NFR BLD: 2.75 K/UL (ref 1.1–3.7)
LYMPHOCYTES RELATIVE PERCENT: 35 % (ref 24–43)
MCH RBC QN AUTO: 25.6 PG (ref 25.2–33.5)
MCHC RBC AUTO-ENTMCNC: 31.1 G/DL (ref 28.4–34.8)
MCV RBC AUTO: 82.4 FL (ref 82.6–102.9)
MONOCYTES NFR BLD: 0.66 K/UL (ref 0.1–1.2)
MONOCYTES NFR BLD: 8 % (ref 3–12)
NEUTROPHILS NFR BLD: 56 % (ref 36–65)
NEUTS SEG NFR BLD: 4.32 K/UL (ref 1.5–8.1)
NRBC BLD-RTO: 0 PER 100 WBC
PLATELET # BLD AUTO: 319 K/UL (ref 138–453)
PMV BLD AUTO: 10.5 FL (ref 8.1–13.5)
POTASSIUM SERPL-SCNC: 4.2 MMOL/L (ref 3.7–5.3)
PROT SERPL-MCNC: 7.2 G/DL (ref 6.6–8.7)
RBC # BLD AUTO: 4.49 M/UL (ref 3.95–5.11)
RBC # BLD: ABNORMAL 10*6/UL
SODIUM SERPL-SCNC: 140 MMOL/L (ref 136–145)
TRIGL SERPL-MCNC: 107 MG/DL
VLDLC SERPL CALC-MCNC: 21 MG/DL (ref 1–30)
WBC OTHER # BLD: 7.9 K/UL (ref 3.5–11.3)

## 2025-02-10 PROCEDURE — 3079F DIAST BP 80-89 MM HG: CPT | Performed by: INTERNAL MEDICINE

## 2025-02-10 PROCEDURE — G8417 CALC BMI ABV UP PARAM F/U: HCPCS | Performed by: INTERNAL MEDICINE

## 2025-02-10 PROCEDURE — 3075F SYST BP GE 130 - 139MM HG: CPT | Performed by: INTERNAL MEDICINE

## 2025-02-10 PROCEDURE — 83036 HEMOGLOBIN GLYCOSYLATED A1C: CPT

## 2025-02-10 PROCEDURE — 1036F TOBACCO NON-USER: CPT | Performed by: INTERNAL MEDICINE

## 2025-02-10 PROCEDURE — 80076 HEPATIC FUNCTION PANEL: CPT

## 2025-02-10 PROCEDURE — 3017F COLORECTAL CA SCREEN DOC REV: CPT | Performed by: INTERNAL MEDICINE

## 2025-02-10 PROCEDURE — 82785 ASSAY OF IGE: CPT

## 2025-02-10 PROCEDURE — 80061 LIPID PANEL: CPT

## 2025-02-10 PROCEDURE — 80048 BASIC METABOLIC PNL TOTAL CA: CPT

## 2025-02-10 PROCEDURE — 99214 OFFICE O/P EST MOD 30 MIN: CPT | Performed by: INTERNAL MEDICINE

## 2025-02-10 PROCEDURE — G8427 DOCREV CUR MEDS BY ELIG CLIN: HCPCS | Performed by: INTERNAL MEDICINE

## 2025-02-10 PROCEDURE — 85025 COMPLETE CBC W/AUTO DIFF WBC: CPT

## 2025-02-10 NOTE — PROGRESS NOTES
Beatriz Edmond  2/10/2025  Chief Complaint   Patient presents with    Asthma     Follow up         Beatriz Edmond  54 y.o. female   History of Present Illness  The patient presents for evaluation of nasal congestion, asthma    She reports persistent nasal congestion, which she attributes to her allergies. Her  has observed that her speech is consistently muffled, as if her nose is perpetually congested. She also notes facial puffiness and swelling. Despite these symptoms, she reports no difficulty in breathing. She has not been taking any anti-allergy medications. She has been using a nasal spray for symptom management and used it this morning. She had previously consulted an ENT specialist who advised against further surgical intervention and recommended the continued use of a nasal spray. She underwent a surgical procedure for her nasal condition approximately 2 years ago. She was prescribed prednisone once last year due to an illness.    Her asthma is well-managed with Dulera, and she has not required hospitalization for this condition. She is requesting a handicap sticker.    She expresses concern about her weight and potential borderline diabetes. She was informed that her insurance would not cover the cost of an EpiPen. She was advised to lose weight.    MEDICATIONS  Current: Dulera, nasal spray  Past: Prednisone               Review of Systems -  General ROS: negative for - chills, fatigue, fever or weight loss  ENT ROS:  see hpi   Cardiovascular ROS: no chest pain , orthopnea or pnd   Gastrointestinal ROS: no abdominal pain, change in bowel habits, or black or bloody stools  Skin - no rash   Neuro - no blurry vision , no loc . No focal weakness   msk - no jt tenderness or swelling    Vascular - no claudication , rest completed and negative   Lymphatic - complete and negative   Hematology - oncology - complete and negative   Allergy immunology - complete and negative    no burning or hematuria

## 2025-02-11 ENCOUNTER — HOSPITAL ENCOUNTER (OUTPATIENT)
Age: 55
Setting detail: SPECIMEN
Discharge: HOME | End: 2025-02-11

## 2025-02-11 ENCOUNTER — OFFICE VISIT (OUTPATIENT)
Dept: OBGYN | Age: 55
End: 2025-02-11
Payer: MEDICAID

## 2025-02-11 VITALS
BODY MASS INDEX: 42.97 KG/M2 | HEART RATE: 66 BPM | SYSTOLIC BLOOD PRESSURE: 130 MMHG | DIASTOLIC BLOOD PRESSURE: 81 MMHG | WEIGHT: 220 LBS

## 2025-02-11 DIAGNOSIS — D25.9 UTERINE LEIOMYOMA, UNSPECIFIED LOCATION: ICD-10-CM

## 2025-02-11 DIAGNOSIS — Z01.419 WELL WOMAN EXAM WITH ROUTINE GYNECOLOGICAL EXAM: Primary | ICD-10-CM

## 2025-02-11 DIAGNOSIS — Z01.419 WELL WOMAN EXAM WITH ROUTINE GYNECOLOGICAL EXAM: ICD-10-CM

## 2025-02-11 PROCEDURE — 99386 PREV VISIT NEW AGE 40-64: CPT | Performed by: STUDENT IN AN ORGANIZED HEALTH CARE EDUCATION/TRAINING PROGRAM

## 2025-02-11 PROCEDURE — 3079F DIAST BP 80-89 MM HG: CPT | Performed by: STUDENT IN AN ORGANIZED HEALTH CARE EDUCATION/TRAINING PROGRAM

## 2025-02-11 PROCEDURE — 3075F SYST BP GE 130 - 139MM HG: CPT | Performed by: STUDENT IN AN ORGANIZED HEALTH CARE EDUCATION/TRAINING PROGRAM

## 2025-02-12 LAB
C TRACH DNA SPEC QL PROBE+SIG AMP: NEGATIVE
CANDIDA SPECIES: NEGATIVE
GARDNERELLA VAGINALIS: NEGATIVE
N GONORRHOEA DNA SPEC QL PROBE+SIG AMP: NEGATIVE
SOURCE: NORMAL
SPECIMEN DESCRIPTION: NORMAL
TRICHOMONAS: NEGATIVE

## 2025-02-12 NOTE — PROGRESS NOTES
Attending Physician Statement  I have discussed the care of Beatriz Edmond, including pertinent history and exam findings, with the resident. I have reviewed the key elements of all parts of the encounter with the resident.  I agree with the assessment, plan and orders as documented by the resident.  (GE Modifier)    Juani Jewell DO  [facility]  2/12/2025, 3:50 PM    
face to face time of 30 minutes. More than 50% of this visit was on counseling and education regarding the problems listed below and her options. She was also counseled on her preventative health maintenance recommendations and follow-up.   Diagnosis Orders   1. Well woman exam with routine gynecological exam  Chlamydia Trachomatis & Neisseria gonorrhoeae (GC) by amplified detection    Vaginitis DNA Probe      2. Uterine leiomyoma, unspecified location  US PELVIS COMPLETE           Betite Bae DO  Ob/Gyn Resident  [facility], [facility]  2/11/2025, 3:56 PM

## 2025-02-17 ENCOUNTER — OFFICE VISIT (OUTPATIENT)
Dept: NEUROLOGY | Age: 55
End: 2025-02-17
Payer: MEDICAID

## 2025-02-17 VITALS
DIASTOLIC BLOOD PRESSURE: 67 MMHG | BODY MASS INDEX: 43.59 KG/M2 | WEIGHT: 222 LBS | HEART RATE: 78 BPM | SYSTOLIC BLOOD PRESSURE: 134 MMHG | HEIGHT: 60 IN

## 2025-02-17 DIAGNOSIS — G93.2 IIH (IDIOPATHIC INTRACRANIAL HYPERTENSION): Primary | ICD-10-CM

## 2025-02-17 PROCEDURE — 3078F DIAST BP <80 MM HG: CPT

## 2025-02-17 PROCEDURE — G8417 CALC BMI ABV UP PARAM F/U: HCPCS

## 2025-02-17 PROCEDURE — 1036F TOBACCO NON-USER: CPT

## 2025-02-17 PROCEDURE — 3017F COLORECTAL CA SCREEN DOC REV: CPT

## 2025-02-17 PROCEDURE — G8427 DOCREV CUR MEDS BY ELIG CLIN: HCPCS

## 2025-02-17 PROCEDURE — 99214 OFFICE O/P EST MOD 30 MIN: CPT

## 2025-02-17 PROCEDURE — 3075F SYST BP GE 130 - 139MM HG: CPT

## 2025-02-17 RX ORDER — TOPIRAMATE 50 MG/1
TABLET, FILM COATED ORAL
Qty: 121 TABLET | Refills: 2 | Status: CANCELLED | OUTPATIENT
Start: 2025-02-17 | End: 2025-06-18

## 2025-02-17 RX ORDER — ACETAZOLAMIDE 250 MG/1
500 TABLET ORAL 2 TIMES DAILY
Qty: 90 TABLET | Refills: 3 | Status: SHIPPED | OUTPATIENT
Start: 2025-02-17

## 2025-02-17 NOTE — PROGRESS NOTES
[address]  [facility] [address]  Dept: [phone]  Dept Fax: [phone]    NEUROLOGY FOLLOW UP NOTE                                              PATIENT NAME: Beatriz Edmond   PATIENT MRN: [id number]  FOLLOW UP TODAY: 10/5/2023            INITIAL & INTERVAL HISTORY:     2/17/2025:  She is presenting to the clinic today after having insurance troubles and not being able to take both Topamax and Diamox for the last 3 months.  She mentions symptoms of tinnitus bilateral which is worse when laying down and at night, has a positional component and head tightness.  Otherwise she mentions no headaches like before and she is still doing well. She has lost 5 pounds.      HPI:  This a 54-year-old -American female who presented with headaches.  Past medical history significant for headache disorder since 2019.  Other significant history of obesity, hypertension, hyperlipidemia, COPD, iron deficiency anemia and hiatal hernia.  Patient has had progressive headache disorder for the last few years.  At the time similar nature with throbbing holoacranial headache that was constant and not improved with ibuprofen or Tylenol.  She was referred to neurology by her PCP and started on Topamax however failed therapy due to side effects.  She has had increasingly disabling headaches associated with vision change.  She works as a nurse aide and endorses that she became debilitated with pulsatile tinnitus when she lies down at night and also worsening pain.  Denies any previous visual exams for IIH.    Saw Dr. Jimenez in the clinic.  Underwent lumbar puncture which showed opening pressure of 26 cmH2O.  She was diagnosed with IIH and started on Diamox to 250 mg BID which was increased to 500 mg BID.  Followed up with ophthalmology and noted to have bilateral papilledema further supporting diagnosis of IIH.  She also seen by ENT and

## 2025-02-27 ENCOUNTER — TELEPHONE (OUTPATIENT)
Dept: GASTROENTEROLOGY | Age: 55
End: 2025-02-27

## 2025-02-27 NOTE — TELEPHONE ENCOUNTER
Pt requests to reschedule canceled procedure originally booked for 3/3 with Dr. Jean Baptiste.     Please contact her to schedule.  OK to leave detailed voicemail message  Phone: [phone]

## 2025-03-07 DIAGNOSIS — K21.00 CHRONIC REFLUX ESOPHAGITIS: ICD-10-CM

## 2025-03-07 RX ORDER — ASPIRIN 81 MG/1
TABLET ORAL
Qty: 30 TABLET | Refills: 5 | Status: SHIPPED | OUTPATIENT
Start: 2025-03-07

## 2025-03-07 RX ORDER — ONDANSETRON 4 MG/1
TABLET, ORALLY DISINTEGRATING ORAL
Qty: 15 TABLET | Refills: 1 | Status: SHIPPED | OUTPATIENT
Start: 2025-03-07

## 2025-03-07 NOTE — TELEPHONE ENCOUNTER
Once 02/25/2025               Patient Active Problem List:     Hypertension     Asthma     GERD (gastroesophageal reflux disease)     S/P cardiac cath-Minimal disease 11/23/15 Dr. chow     Irritable bowel syndrome with constipation     Obesity     Allergic rhinitis     Hiatal hernia     Paresthesias     Left sided numbness     Dysphagia     Family history of colon cancer in father     DNS (deviated nasal septum)     Hypertrophy of both inferior nasal turbinates     Iron deficiency anemia secondary to inadequate dietary iron intake     Iron deficiency anemia, unspecified     Atypical chest pain     Other headache syndrome     Encounter for colonoscopy in patient with family history of colon cancer     Redundant colon     Abdominal pain, left upper quadrant     Viral upper respiratory tract infection

## 2025-03-18 ENCOUNTER — PREP FOR PROCEDURE (OUTPATIENT)
Dept: GASTROENTEROLOGY | Age: 55
End: 2025-03-18

## 2025-03-18 DIAGNOSIS — R13.19 ESOPHAGEAL DYSPHAGIA: ICD-10-CM

## 2025-03-18 DIAGNOSIS — K21.00 GASTROESOPHAGEAL REFLUX DISEASE WITH ESOPHAGITIS WITHOUT HEMORRHAGE: ICD-10-CM

## 2025-03-18 DIAGNOSIS — R10.12 ABDOMINAL PAIN, LUQ: ICD-10-CM

## 2025-04-01 ENCOUNTER — ANCILLARY PROCEDURE (OUTPATIENT)
Dept: OBGYN | Age: 55
End: 2025-04-01

## 2025-04-01 DIAGNOSIS — D25.9 UTERINE LEIOMYOMA, UNSPECIFIED LOCATION: ICD-10-CM

## 2025-04-01 PROCEDURE — 76856 US EXAM PELVIC COMPLETE: CPT | Performed by: RADIOLOGY

## 2025-04-03 ENCOUNTER — RESULTS FOLLOW-UP (OUTPATIENT)
Dept: OBGYN | Age: 55
End: 2025-04-03

## 2025-04-03 ENCOUNTER — TELEPHONE (OUTPATIENT)
Dept: INTERNAL MEDICINE | Age: 55
End: 2025-04-03

## 2025-04-03 NOTE — RESULT ENCOUNTER NOTE
Ultrasound reviewed, multiple uterine fibroids noted. Please follow up with scheduled appointment with Dr. Stewart on 4/10

## 2025-04-10 ENCOUNTER — SCHEDULED TELEPHONE ENCOUNTER (OUTPATIENT)
Dept: OBGYN | Age: 55
End: 2025-04-10

## 2025-04-11 NOTE — PROGRESS NOTES
Attending Physician Statement  I have discussed the care of Beatriz Edmond, including pertinent history and exam findings,  with the resident. I have reviewed the key elements of all parts of the encounter with the resident.  I agree with the assessment, plan and orders as documented by the resident.  (GE Modifier)    Montserrat Nunes,    
a large  partially exophytic calcified fibroid off of the anterior uterine body,  measuring 5.3 x 5.0 x 4.9 cm, similar to the prior exam.  There is an  additional 2.1 x 1.5 x 2.5 cm hypoechoic fibroid within the right lower  uterine segment.    Endometrial stripe: The endometrial stripe is abnormally thickened for a  patient experiencing postmenopausal bleeding.  However, no discrete  endometrial nodule or mass is identified.    Right Ovary: The right ovary was not visualized despite sonographic  interrogation of the right adnexa.    Left Ovary:  Left ovary is within normal limits.  There is no evidence of a  left ovarian mass.    Free Fluid: No evidence of free fluid.    Impression  1. Fibroid uterus, similar to the prior exam.  2. Abnormally thickened appearance of the endometrial stripe for a patient  experiencing postmenopausal bleeding. Differential considerations include  endometrial hyperplasia, endometrial polyps, or endometrial carcinoma.  Consider further characterization with endometrial biopsy.  3. Unremarkable sonographic appearance of the left ovary.  4. Nonvisualization of the right ovary despite sonographic interrogation of  the right adnexa.        I affirm this is a Patient Initiated Episode with an Established Patient who has not had a related appointment within my department in the past 7 days or scheduled within the next 24 hours.    Patient identification was verified at the start of the visit: Yes    Total Time: minutes: 11-20 minutes    Note: not billable if this call serves to triage the patient into an appointment for the relevant concern      Celia Stewart DO  OBGYN Resident, PGY4  [facility]  4/9/2025, 10:46 AM

## 2025-05-06 ENCOUNTER — OFFICE VISIT (OUTPATIENT)
Age: 55
End: 2025-05-06
Payer: MEDICAID

## 2025-05-06 VITALS
WEIGHT: 217 LBS | OXYGEN SATURATION: 98 % | SYSTOLIC BLOOD PRESSURE: 138 MMHG | DIASTOLIC BLOOD PRESSURE: 86 MMHG | BODY MASS INDEX: 42.6 KG/M2 | HEIGHT: 60 IN | HEART RATE: 72 BPM

## 2025-05-06 DIAGNOSIS — H04.123 DRY EYES: ICD-10-CM

## 2025-05-06 DIAGNOSIS — E78.2 MIXED HYPERLIPIDEMIA: ICD-10-CM

## 2025-05-06 DIAGNOSIS — M16.12 OSTEOARTHRITIS OF LEFT HIP, UNSPECIFIED OSTEOARTHRITIS TYPE: Primary | ICD-10-CM

## 2025-05-06 DIAGNOSIS — K21.00 CHRONIC REFLUX ESOPHAGITIS: ICD-10-CM

## 2025-05-06 DIAGNOSIS — I10 ESSENTIAL HYPERTENSION: ICD-10-CM

## 2025-05-06 DIAGNOSIS — E66.01 MORBID OBESITY (HCC): ICD-10-CM

## 2025-05-06 DIAGNOSIS — R73.03 PREDIABETES: ICD-10-CM

## 2025-05-06 DIAGNOSIS — G93.2 IIH (IDIOPATHIC INTRACRANIAL HYPERTENSION): ICD-10-CM

## 2025-05-06 PROCEDURE — 3079F DIAST BP 80-89 MM HG: CPT

## 2025-05-06 PROCEDURE — 3017F COLORECTAL CA SCREEN DOC REV: CPT

## 2025-05-06 PROCEDURE — 99212 OFFICE O/P EST SF 10 MIN: CPT

## 2025-05-06 PROCEDURE — 3075F SYST BP GE 130 - 139MM HG: CPT

## 2025-05-06 PROCEDURE — G8428 CUR MEDS NOT DOCUMENT: HCPCS

## 2025-05-06 PROCEDURE — 1036F TOBACCO NON-USER: CPT

## 2025-05-06 PROCEDURE — G8417 CALC BMI ABV UP PARAM F/U: HCPCS

## 2025-05-06 PROCEDURE — 99213 OFFICE O/P EST LOW 20 MIN: CPT

## 2025-05-06 RX ORDER — ATORVASTATIN CALCIUM 40 MG/1
80 TABLET, FILM COATED ORAL DAILY
Qty: 30 TABLET | Refills: 5 | Status: SHIPPED | OUTPATIENT
Start: 2025-05-06

## 2025-05-06 NOTE — PROGRESS NOTES
Attending Physician Statement  I have discussed the care of Beatriz Edmond, including pertinent history and exam findings with the resident. I have reviewed the key elements of all parts of the encounter with the resident. I agree with the assessment, and status of the problem list as documented.  Patient is reporting little to no weight loss given diet and exercise.  She is insistent on medication adjunct to help with the weight loss.  She is prediabetic.   Diagnosis Orders   1. Osteoarthritis of left hip, unspecified osteoarthritis type  [facility]      2. Chronic reflux esophagitis        3. BMI 40.0-44.9, adult (HCC)        4. Mixed hyperlipidemia  TSH reflex to FT4    atorvastatin (LIPITOR) 40 MG tablet      5. Essential hypertension        6. IIH (idiopathic intracranial hypertension)        7. Dry eyes  polyethyl glycol-propyl glycol 0.4-0.3 % (SYSTANE) 0.4-0.3 % ophthalmic solution      8. Morbid obesity (HCC)  Semaglutide,0.25 or 0.5MG/DOS, 2 MG/3ML SOPN      9. Prediabetes  Albumin/Creatinine Ratio, Urine    Semaglutide,0.25 or 0.5MG/DOS, 2 MG/3ML SOPN        The plan and orders should include No orders of the defined types were placed in this encounter.   and this was also documented by the resident.  I agree with the referral to Ortho.The medication list was reviewed with the resident and is up to date. The return visit should be in 4 weeks .    Dr Gretchen Ren MD, FACP  Associate , Internal Medicine Residency Program  Residency Clinic , [facility]  Chair, Department of Internal Medicine  [facility]         5/6/2025, 4:13 PM        
is contributing to her other medical problems as well including intracranial hypertension and osteoarthritis.  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 Patient had Xray hip in 2024 which showed osteoarthritis of left hip. She states ibuprofen has not been helping her that much. Patient states she cannot go to the physical therapy due to time constraints.   Eye swelling: Patient states she has puffiness going in her eyes for while. Patient also mentions having foreign body sensation in her eyes. Patient states she has running eyes. Denies any fever, or purulent discharge from eyes. Patient states she is scheduling with her ophthalmologist for further workup.    ______________________________________________________________________  Past Medical/Surgical History:        Diagnosis Date    Allergic rhinitis     Asthma     Clinical trial participant 11/23/2015 11/23/20015    COPD (chronic obstructive pulmonary disease) (HCC)     Dysphagia     Dysphagia     has needed EGD with dilatation in the past    Family history of colon cancer 8/9/2017    Father    GERD (gastroesophageal reflux disease)     H/O cardiovascular stress test 01/15/2018    ABNORMAL    Hiatal hernia     Hyperlipidemia     Hypertension     Iron deficiency anemia secondary to inadequate dietary iron intake 5/13/2020    Iron deficiency anemia, unspecified 5/13/2020    Irritable bowel syndrome with constipation 1/21/2016    Obesity     Snores     states has tested negative for sleep apnea    Wears glasses            Procedure Laterality Date    CARDIAC CATHETERIZATION  01/16/2018    CARDIAC CATHETERIZATION  11/23/2015     Minimal non obstructive CAD    COLONOSCOPY  2015    COLONOSCOPY  02/08/2018    Redundant colon    ENDOSCOPY, COLON, DIAGNOSTIC  2015    with dilatation    ENDOSCOPY, COLON, DIAGNOSTIC  02/08/2018    WNL     NASAL SEPTOPLASTY W/ TURBINOPLASTY  2020        NOSE SURGERY

## 2025-05-07 ENCOUNTER — HOSPITAL ENCOUNTER (OUTPATIENT)
Age: 55
Discharge: HOME | End: 2025-05-07
Payer: MEDICAID

## 2025-05-07 DIAGNOSIS — R73.03 PREDIABETES: ICD-10-CM

## 2025-05-07 DIAGNOSIS — E78.2 MIXED HYPERLIPIDEMIA: ICD-10-CM

## 2025-05-07 LAB
CREAT UR-MCNC: 343 MG/DL (ref 28–217)
MICROALBUMIN UR-MCNC: 40 MG/L (ref 0–20)
MICROALBUMIN/CREAT UR-RTO: 12 MCG/MG CREAT (ref 0–25)
TSH SERPL DL<=0.05 MIU/L-ACNC: 2.74 UIU/ML (ref 0.27–4.2)

## 2025-05-07 PROCEDURE — 82043 UR ALBUMIN QUANTITATIVE: CPT

## 2025-05-07 PROCEDURE — 84443 ASSAY THYROID STIM HORMONE: CPT

## 2025-05-07 PROCEDURE — 36415 COLL VENOUS BLD VENIPUNCTURE: CPT

## 2025-05-07 PROCEDURE — 82570 ASSAY OF URINE CREATININE: CPT

## 2025-05-08 ENCOUNTER — RESULTS FOLLOW-UP (OUTPATIENT)
Dept: CARDIOLOGY | Age: 55
End: 2025-05-08

## 2025-05-08 DIAGNOSIS — R80.9 MICROALBUMINURIA: Primary | ICD-10-CM

## 2025-05-08 RX ORDER — PHENTERMINE HYDROCHLORIDE 37.5 MG/1
37.5 TABLET ORAL
Qty: 30 TABLET | Refills: 0 | Status: SHIPPED | OUTPATIENT
Start: 2025-05-08 | End: 2025-05-23 | Stop reason: SDUPTHER

## 2025-05-08 NOTE — TELEPHONE ENCOUNTER
Received request for PA in/on Epic for Ozempic.    Pt's insurance requires trial and failure of preferred medications before Ozempic can be prescribed. Per chart review, pt's last A1C was 5.8%, OV note on 5/6 indicates medication prescribed for weight loss. This is a plan exclusion, PA will not be submitted for weight loss medication.    If pt needs to be on medication for prediabetes, preferred GLP-1 is Trulicity. If medication is for weight loss, Adipex/phenteramine is typically affordable out of pocket.    PC to pt to update, she would be willing to try Adipex if she is a candidate, she understands follow up every 30 days while on medication is required.    Pt also requesting labs completed 5/7 be reviewed. Kindly route this encounter back to me with responses, thank you!

## 2025-05-14 ENCOUNTER — OFFICE VISIT (OUTPATIENT)
Dept: ORTHOPEDIC SURGERY | Age: 55
End: 2025-05-14
Payer: MEDICAID

## 2025-05-14 VITALS — HEIGHT: 60 IN | WEIGHT: 218 LBS | BODY MASS INDEX: 42.8 KG/M2

## 2025-05-14 DIAGNOSIS — M76.32 IT BAND SYNDROME, LEFT: Primary | ICD-10-CM

## 2025-05-14 PROCEDURE — G8427 DOCREV CUR MEDS BY ELIG CLIN: HCPCS | Performed by: ORTHOPAEDIC SURGERY

## 2025-05-14 PROCEDURE — 1036F TOBACCO NON-USER: CPT | Performed by: ORTHOPAEDIC SURGERY

## 2025-05-14 PROCEDURE — G8417 CALC BMI ABV UP PARAM F/U: HCPCS | Performed by: ORTHOPAEDIC SURGERY

## 2025-05-14 PROCEDURE — 99203 OFFICE O/P NEW LOW 30 MIN: CPT | Performed by: ORTHOPAEDIC SURGERY

## 2025-05-14 PROCEDURE — 3017F COLORECTAL CA SCREEN DOC REV: CPT | Performed by: ORTHOPAEDIC SURGERY

## 2025-05-14 NOTE — PROGRESS NOTES
(COREG) 6.25 MG tablet Take 1 tablet by mouth 2 times daily 60 tablet 5    simethicone (MYLICON) 80 MG chewable tablet CHEW AND SWALLOW 1 tablet by mouth four times a day if needed for FLATULENCE 180 tablet 3    acetaminophen (TYLENOL) 500 MG tablet Take 1 tablet by mouth 4 times daily as needed for Pain 360 tablet 1    lidocaine (LIDODERM) 5 % 12 hours on, 12 hours off. 30 patch 2    mometasone-formoterol (DULERA) 200-5 MCG/ACT inhaler inhale 2 puffs by mouth and INTO THE LUNGS twice a day 13 g 5    ibuprofen (ADVIL;MOTRIN) 800 MG tablet Take 1 tablet by mouth every 8 hours as needed for Pain 30 tablet 2    Menthol, Topical Analgesic, (BIOFREEZE) 10 % CREA Use 4/day as needed 100 g 1    meloxicam (MOBIC) 7.5 MG tablet Take 1 tablet by mouth daily 90 tablet 0    fluticasone (FLONASE) 50 MCG/ACT nasal spray 2 sprays by Each Nostril route daily 16 g 5    albuterol sulfate HFA (PROAIR HFA) 108 (90 Base) MCG/ACT inhaler inhale 2 puffs by mouth every 6 hours if needed for wheezing 1 each 5    albuterol (PROVENTIL) (2.5 MG/3ML) 0.083% nebulizer solution Take 3 mLs by nebulization every 6 hours as needed for Wheezing 120 each 3    polyethylene glycol (GLYCOLAX) 17 GM/SCOOP powder Follow instructions given by provider 238 g 0    bisacodyl 5 MG EC tablet Use as instructed from your physicians office for your colonoscopy prep. 4 tablet 0    Menthol (CEPACOL SORE THROAT) 5.4 MG LOZG Take 1 Dose by mouth 2 times daily 25 lozenge 0    olopatadine (PATANOL) 0.1 % ophthalmic solution instill 1 drop into both eyes twice a day      Multiple Vitamins-Minerals (WOMENS MULTI VITAMIN & MINERAL PO) Take 1 tablet by mouth daily        No current facility-administered medications for this visit.     Allergies:    Allergies reviewed: Cat dander  Social History:   Patient reports that she has never smoked. She has never used smokeless tobacco. She reports current alcohol use of about 4.0 standard drinks of alcohol per week. She reports that

## 2025-05-20 DIAGNOSIS — J45.50 SEVERE PERSISTENT ASTHMA WITHOUT COMPLICATION (HCC): ICD-10-CM

## 2025-05-20 NOTE — TELEPHONE ENCOUNTER
Request for   Requested Prescriptions      No prescriptions requested or ordered in this encounter    .      Please review and e-scribe to pharmacy listed in chart if appropriate. Thank you.      Last Visit Date: 5/6/2025  Next Visit Date: 6/10/2025    Future Appointments   Date Time Provider Department Center   6/10/2025  1:00 PM Randall Newell MD [facility]   6/25/2025 11:00 AM SCHEDULE, P [facility]   8/11/2025  9:30 AM Greyson Reid MD Resp Spec [facility]   8/18/2025  1:30 PM Monica Wade MD Neuro [facility] Neurology -       Health Maintenance   Topic Date Due    Hepatitis B vaccine (1 of 3 - 19+ 3-dose series) Never done    Pneumococcal 50+ years Vaccine (2 of 2 - PCV) 01/21/2017    Shingles vaccine (2 of 2) 09/03/2021    Colorectal Cancer Screen  02/08/2023    COVID-19 Vaccine (3 - 2024-25 season) 09/01/2024    Flu vaccine (Season Ended) 08/01/2025    Depression Monitoring  01/29/2026    DTaP/Tdap/Td vaccine (2 - Td or Tdap) 02/05/2026    A1C test (Diabetic or Prediabetic)  02/10/2026    Lipids  02/10/2026    Breast cancer screen  08/16/2026    Cervical cancer screen  09/07/2026    Hepatitis C screen  Completed    HIV screen  Completed    Hepatitis A vaccine  Aged Out    Hib vaccine  Aged Out    Polio vaccine  Aged Out    Meningococcal (ACWY) vaccine  Aged Out    Meningococcal B vaccine  Aged Out    Depression Screen  Discontinued    Pneumococcal 0-49 years Vaccine  Discontinued    Diabetes screen  Discontinued       Hemoglobin A1C (%)   Date Value   02/10/2025 5.8   08/20/2024 6.0   05/09/2023 5.6             ( goal A1C is < 7)   No components found for: \"LABMICR\"  No components found for: \"LDLCHOLESTEROL\", \"LDLCALC\"    (goal LDL is <100)   AST (U/L)   Date Value   02/10/2025 18     ALT (U/L)   Date Value   02/10/2025 17     BUN (mg/dL)   Date Value   02/10/2025 14     BP Readings from Last 3 Encounters:   05/06/25 138/86   02/17/25 134/67   02/11/25 130/81

## 2025-05-21 RX ORDER — ALBUTEROL SULFATE 90 UG/1
INHALANT RESPIRATORY (INHALATION)
Qty: 1 EACH | Refills: 5 | Status: SHIPPED | OUTPATIENT
Start: 2025-05-21

## 2025-05-23 DIAGNOSIS — I10 ESSENTIAL HYPERTENSION: ICD-10-CM

## 2025-05-23 DIAGNOSIS — M75.42 IMPINGEMENT SYNDROME OF SHOULDER REGION, LEFT: ICD-10-CM

## 2025-05-23 DIAGNOSIS — K21.00 CHRONIC REFLUX ESOPHAGITIS: ICD-10-CM

## 2025-05-23 DIAGNOSIS — E78.2 MIXED HYPERLIPIDEMIA: ICD-10-CM

## 2025-05-23 DIAGNOSIS — J45.50 SEVERE PERSISTENT ASTHMA WITHOUT COMPLICATION (HCC): ICD-10-CM

## 2025-05-23 DIAGNOSIS — J34.3 NASAL TURBINATE HYPERTROPHY: ICD-10-CM

## 2025-05-23 DIAGNOSIS — J45.51 SEVERE PERSISTENT ASTHMA WITH ACUTE EXACERBATION (HCC): ICD-10-CM

## 2025-05-23 DIAGNOSIS — E83.51 LOW CALCIUM LEVELS: Primary | ICD-10-CM

## 2025-05-23 RX ORDER — PHENTERMINE HYDROCHLORIDE 37.5 MG/1
37.5 TABLET ORAL
Qty: 30 TABLET | Refills: 0 | Status: SHIPPED | OUTPATIENT
Start: 2025-05-23 | End: 2025-06-22

## 2025-05-23 RX ORDER — VALSARTAN 80 MG/1
80 TABLET ORAL DAILY
Qty: 90 TABLET | Refills: 1 | Status: SHIPPED | OUTPATIENT
Start: 2025-05-23

## 2025-05-23 RX ORDER — MELOXICAM 7.5 MG/1
7.5 TABLET ORAL DAILY
Qty: 90 TABLET | Refills: 1 | Status: SHIPPED | OUTPATIENT
Start: 2025-05-23

## 2025-05-23 RX ORDER — ALBUTEROL SULFATE 0.83 MG/ML
2.5 SOLUTION RESPIRATORY (INHALATION) EVERY 6 HOURS PRN
Qty: 120 EACH | Refills: 3 | Status: SHIPPED | OUTPATIENT
Start: 2025-05-23

## 2025-05-23 RX ORDER — FENOFIBRATE 48 MG/1
48 TABLET, FILM COATED ORAL DAILY
Qty: 90 TABLET | Refills: 1 | Status: SHIPPED | OUTPATIENT
Start: 2025-05-23

## 2025-05-23 RX ORDER — SENNOSIDES A AND B 8.6 MG/1
TABLET, FILM COATED ORAL
Qty: 90 TABLET | Refills: 1 | Status: SHIPPED | OUTPATIENT
Start: 2025-05-23

## 2025-05-23 RX ORDER — LORATADINE 10 MG/1
10 TABLET ORAL DAILY
Qty: 90 TABLET | Refills: 1 | Status: SHIPPED | OUTPATIENT
Start: 2025-05-23

## 2025-05-23 RX ORDER — ONDANSETRON 4 MG/1
TABLET, ORALLY DISINTEGRATING ORAL
Qty: 15 TABLET | Refills: 1 | Status: SHIPPED | OUTPATIENT
Start: 2025-05-23

## 2025-05-23 RX ORDER — CARVEDILOL 6.25 MG/1
6.25 TABLET ORAL 2 TIMES DAILY
Qty: 180 TABLET | Refills: 1 | Status: SHIPPED | OUTPATIENT
Start: 2025-05-23

## 2025-05-23 RX ORDER — FLUTICASONE PROPIONATE 50 MCG
2 SPRAY, SUSPENSION (ML) NASAL DAILY
Qty: 16 G | Refills: 5 | Status: SHIPPED | OUTPATIENT
Start: 2025-05-23

## 2025-05-23 RX ORDER — HYDROCHLOROTHIAZIDE 25 MG/1
25 TABLET ORAL EVERY MORNING
Qty: 90 TABLET | Refills: 1 | Status: SHIPPED | OUTPATIENT
Start: 2025-05-23

## 2025-05-23 RX ORDER — OMEPRAZOLE 40 MG/1
CAPSULE, DELAYED RELEASE ORAL
Qty: 90 CAPSULE | Refills: 1 | Status: SHIPPED | OUTPATIENT
Start: 2025-05-23

## 2025-05-23 NOTE — TELEPHONE ENCOUNTER
Pt called in stating she has not received the phentermine that was ordered on 5/8/2025. Review of report shows script did not go through electronically. Pt's 4-week f/u for phentermine use rescheduled to allow pt time to start medication.    Pt due for multiple other medication refills as well.    Pt states she discussed with PCP on 5/6 the need for clearance for ob/gyn surgery/procedure. Please advise if pt has been cleared for this.

## 2025-05-28 ENCOUNTER — TELEPHONE (OUTPATIENT)
Age: 55
End: 2025-05-28

## 2025-05-28 NOTE — TELEPHONE ENCOUNTER
Discussed with pt medication, she states she just needs to go pick it up. No further questions re: labs.

## 2025-05-28 NOTE — TELEPHONE ENCOUNTER
Pt states she was supposed to be cleared for surgery for ob/gyn biopsy. Writer sees where clearance was faxed, unsure of correct number though.    Clearance faxed again to ob/gyn office using [phone] fax number.

## 2025-05-29 ENCOUNTER — TELEPHONE (OUTPATIENT)
Age: 55
End: 2025-05-29

## 2025-05-29 ENCOUNTER — PREP FOR PROCEDURE (OUTPATIENT)
Age: 55
End: 2025-05-29

## 2025-05-29 DIAGNOSIS — N93.9 ABNORMAL UTERINE BLEEDING: ICD-10-CM

## 2025-05-29 DIAGNOSIS — D21.9 FIBROID: ICD-10-CM

## 2025-06-03 NOTE — PROGRESS NOTES
Shanda Taylor  [birth year]      Shanda Taylor  47 y.o. female presented for follow up for asthma   Did not edgardo trelegy due to dry powder . She is using dulera , which she feels is best .   Uses albuterol daily as needed   No nocturnal wheeze   Dry cough no hemoptysis   Cough started last 1 week with weather       Last visit   Patient is following up in the clinic after 2020. She is complaining of exertional dyspnea which has progressed. Has wheezing. Denies hemoptysis no purulent sputum she is using Dulera and uses albuterol daily. Does not exercise. No change in home situation. No new animals and no new changes at the job. She works as an health aide. Last visit  Patient is complaining of sore throat. She denies any cough or hemoptysis but with the weather change she is requiring more albuterol doing it 3-4 times a day there is no wheezing but she notices chest tightness. She is using Dulera and Pulmicort nebulized along with albuterol. Her  tells her that she still snores with loudly and gasps and chokes for breath throughout the night sometimes she wakes up gasping and choking. She feels tired and fatigued and unrested in the morning. Previous sleep study in 2016 showed snoring however since then patient has put on significant weight  She also has acid reflux ,she is on Prilosec  Advised her to continue with GERD precautions with head of the bed elevation and avoiding laying down within 2 hours of eating and to take Tums at night      Last visit    I reviewed Ms. Omar Lane  IgE level and eosinophil level . Her IgE level is elevated eosinophil level is low .   She continues to have nocturnal wheeze and is on Dulera 2 puffs twice a day with a spacer uses albuterol in the morning and at night before going to bed her GERD is under control  Denies any cough with purulent sputum does have shortness of breath with exertion  She did follow-up with ENT and there is a plan to proceed with turbinectomy. I have advised her to continue to use Flonase for her chronic rhinitis. Last visit   Patient did not follow-up in clinic after April 2019. She did go see ENT and was prescribed Flonase 2 sprays each nostril once a day but she is using 1 spray each nostril once a day. Her asthma is poorly controlled she is needing rescue medication albuterol nebulized once a day and albuterol inhaler 2 times a day daily. She is doing Dulera 2 puffs inhaled twice a day but she still notices a wheeze. Symptoms are triggered by chemicals dust animal dander and she tries to avoid all these. She also feels short winded with exertion. GERD is under control  She did not have allergy testing at Dr. Aroldo Mo because she did not follow-up with him. Last visit  She has been have snorting , snoring at night and wakes up choking . She had previous sleep study which was negative . She did see ENT who continued Flonase but that has not helped . She also has sob - good days and bad days   Uses Advair once or twice a day - mostly once - because she forgets . Also uses albuterol nebulized in am   melendrez snot use albuterol mdi   No asthma exacerbation since last visit   Has been having headaches       Previous visit  Patient had lost her insurance, so could not do the Xolair injections. I'll follow up with ENT. She is doing a little bit better than before using albuterol once every day. No nocturnal use. I have asked her to avoid triggers, which is chemical like Lysol bleach and also smoke. Her  is smoking and she is getting passively exposed. Also encouraged her to quit marijuana. She denies any cough, any hemoptysis since last visit used prednisone once did not have any ER visit for asthma          Previous visit  Since last visit patient is still complaining of symptoms daily. She uses albuterol 3 times a day and at least 3-4 times  night in a week.   Dyspneic with exertion, which is grade 2 at work at home has had one course of prednisone again. Since her last visit and see back. She tried Symbicort and Spiriva, but prefer causing burning in the chest and she using Advair now which does not dose had been increased from 250 to 500 / 50 On her last visit. She is also using Singulair  Avoiding allergens and is not smoking  Today, she is willing very fatigued and tired due to her asthma even though there are no rhonchi  Her IgG level is elevated, and based on her weight. She would need 300 mg of Xolair once a month. I discussed the side effects, the pros and cons of this and she is agreeable to proceed    Review of Systems   Constitutional:  Negative for fatigue and fever. HENT:  Positive for congestion, postnasal drip, rhinorrhea and sinus pressure. Respiratory:  Positive for cough and shortness of breath. Negative for chest tightness and wheezing. Cardiovascular: Negative. Neurological: Negative.             Immunization History   Administered Date(s) Administered    COVID-19, PFIZER PURPLE top, DILUTE for use, (age 15 y+), 30mcg/0.3mL 08/07/2021, 09/07/2021    Influenza Vaccine, unspecified formulation 10/09/2017    Influenza Virus Vaccine 12/21/2015    Influenza Whole 12/21/2015    Influenza, AFLURIA (age 1 yrs+), FLUZONE, (age 10 mo+), MDV, 0.5mL 11/17/2021    Influenza, FLUARIX, FLULAVAL, FLUZONE (age 10 mo+) AND AFLURIA, (age 1 y+), PF, 0.5mL 11/13/2015, 10/19/2020, 10/25/2022    Influenza, Quadv, 6 mo and older, IM (Fluzone, Flulaval) 10/09/2017    Influenza, Triv, 3 Years and older, IM (Afluria (5 yrs and older) 10/10/2016    Pneumococcal Polysaccharide ([id number]) 01/21/2016    Tdap (Boostrix, Adacel) 02/05/2016    Zoster Recombinant (Shingrix) 07/09/2021          PAST MEDICAL HISTORY:         Diagnosis Date    Allergic rhinitis     Asthma     Clinical trial participant 11/23/2015 11/23/20015    COPD (chronic obstructive pulmonary disease) ([facility] [address]     Dysphagia     Dysphagia     has needed EGD with dilatation in the past    Family history of colon cancer 8/9/2017    Father    GERD (gastroesophageal reflux disease)     H/O cardiovascular stress test 01/15/2018    ABNORMAL    Hiatal hernia     Hyperlipidemia     Hypertension     Iron deficiency anemia secondary to inadequate dietary iron intake 5/13/2020    Iron deficiency anemia, unspecified 5/13/2020    Irritable bowel syndrome with constipation 1/21/2016    Obesity     Snores     states has tested negative for sleep apnea    Wears glasses        Family History:       Problem Relation Age of Onset    Hypertension Mother     High Cholesterol Mother     Asthma Mother     Osteoarthritis Mother     Other Mother         overweight    Colon Cancer Father        SURGICAL HISTORY:   Past Surgical History:   Procedure Laterality Date    CARDIAC CATHETERIZATION  01/16/2018    CARDIAC CATHETERIZATION  11/23/2015     Minimal non obstructive CAD    COLONOSCOPY  2015    COLONOSCOPY  02/08/2018    Redundant colon    ENDOSCOPY, COLON, DIAGNOSTIC  2015    with dilatation    ENDOSCOPY, COLON, DIAGNOSTIC  02/08/2018    WNL     NASAL SEPTOPLASTY W/ TURBINOPLASTY  2020        NOSE SURGERY      turbinate     [place] COLON CA SCRN NOT HI RSK IND N/A 2/8/2018    COLONOSCOPY performed by Torey Vick MD at [address] [place] [address]  2003    UPPER GASTROINTESTINAL ENDOSCOPY      The endoscopic exam showed mildly tortuous esophagus. Savary dilation performed x 42 and 45 F.     UPPER GASTROINTESTINAL ENDOSCOPY N/A 7/18/2017    EGD DILATION SAVORY performed by Torey Vick MD at [facility]              Not in a hospital admission. No Known Allergies  Social History     Tobacco Use   Smoking Status Never   Smokeless Tobacco Never     Prior to Admission medications    Medication Sig Start Date End Date Taking?  Authorizing Provider   guaiFENesin-dextromethorphan (ROBITUSSIN DM) 100-10 MG/5ML syrup Take 5 mLs by mouth 3 times daily as needed for Cough 1/9/23 1/19/23 Yes Mel Bettencourt MD   senna (SENOKOT) 8.6 MG TABS tablet take 1 tablet by mouth once daily 11/21/22  Yes Harpal Walton MD   ibuprofen (ADVIL;MOTRIN) 800 MG tablet take 1 tablet by mouth every 8 hours AS NEEDED FOR PAIN 10/27/22  Yes Harpal Walton MD   omeprazole (PRILOSEC) 40 MG delayed release capsule take 1 capsule by mouth once daily if needed 10/25/22  Yes Harpal Walton MD   ondansetron (ZOFRAN-ODT) 4 MG disintegrating tablet dissolve 1 tablet ON TONGUE every 12 hours if needed for nausea OR vomiting 10/24/22  Yes Harpal Walton MD   mometasone-formoterol [facility]) 200-5 MCG/ACT inhaler inhale 2 puffs by mouth and INTO THE LUNGS twice a day 10/21/22  Yes Harpal Walton MD   lidocaine (LIDODERM) 5 % apply 1 patch TO THE AFFECTED AREA DAILY.  LEAVE ON FOR 12 HOURS AND THEN OFF FOR 12 HOURS. 9/28/22  Yes Dusty Aden MD   aspirin (RA ASPIRIN EC) 81 MG EC tablet take 1 tablet by mouth once daily 8/30/22  Yes Harpal Walton MD   albuterol sulfate HFA (PROAIR HFA) 108 (90 Base) MCG/ACT inhaler inhale 2 puffs by mouth every 6 hours if needed for wheezing 7/12/22  Yes Harpal Walton MD   albuterol (PROVENTIL) (2.5 MG/3ML) 0.083% nebulizer solution Take 3 mLs by nebulization every 6 hours as needed for Wheezing 7/12/22  Yes Harpal Walton MD   valsartan-hydroCHLOROthiazide (DIOVAN-HCT) 80-12.5 MG per tablet Once a day 7/12/22  Yes Harpal Walton MD   carvedilol (COREG) 6.25 MG tablet 1 tablet 2/day 7/12/22  Yes Harpal Walton MD   atorvastatin (LIPITOR) 20 MG tablet 1 tablet once daily 7/12/22  Yes Harpal Walton MD   simethicone (MYLICON) 80 MG chewable tablet Take 1 tablet by mouth 4 times daily as needed for Flatulence 2/1/22  Yes Harpal Walton MD   meloxicam (MOBIC) 7.5 MG tablet Take 1 tablet by mouth daily 11/17/21  Yes Harpal Walton MD   fluticasone (FLONASE) 50 MCG/ACT nasal spray instill 2 sprays into each nostril once daily 11/11/21  Yes Harpal Walton MD   melatonin (RA MELATONIN) 5 MG TABS tablet take 1 tablet by mouth nightly 7/12/21  Yes Adelso Henry MD   Multiple Vitamins-Minerals (WOMENS MULTI VITAMIN & MINERAL PO) Take 1 tablet by mouth daily    Yes Historical Provider, MD     Exam obese  Head and neck atraumatic, normocephalic    Lymph nodes-no cervical, supraclavicular lymphadenopathy    Neck-no JVP elevation    Nasal turbinated - 3 + inferior   Sinus pressure - maxillary and frontal b/l   Lungs - Ventilating all lobes without any rales, rhonchi, wheezes or dullness. No bronchial breath sounds. Chest expansion equal bilaterally    CVS- S1, S2 regular. No S3 no S4, no murmurs    Abdomen-nontender, nondistended. Bowel sounds are present. No organomegaly    Lower extremity-no edema    Upper extremity-no edema    Neurological-grossly normal cranial nerves. No overt motor deficit     /84 (Site: Left Upper Arm)   Pulse 73   Resp 12   Ht 5' (1.524 m)   Wt 209 lb (94.8 kg)   SpO2 99% Comment: room air at rest  BMI 40.82 kg/m²     Eosinophils Absolute 129Low   200 - 400 /uL       IgE 357High   <101 IU/mL Final 01/31/2020  3:40  [address]   Alternaria Alternata <0.34  0.00 - 0.34 kU/L Final 01/31/2020  3:40  [address]   Maple/[place]elder Tree <0.34  0.00 - 0.34 kU/L Final 01/31/2020  3:40  [address]   Cat Dander Antibody 4. 11High   0.00 - 0.34 kU/L Final 01/31/2020  3:40 PM [address] Tree <0.34  0.00 - 0.34 kU/L Final 01/31/2020  3:40 PM [place] <0.34  0.00 - 0.34 kU/L Final 01/31/2020  3:40  [address]   Milk, Cow's IgE <0.34  0.00 - 0.34 kU/L Final 01/31/2020  3:40  [address]   Peanut IgE <0.34  0.00 - 0.34 kU/L Final 01/31/2020  3:40 PM [address] IGE <0.34  0.00 - 0.34 kU/L Final 01/31/2020  3:40 PM [address] <0.34 0.00 - 0.34 kU/L Final 01/31/2020  3:40 PM [place] <0.34  0.00 - 0.34 kU/L Final 01/31/2020  3:40  [address]   Allergen Hormodendrum IgE <0.34  0.00 - 0.34 kUL/L Final 01/31/2020  3:40 PM [place] [address] <0.34  0.00 - 0.34 kU/L Final 01/31/2020  3:40  [address] Tree IgE <0.34  0.00 - 0.34 kU/L Final 01/31/2020  3:40  [address] IgE <0.34  0.00 - 0.34 kU/L Final 01/31/2020  3:40  [address]   Aspergillus Fumigatus <0.34  0.00 - 0.34 kU/L Final 01/31/2020  3:40  [address]   D. pteronyssinus <0.34  0.00 - 0.34 kU/L Final 01/31/2020  3:40  [address]   D. Farinae <0.34  0.00 - 0.34 kU/L Final 01/31/2020  3:40 PM [address] IgE <0.34  0.00 - 0.34 kU/L Final 01/31/2020  3:40  [address]   Allergen, [facility] IgE <0.34  0.00 - 0.34 kU/L Final 01/31/2020  3:40 PM [employer].  [place] <0.34  0.00 - 0.34 kU/L Final 01/31/2020  3:40  [address]   Short Ragwd(A parviz.) IgE <0.34  0.00 - 0.34 kU/L Final 01/31/2020  3:40  [address]   Cockroach IgE <0.34  0.00 - 0.34 kU/L Final 01/31/2020  3:40  [address]   Allergen [place] <0.34  0.00 - 0.34 kU/L Final 01/31/2020  3:40 PM [address] Tree IgE <0.34  0.00 - 0.34 kU/L Final 01/31/2020  3:40 PM [address] Tree IgE <0.34  0.00 - 0.34 kU/L Final 01/31/2020  3:40  [address]   Mouse Epithelial <0.34  0.00 - 0.34 kU/L Final 01/31/2020  3:40  [address]   Mucor Racemosus <0.34  0.00 - 0.34 kU/L Final 01/31/2020  3:40  [address]   Allergen [place] Tree, IGE <0.34  0.00 - 0.34 kU/L Final 01/31/2020  3:40  [address] Dog Dander IgE <0.34  0.00 - 0.34 kU/L Final 01/31/2020  3:40  [address] [place] IgE <0.34              Status Reason Specialty Diagnoses / Procedures Referred By Contact Referred To Contact         Diagnoses    Deviated nasal septum    Hypertrophy of nasal turbinates    Nasal congestion    DEVIATED NASAL SEPTUM, HYPERTROPHY OF NASAL TURBINATES, AND NASAL CONGESTION       Procedures    NJ REPAIR OF NASAL SEPTUM    NJ EXCISION TURBINATE,SUBMUCOUS    REDUCTION SEPTOPLASTY TURBINATE             Assessment   Diagnosis Orders   1. Uncomplicated severe persistent asthma  External Referral To ENT    guaiFENesin-dextromethorphan (ROBITUSSIN DM) 100-10 MG/5ML syrup      2. Nasal turbinate hypertrophy -post nasal turbinectomy  External Referral To ENT    guaiFENesin-dextromethorphan (ROBITUSSIN DM) 100-10 MG/5ML syrup      3. Elevated IgE level        4. Hiatal hernia        5. Gastroesophageal reflux disease without esophagitis, controlled on omeprazole        6. Chronic rhinosinusitis  External Referral To ENT    guaiFENesin-dextromethorphan (ROBITUSSIN DM) 100-10 MG/5ML syrup        Patient's IgE level is elevated she has severe persistent asthma maximized on bronchodilator therapy will recommend start Xolair injection. I reviewed risks and benefits of her Xolair with patient but she does not want to start Xolair at this present time. Plan:  Continue Ron Contreras which has provided he maximum benefit . Use albuterol as needed   On exam her turbinates are again hypertrophied and she has chronic rhino sinusitis  . I have referred her back to ENT - ? Repeat sinus surgery and candidate for dupxent .     Follow up after ENT evaluation               Requested Prescriptions     Signed Prescriptions Disp Refills    guaiFENesin-dextromethorphan (ROBITUSSIN DM) 100-10 MG/5ML syrup 473 mL 1     Sig: Take 5 mLs by mouth 3 times daily as needed for Cough       There are no discontinued medications. Zelda Princess received counseling on the following healthy behaviors: nutrition, exercise and medication adherence    Patient given educational materials : see patient instruction     Discussed use, benefit, and side effects of prescribed medications. Barriers to medication compliance addressed. All patient questions answered. Pt voiced understanding.    Electronically signed by Serafin Chaudhari MD on 1/14/2023 at 4:41 PM [Dyspnea on Exertion] : dyspnea on exertion [Negative] : Cardiovascular [Shortness Of Breath] : no shortness of breath [Wheezing] : no wheezing [Cough] : no cough

## 2025-06-24 ENCOUNTER — TELEPHONE (OUTPATIENT)
Age: 55
End: 2025-06-24

## 2025-06-24 NOTE — TELEPHONE ENCOUNTER
Called the patient to reschedule her appointment after she no-showed today but was unable to reach her by phone. Writer left a voice mail asking her to return the call to the office to reschedule her missed appointment.

## 2025-06-25 ENCOUNTER — TELEPHONE (OUTPATIENT)
Age: 55
End: 2025-06-25

## 2025-06-25 ENCOUNTER — OFFICE VISIT (OUTPATIENT)
Dept: ORTHOPEDIC SURGERY | Age: 55
End: 2025-06-25
Payer: MEDICAID

## 2025-06-25 VITALS — HEIGHT: 60 IN | WEIGHT: 218 LBS | BODY MASS INDEX: 42.8 KG/M2

## 2025-06-25 DIAGNOSIS — M54.16 LUMBAR RADICULOPATHY: Primary | ICD-10-CM

## 2025-06-25 PROCEDURE — 1036F TOBACCO NON-USER: CPT | Performed by: ORTHOPAEDIC SURGERY

## 2025-06-25 PROCEDURE — G8417 CALC BMI ABV UP PARAM F/U: HCPCS | Performed by: ORTHOPAEDIC SURGERY

## 2025-06-25 PROCEDURE — 99213 OFFICE O/P EST LOW 20 MIN: CPT | Performed by: ORTHOPAEDIC SURGERY

## 2025-06-25 PROCEDURE — G8427 DOCREV CUR MEDS BY ELIG CLIN: HCPCS | Performed by: ORTHOPAEDIC SURGERY

## 2025-06-25 PROCEDURE — 3017F COLORECTAL CA SCREEN DOC REV: CPT | Performed by: ORTHOPAEDIC SURGERY

## 2025-06-25 NOTE — TELEPHONE ENCOUNTER
Placed call to patient to reschedule missed ntp appt on 6/24/25. No answer unable to LVM sent a MyChart scheduling ticket and mailed no show letter

## 2025-06-25 NOTE — PROGRESS NOTES
[facility]  [address]  [facility] [address]  Dept Phone: [phone]  Dept Fax: [phone]      Orthopaedic Clinic Follow Up      Subjective:     Beatriz Edmond is a 54 y.o. year old female who presents to the clinic today for follow up of her low back, left hip and thigh pain which has been going on for several years.  She describes pain that begins in the low back and radiates into the left buttock, and down the lateral thigh ending just proximal to the knee.  She endorses radicular symptoms including sharp shooting pains, numbness, and tingling throughout the leg down to the ankle which occur with certain movements.  Her symptoms are provoked with extension, especially at night when lying in bed and significantly impacts her ability to sleep.  Her symptoms are relieved when leaning forward such as on a shopping cart or over-the-counter.  She works as a nursing aide and repeatedly lifts patients.  She endorses chronic perennial numbness and difficulty controlling her urine, especially times when her radicular symptoms are aggravated with certain movements.  These symptoms have been present for over a year and have not worsened recently.  She has never been treated for low back pain in the past and has never gotten lumbar x-rays before today.  She has been treating her pains with NSAIDs including Tylenol and ibuprofen.  At her last visit in clinic on 5/14, she was diagnosed with IT band tightness and prescribed physical therapy for this problem, which she has not attended yet, however she is willing to begin going to physical therapy at this point.    Review of Systems  Gen: no fever, chills, malaise  CV: no chest pain or palpitations  Resp: no cough or shortness of breath  GI: no nausea, vomiting, diarrhea, or constipation  Neuro: no seizures, vertigo, or headache  Msk: Per HPI  10 remaining systems reviewed and negative    Objective :   There were no vitals filed for this

## 2025-06-25 NOTE — PATIENT INSTRUCTIONS
PATIENTIQ:  PatientIQ helps [facility] stay in touch with you to know how you're feeling, and provides education and care instructions to you at various time points.   Your answers help your care team track your progress to provide the best care possible. PatientIQ will contact you pre-op and post-op via email or text with:  Educational Videos and Care Instructions  Questionnaires About How You're Feeling    Your participation provides you valuable education and helps [facility] continue to provide quality care to all patients. Thank you

## 2025-06-26 ENCOUNTER — TELEPHONE (OUTPATIENT)
Age: 55
End: 2025-06-26

## 2025-06-26 NOTE — TELEPHONE ENCOUNTER
----- Message from Moriah HENDRIX sent at 6/25/2025 11:48 AM EDT -----  Regarding: E[facility] Appointment Request  E[facility] Appointment Request    Patient needs appointment for E[facility] Appointment Type: Pre-Op Visit.    Patient Requested Dates(s): After Monday appointment  Patient Requested Time: Morning  Provider Name: Randall Newell     Reason for Appointment Request: Established Patient - No appointments available during search.  July 14, 2025 for OB GYN procedures  --------------------------------------------------------------------------------------------------------------------------    Relationship to Patient: Self     Call Back Information: OK to leave message on voicemail  Preferred Call Back Number: Phone [phone]

## 2025-06-26 NOTE — TELEPHONE ENCOUNTER
----- Message from Moriah HENDRIX sent at 6/25/2025 11:48 AM EDT -----  Regarding: E[facility] Appointment Request  ECC Appointment Request    Patient needs appointment for ECC Appointment Type: Pre-Op Visit.    Patient Requested Dates(s): After Monday appointment  Patient Requested Time: Morning  Provider Name: Randall Newell     Reason for Appointment Request: Established Patient - No appointments available during search.  July 14, 2025 for OB GYN procedures  --------------------------------------------------------------------------------------------------------------------------    Relationship to Patient: Self     Call Back Information: OK to leave message on voicemail  Preferred Call Back Number: Phone [phone]

## 2025-06-26 NOTE — TELEPHONE ENCOUNTER
Left message for patient to call back. Clearance is scheduled for July 8th @ 1:00pm with Dr. Newell.

## 2025-07-06 NOTE — PROGRESS NOTES
Position: Sitting   Pulse: 55   Temp: 97 °F (36.1 °C)   SpO2: 97%   Weight: 97.3 kg (214 lb 9.6 oz)   Height: 1.524 m (5')     BP Readings from Last 3 Encounters:   07/08/25 134/86   05/06/25 138/86   02/17/25 134/67        Physical Exam  Cardiovascular:      Rate and Rhythm: Normal rate and regular rhythm.      Pulses: Normal pulses.      Heart sounds: Normal heart sounds.   Pulmonary:      Effort: Pulmonary effort is normal.      Breath sounds: Normal breath sounds.           DIAGNOSTIC FINDINGS:  CBC:  Lab Results   Component Value Date/Time    WBC 7.9 02/10/2025 12:20 PM    HGB 11.5 02/10/2025 12:20 PM     02/10/2025 12:20 PM       BMP:    Lab Results   Component Value Date/Time     02/10/2025 12:20 PM    K 4.2 02/10/2025 12:20 PM     02/10/2025 12:20 PM    CO2 26 02/10/2025 12:20 PM    BUN 14 02/10/2025 12:20 PM    CREATININE 1.0 02/10/2025 12:20 PM    GLUCOSE 112 02/10/2025 12:20 PM       HEMOGLOBIN A1C:   Lab Results   Component Value Date/Time    LABA1C 5.8 02/10/2025 12:20 PM       FASTING LIPID PANEL:  Lab Results   Component Value Date    CHOL 153 02/10/2025    HDL 53 02/10/2025    TRIG 107 02/10/2025        Diagnosis Orders   1. Preop exam for internal medicine        2. Low calcium levels  Calcium Carb-Magnesium Carb 250-300 MG TABS      3. Breast cancer screening by mammogram  TRAVIS DIGITAL SCREEN W OR WO CAD BILATERAL      4. BMI 40.0-44.9, adult ([facility])  DME Order for (Specify) as Matthew Mireles DO, [facility]    Sleep Study with PAP Titration           ASSESSMENT AND PLAN:  Diagnoses and all orders for this visit:   Diagnosis Orders   1. Preop exam for internal medicine        2. Low calcium levels  Calcium Carb-Magnesium Carb 250-300 MG TABS      3. Breast cancer screening by mammogram  TRAVIS DIGITAL SCREEN W OR WO CAD BILATERAL      4. BMI 40.0-44.9, adult ([facility])  DME Order for (Specify) as Matthew Mireles DO,

## 2025-07-08 ENCOUNTER — OFFICE VISIT (OUTPATIENT)
Age: 55
End: 2025-07-08
Payer: MEDICAID

## 2025-07-08 VITALS
BODY MASS INDEX: 42.13 KG/M2 | DIASTOLIC BLOOD PRESSURE: 86 MMHG | SYSTOLIC BLOOD PRESSURE: 134 MMHG | OXYGEN SATURATION: 97 % | WEIGHT: 214.6 LBS | HEIGHT: 60 IN | HEART RATE: 55 BPM | TEMPERATURE: 97 F

## 2025-07-08 DIAGNOSIS — Z12.31 BREAST CANCER SCREENING BY MAMMOGRAM: ICD-10-CM

## 2025-07-08 DIAGNOSIS — Z01.818 PREOP EXAM FOR INTERNAL MEDICINE: Primary | ICD-10-CM

## 2025-07-08 DIAGNOSIS — E83.51 LOW CALCIUM LEVELS: ICD-10-CM

## 2025-07-08 PROCEDURE — 1036F TOBACCO NON-USER: CPT

## 2025-07-08 PROCEDURE — G8427 DOCREV CUR MEDS BY ELIG CLIN: HCPCS

## 2025-07-08 PROCEDURE — 3075F SYST BP GE 130 - 139MM HG: CPT

## 2025-07-08 PROCEDURE — 3079F DIAST BP 80-89 MM HG: CPT

## 2025-07-08 PROCEDURE — 99213 OFFICE O/P EST LOW 20 MIN: CPT

## 2025-07-08 PROCEDURE — 3017F COLORECTAL CA SCREEN DOC REV: CPT

## 2025-07-08 PROCEDURE — G8417 CALC BMI ABV UP PARAM F/U: HCPCS

## 2025-07-08 ASSESSMENT — PATIENT HEALTH QUESTIONNAIRE - PHQ9
2. FEELING DOWN, DEPRESSED OR HOPELESS: NOT AT ALL
1. LITTLE INTEREST OR PLEASURE IN DOING THINGS: NOT AT ALL
4. FEELING TIRED OR HAVING LITTLE ENERGY: MORE THAN HALF THE DAYS
5. POOR APPETITE OR OVEREATING: SEVERAL DAYS
10. IF YOU CHECKED OFF ANY PROBLEMS, HOW DIFFICULT HAVE THESE PROBLEMS MADE IT FOR YOU TO DO YOUR WORK, TAKE CARE OF THINGS AT HOME, OR GET ALONG WITH OTHER PEOPLE: SOMEWHAT DIFFICULT
8. MOVING OR SPEAKING SO SLOWLY THAT OTHER PEOPLE COULD HAVE NOTICED. OR THE OPPOSITE, BEING SO FIGETY OR RESTLESS THAT YOU HAVE BEEN MOVING AROUND A LOT MORE THAN USUAL: NOT AT ALL
SUM OF ALL RESPONSES TO PHQ QUESTIONS 1-9: 6
9. THOUGHTS THAT YOU WOULD BE BETTER OFF DEAD, OR OF HURTING YOURSELF: NOT AT ALL
SUM OF ALL RESPONSES TO PHQ QUESTIONS 1-9: 6
SUM OF ALL RESPONSES TO PHQ QUESTIONS 1-9: 6
7. TROUBLE CONCENTRATING ON THINGS, SUCH AS READING THE NEWSPAPER OR WATCHING TELEVISION: NOT AT ALL
3. TROUBLE FALLING OR STAYING ASLEEP: MORE THAN HALF THE DAYS
6. FEELING BAD ABOUT YOURSELF - OR THAT YOU ARE A FAILURE OR HAVE LET YOURSELF OR YOUR FAMILY DOWN: SEVERAL DAYS
SUM OF ALL RESPONSES TO PHQ QUESTIONS 1-9: 6

## 2025-07-10 ENCOUNTER — HOSPITAL ENCOUNTER (OUTPATIENT)
Dept: MRI IMAGING | Age: 55
Discharge: HOME | End: 2025-07-12
Payer: MEDICAID

## 2025-07-10 DIAGNOSIS — M54.16 LUMBAR RADICULOPATHY: ICD-10-CM

## 2025-07-10 PROCEDURE — 72148 MRI LUMBAR SPINE W/O DYE: CPT

## 2025-07-11 ENCOUNTER — OFFICE VISIT (OUTPATIENT)
Dept: NEUROSURGERY | Age: 55
End: 2025-07-11
Payer: MEDICAID

## 2025-07-11 VITALS
WEIGHT: 211 LBS | SYSTOLIC BLOOD PRESSURE: 132 MMHG | BODY MASS INDEX: 41.43 KG/M2 | HEIGHT: 60 IN | HEART RATE: 61 BPM | DIASTOLIC BLOOD PRESSURE: 75 MMHG

## 2025-07-11 DIAGNOSIS — M47.26 OTHER SPONDYLOSIS WITH RADICULOPATHY, LUMBAR REGION: Primary | ICD-10-CM

## 2025-07-11 PROCEDURE — 3078F DIAST BP <80 MM HG: CPT

## 2025-07-11 PROCEDURE — 99204 OFFICE O/P NEW MOD 45 MIN: CPT

## 2025-07-11 PROCEDURE — G8417 CALC BMI ABV UP PARAM F/U: HCPCS

## 2025-07-11 PROCEDURE — G8427 DOCREV CUR MEDS BY ELIG CLIN: HCPCS

## 2025-07-11 PROCEDURE — 3075F SYST BP GE 130 - 139MM HG: CPT

## 2025-07-11 PROCEDURE — 3017F COLORECTAL CA SCREEN DOC REV: CPT

## 2025-07-11 PROCEDURE — 1036F TOBACCO NON-USER: CPT

## 2025-07-11 NOTE — PROGRESS NOTES
(COREG) 6.25 MG tablet Take 1 tablet by mouth 2 times daily 180 tablet 1    fenofibrate (TRICOR) 48 MG tablet Take 1 tablet by mouth daily 90 tablet 1    fluticasone (FLONASE) 50 MCG/ACT nasal spray 2 sprays by Each Nostril route daily 16 g 5    hydroCHLOROthiazide (HYDRODIURIL) 25 MG tablet Take 1 tablet by mouth every morning 90 tablet 1    loratadine (CLARITIN) 10 MG tablet Take 1 tablet by mouth daily 90 tablet 1    melatonin (RA MELATONIN) 5 MG TABS tablet take 1 tablet by mouth nightly 90 tablet 1    meloxicam (MOBIC) 7.5 MG tablet Take 1 tablet by mouth daily 90 tablet 1    mometasone-formoterol (DULERA) 200-5 MCG/ACT inhaler inhale 2 puffs by mouth and INTO THE LUNGS twice a day 13 g 5    omeprazole (PRILOSEC) 40 MG delayed release capsule 1 tablet daily on empty stomach 90 capsule 1    senna (HAYDER-JEREMIE) 8.6 MG tablet take 1 tablet by mouth once daily 90 tablet 1    valsartan (DIOVAN) 80 MG tablet Take 1 tablet by mouth daily 90 tablet 1    albuterol sulfate HFA (PROAIR HFA) 108 (90 Base) MCG/ACT inhaler inhale 2 puffs by mouth every 6 hours if needed for wheezing 1 each 5    atorvastatin (LIPITOR) 40 MG tablet Take 2 tablets by mouth daily 30 tablet 5    polyethylene glycol (GLYCOLAX) 17 GM/SCOOP powder Follow instructions given by provider 238 g 0    bisacodyl 5 MG EC tablet Use as instructed from your physicians office for your colonoscopy prep. 4 tablet 0    aspirin (ASPIRIN LOW DOSE) 81 MG EC tablet take 1 tablet by mouth once daily 30 tablet 5    acetaZOLAMIDE (DIAMOX) 250 MG tablet Take 2 tablets by mouth 2 times daily 90 tablet 3    naloxone 4 MG/0.1ML LIQD nasal spray USE AS DIRECTED FOR OPIOID REVERSAL      Spacer/Aero-Holding Chambers (PURE COMFORT SPACER CHAMBER) JOHNNY AS DIRECTED      Blood Glucose-BP Monitor (BLOOD GLUCOSE-WRIST BP MONITOR) JOHNNY Check BP daily 1 each 0    simethicone (MYLICON) 80 MG chewable tablet CHEW AND SWALLOW 1 tablet by mouth four times a day if needed for FLATULENCE 180

## 2025-07-12 ENCOUNTER — HOSPITAL ENCOUNTER (OUTPATIENT)
Dept: GENERAL RADIOLOGY | Age: 55
Discharge: HOME | End: 2025-07-14
Payer: MEDICAID

## 2025-07-12 DIAGNOSIS — M47.26 OTHER SPONDYLOSIS WITH RADICULOPATHY, LUMBAR REGION: ICD-10-CM

## 2025-07-12 PROCEDURE — 72110 X-RAY EXAM L-2 SPINE 4/>VWS: CPT

## 2025-07-12 PROCEDURE — 72082 X-RAY EXAM ENTIRE SPI 2/3 VW: CPT

## 2025-07-13 NOTE — H&P
OB/GYN Pre-Op H&P  [facility]    Patient Name: Beatriz Edmond     Patient : 1970  Room/Bed: ST OR [facility]/NONE  Admission Date/Time: 2025  5:53 AM  Primary Care Physician: Randall Newell MD  MRN: [id number]    Date: 2025  Time: 6:52 AM    The patient was seen in pre-op holding. She is here for hysteroscopy, dilation and curettage with Aveta device.    Beatriz Edmond is a 54 y.o.  with a history of post menopausal bleeding, pelvic pain, and known fibroid. Patient reports over the last 6 months she has been experiencing significant pelvic discomfort and a sensation of bloating in her abdomen. She reports the pain is made worse with sexual intercourse. She reports menopause was at age 52 y/o and she now has experienced menstrual like bleeding over the last several months. Fibroid was initially recognized incidentally on an US performed on 23 and measuring 5.7 x 4.4 5.0 cm. Repeat US performed on 25 showed size is stable, thickened endometrium noted at 5.6 mm. Most recent pap smear w/ HPV performed in  was NILM w/ negative HPV. Recommendation made for EMB vs D&C and patient is desiring to proceed with hysteroscopy with dilation and curettage for sampling. Her eventual desire is definitive management with a hysterectomy due to her persistent pelvic pain.    The procedure risks and complications were reviewed.  The labs, Consent, and H&P were reviewed and updated.  The patient was counseled on the possibility of  the need of a second surgery.  The patient voiced understanding and had all of her questions answered. The possibility of incomplete removal of abnormal tissue was discussed.    OBSTETRICAL HISTORY:   OB History    Para Term  AB Living   3 2 2 0 1 2   SAB IAB Ectopic Molar Multiple Live Births   1 0 0 0 0 2      # Outcome Date GA Lbr Carter/2nd Weight Sex Type Anes PTL Lv   3 Term 02 40w0d  3.232 kg M Vag-Spont EPI N FRITZ   2 Term

## 2025-07-14 ENCOUNTER — HOSPITAL ENCOUNTER (OUTPATIENT)
Age: 55
Setting detail: OUTPATIENT SURGERY
Discharge: HOME | End: 2025-07-14
Attending: STUDENT IN AN ORGANIZED HEALTH CARE EDUCATION/TRAINING PROGRAM | Admitting: STUDENT IN AN ORGANIZED HEALTH CARE EDUCATION/TRAINING PROGRAM
Payer: MEDICAID

## 2025-07-14 ENCOUNTER — ANESTHESIA EVENT (OUTPATIENT)
Dept: OPERATING ROOM | Age: 55
End: 2025-07-14
Payer: MEDICAID

## 2025-07-14 ENCOUNTER — ANESTHESIA (OUTPATIENT)
Dept: OPERATING ROOM | Age: 55
End: 2025-07-14
Payer: MEDICAID

## 2025-07-14 VITALS
SYSTOLIC BLOOD PRESSURE: 125 MMHG | TEMPERATURE: 96.8 F | WEIGHT: 211 LBS | BODY MASS INDEX: 41.43 KG/M2 | RESPIRATION RATE: 16 BRPM | DIASTOLIC BLOOD PRESSURE: 71 MMHG | HEIGHT: 60 IN | OXYGEN SATURATION: 98 % | HEART RATE: 61 BPM

## 2025-07-14 DIAGNOSIS — D21.9 FIBROID: ICD-10-CM

## 2025-07-14 DIAGNOSIS — N93.9 ABNORMAL UTERINE BLEEDING: ICD-10-CM

## 2025-07-14 PROBLEM — R93.89 THICKENED ENDOMETRIUM: Status: ACTIVE | Noted: 2025-07-14

## 2025-07-14 PROBLEM — N95.0 PMB (POSTMENOPAUSAL BLEEDING): Status: ACTIVE | Noted: 2025-07-14

## 2025-07-14 PROBLEM — G89.18 POST-OP PAIN: Status: ACTIVE | Noted: 2025-07-14

## 2025-07-14 LAB
ABO + RH BLD: NORMAL
ARM BAND NUMBER: NORMAL
BLOOD BANK SAMPLE EXPIRATION: NORMAL
BLOOD GROUP ANTIBODIES SERPL: NEGATIVE
BUN BLD-MCNC: 13 MG/DL (ref 8–26)
CHLORIDE BLD-SCNC: 104 MMOL/L (ref 98–107)
CO2 BLD CALC-SCNC: 29 MMOL/L (ref 22–30)
EGFR, POC: 87 ML/MIN/1.73M2
ERYTHROCYTE [DISTWIDTH] IN BLOOD BY AUTOMATED COUNT: 16.4 % (ref 11.8–14.4)
GLUCOSE BLD-MCNC: 110 MG/DL (ref 74–100)
HCT VFR BLD AUTO: 39.2 % (ref 36.3–47.1)
HGB BLD-MCNC: 12.2 G/DL (ref 11.9–15.1)
MCH RBC QN AUTO: 26 PG (ref 25.2–33.5)
MCHC RBC AUTO-ENTMCNC: 31.1 G/DL (ref 28.4–34.8)
MCV RBC AUTO: 83.4 FL (ref 82.6–102.9)
NRBC BLD-RTO: 0 PER 100 WBC
PLATELET # BLD AUTO: 288 K/UL (ref 138–453)
PMV BLD AUTO: 10.2 FL (ref 8.1–13.5)
POC ANION GAP: 9 MMOL/L (ref 7–16)
POC CREATININE: 0.8 MG/DL (ref 0.51–1.19)
POTASSIUM BLD-SCNC: 3.8 MMOL/L (ref 3.5–4.5)
RBC # BLD AUTO: 4.7 M/UL (ref 3.95–5.11)
SODIUM BLD-SCNC: 141 MMOL/L (ref 138–146)
WBC OTHER # BLD: 7.1 K/UL (ref 3.5–11.3)

## 2025-07-14 PROCEDURE — 7100000011 HC PHASE II RECOVERY - ADDTL 15 MIN: Performed by: STUDENT IN AN ORGANIZED HEALTH CARE EDUCATION/TRAINING PROGRAM

## 2025-07-14 PROCEDURE — 88305 TISSUE EXAM BY PATHOLOGIST: CPT

## 2025-07-14 PROCEDURE — 84520 ASSAY OF UREA NITROGEN: CPT

## 2025-07-14 PROCEDURE — 2500000003 HC RX 250 WO HCPCS: Performed by: STUDENT IN AN ORGANIZED HEALTH CARE EDUCATION/TRAINING PROGRAM

## 2025-07-14 PROCEDURE — 6360000002 HC RX W HCPCS: Performed by: NURSE ANESTHETIST, CERTIFIED REGISTERED

## 2025-07-14 PROCEDURE — 3700000001 HC ADD 15 MINUTES (ANESTHESIA): Performed by: STUDENT IN AN ORGANIZED HEALTH CARE EDUCATION/TRAINING PROGRAM

## 2025-07-14 PROCEDURE — 7100000001 HC PACU RECOVERY - ADDTL 15 MIN: Performed by: STUDENT IN AN ORGANIZED HEALTH CARE EDUCATION/TRAINING PROGRAM

## 2025-07-14 PROCEDURE — 3700000000 HC ANESTHESIA ATTENDED CARE: Performed by: STUDENT IN AN ORGANIZED HEALTH CARE EDUCATION/TRAINING PROGRAM

## 2025-07-14 PROCEDURE — 6360000002 HC RX W HCPCS: Performed by: ANESTHESIOLOGY

## 2025-07-14 PROCEDURE — 80051 ELECTROLYTE PANEL: CPT

## 2025-07-14 PROCEDURE — 86850 RBC ANTIBODY SCREEN: CPT

## 2025-07-14 PROCEDURE — 86901 BLOOD TYPING SEROLOGIC RH(D): CPT

## 2025-07-14 PROCEDURE — 3600000013 HC SURGERY LEVEL 3 ADDTL 15MIN: Performed by: STUDENT IN AN ORGANIZED HEALTH CARE EDUCATION/TRAINING PROGRAM

## 2025-07-14 PROCEDURE — 7100000000 HC PACU RECOVERY - FIRST 15 MIN: Performed by: STUDENT IN AN ORGANIZED HEALTH CARE EDUCATION/TRAINING PROGRAM

## 2025-07-14 PROCEDURE — 7100000010 HC PHASE II RECOVERY - FIRST 15 MIN: Performed by: STUDENT IN AN ORGANIZED HEALTH CARE EDUCATION/TRAINING PROGRAM

## 2025-07-14 PROCEDURE — 3600000003 HC SURGERY LEVEL 3 BASE: Performed by: STUDENT IN AN ORGANIZED HEALTH CARE EDUCATION/TRAINING PROGRAM

## 2025-07-14 PROCEDURE — 6370000000 HC RX 637 (ALT 250 FOR IP): Performed by: ANESTHESIOLOGY

## 2025-07-14 PROCEDURE — 82947 ASSAY GLUCOSE BLOOD QUANT: CPT

## 2025-07-14 PROCEDURE — 85027 COMPLETE CBC AUTOMATED: CPT

## 2025-07-14 PROCEDURE — 82565 ASSAY OF CREATININE: CPT

## 2025-07-14 PROCEDURE — 2709999900 HC NON-CHARGEABLE SUPPLY: Performed by: STUDENT IN AN ORGANIZED HEALTH CARE EDUCATION/TRAINING PROGRAM

## 2025-07-14 PROCEDURE — 2580000003 HC RX 258: Performed by: ANESTHESIOLOGY

## 2025-07-14 PROCEDURE — 86900 BLOOD TYPING SEROLOGIC ABO: CPT

## 2025-07-14 PROCEDURE — 2720000010 HC SURG SUPPLY STERILE: Performed by: STUDENT IN AN ORGANIZED HEALTH CARE EDUCATION/TRAINING PROGRAM

## 2025-07-14 PROCEDURE — 2500000003 HC RX 250 WO HCPCS: Performed by: NURSE ANESTHETIST, CERTIFIED REGISTERED

## 2025-07-14 RX ORDER — FENTANYL CITRATE 50 UG/ML
25 INJECTION, SOLUTION INTRAMUSCULAR; INTRAVENOUS EVERY 5 MIN PRN
Status: DISCONTINUED | OUTPATIENT
Start: 2025-07-14 | End: 2025-07-14 | Stop reason: HOSPADM

## 2025-07-14 RX ORDER — LIDOCAINE HYDROCHLORIDE 10 MG/ML
INJECTION, SOLUTION EPIDURAL; INFILTRATION; INTRACAUDAL; PERINEURAL
Status: DISCONTINUED | OUTPATIENT
Start: 2025-07-14 | End: 2025-07-14 | Stop reason: SDUPTHER

## 2025-07-14 RX ORDER — ONDANSETRON 2 MG/ML
INJECTION INTRAMUSCULAR; INTRAVENOUS
Status: DISCONTINUED | OUTPATIENT
Start: 2025-07-14 | End: 2025-07-14 | Stop reason: SDUPTHER

## 2025-07-14 RX ORDER — SCOPOLAMINE 1 MG/3D
1 PATCH, EXTENDED RELEASE TRANSDERMAL ONCE
Status: DISCONTINUED | OUTPATIENT
Start: 2025-07-14 | End: 2025-07-14 | Stop reason: HOSPADM

## 2025-07-14 RX ORDER — PHENYLEPHRINE HCL IN 0.9% NACL 1 MG/10 ML
SYRINGE (ML) INTRAVENOUS
Status: DISCONTINUED | OUTPATIENT
Start: 2025-07-14 | End: 2025-07-14 | Stop reason: SDUPTHER

## 2025-07-14 RX ORDER — PROPOFOL 10 MG/ML
INJECTION, EMULSION INTRAVENOUS
Status: DISCONTINUED | OUTPATIENT
Start: 2025-07-14 | End: 2025-07-14 | Stop reason: SDUPTHER

## 2025-07-14 RX ORDER — ALBUTEROL SULFATE 0.83 MG/ML
2.5 SOLUTION RESPIRATORY (INHALATION) EVERY 8 HOURS PRN
Status: DISCONTINUED | OUTPATIENT
Start: 2025-07-14 | End: 2025-07-14 | Stop reason: HOSPADM

## 2025-07-14 RX ORDER — SODIUM CHLORIDE, SODIUM LACTATE, POTASSIUM CHLORIDE, CALCIUM CHLORIDE 600; 310; 30; 20 MG/100ML; MG/100ML; MG/100ML; MG/100ML
INJECTION, SOLUTION INTRAVENOUS CONTINUOUS
Status: DISCONTINUED | OUTPATIENT
Start: 2025-07-14 | End: 2025-07-14 | Stop reason: HOSPADM

## 2025-07-14 RX ORDER — FENTANYL CITRATE 50 UG/ML
50 INJECTION, SOLUTION INTRAMUSCULAR; INTRAVENOUS
Refills: 0 | Status: DISCONTINUED | OUTPATIENT
Start: 2025-07-14 | End: 2025-07-14 | Stop reason: HOSPADM

## 2025-07-14 RX ORDER — MIDAZOLAM HYDROCHLORIDE 2 MG/2ML
1 INJECTION, SOLUTION INTRAMUSCULAR; INTRAVENOUS EVERY 10 MIN PRN
Status: DISCONTINUED | OUTPATIENT
Start: 2025-07-14 | End: 2025-07-14 | Stop reason: HOSPADM

## 2025-07-14 RX ORDER — SODIUM CHLORIDE 9 MG/ML
INJECTION, SOLUTION INTRAVENOUS PRN
Status: DISCONTINUED | OUTPATIENT
Start: 2025-07-14 | End: 2025-07-14 | Stop reason: HOSPADM

## 2025-07-14 RX ORDER — DIPHENHYDRAMINE HYDROCHLORIDE 50 MG/ML
12.5 INJECTION, SOLUTION INTRAMUSCULAR; INTRAVENOUS
Status: DISCONTINUED | OUTPATIENT
Start: 2025-07-14 | End: 2025-07-14 | Stop reason: HOSPADM

## 2025-07-14 RX ORDER — MAGNESIUM HYDROXIDE 1200 MG/15ML
LIQUID ORAL CONTINUOUS PRN
Status: COMPLETED | OUTPATIENT
Start: 2025-07-14 | End: 2025-07-14

## 2025-07-14 RX ORDER — ONDANSETRON 2 MG/ML
4 INJECTION INTRAMUSCULAR; INTRAVENOUS
Status: COMPLETED | OUTPATIENT
Start: 2025-07-14 | End: 2025-07-14

## 2025-07-14 RX ORDER — IBUPROFEN 600 MG/1
600 TABLET, FILM COATED ORAL EVERY 6 HOURS
Qty: 60 TABLET | Refills: 1 | Status: SHIPPED | OUTPATIENT
Start: 2025-07-14

## 2025-07-14 RX ORDER — ACETAMINOPHEN 500 MG
1000 TABLET ORAL EVERY 6 HOURS
Qty: 60 TABLET | Refills: 1 | Status: SHIPPED | OUTPATIENT
Start: 2025-07-14

## 2025-07-14 RX ORDER — SODIUM CHLORIDE 0.9 % (FLUSH) 0.9 %
5-40 SYRINGE (ML) INJECTION EVERY 12 HOURS SCHEDULED
Status: DISCONTINUED | OUTPATIENT
Start: 2025-07-14 | End: 2025-07-14 | Stop reason: HOSPADM

## 2025-07-14 RX ORDER — SENNA AND DOCUSATE SODIUM 50; 8.6 MG/1; MG/1
2 TABLET, FILM COATED ORAL 2 TIMES DAILY
Qty: 60 TABLET | Refills: 1 | Status: SHIPPED | OUTPATIENT
Start: 2025-07-14

## 2025-07-14 RX ORDER — DEXAMETHASONE SODIUM PHOSPHATE 10 MG/ML
INJECTION, SOLUTION INTRA-ARTICULAR; INTRALESIONAL; INTRAMUSCULAR; INTRAVENOUS; SOFT TISSUE
Status: DISCONTINUED | OUTPATIENT
Start: 2025-07-14 | End: 2025-07-14 | Stop reason: SDUPTHER

## 2025-07-14 RX ORDER — MIDAZOLAM HYDROCHLORIDE 1 MG/ML
INJECTION, SOLUTION INTRAMUSCULAR; INTRAVENOUS
Status: DISCONTINUED | OUTPATIENT
Start: 2025-07-14 | End: 2025-07-14 | Stop reason: SDUPTHER

## 2025-07-14 RX ORDER — ROCURONIUM BROMIDE 10 MG/ML
INJECTION, SOLUTION INTRAVENOUS
Status: DISCONTINUED | OUTPATIENT
Start: 2025-07-14 | End: 2025-07-14 | Stop reason: SDUPTHER

## 2025-07-14 RX ORDER — FENTANYL CITRATE 50 UG/ML
50 INJECTION, SOLUTION INTRAMUSCULAR; INTRAVENOUS EVERY 5 MIN PRN
Status: DISCONTINUED | OUTPATIENT
Start: 2025-07-14 | End: 2025-07-14 | Stop reason: HOSPADM

## 2025-07-14 RX ORDER — ONDANSETRON 4 MG/1
4 TABLET, ORALLY DISINTEGRATING ORAL EVERY 4 HOURS PRN
Qty: 20 TABLET | Refills: 0 | Status: SHIPPED | OUTPATIENT
Start: 2025-07-14

## 2025-07-14 RX ORDER — ALBUTEROL SULFATE 90 UG/1
2 INHALANT RESPIRATORY (INHALATION) EVERY 6 HOURS PRN
Status: DISCONTINUED | OUTPATIENT
Start: 2025-07-14 | End: 2025-07-14 | Stop reason: HOSPADM

## 2025-07-14 RX ORDER — SODIUM CHLORIDE 0.9 % (FLUSH) 0.9 %
5-40 SYRINGE (ML) INJECTION PRN
Status: DISCONTINUED | OUTPATIENT
Start: 2025-07-14 | End: 2025-07-14 | Stop reason: HOSPADM

## 2025-07-14 RX ORDER — FENTANYL CITRATE 50 UG/ML
25 INJECTION, SOLUTION INTRAMUSCULAR; INTRAVENOUS
Refills: 0 | Status: DISCONTINUED | OUTPATIENT
Start: 2025-07-14 | End: 2025-07-14 | Stop reason: HOSPADM

## 2025-07-14 RX ORDER — FENTANYL CITRATE 50 UG/ML
INJECTION, SOLUTION INTRAMUSCULAR; INTRAVENOUS
Status: DISCONTINUED | OUTPATIENT
Start: 2025-07-14 | End: 2025-07-14 | Stop reason: SDUPTHER

## 2025-07-14 RX ADMIN — Medication 100 MCG: at 07:45

## 2025-07-14 RX ADMIN — FENTANYL CITRATE 50 MCG: 50 INJECTION, SOLUTION INTRAMUSCULAR; INTRAVENOUS at 07:32

## 2025-07-14 RX ADMIN — Medication 50 MCG: at 07:51

## 2025-07-14 RX ADMIN — SODIUM CHLORIDE, SODIUM LACTATE, POTASSIUM CHLORIDE, AND CALCIUM CHLORIDE: .6; .31; .03; .02 INJECTION, SOLUTION INTRAVENOUS at 06:44

## 2025-07-14 RX ADMIN — DEXAMETHASONE SODIUM PHOSPHATE 10 MG: 10 INJECTION INTRAMUSCULAR; INTRAVENOUS at 07:41

## 2025-07-14 RX ADMIN — ONDANSETRON 4 MG: 2 INJECTION, SOLUTION INTRAMUSCULAR; INTRAVENOUS at 08:46

## 2025-07-14 RX ADMIN — SUGAMMADEX 200 MG: 100 INJECTION, SOLUTION INTRAVENOUS at 08:05

## 2025-07-14 RX ADMIN — MIDAZOLAM 2 MG: 1 INJECTION INTRAMUSCULAR; INTRAVENOUS at 07:29

## 2025-07-14 RX ADMIN — Medication 150 MCG: at 07:57

## 2025-07-14 RX ADMIN — Medication 200 MCG: at 08:03

## 2025-07-14 RX ADMIN — LIDOCAINE HYDROCHLORIDE 50 MG: 10 INJECTION, SOLUTION EPIDURAL; INFILTRATION; INTRACAUDAL; PERINEURAL at 07:32

## 2025-07-14 RX ADMIN — ONDANSETRON 4 MG: 2 INJECTION, SOLUTION INTRAMUSCULAR; INTRAVENOUS at 08:05

## 2025-07-14 RX ADMIN — ROCURONIUM BROMIDE 50 MG: 10 INJECTION, SOLUTION INTRAVENOUS at 07:34

## 2025-07-14 RX ADMIN — PROPOFOL 150 MG: 10 INJECTION, EMULSION INTRAVENOUS at 07:32

## 2025-07-14 ASSESSMENT — PAIN - FUNCTIONAL ASSESSMENT: PAIN_FUNCTIONAL_ASSESSMENT: NONE - DENIES PAIN

## 2025-07-14 NOTE — ANESTHESIA POSTPROCEDURE EVALUATION
Department of Anesthesiology  Postprocedure Note    Patient: Beatriz Edmond  MRN: [id number]  YOB: 1970  Date of evaluation: 7/14/2025    Procedure Summary       Date: 07/14/25 Room / Location: [address]    Anesthesia Start: 0729 Anesthesia Stop: 0821    Procedure: DILATATION AND CURETTAGE HYSTEROSCOPY, AVETA Diagnosis:       Abnormal uterine bleeding      Fibroid      (Abnormal uterine bleeding [N93.9])      (Fibroid [D21.9])    Surgeons: Mary Ann Fritz DO Responsible Provider: Benigno Baldwin MD    Anesthesia Type: general ASA Status: 3            Anesthesia Type: No value filed.    Zuly Phase I: Zuly Score: 10    Zuly Phase II: Zuly Score: 10    Anesthesia Post Evaluation    Patient location during evaluation: PACU  Patient participation: complete - patient participated  Level of consciousness: awake  Pain score: 1  Airway patency: patent  Nausea & Vomiting: no nausea and no vomiting  Cardiovascular status: blood pressure returned to baseline and hemodynamically stable  Respiratory status: acceptable  Hydration status: euvolemic  Pain management: adequate    No notable events documented.

## 2025-07-14 NOTE — ANESTHESIA PRE PROCEDURE
obstructive pulmonary disease) (HCC)     Dysphagia     Dysphagia     has needed EGD with dilatation in the past    Family history of colon cancer 8/9/2017    Father    GERD (gastroesophageal reflux disease)     H/O cardiovascular stress test 01/15/2018    ABNORMAL    Hiatal hernia     Hyperlipidemia     Hypertension     Iron deficiency anemia secondary to inadequate dietary iron intake 5/13/2020    Iron deficiency anemia, unspecified 5/13/2020    Irritable bowel syndrome with constipation 1/21/2016    Obesity     Snores     states has tested negative for sleep apnea    Wears glasses        Past Surgical History:        Procedure Laterality Date    CARDIAC CATHETERIZATION  01/16/2018    CARDIAC CATHETERIZATION  11/23/2015     Minimal non obstructive CAD    COLONOSCOPY  2015    COLONOSCOPY  02/08/2018    Redundant colon    ENDOSCOPY, COLON, DIAGNOSTIC  2015    with dilatation    ENDOSCOPY, COLON, DIAGNOSTIC  02/08/2018    WNL     NASAL SEPTOPLASTY W/ TURBINOPLASTY  2020        NOSE SURGERY      turbinate     VA COLON CA SCRN NOT HI RSK IND N/A 02/08/2018    COLONOSCOPY performed by Walker Castillo MD at [facility] OR    TUBAL LIGATION  2003    UPPER GASTROINTESTINAL ENDOSCOPY      The endoscopic exam showed mildly tortuous esophagus. Savary dilation performed x 42 and 45 F.     UPPER GASTROINTESTINAL ENDOSCOPY N/A 07/18/2017    EGD DILATION SAVORY performed by Walker Castillo MD at [facility]       Social History:    Social History     Tobacco Use    Smoking status: Never    Smokeless tobacco: Never   Substance Use Topics    Alcohol use: Yes     Alcohol/week: 4.0 standard drinks of alcohol     Types: 4 Cans of beer per week     Comment: occasionally                                Counseling given: Not Answered      Vital Signs (Current):   There were no vitals filed for this visit.                                             BP Readings from Last 3 Encounters:   07/11/25 132/75   07/08/25 134/86   05/06/25

## 2025-07-14 NOTE — OP NOTE
Operative Note  Department of Obstetrics and Gynecology  [facility]       Patient: Beatriz Edmond   : 1970  MRN: [id number]       Acct: [id number]   PCP: Randall Newell MD  Date of Procedure: 25    Pre-operative Diagnosis:   54 y.o. female    Post menopausal bleeding  Thickened endometrium  Pelvic pain  Dyspareunia  Fibroid  BMI 41     Post-operative Diagnosis:   54 y.o. female    Post menopausal bleeding  Thickened endometrium  Pelvic pain  Dyspareunia  Fibroid  BMI 41     Procedure: Hysteroscopy with Aveta, dilation and curettage     Surgeon: Dr. Espinoza  Assistant(s): Deana Prabhakar MD, PGY3; Taj Dawson, PGY1, Laly URIOSTEGUIS2     Anesthesia: general    Indications: Beatriz Edmond is a 54 y.o.  with a history of post menopausal bleeding, pelvic pain, and known fibroid. Patient reports over the last 6 months she has been experiencing significant pelvic discomfort and a sensation of bloating in her abdomen. She reports associated dyspareunia. She reports menopause was at age 50 y/o and she now has experienced menstrual like bleeding over the last several months. Fibroid was initially recognized incidentally on an US performed on 23 and measuring 5.7 x 4.4 5.0 cm. Repeat US performed on 25 showed size is stable, thickened endometrium noted at 5.6 mm. Most recent pap smear w/ HPV performed in  was NILM w/ negative HPV. Recommendation made for EMB vs D&C and patient is desiring to proceed with hysteroscopy with dilation and curettage for sampling due to concerns about pain management in an in office procedure. Her eventual desire is definitive management with a hysterectomy due to her persistent pelvic pain.     Procedure Details:   The patient was seen in the pre-op room. The risks, benefits, complications, treatment options, and expected outcomes were discussed with the patient. The patient concurred with the proposed plan, giving

## 2025-07-14 NOTE — BRIEF OP NOTE
Brief Operative Note  Department of Obstetrics and Gynecology  [facility]     Patient: Beatriz Edmond   : 1970  MRN: [id number]       Acct: [id number]   Date of Procedure: 25     Pre-operative Diagnosis:   54 y.o. female    Post menopausal bleeding  Thickened endometrium  Pelvic pain  Dyspareunia  Fibroid  BMI 41       Post-operative Diagnosis:   54 y.o. female    Post menopausal bleeding  Thickened endometrium  Pelvic pain  Dyspareunia  Fibroid  BMI 41    Procedure: Hysteroscopy with Aveta, dilation and curettage    Surgeon: Dr. Espinoza     Assistant(s): Deana Prabhakar MD, PGY3; Taj Dawson, PGY1, Laly PIERRE-S2    Anesthesia: general via ETT    Findings:  anteverted uterus, external genitalia without lesions, atrophic vaginal mucosa, cervix without lesions, uterus sounding to 9 cm, normal appearing endocervical canal with areas of hyperpigmentation approximately 2 cm from eternal os, atrophic appearing endometrial cavity, patent tubal ostia bilaterally   Total IV fluids/Blood products:  600 ml crystalloid  Urine Output:  patient voided prior to procedure  Estimated blood loss:  5 ml  Drains:  none  Fluid Deficit: 160 ml  Specimens:  endometrial curettings  Instrument and Sponge Count: Correct  Complications:  none  Condition:  good, transferred to post anesthesia recovery    See full operative report for further details.    Deana Prabhakar MD  Ob/Gyn Resident  2025, 8:09 AM    Attending Attestation:   I was present and scrubbed for the entire procedure.     Mary Ann Posadas DO  [facility]  Date: 2025  Time: 8:21 AM

## 2025-07-15 LAB — SURGICAL PATHOLOGY REPORT: NORMAL

## 2025-07-17 ENCOUNTER — TELEPHONE (OUTPATIENT)
Age: 55
End: 2025-07-17

## 2025-07-17 NOTE — TELEPHONE ENCOUNTER
Patient called in requesting 2 week post op appt. [facility] not able to accommodate. Please call to discuss    Thank you

## 2025-07-22 ENCOUNTER — HOSPITAL ENCOUNTER (OUTPATIENT)
Dept: SLEEP CENTER | Age: 55
Discharge: HOME | End: 2025-07-24
Payer: MEDICAID

## 2025-07-22 PROCEDURE — 95810 POLYSOM 6/> YRS 4/> PARAM: CPT

## 2025-07-23 VITALS
RESPIRATION RATE: 12 BRPM | HEART RATE: 63 BPM | HEIGHT: 60 IN | OXYGEN SATURATION: 98 % | BODY MASS INDEX: 42.01 KG/M2 | WEIGHT: 214 LBS

## 2025-07-23 ASSESSMENT — SLEEP AND FATIGUE QUESTIONNAIRES
HOW LIKELY ARE YOU TO NOD OFF OR FALL ASLEEP WHILE SITTING INACTIVE IN A PUBLIC PLACE: SLIGHT CHANCE OF DOZING
HOW LIKELY ARE YOU TO NOD OFF OR FALL ASLEEP WHILE SITTING AND READING: MODERATE CHANCE OF DOZING
HOW LIKELY ARE YOU TO NOD OFF OR FALL ASLEEP IN A CAR, WHILE STOPPED FOR A FEW MINUTES IN TRAFFIC: WOULD NEVER DOZE
HOW LIKELY ARE YOU TO NOD OFF OR FALL ASLEEP WHEN YOU ARE A PASSENGER IN A CAR FOR AN HOUR WITHOUT A BREAK: SLIGHT CHANCE OF DOZING
HOW LIKELY ARE YOU TO NOD OFF OR FALL ASLEEP WHILE SITTING QUIETLY AFTER LUNCH WITHOUT ALCOHOL: MODERATE CHANCE OF DOZING
HOW LIKELY ARE YOU TO NOD OFF OR FALL ASLEEP WHILE SITTING AND TALKING TO SOMEONE: WOULD NEVER DOZE
HOW LIKELY ARE YOU TO NOD OFF OR FALL ASLEEP WHILE WATCHING TV: HIGH CHANCE OF DOZING
HOW LIKELY ARE YOU TO NOD OFF OR FALL ASLEEP WHILE LYING DOWN TO REST IN THE AFTERNOON WHEN CIRCUMSTANCES PERMIT: MODERATE CHANCE OF DOZING
ESS TOTAL SCORE: 11

## 2025-08-07 ENCOUNTER — OFFICE VISIT (OUTPATIENT)
Age: 55
End: 2025-08-07
Payer: MEDICAID

## 2025-08-07 VITALS
HEART RATE: 64 BPM | DIASTOLIC BLOOD PRESSURE: 64 MMHG | BODY MASS INDEX: 41.79 KG/M2 | SYSTOLIC BLOOD PRESSURE: 125 MMHG | WEIGHT: 214 LBS

## 2025-08-07 DIAGNOSIS — G89.18 POST-OP PAIN: Primary | ICD-10-CM

## 2025-08-07 PROCEDURE — 1036F TOBACCO NON-USER: CPT | Performed by: STUDENT IN AN ORGANIZED HEALTH CARE EDUCATION/TRAINING PROGRAM

## 2025-08-07 PROCEDURE — 99213 OFFICE O/P EST LOW 20 MIN: CPT | Performed by: STUDENT IN AN ORGANIZED HEALTH CARE EDUCATION/TRAINING PROGRAM

## 2025-08-07 PROCEDURE — G8427 DOCREV CUR MEDS BY ELIG CLIN: HCPCS | Performed by: STUDENT IN AN ORGANIZED HEALTH CARE EDUCATION/TRAINING PROGRAM

## 2025-08-07 PROCEDURE — G8417 CALC BMI ABV UP PARAM F/U: HCPCS | Performed by: STUDENT IN AN ORGANIZED HEALTH CARE EDUCATION/TRAINING PROGRAM

## 2025-08-07 PROCEDURE — 3078F DIAST BP <80 MM HG: CPT | Performed by: STUDENT IN AN ORGANIZED HEALTH CARE EDUCATION/TRAINING PROGRAM

## 2025-08-07 PROCEDURE — 3017F COLORECTAL CA SCREEN DOC REV: CPT | Performed by: STUDENT IN AN ORGANIZED HEALTH CARE EDUCATION/TRAINING PROGRAM

## 2025-08-07 PROCEDURE — 3074F SYST BP LT 130 MM HG: CPT | Performed by: STUDENT IN AN ORGANIZED HEALTH CARE EDUCATION/TRAINING PROGRAM

## 2025-08-07 PROCEDURE — 99212 OFFICE O/P EST SF 10 MIN: CPT | Performed by: STUDENT IN AN ORGANIZED HEALTH CARE EDUCATION/TRAINING PROGRAM

## 2025-09-02 DIAGNOSIS — K21.00 CHRONIC REFLUX ESOPHAGITIS: ICD-10-CM

## 2025-09-02 DIAGNOSIS — J45.50 SEVERE PERSISTENT ASTHMA WITHOUT COMPLICATION (HCC): ICD-10-CM

## 2025-09-02 DIAGNOSIS — E78.2 MIXED HYPERLIPIDEMIA: ICD-10-CM

## 2025-09-02 DIAGNOSIS — M75.42 IMPINGEMENT SYNDROME OF SHOULDER REGION, LEFT: ICD-10-CM

## 2025-09-02 DIAGNOSIS — M25.552 LEFT HIP PAIN: ICD-10-CM

## 2025-09-02 DIAGNOSIS — J34.3 NASAL TURBINATE HYPERTROPHY: ICD-10-CM

## 2025-09-02 DIAGNOSIS — I10 ESSENTIAL HYPERTENSION: ICD-10-CM

## 2025-09-02 RX ORDER — IBUPROFEN 800 MG/1
800 TABLET, FILM COATED ORAL EVERY 8 HOURS PRN
Qty: 30 TABLET | Refills: 2 | Status: CANCELLED | OUTPATIENT
Start: 2025-09-02

## 2025-09-02 RX ORDER — VALSARTAN 80 MG/1
80 TABLET ORAL DAILY
Qty: 90 TABLET | Refills: 1 | Status: SHIPPED | OUTPATIENT
Start: 2025-09-02

## 2025-09-02 RX ORDER — FENOFIBRATE 48 MG/1
48 TABLET, FILM COATED ORAL DAILY
Qty: 90 TABLET | Refills: 1 | Status: SHIPPED | OUTPATIENT
Start: 2025-09-02

## 2025-09-02 RX ORDER — OMEPRAZOLE 40 MG/1
CAPSULE, DELAYED RELEASE ORAL
Qty: 90 CAPSULE | Refills: 1 | Status: SHIPPED | OUTPATIENT
Start: 2025-09-02

## 2025-09-02 RX ORDER — SENNOSIDES 8.6 MG/1
TABLET ORAL
Qty: 90 TABLET | Refills: 1 | Status: SHIPPED | OUTPATIENT
Start: 2025-09-02

## 2025-09-02 RX ORDER — MELOXICAM 7.5 MG/1
7.5 TABLET ORAL DAILY
Qty: 90 TABLET | Refills: 1 | Status: CANCELLED | OUTPATIENT
Start: 2025-09-02

## 2025-09-02 RX ORDER — HYDROCHLOROTHIAZIDE 25 MG/1
25 TABLET ORAL EVERY MORNING
Qty: 90 TABLET | Refills: 1 | Status: SHIPPED | OUTPATIENT
Start: 2025-09-02

## 2025-09-02 RX ORDER — LORATADINE 10 MG/1
10 TABLET ORAL DAILY
Qty: 90 TABLET | Refills: 1 | Status: SHIPPED | OUTPATIENT
Start: 2025-09-02

## 2025-09-02 RX ORDER — ONDANSETRON 4 MG/1
4 TABLET, ORALLY DISINTEGRATING ORAL EVERY 4 HOURS PRN
Qty: 20 TABLET | Refills: 0 | Status: SHIPPED | OUTPATIENT
Start: 2025-09-02

## (undated) DEVICE — SAVARY-GILLIARD WIRE GUIDE: Brand: SAVARY-GILLIARD

## (undated) DEVICE — NO USE 18 MONTHS GOWN ISOL XL YEL LF

## (undated) DEVICE — SYSTEM WST MGMT AVETA

## (undated) DEVICE — Device

## (undated) DEVICE — TUBING, SUCTION, 9/32" X 20', STRAIGHT: Brand: MEDLINE INDUSTRIES, INC.

## (undated) DEVICE — HYSTEROSCOPE RIGID CORAL AVETA DISP

## (undated) DEVICE — STRAP ARMBRD W1.5XL32IN FOAM STR YET SFT W/ HK AND LOOP

## (undated) DEVICE — SYSTEM ENDOSCP FLUID MGMT CAP AVETA

## (undated) DEVICE — STRAP,POSITIONING,KNEE/BODY,FOAM,4X60": Brand: MEDLINE

## (undated) DEVICE — 1016 S-DRAPE IRRIG POUCH 10/BOX: Brand: STERI-DRAPE™

## (undated) DEVICE — SOLIDIFIER FLD 3.2OZ 3000CC TRAD IN BTL LIQUI-LOC

## (undated) DEVICE — COVER OR TBL W40XL90IN ABSRB STD AND GRIPPY BK SAHARA

## (undated) DEVICE — SOLUTION SCRB 4OZ 2% CHG FOR SURG SCRBBING HND WSH

## (undated) DEVICE — GLOVE SURG SZ 55 CRM LTX FREE POLYISOPRENE POLYMER BEAD ANTI

## (undated) DEVICE — TUBING, SUCTION, 1/4" X 12', STRAIGHT: Brand: MEDLINE